# Patient Record
Sex: FEMALE | Race: WHITE | NOT HISPANIC OR LATINO | Employment: OTHER | URBAN - METROPOLITAN AREA
[De-identification: names, ages, dates, MRNs, and addresses within clinical notes are randomized per-mention and may not be internally consistent; named-entity substitution may affect disease eponyms.]

---

## 2017-02-10 ENCOUNTER — ALLSCRIPTS OFFICE VISIT (OUTPATIENT)
Dept: OTHER | Facility: OTHER | Age: 62
End: 2017-02-10

## 2017-02-10 DIAGNOSIS — G89.4 CHRONIC PAIN SYNDROME: ICD-10-CM

## 2017-02-10 DIAGNOSIS — R73.9 HYPERGLYCEMIA: ICD-10-CM

## 2017-02-10 DIAGNOSIS — J44.9 CHRONIC OBSTRUCTIVE PULMONARY DISEASE (HCC): ICD-10-CM

## 2017-02-10 DIAGNOSIS — E03.9 HYPOTHYROIDISM: ICD-10-CM

## 2017-02-10 DIAGNOSIS — J18.9 PNEUMONIA: ICD-10-CM

## 2017-04-13 ENCOUNTER — ALLSCRIPTS OFFICE VISIT (OUTPATIENT)
Dept: OTHER | Facility: OTHER | Age: 62
End: 2017-04-13

## 2018-01-09 NOTE — RESULT NOTES
Verified Results  (1) CBC/ PLT (NO DIFF) [id number] 08:44AM [email or web address] Order Number: [id number]     Order Number: [id number]     Test Name Result Flag Reference   HEMATOCRIT 50 9 % H 34 8-46 1   HEMOGLOBIN 16 3 g/dL H 11 5-15 4   MCHC 32 0 g/dL  31 4-37 4   MCH 29 0 pg  26 8-34 3   MCV 91 fL  82-98   PLATELET COUNT 152 Thousands/uL  149-390   RBC COUNT 5 62 Million/uL H 3 81-5 12   RDW 16 0 % H 11 6-15 1   WBC COUNT 8 10 Thousand/uL  4 31-10 16   MPV 13 1 fL H 8 9-12 7     (1) COMPREHENSIVE METABOLIC PANEL [id number] 87:32WU [email or web address] Order Number: [id number]      National Kidney Disease Education Program recommendations are as follows:  GFR calculation is accurate only with a steady state creatinine  Chronic Kidney disease less than 60 ml/min/1 73 sq  meters  Kidney failure less than 15 ml/min/1 73 sq  meters  Test Name Result Flag Reference   GLUCOSE,RANDM 110 mg/dL     If the patient is fasting, the ADA then defines impaired fasting glucose as > 100 mg/dL and diabetes as > or equal to 123 mg/dL     SODIUM 142 mmol/L  136-145   POTASSIUM 4 5 mmol/L  3 5-5 3   CHLORIDE 101 mmol/L  100-108   CARBON DIOXIDE 34 mmol/L H 21-32   ANION GAP (CALC) 7 mmol/L  4-13   BLOOD UREA NITROGEN 12 mg/dL  5-25   CREATININE 1 11 mg/dL  0 60-1 30   Standardized to IDMS reference method   CALCIUM 8 4 mg/dL  8 3-10 1   BILI, TOTAL 0 35 mg/dL  0 20-1 00   ALK PHOSPHATAS 107 U/L     ALT (SGPT) 37 U/L  12-78   AST(SGOT) 22 U/L  5-45   ALBUMIN 3 5 g/dL  3 5-5 0   TOTAL PROTEIN 7 7 g/dL  6 4-8 2   eGFR Non-African American 50 5 ml/min/1 73sq m       (1) LIPID PANEL, FASTING [id number] 08:44AM [email or web address] Order Number: [id number]      Triglyceride:         Normal              <150 mg/dl       Borderline High    150-199 mg/dl       High               200-499 mg/dl       Very High          >499 mg/dl  Cholesterol:         Desirable        <200 mg/dl      Borderline High  200-239 mg/dl High             >239 mg/dl  HDL Cholesterol:        High    >59 mg/dL      Low     <41 mg/dL  LDL CALCULATED:    This screening LDL is a calculated result  It does not have the accuracy of the Direct Measured LDL in the monitoring of patients with hyperlipidemia and/or statin therapy  Direct Measure LDL (YTA988) must be ordered separately in these patients  Test Name Result Flag Reference   CHOLESTEROL 166 mg/dL     HDL,DIRECT 38 mg/dL L 40-60   TRIGLYCERIDES 134 mg/dL  <=150     (1) TSH [id number] 08:44AM [facility] Order Number: [id number]    Patients undergoing fluorescein dye angiography may retain small amounts of fluorescein in the body for 48-72 hours post procedure  Samples containing fluorescein can produce falsely depressed TSH values  If the patient had this procedure,a specimen should be resubmitted post fluorescein clearance          The recommended reference ranges for TSH during pregnancy are as follows:  First trimester 0 1 to 2 5 uIU/mL  Second trimester  0 2 to 3 0 uIU/mL  Third trimester 0 3 to 3 0 uIU/m     Test Name Result Flag Reference   TSH 9 278 uIU/mL H 0 358-3 740     (1) T4, FREE [id number] 08:44AM [facility] Order Number: [id number]     Test Name Result Flag Reference   T4,FREE 1 02 ng/dL  0 76-1 46        Pt aware1      1 Amended By: Lalita Andersen; Mar 10 2016 11:22 AM EST    Plan  PMH: History of hyperthyroidism, PMH: Thyroid Surgery Total Thyroidectomy    · Changed: From  Levothyroxine Sodium 100 MCG Oral Tablet TAKE 1 TABLET DAILY AS      DIRECTED To Levothyroxine Sodium 112 MCG Oral Tablet (Synthroid) TAKE 1 TABLET  DAILY    Discussion/Summary   please notify pt that her BW showed her thyroid tests were very elevated indicating she is not on enough thyroid med - will need to increase dose to 112 mcg 1 tab PO q day and recheck BW in 6 wks - will give pt order and review rest of BW at next appt in 6 wks; rx sent to her pharmacy

## 2018-01-11 NOTE — RESULT NOTES
Verified Results  (1) T4, FREE [id number] 11:34AM Conrado Roach     Test Name Result Flag Reference   T4, FREE 1 0 ng/dL  0 8-1 8     (1) CBC/PLT/DIFF [id number] 11:34AM Conrado Roach     Test Name Result Flag Reference   WHITE BLOOD CELL COUNT 11 7 Thousand/uL H 3 8-10 8   RED BLOOD CELL COUNT 5 19 Million/uL H 3 80-5 10   HEMOGLOBIN 15 5 g/dL  11 7-15 5   HEMATOCRIT 48 8 % H 35 0-45 0   MCV 94 0 fL  80 0-100 0   MCH 29 8 pg  27 0-33 0   MCHC 31 7 g/dL L 32 0-36 0   RDW 15 3 % H 11 0-15 0   PLATELET COUNT 712 Thousand/uL  140-400   MPV 10 9 fL  7 5-11 5   ABSOLUTE NEUTROPHILS 8213 cells/uL H 5601-2535   ABSOLUTE LYMPHOCYTES 2539 cells/uL  850-3900   ABSOLUTE MONOCYTES 737 cells/uL  200-950   ABSOLUTE EOSINOPHILS 176 cells/uL     ABSOLUTE BASOPHILS 35 cells/uL  0-200   NEUTROPHILS 70 2 %     LYMPHOCYTES 21 7 %     MONOCYTES 6 3 %     EOSINOPHILS 1 5 %     BASOPHILS 0 3 %       (Q) TSH, 3RD GENERATION [id number] 11:34AM Conrado Roach   REPORT COMMENT:  FASTING:NO     Test Name Result Flag Reference   TSH 4 71 mIU/L H 0 40-4 50        Pt aware  1      1 Amended By: Remington Crane; May 24 2016 3:50 PM EST    Plan  PMH: History of hyperthyroidism, PMH: Thyroid Surgery Total Thyroidectomy    · Changed: From  Levothyroxine Sodium 112 MCG Oral Tablet TAKE 1 TABLET DAILY To  Levothyroxine Sodium 125 MCG Oral Tablet take 1 tablet every day    Discussion/Summary   please notify pt that her thyroid tests have improved but are better - need to increase levothyroxine to 125 mcg 1 tab PO q day and will need to recheck in 6 wks - will give pt order at her appt in July

## 2018-01-11 NOTE — MISCELLANEOUS
Message   Recorded as Task   Date: 05/27/2016 09:11 AM, Created By: Nichelle Cr   Task Name: Medical Complaint Callback   Assigned To: Nichelle Cr   Regarding Patient: Bailey Parekh, Status: Active   Comment:    Tomasa Box - 27 May 2016 9:11 AM     TASK CREATED  Caller: Self; Medical Complaint; [phone] (Home)  Pt woke up with pink eye  Red and crusty  She is unable to come in  Could you call something into [facility]? No need to callback if you are ordeing med  Thanks  Olena Marc - 27 May 2016 11:09 AM     TASK REPLIED TO: Previously Assigned To Olena Marc  rx for sulfacetamide eye gtt sent to [facility] - 27 May 2016 11:21 AM     TASK EDITED  Pt also needs a note to say she was prescirbed the med  (I guess she lives in a group home)  Thanks  Please fax to [phone]  Olena Marc - 27 May 2016 11:45 AM     TASK REPLIED TO: Previously Assigned To Olena Marc  rx written and given to Devonte Dewitt - 27 May 2016 11:54 AM     TASK EDITED  Note faxed and completed  Active Problems    1  Acquired hypothyroidism (244 9) (E03 9)   2  Acute conjunctivitis (372 00) (H10 30)   3  Chronic obstructive pulmonary disease (496) (J44 9)   4  Chronic pain disorder (338 4) (G89 4)   5  Chronic pain of right upper extremity (729 5,338 29) (G89 29,M79 601)   6  Colonoscopy (Fiberoptic) Screening   7  Depression (311) (F32 9)   8  Encounter for PPD test (V74 1) (Z11 1)   9  Encounter for screening mammogram for malignant neoplasm of breast (V76 12)   (Z12 31)   10  Erythrocytosis (289 0) (D75 1)   11  Fibromyalgia (729 1) (M79 7)   12  Fracture of nasal bone (802 0) (S02  2XXA)   13  Gastritis (535 50) (K29 70)   14  History of hyperthyroidism (V12 29) (Z86 39)   15  Hyperglycemia (790 29) (R73 9)   16  Insomnia (780 52) (G47 00)   17  Irritable bowel syndrome with diarrhea (564 1) (K58 0)   18  Osteoporosis screening (V82 81) (Z13 820)   19   Screening for colon cancer (V76 51) (Z12 11)    Current Meds   1  Advair Diskus 250-50 MCG/DOSE Inhalation Aerosol Powder Breath Activated; inhale 1   puff twice daily; Therapy: 65FMP6500 to (Evaluate:61Lul1805)  Requested for: 93UIG0872 Recorded   2  BusPIRone HCl - 10 MG Oral Tablet; 1 tab PO qid; Therapy: 16OIG4357 to Recorded   3  Carisoprodol 250 MG Oral Tablet; 1 tab PO q 12 hrs prn; Therapy: 70Thi1814 to (Evaluate:03Wxq1064); Last Rx:28Apr2016 Ordered   4  Effexor  MG Oral Capsule Extended Release 24 Hour; take 1 capsule by mouth   once daily; Therapy: 11Vsi2913 to Recorded   5  Levothyroxine Sodium 125 MCG Oral Tablet; take 1 tablet every day; Therapy: 62Hgf8011 to (Evaluate:26Tfb9397)  Requested for: 47JPY2562; Last   Rx:24May2016 Ordered   6  Lyrica 200 MG Oral Capsule; 1 tab po tid; Therapy: 28VAP2005 to (Evaluate:46Jci7193); Last Rx:28Apr2016 Ordered   7  Omeprazole 20 MG Oral Capsule Delayed Release; 1 tab po bid; Therapy: 71SAS1889 to Recorded   8  Prochlorperazine Maleate 10 MG Oral Tablet; take 1 tablet by mouth every 8 hours if   needed for nausea; Therapy: 47NGM3071 to (Evaluate:94Trw7353) Recorded   9  Sulfacetamide Sodium 10 % Ophthalmic Solution; INSTILL 2 DROP Every 4 hours As   Directed while awake x 7 days; Therapy: 56GKD2651 to (Last Rx:27May2016)  Requested for: 84GKY2496 Ordered   10  Ventolin  (90 Base) MCG/ACT Inhalation Aerosol Solution; INHALE 2 PUFFS    EVERY 4 HOURS AS NEEDED FOR COUGH AND WHEEZE;    Therapy: 13Exq1545 to (Evaluate:18Mar2016)  Requested for: 35WVA5888 Recorded   11  Voltaren 1 % Transdermal Gel; GEL Top X 30; Therapy: 33SUM7245 to (Evaluate:09Apr2016)  Requested for: 77ZRO9924; Last    Rx:10Mar2016 Ordered    Allergies    1  Levaquin TABS   2   Penicillins    Signatures   Electronically signed by : Antoine Bales DO; May 27 2016 12:02PM EST                       (Author)

## 2018-01-11 NOTE — MISCELLANEOUS
Message   Recorded as Task   Date: 03/28/2016 12:36 PM, Created By: Leatha Neely   Task Name: Medical Complaint Callback   Assigned To: [facility]   Regarding Patient: Alexander Stephens, Status: Active   Comment:    Bela Garcia - 28 Mar 2016 12:36 PM     TASK CREATED  Caller: Self; Medical Complaint; [phone] (Home)  in [facility] 3/26/2016 - records in chart    fell out of bed - stitches in head, broken nose    was given #16 vicodin    still in a lot of pain, asking for more vicodin    pcb   Olena Marc - 28 Mar 2016 1:05 PM     TASK REPLIED TO: Previously Assigned To Yari Anderson  rx for hydrocodone written for 7 days but no further refills will be given at all - rx given to Marivel Beauchamp - 28 Mar 2016 1:16 PM     TASK REPLIED TO: Previously Assigned To [address] pt rx for 7 days can be picked up and we will not fill for anymore once it is gone        Active Problems    1  Acquired hypothyroidism (244 9) (E03 9)   2  Chronic obstructive pulmonary disease (496) (J44 9)   3  Chronic pain disorder (338 4) (G89 4)   4  Depression (311) (F32 9)   5  Fibromyalgia (729 1) (M79 7)   6  Fracture of nasal bone (802 0) (S02  2XXA)   7  Gastritis (535 50) (K29 70)   8  History of hyperthyroidism (V12 29) (Z86 39)   9  Insomnia (780 52) (G47 00)   10  Irritable bowel syndrome with diarrhea (564 1) (K58 0)    Current Meds   1  Advair Diskus 250-50 MCG/DOSE Inhalation Aerosol Powder Breath Activated; inhale 1   puff twice daily; Therapy: 23ISF8450 to (Evaluate:70Lwj0132)  Requested for: 35IKV2798 Recorded   2  BusPIRone HCl - 10 MG Oral Tablet; 1 tab PO qid; Therapy: 02CUB0284 to Recorded   3  Hydrocodone-Acetaminophen 5-325 MG Oral Tablet; 1 tab PO q 6 hrs prn severe pain; Therapy: 15YGM8550 to (Evaluate:04Apr2016); Last Rx:28Mar2016 Ordered   4  Levothyroxine Sodium 112 MCG Oral Tablet; TAKE 1 TABLET DAILY;    Therapy: 33Ufx8782 to (Evaluate:30Bmd0298) Requested for: 31KGJ6130; Last   Rx:06Mar2016 Ordered   5  Lyrica 100 MG Oral Capsule; 1 tab PO qid; Therapy: 60CBV9634 to (Evaluate:27Apr2016); Last Rx:28Jan2016 Ordered   6  Omeprazole 20 MG Oral Capsule Delayed Release; 1 tab po bid; Therapy: 92HMW6251 to Recorded   7  PARoxetine HCl - 40 MG Oral Tablet; take 1 tab PO q day; Therapy: 78WFO2861 to (Evaluate:20Ccg7923)  Requested for: 08CKM1267 Recorded   8  Prochlorperazine Maleate 10 MG Oral Tablet; take 1 tablet by mouth every 8 hours if   needed for nausea; Therapy: 74SZJ7662 to (Evaluate:71Bjp3784) Recorded   9  Ventolin  (90 Base) MCG/ACT Inhalation Aerosol Solution; INHALE 2 PUFFS   EVERY 4 HOURS AS NEEDED FOR COUGH AND WHEEZE;   Therapy: 14Apr2011 to (Evaluate:18Mar2016)  Requested for: 75FNC3809 Recorded   10  Voltaren 1 % Transdermal Gel; GEL Top X 30; Therapy: 11WVV4810 to (Evaluate:09Apr2016)  Requested for: 12FBG9171; Last    Rx:10Mar2016 Ordered    Allergies    1  Levaquin TABS   2   Penicillins    Signatures   Electronically signed by : Sarah Manzo DO; Mar 28 2016  1:26PM EST                       (Author)

## 2018-01-12 NOTE — MISCELLANEOUS
Provider Comments  Provider Comments:   pt no showed for today's appt - task will be sent to staff to call and have pt reschedule      Signatures   Electronically signed by : Maricarmen Mar DO; Dec 15 2016 12:15PM EST                       (Author)

## 2018-01-12 NOTE — MISCELLANEOUS
Message   Recorded as Task   Date: 08/04/2016 02:14 PM, Created By: Cristobal Landers   Task Name: Medical Complaint Callback   Assigned To: Cristobal Landers   Regarding Patient: Merlin De La O, Status: Active   CommentRafael Yates - 04 Aug 2016 2:14 PM     TASK CREATED  Caller: Self; Medical Complaint; [phone] (Home)  Pt is on oxydocodone 5/325-one every 12 hours  She wants to know if she could take it every 8 hours  The ankle hurts so bad---she sees the surgeon on Tuesday  She just can't take the pain  If this is OK, please send new directions and script to ----[phone]  [phone]  Olena Marc - 04 Aug 2016 2:43 PM     TASK REPLIED TO: Previously Assigned To Olena Marc                      no con't current Oxy dose until she see's surgeon   Tomasa Box - 04 Aug 2016 3:13 PM     TASK EDITED  She wants to stay on two a day-but take it at [address]  She takes soma to sleep  If this is ok she needs new directions sent over  Please advise  Wisam West - 04 Aug 2016 3:34 PM     TASK REPLIED TO: Previously Assigned To Wisam West            note written and given to Ida Adhikari - 04 Aug 2016 3:37 PM     TASK EDITED  New note faxed and Pt aware        Active Problems    1  Acquired hypothyroidism (244 9) (E03 9)   2  Chronic obstructive pulmonary disease (496) (J44 9)   3  Chronic pain disorder (338 4) (G89 4)   4  Chronic pain of right upper extremity (729 5,338 29) (G89 29,M79 601)   5  Colonoscopy (Fiberoptic) Screening   6  Depression (311) (F32 9)   7  Encounter for PPD test (V74 1) (Z11 1)   8  Encounter for screening mammogram for malignant neoplasm of breast (V76 12)   (Z12 31)   9  Erythrocytosis (289 0) (D75 1)   10  Fibromyalgia (729 1) (M79 7)   11  Fracture of nasal bone (802 0) (S02  2XXA)   12  Gastritis (535 50) (K29 70)   13  History of hyperthyroidism (V12 29) (Z86 39)   14  Hyperglycemia (790 29) (R73 9)   15  Insomnia (191 52) (G47 00)   16   Irritable bowel syndrome with diarrhea (564 1) (K58 0)   17  Nicotine dependence (305 1) (F17 200)   18  Osteoporosis screening (V82 81) (Z13 820)   19  Primary localized osteoarthritis of ankle, right (715 17) (M19 071)   20  Right ankle pain (719 47) (M25 571)   21  Screening for colon cancer (V76 51) (Z12 11)    Current Meds   1  Acetaminophen 500 MG Oral Tablet; Therapy: (Recorded:01Jun2016) to Recorded   2  Adderall 20 MG Oral Tablet; TAKE 1 TABLET TWICE DAILY; Therapy: (Recorded:01Jun2016) to Recorded   3  Advair Diskus 250-50 MCG/DOSE Inhalation Aerosol Powder Breath Activated; inhale 1   puff twice daily; Therapy: 39BIM0599 to (Evaluate:27Axg8360)  Requested for: 29Jul2016; Last   Rx:01Egc6315 Ordered   4  Bentyl 10 MG Oral Capsule; take 1 capsule 4 times daily; Therapy: (Recorded:01Jun2016) to Recorded   5  BusPIRone HCl - 10 MG Oral Tablet; 1 tab PO qid; Therapy: 21DPJ7694 to Recorded   6  Carisoprodol 250 MG Oral Tablet; 1 tab PO q 12 hrs prn; Therapy: 28Apr2016 to (Evaluate:27Oct2016); Last Rx:23Cyp7480 Ordered   7  Effexor  MG Oral Capsule Extended Release 24 Hour; 150mg and 75 mg taken   by mouth once daily; Therapy: 28Apr2016 to Recorded   8  KlonoPIN 1 MG Oral Tablet; TAKE 1 TABLET 3 TIMES DAILY; Therapy: (Recorded:01Jun2016) to Recorded   9  Levothyroxine Sodium 125 MCG Oral Tablet; 125 mcg 1 tab PO Mon, Wed, Fri, Sun and   con't 112 mcg 1 tab on Tue, Thurs, Sat; Therapy: 27Lpi2893 to (Evaluate:76Bol4058)  Requested for: 29Jul2016 Recorded   10  Lyrica 225 MG Oral Capsule; 1 tab po tid; Therapy: 22FHO0872 to (Evaluate:27Oct2016); Last Rx:74Ppw8070 Ordered   11  Omeprazole 20 MG Oral Capsule Delayed Release; 1 tab po bid; Therapy: 68QSN6484 to Recorded   12  Oxycodone-Acetaminophen 5-325 MG Oral Tablet; 1 tab PO q 8-12 hrs prn severe pain; Therapy: 77PSY8775 to (Evaluate:10Aug2016); Last Rx:37Jmn8344 Ordered   13   Prochlorperazine Maleate 10 MG Oral Tablet; take 1 tablet by mouth every 8 hours if    needed for nausea; Therapy: 14CKE6328 to (Evaluate:36Dww1443) Recorded   14  Sulfacetamide Sodium 10 % Ophthalmic Solution; INSTILL 2 DROP Every 4 hours As    Directed while awake x 7 days; Therapy: 63RDN5322 to (Last Rx:27May2016)  Requested for: 51UCG7535 Ordered   15  Synthroid 112 MCG Oral Tablet; TAKE 1 TABLET DAILY AS DIRECTED; Therapy: (Recorded:01Jun2016) to Recorded   16  Ventolin  (90 Base) MCG/ACT Inhalation Aerosol Solution; INHALE 2 PUFFS    EVERY 4 HOURS AS NEEDED FOR COUGH AND WHEEZE;    Therapy: 50Tud3644 to (Evaluate:18Mar2016)  Requested for: 01CAZ9397 Recorded   17  Voltaren 1 % Transdermal Gel; GEL Top X 30; Therapy: 29BGX1093 to (Evaluate:04Sba3519)  Requested for: 15YBX1181; Last    Rx:35Apa7089 Ordered    Allergies    1  Levaquin TABS   2   Penicillins    Signatures   Electronically signed by : Jessica Alarcon DO; Aug  4 2016  3:42PM EST                       (Author)

## 2018-01-13 NOTE — PROGRESS NOTES
Assessment   1  Encounter for preventive health examination (V70 0) (Z00 00)1   2  Chronic pain disorder (338 4) (G89 4)  3  Chronic obstructive pulmonary disease (496) (J44 9)  4  Depression (311) (F32 9)  5  Acquired hypothyroidism (244 9) (E03 9)  6  Fibromyalgia (729 1) (M79 7)     1 Amended By: Dania Nair; Mar 04 2016 10:33 AM EST    Discussion/Summary    Completed forms to be faxed  add voltaren gel to see if this help for her arm pain  advised need for pain mgt  advised f/u with psychiatry as well  continue same medications for now      will need to again encourage preventative measures in future visits    Impression: Initial Annual Wellness Visit  Cardiovascular screening and counseling: screening is current  Diabetes screening and counseling: screening is current  Colorectal cancer screening and counseling: the patient declines screening  Cervical cancer screening and counseling: the patient declines screening  Osteoporosis screening and counseling: the patient declines screening  Abdominal aortic aneurysm screening and counseling: screening not indicated  Glaucoma screening and counseling: the risks and benefits of screening were discussed and screening is current  HIV screening and counseling: screening not indicated  Advance Directive Planning: not complete, she was encouraged to follow-up with me to discuss her questions and/or decisions  Patient Discussion: follow-up visit needed in 1 month as scheduled  Chief Complaint  AWV/physical      History of Present Illness  HPI: patient here needing a formal physical examination  with forms faxed to [employer]  Was recently seen for multiple chronic conditions  She is currently looking for pain mgt as well as psychiatry  She reports h/o severe right arm pain due to previous injury and surgeries  Already on lyrica 100 QID  Reports this is not helping  At some point she was on multiple narcotics for this  She has not been able to see pain mgt again/yet  reviewed AWV questions   Welcome to Medicare and Wellness Visits: The patient is being seen for the initial annual wellness visit  Medicare Screening and Risk Factors   Hospitalizations: no previous hospitalizations  Medicare Screening Tests Risk Questions   Abdominal aortic aneurysm risk assessment: none indicated  Osteoporosis risk assessment: , female gender, over 48years of age and tobacco use  HIV risk assessment: none indicated  Drug and Alcohol Use: The patient is a current cigarette smoker  She wants resources to help with quitting and wants treatment to quit tobacco use  The patient reports never drinking alcohol  She has never used illicit drugs  Diet and Physical Activity: Current diet includes low carbohydrate food choices, low salt food choices, 1 cups of coffee per day and 2-3 cups of tea per day  She exercises daily  Exercise: walking 20 minutes per day  Mood Disorder and Cognitive Impairment Screening: PHQ-9 Depression Scale   Over the past 2 weeks, how often have you been bothered by the following problems? 1 ) Little interest or pleasure in doing things? Several days  2 ) Feeling down, depressed or hopeless? Several days  3 ) Trouble falling asleep or sleeping too much? Nearly every day  4 ) Feeling tired or having little energy? Nearly every day  5 ) Poor appetite or overeating? Nearly every day  6 ) Feeling bad about yourself, or that you are a failure, or have let yourself or your family down? Not at all    7 ) Trouble concentrating on things, such as reading a newspaper or watching television? Half the days or more  8 ) Moving or speaking so slowly that other people could have noticed, or the opposite, moving or speaking faster than usual? Not at all    9 ) Thoughts that you would be off dead or of hurting yourself in some way? Not at all     Functional Ability/Level of Safety: Hearing is a hearing aid is not used  The patient is currently able to do activities of daily living without limitations and able to do instrumental activities of daily living without limitations  Activities of daily living details: transportation help needed  Fall risk factors:  previous fall and walking in snow, but The patient fell 1 times in the past 12 months  Advance Directives: Advance directives: no living will, no durable power of  for health care directives and no advance directives  Co-Managers and Medical Equipment/Suppliers: See Patient Care Team      Review of Systems    Constitutional: negative  Head and Face: negative  ENT: negative  Cardiovascular: negative  Respiratory: negative  Gastrointestinal: negative  Genitourinary: negative  Musculoskeletal: as noted in HPI  Active Problems   1  Acquired hypothyroidism (244 9) (E03 9)  2  Chronic obstructive pulmonary disease (496) (J44 9)  3  Chronic pain disorder (338 4) (G89 4)  4  Depression (311) (F32 9)  5  Fibromyalgia (729 1) (M79 7)  6  Gastritis (535 50) (K29 70)  7  History of hyperthyroidism (V12 29) (Z86 39)  8  Insomnia (780 52) (G47 00)  9  Irritable bowel syndrome with diarrhea (564 1) (K58 0)    Past Medical History    · History of hyperthyroidism (V12 29) (Z86 39)   · History of thyroid nodule (V12 29) (Z86 39)   · History of Non-traumatic compartment syndrome of right upper extremity (729 71)  (M79 A11)    The active problems and past medical history were reviewed and updated today        Surgical History    · History of Ankle Surgery   · History of Biopsy Thyroid Using Percutaneous Core Needle   · History of  Section   · History of Forearm Decompression Fasciotomy   · History of Hysterectomy (V88 01)   · History of Knee Surgery   · History of Thyroid Surgery Total Thyroidectomy    Family History    · Family history of Diabetes (250 00) (E11 9)   · Family history of lung cancer (V16 1) (Z80 1)    · Family history unknown (V49 89) (Z78 9)    · Family history of Diabetes (250 00) (E11 9)    · Family history of malignant neoplasm of testis (V16 43) (Z80 43)    The family history was reviewed and updated today  Social History    · Current every day smoker (305 1) (F17 200)   ·    · No drug use   · Non drinker / no alcohol use  The social history was reviewed and updated today  Current Meds  1  Advair Diskus 250-50 MCG/DOSE Inhalation Aerosol Powder Breath Activated; inhale 1   puff twice daily; Therapy: 67SWA4391 to (Evaluate:52Irb8893)  Requested for: 92IYY7616 Recorded  2  BusPIRone HCl - 10 MG Oral Tablet; 1 tab PO qid; Therapy: 37FCU1393 to Recorded  3  Levothyroxine Sodium 100 MCG Oral Tablet; TAKE 1 TABLET DAILY AS     DIRECTED; Therapy: 06Ulb4074 to ([phone])  Requested for: 45OCC5883; Last   Rx:28Jan2016 Ordered  4  Lyrica 100 MG Oral Capsule; 1 tab PO qid; Therapy: 21FTR7716 to (Evaluate:39Uuc7950); Last Rx:28Jan2016 Ordered  5  Omeprazole 20 MG Oral Capsule Delayed Release; 1 tab po bid; Therapy: 56DOU8435 to Recorded  6  PARoxetine HCl - 40 MG Oral Tablet; take 1 tab PO q day; Therapy: 01QEV9652 to (Evaluate:96Ojm1125)  Requested for: 85YBF7774 Recorded  7  Prochlorperazine Maleate 10 MG Oral Tablet; take 1 tablet by mouth every 8 hours if   needed for nausea; Therapy: 86IUG3474 to (Evaluate:57Qav2399) Recorded  8  Ventolin  (90 Base) MCG/ACT Inhalation Aerosol Solution; INHALE 2 PUFFS   EVERY 4 HOURS AS NEEDED FOR COUGH AND WHEEZE;   Therapy: 30Ioe4755 to (Evaluate:43Csu5270)  Requested for: 78RTE1017 Recorded    Allergies   1  Levaquin TABS  2   Penicillins    Vitals  Signs [Data Includes: Current Encounter]    Temperature: 98 9 F, Tympanic  Heart Rate: 80, L Radial  Pulse Quality: Norm  Respiration: 16  Respiration Quality: Norm  Systolic: 560, LUE, Sitting  Diastolic: 66, LUE, Sitting  Height: 5 ft 5 in  Weight: 207 lb 2 08 oz  BMI Calculated: 34 47  BSA Calculated: 2 01    Physical Exam    Constitutional   General appearance: No acute distress, well appearing and well nourished  Head and Face   Head and face: Normal     Palpation of the face and sinuses: No sinus tenderness  Eyes   Conjunctiva and lids: No swelling, erythema or discharge  Pupils and irises: Equal, round, reactive to light  Neck   Neck: Supple, symmetric, trachea midline, no masses  Thyroid: Normal, no thyromegaly  Pulmonary   Respiratory effort: No increased work of breathing or signs of respiratory distress  Palpation of chest: Normal     Auscultation of lungs: Clear to auscultation  Cardiovascular   Auscultation of heart: Normal rate and rhythm, normal S1 and S2, no murmurs  Peripheral vascular exam: Normal     Examination of extremities for edema and/or varicosities: Normal     Abdomen   Abdomen: Non-tender, no masses  Liver and spleen: No hepatomegaly or splenomegaly  Lymphatic   Palpation of lymph nodes in neck: No lymphadenopathy  Constitutional   General appearance: No acute distress, well appearing and well nourished  Eyes   Conjunctiva and lids: No swelling, erythema or discharge  Ears, Nose, Mouth, and Throat   External inspection of ears and nose: Normal     Otoscopic examination: Abnormal   R TM occluded by cerumen  Oropharynx: Normal with no erythema, edema, exudate or lesions  upper and lower dentures  Pulmonary   Respiratory effort: No increased work of breathing or signs of respiratory distress  Auscultation of lungs: Clear to auscultation  Cardiovascular   Auscultation of heart: Normal rate and rhythm, normal S1 and S2, without murmurs  Abdomen generalized tenderness; no rebound or guarding  Liver and spleen: No hepatomegaly or splenomegaly  Lymphatic   Palpation of lymph nodes in neck: No lymphadenopathy  Skin scar R forearm     Psychiatric   Orientation to person, place, and time: Normal     Mood and affect: Normal  Future Appointments    Date/Time Provider Specialty Site   04/28/2016 09:00 AM Katherine Briscoe DO Internal Medicine Alok Drew MD     Signatures   Electronically signed by : Louise Madsen MD; Mar  4 2016 10:33AM EST                       (Author)

## 2018-01-14 VITALS
WEIGHT: 212 LBS | HEIGHT: 65 IN | HEART RATE: 92 BPM | SYSTOLIC BLOOD PRESSURE: 120 MMHG | TEMPERATURE: 97.5 F | DIASTOLIC BLOOD PRESSURE: 70 MMHG | BODY MASS INDEX: 35.32 KG/M2

## 2018-01-15 NOTE — MISCELLANEOUS
Message   Recorded as Task   Date: 05/05/2016 12:42 PM, Created By: Gagan Murcia   Task Name: Medical Complaint Callback   Assigned To: Jordan Mcgarry   Regarding Patient: Kamar Conley, Status: Active   Comment:    Bela Garcia - 05 May 2016 12:42 PM     TASK CREATED  Caller: Self; Medical Complaint; [phone] (Home)  feeling of needing to urinate, but cannot go/or goes only a small amountonce getting into bathroom -  no burning or foul odor    still with pain in ankle and leg, radiating up the leg   John PaulmarcusTomasa - 05 May 2016 1:17 PM     TASK EDITED  Pt called  I called her back and she said she knows she doesn't have a UTI--shes had one before and its not like that  She went just a couple minutes before I called her back  She did have her pshych doctor up her anxiety pills but she is doing OK with that  She didn't want an appt today or even next week with you  She will call back if it gets worse or her pain gets worse  Just an FYI>        Active Problems    1  Acquired hypothyroidism (244 9) (E03 9)   2  Chronic obstructive pulmonary disease (496) (J44 9)   3  Chronic pain disorder (338 4) (G89 4)   4  Chronic pain of right upper extremity (729 5,338 29) (G89 29,M79 601)   5  Colonoscopy (Fiberoptic) Screening   6  Depression (311) (F32 9)   7  Encounter for PPD test (V74 1) (Z11 1)   8  Encounter for screening mammogram for malignant neoplasm of breast (V76 12)   (Z12 31)   9  Erythrocytosis (289 0) (D75 1)   10  Fibromyalgia (729 1) (M79 7)   11  Fracture of nasal bone (802 0) (S02  2XXA)   12  Gastritis (535 50) (K29 70)   13  History of hyperthyroidism (V12 29) (Z86 39)   14  Hyperglycemia (790 29) (R73 9)   15  Insomnia (780 52) (G47 00)   16  Irritable bowel syndrome with diarrhea (564 1) (K58 0)   17  Osteoporosis screening (V82 81) (Z13 820)   18  Screening for colon cancer (V76 51) (Z12 11)    Current Meds   1   Advair Diskus 250-50 MCG/DOSE Inhalation Aerosol Powder Breath Activated; inhale 1 puff twice daily; Therapy: 40KYL4216 to (Evaluate:12Dsz0618)  Requested for: 85TDS5022 Recorded   2  BusPIRone HCl - 10 MG Oral Tablet; 1 tab PO qid; Therapy: 29AYY5001 to Recorded   3  Carisoprodol 250 MG Oral Tablet (Soma); 1 tab PO q 12 hrs prn; Therapy: 49Ljp1181 to (Evaluate:38Fmv4611); Last Rx:28Apr2016 Ordered   4  Effexor  MG Oral Capsule Extended Release 24 Hour (Venlafaxine HCl ER); take   1 capsule by mouth once daily; Therapy: 45Tze4810 to Recorded   5  Levothyroxine Sodium 112 MCG Oral Tablet; TAKE 1 TABLET DAILY; Therapy: 61Njs1658 to (Evaluate:72Mlx6096)  Requested for: 73LVV5828; Last   Rx:06Mar2016 Ordered   6  Lyrica 200 MG Oral Capsule; 1 tab po tid; Therapy: 70TBU7329 to (Evaluate:40Opo2935); Last Rx:28Apr2016 Ordered   7  Omeprazole 20 MG Oral Capsule Delayed Release; 1 tab po bid; Therapy: 66HLM0633 to Recorded   8  Prochlorperazine Maleate 10 MG Oral Tablet; take 1 tablet by mouth every 8 hours if   needed for nausea; Therapy: 86RXS8720 to (Evaluate:49Qoa9162) Recorded   9  Ventolin  (90 Base) MCG/ACT Inhalation Aerosol Solution; INHALE 2 PUFFS   EVERY 4 HOURS AS NEEDED FOR COUGH AND WHEEZE;   Therapy: 13Vfv7814 to (Evaluate:18Mar2016)  Requested for: 67SWR6255 Recorded   10  Voltaren 1 % Transdermal Gel (Diclofenac Sodium); GEL Top X 30; Therapy: 79FAY6553 to (Evaluate:09Apr2016)  Requested for: 59AMO2113; Last    Rx:10Mar2016 Ordered    Allergies    1  Levaquin TABS   2   Penicillins    Signatures   Electronically signed by : Blessing Mota DO; May  8 2016 12:55PM EST                       (Author)

## 2018-01-15 NOTE — MISCELLANEOUS
Message   Recorded as Task   Date: 06/13/2016 11:07 AM, Created By: Cindy Jimenez   Task Name: Med Renewal   Assigned To: [address] [place] [address]   Regarding Patient: Bert Hansen, Status: Active   Comment:    Cindy Jimenez - 13 Jun 2016 11:07 AM     TASK CREATED  please renew to [facility], has apt with ortho 07/08/16, would like something stronger than tramadol if she can, please advise, pcb when ready   Arleen Morfin - 13 Jun 2016 3:20 PM     TASK REPLIED TO: Previously Assigned To Lalito Mckenna  Patient is aware you are not in 06/13/2016  Spoke with patient and states she would have to call her ortho specialist for a change of dose of tramadol  Patient states she called and was told she has an appointment 07/08/2016 with a surgeon and she should ask her PCP for a stronger dose  Told patient her ortho is the ordering physician of tramadol and that is unusual they would have her call her PCP and not change the dose  Please Advise  Olena Marc - 13 Jun 2016 8:43 PM     TASK REPLIED TO: Previously Assigned To Olena Marc  with all of her other meds I do not feel increasing the dose of the Tramadol is appropriate - will con't current meds and f/u wtith Jess Form - 14 Jun 2016 8:56 AM     TASK REPLIED TO: Previously Assigned To [address] [place] [address]  Pt aware        Active Problems    1  Acquired hypothyroidism (244 9) (E03 9)   2  Acute conjunctivitis (372 00) (H10 30)   3  Chronic obstructive pulmonary disease (496) (J44 9)   4  Chronic pain disorder (338 4) (G89 4)   5  Chronic pain of right upper extremity (729 5,338 29) (G89 29,M79 601)   6  Colonoscopy (Fiberoptic) Screening   7  Depression (311) (F32 9)   8  Encounter for PPD test (V74 1) (Z11 1)   9  Encounter for screening mammogram for malignant neoplasm of breast (V76 12)   (Z12 31)   10  Erythrocytosis (289 0) (D75 1)   11  Fibromyalgia (729 1) (M79 7)   12   Fracture of nasal bone (802 0) (S02 2XXA)   13  Gastritis (535 50) (K29 70)   14  History of hyperthyroidism (V12 29) (Z86 39)   15  Hyperglycemia (790 29) (R73 9)   16  Insomnia (780 52) (G47 00)   17  Irritable bowel syndrome with diarrhea (564 1) (K58 0)   18  Osteoporosis screening (V82 81) (Z13 820)   19  Primary localized osteoarthritis of ankle, right (715 17) (M19 071)   20  Right ankle pain (719 47) (M25 571)   21  Screening for colon cancer (V76 51) (Z12 11)    Current Meds   1  Acetaminophen 500 MG Oral Tablet; Therapy: (Recorded:01Jun2016) to Recorded   2  Adderall 20 MG Oral Tablet; TAKE 1 TABLET TWICE DAILY; Therapy: (Recorded:01Jun2016) to Recorded   3  Advair Diskus 250-50 MCG/DOSE Inhalation Aerosol Powder Breath Activated; inhale 1   puff twice daily; Therapy: 41XHG8238 to (Evaluate:17Mjh5572)  Requested for: 61SFO1617 Recorded   4  Bentyl 10 MG Oral Capsule; take 1 capsule 4 times daily; Therapy: (Recorded:01Jun2016) to Recorded   5  BusPIRone HCl - 10 MG Oral Tablet; 1 tab PO qid; Therapy: 22WZW2068 to Recorded   6  Carisoprodol 250 MG Oral Tablet; 1 tab PO q 12 hrs prn; Therapy: 28Apr2016 to (Evaluate:53Hxf3701); Last Rx:28Apr2016 Ordered   7  Effexor  MG Oral Capsule Extended Release 24 Hour; 150mg and 75 mg taken   by mouth once daily; Therapy: 28Apr2016 to Recorded   8  KlonoPIN 1 MG Oral Tablet; TAKE 1 TABLET 3 TIMES DAILY; Therapy: (Recorded:01Jun2016) to Recorded   9  Levothyroxine Sodium 125 MCG Oral Tablet; take 1 tablet every day; Therapy: 48Brg2220 to (Evaluate:89Ahm5774)  Requested for: 07DTI5511; Last   Rx:61Uor9352 Ordered   10  Lyrica 200 MG Oral Capsule; 1 tab po tid; Therapy: 73VBX3479 to (Evaluate:51Hen2866); Last Rx:28Apr2016 Ordered   11  Omeprazole 20 MG Oral Capsule Delayed Release; 1 tab po bid; Therapy: 48VIB3189 to Recorded   12  Prochlorperazine Maleate 10 MG Oral Tablet; take 1 tablet by mouth every 8 hours if    needed for nausea;     Therapy: 51DZL9531 to (Evaluate:27Feb2016) Recorded   13  Sulfacetamide Sodium 10 % Ophthalmic Solution; INSTILL 2 DROP Every 4 hours As    Directed while awake x 7 days; Therapy: 40JXV4397 to (Last Rx:27May2016)  Requested for: 97GXZ9843 Ordered   14  Synthroid 112 MCG Oral Tablet; TAKE 1 TABLET DAILY AS DIRECTED; Therapy: (Recorded:01Jun2016) to Recorded   15  TraMADol HCl - 50 MG Oral Tablet; TAKE 1 TABLET EVERY 8 HOURS AS NEEDED; Therapy: 67XII0311 to (Evaluate:11Jun2016); Last Rx:01Jun2016 Ordered   16  Ventolin  (90 Base) MCG/ACT Inhalation Aerosol Solution; INHALE 2 PUFFS    EVERY 4 HOURS AS NEEDED FOR COUGH AND WHEEZE;    Therapy: 14Apr2011 to (Evaluate:18Mar2016)  Requested for: 07WOV2992 Recorded   17  Voltaren 1 % Transdermal Gel; GEL Top X 30; Therapy: 61WCA2180 to (Evaluate:24Sep2016)  Requested for: 55AFP0670; Last    Rx:27May2016 Ordered    Allergies    1  Levaquin TABS   2   Penicillins    Signatures   Electronically signed by : Ihsan Diamond DO; Jun 14 2016 12:24PM EST                       (Author)

## 2018-01-16 NOTE — MISCELLANEOUS
Provider Comments  Provider Comments:   pt no showed for today's appt; task will be sent to staff to call pt and have her do BW and reschedule appt      Signatures   Electronically signed by : Rankin Angelucci, DO;  Apr 13 2017  1:44PM EST                       (Author)

## 2018-01-17 NOTE — MISCELLANEOUS
Message   Recorded as Task   Date: 09/27/2016 03:31 PM, Created By: Shamika Meadows   Task Name: Call Back   Assigned To: [address] [place] [address]   Regarding Patient: Bailey Parekh, Status: Active   Comment:    Shamika Meadows - 27 Sep 2016 3:31 PM     TASK CREATED  Patient calling because she is home bound right now from ankle surgery  She is in need of blood work to check her thyroid so she would like for the nurses to draw her blood when they come in for a visit  Jovanna Bennett can be reached at [phone]  Tomasa Box - 27 Sep 2016 3:36 PM     TASK REASSIGNED: Previously Assigned To [address] [place] [address]  Please advise  (she is suppose to come in on Thursday at 9:00---I wonder if she is going to cancel?)   Olena Marc - 27 Sep 2016 4:44 PM     TASK REPLIED TO: Previously Assigned To Marcin Lanier                      she was given order at last appt for FBS/A1C and TSH/FT4 - I reprinted these orders - will print on front printer as two separate orders - please make sure pt gets both orders to do before f/u appt - ensure she is keeping that appt on Thursday - if not able to make it for  1 mo out when she should be back on her feet (40 Min)   Tomasa Box - 27 Sep 2016 4:54 PM     TASK EDITED  Pt aware  Orders are mailed out to her  She rescheduled the appt for thursday to 10/28  Active Problems    1  Acquired hypothyroidism (244 9) (E03 9)   2  Chronic obstructive pulmonary disease (496) (J44 9)   3  Chronic pain disorder (338 4) (G89 4)   4  Chronic pain of right upper extremity (729 5,338 29) (G89 29,M79 601)   5  Colonoscopy (Fiberoptic) Screening   6  Depression (311) (F32 9)   7  Encounter for PPD test (V74 1) (Z11 1)   8  Encounter for screening mammogram for malignant neoplasm of breast (V76 12)   (Z12 31)   9  Erythrocytosis (289 0) (D75 1)   10  Fibromyalgia (729 1) (M79 7)   11  Fracture of nasal bone (802 0) (S02  2XXA)   12  Gastritis (535 50) (K29 70)   13  History of hyperthyroidism (V12 29) (Z86 39)   14  History of nicotine dependence (V15 82) (Z87 891)   15  Hyperglycemia (790 29) (R73 9)   16  Insomnia (780 52) (G47 00)   17  Irritable bowel syndrome with diarrhea (564 1) (K58 0)   18  Osteoporosis screening (V82 81) (Z13 820)   19  Preoperative clearance (V72 84) (Z01 818)   20  Primary localized osteoarthritis of ankle, right (715 17) (M19 071)   21  Rhinitis (472 0) (J31 0)   22  Right ankle pain (719 47) (M25 571)   23  Screening for colon cancer (V76 51) (Z12 11)   24  Vertigo (780 4) (R42)    Current Meds   1  Acetaminophen 500 MG Oral Tablet; Therapy: (Recorded:01Jun2016) to Recorded   2  Adderall 20 MG Oral Tablet; TAKE 1 TABLET TWICE DAILY; Therapy: (Recorded:01Jun2016) to Recorded   3  Advair Diskus 250-50 MCG/DOSE Inhalation Aerosol Powder Breath Activated; inhale 1   puff twice daily; Therapy: 54OZD5882 to (Evaluate:60Fck1436)  Requested for: 60Cfp9890; Last   Rx:05Uul7020 Ordered   4  Bentyl 10 MG Oral Capsule; take 1 capsule 4 times daily; Therapy: (Recorded:01Jun2016) to Recorded   5  BusPIRone HCl - 10 MG Oral Tablet; 1 tab PO qid; Therapy: 71IXZ1858 to Recorded   6  Carisoprodol 250 MG Oral Tablet; 1 tab PO q 12 hrs prn; Therapy: 28Apr2016 to (Evaluate:27Oct2016); Last Rx:71Kpg2368 Ordered   7  Effexor  MG Oral Capsule Extended Release 24 Hour; 150mg and 75 mg taken   by mouth once daily; Therapy: 28Apr2016 to Recorded   8  Fluticasone Propionate 50 MCG/ACT Nasal Suspension; USE 2 SPRAYS IN EACH   NOSTRIL ONCE DAILY; Therapy: 90Xlp9841 to (Evaluate:42Xdk5486)  Requested for: 32Xrf5702; Last   Rx:34Gld9991 Ordered   9  KlonoPIN 1 MG Oral Tablet; TAKE 1 TABLET 3 TIMES DAILY; Therapy: (Recorded:01Jun2016) to Recorded   10  Levothyroxine Sodium 125 MCG Oral Tablet; 125 mcg 1 tab PO Mon, Wed, Fri, Sun and    con't 112 mcg 1 tab on Tue, Thurs, Sat;     Therapy: 89Jes2518 to (Evaluate:25Shf0252)  Requested for: 61Gkx1146 Recorded 11  Lyrica 225 MG Oral Capsule; 1 tab po tid; Therapy: 68ASR6700 to (Evaluate:27Oct2016); Last Rx:16Qhh8502 Ordered   12  Meclizine HCl - 25 MG Oral Tablet; TAKE 1 TABLET BY MOUTH EVERY 8 HOURS as    needed for dizziness; Therapy: 02Klz6453 to ([facility])  Requested for: 39Jfy3576; Last    Rx:59Qiw7795 Ordered   13  Omeprazole 20 MG Oral Capsule Delayed Release; 1 tab po bid; Therapy: 26HDC0168 to Recorded   14  Oxycodone-Acetaminophen 5-325 MG Oral Tablet; 1 tab PO q 8-12 hrs prn severe pain; Therapy: 14GHR5131 to (Evaluate:32Dzo2108); Last Rx:89Kzy8846 Ordered   15  Prochlorperazine Maleate 10 MG Oral Tablet; take 1 tablet by mouth every 8 hours if    needed for nausea; Therapy: 70KUB9085 to (Evaluate:66Gpq4644) Recorded   16  Sulfacetamide Sodium 10 % Ophthalmic Solution; INSTILL 2 DROP Every 4 hours As    Directed while awake x 7 days; Therapy: 13PLB7841 to (Last Rx:27May2016)  Requested for: 69QQU7030 Ordered   17  Synthroid 112 MCG Oral Tablet; TAKE 1 TABLET DAILY AS DIRECTED; Therapy: (Recorded:01Jun2016) to Recorded   18  Ventolin  (90 Base) MCG/ACT Inhalation Aerosol Solution; INHALE 2 PUFFS    EVERY 4 HOURS AS NEEDED FOR COUGH AND WHEEZE;    Therapy: 05Qmq8776 to (Evaluate:18Mar2016)  Requested for: 15EAD0450 Recorded   19  Voltaren 1 % Transdermal Gel; GEL Top X 30; Therapy: 38KKA7784 to (Evaluate:94Wfy3886)  Requested for: 66JUQ9418; Last    Rx:65Jbd2765 Ordered    Allergies    1  Levaquin TABS   2   Penicillins    Signatures   Electronically signed by : Wendi Nicholson DO; Sep 27 2016  5:19PM EST                       (Author)

## 2018-01-18 NOTE — MISCELLANEOUS
Message   Recorded as Task   Date: 06/27/2016 09:07 AM, Created By: Zak Elliott   Task Name: Med Renewal Request   Assigned To: [address] [facility]   Regarding Patient: Mayelin Miles, Status: Active   Comment:    Zak Elliott - 27 Jun 2016 9:07 AM     TASK CREATED  Patient calling because she needs to speak to someone regarding her thyroid medication  She states that she "does not feel good" on the dose she is currently taking  [facility] is also requesting a new order for blood work for her thyroid  She can be reached at [phone]  Arleen Morfin - 27 Jun 2016 9:13 AM     TASK REASSIGNED: Previously Assigned To [address] [facility]  This is a Medical complaint call  Yari Anderson - 27 Jun 2016 9:33 AM     TASK EDITED  Pt sees Dr Heaton Cibecue but she is not in today  Patient is tired all the time, about 4pm becomes extremely tired  Stomach upset  Doesnt feel right  Started to get symptoms about 1 week ago  Started new dose of synthroid end of may  Feels this is from the med and would like to go back to dose she was on previously  Do you feel comfortable advising pt? Augusto Conroy - 27 Jun 2016 12:45 PM     TASK REPLIED TO: Previously Assigned To Augusto Conroy  She can go back to the previous dose and discuss the issue with Dr Heaton Cibecue when she comes back this week   Tomasa Box - 27 Jun 2016 1:14 PM     TASK EDITED  Pt aware  Active Problems    1  Acquired hypothyroidism (244 9) (E03 9)   2  Acute conjunctivitis (372 00) (H10 30)   3  Chronic obstructive pulmonary disease (496) (J44 9)   4  Chronic pain disorder (338 4) (G89 4)   5  Chronic pain of right upper extremity (729 5,338 29) (G89 29,M79 601)   6  Colonoscopy (Fiberoptic) Screening   7  Depression (311) (F32 9)   8  Encounter for PPD test (V74 1) (Z11 1)   9  Encounter for screening mammogram for malignant neoplasm of breast (V76 12)   (Z12 31)   10  Erythrocytosis (289 0) (D75 1)   11  Fibromyalgia (729 1) (M79 7)   12  Fracture of nasal bone (802 0) (S02  2XXA)   13  Gastritis (535 50) (K29 70)   14  History of hyperthyroidism (V12 29) (Z86 39)   15  Hyperglycemia (790 29) (R73 9)   16  Insomnia (780 52) (G47 00)   17  Irritable bowel syndrome with diarrhea (564 1) (K58 0)   18  Osteoporosis screening (V82 81) (Z13 820)   19  Primary localized osteoarthritis of ankle, right (715 17) (M19 071)   20  Right ankle pain (719 47) (M25 571)   21  Screening for colon cancer (V76 51) (Z12 11)    Current Meds   1  Acetaminophen 500 MG Oral Tablet; Therapy: (Recorded:01Jun2016) to Recorded   2  Adderall 20 MG Oral Tablet; TAKE 1 TABLET TWICE DAILY; Therapy: (Recorded:01Jun2016) to Recorded   3  Advair Diskus 250-50 MCG/DOSE Inhalation Aerosol Powder Breath Activated; inhale 1   puff twice daily; Therapy: 11WXR4163 to (Evaluate:64Dcm2855)  Requested for: 78ZLK5601 Recorded   4  Bentyl 10 MG Oral Capsule; take 1 capsule 4 times daily; Therapy: (Recorded:01Jun2016) to Recorded   5  BusPIRone HCl - 10 MG Oral Tablet; 1 tab PO qid; Therapy: 70BQX8820 to Recorded   6  Carisoprodol 250 MG Oral Tablet; 1 tab PO q 12 hrs prn; Therapy: 28Apr2016 to (Evaluate:30Fok5018); Last Rx:28Apr2016 Ordered   7  Effexor  MG Oral Capsule Extended Release 24 Hour; 150mg and 75 mg taken   by mouth once daily; Therapy: 28Apr2016 to Recorded   8  KlonoPIN 1 MG Oral Tablet; TAKE 1 TABLET 3 TIMES DAILY; Therapy: (Recorded:01Jun2016) to Recorded   9  Levothyroxine Sodium 125 MCG Oral Tablet; take 1 tablet every day; Therapy: 00Cgg8955 to (Evaluate:45Svo1273)  Requested for: 16KBV6971; Last   Rx:46Ecx0117 Ordered   10  Lyrica 200 MG Oral Capsule; 1 tab po tid; Therapy: 28YEP3546 to (Evaluate:25Uac8003); Last Rx:28Apr2016 Ordered   11  Omeprazole 20 MG Oral Capsule Delayed Release; 1 tab po bid; Therapy: 02UWZ7173 to Recorded   12   Prochlorperazine Maleate 10 MG Oral Tablet; take 1 tablet by mouth every 8 hours if    needed for nausea; Therapy: 59GPP3769 to (Evaluate:37Ybs9978) Recorded   13  Sulfacetamide Sodium 10 % Ophthalmic Solution; INSTILL 2 DROP Every 4 hours As    Directed while awake x 7 days; Therapy: 39TJG4346 to (Last Rx:27May2016)  Requested for: 45LNB1746 Ordered   14  Synthroid 112 MCG Oral Tablet; TAKE 1 TABLET DAILY AS DIRECTED; Therapy: (Recorded:01Jun2016) to Recorded   15  TraMADol HCl - 50 MG Oral Tablet; TAKE 1 TABLET EVERY 8 HOURS AS NEEDED; Therapy: 07IAP7694 to (Evaluate:66Uue0653); Last Rx:14Jun2016 Ordered   16  Ventolin  (90 Base) MCG/ACT Inhalation Aerosol Solution; INHALE 2 PUFFS    EVERY 4 HOURS AS NEEDED FOR COUGH AND WHEEZE;    Therapy: 14Apr2011 to (Evaluate:18Mar2016)  Requested for: 63BBL5211 Recorded   17  Voltaren 1 % Transdermal Gel; GEL Top X 30; Therapy: 36MML6468 to (Evaluate:19Uzf2938)  Requested for: 32RVO6082; Last    Rx:27May2016 Ordered    Allergies    1  Levaquin TABS   2   Penicillins    Signatures   Electronically signed by : Cheli Saxena MD; Jul 28 2016  8:13PM EST                       (Co-author)

## 2019-11-21 ENCOUNTER — CLINICAL SUPPORT (OUTPATIENT)
Dept: GASTROENTEROLOGY | Facility: CLINIC | Age: 64
End: 2019-11-21

## 2019-11-21 VITALS — HEIGHT: 66 IN | WEIGHT: 207 LBS | BODY MASS INDEX: 33.27 KG/M2

## 2019-11-21 DIAGNOSIS — Z86.010 HISTORY OF COLON POLYPS: Primary | ICD-10-CM

## 2019-11-21 RX ORDER — LORAZEPAM 1 MG/1
1 TABLET ORAL EVERY 6 HOURS PRN
COMMUNITY
End: 2022-02-03

## 2019-11-21 RX ORDER — CYCLOBENZAPRINE HCL 5 MG
5 TABLET ORAL AS NEEDED
COMMUNITY
Start: 2018-04-13 | End: 2021-04-08

## 2019-11-21 RX ORDER — VENLAFAXINE HYDROCHLORIDE 150 MG/1
150 CAPSULE, EXTENDED RELEASE ORAL DAILY
COMMUNITY
End: 2022-03-04

## 2019-11-21 RX ORDER — ONDANSETRON 4 MG/1
4 TABLET, FILM COATED ORAL EVERY 8 HOURS PRN
COMMUNITY
End: 2021-12-10 | Stop reason: SDUPTHER

## 2019-11-21 RX ORDER — IPRATROPIUM BROMIDE AND ALBUTEROL SULFATE 2.5; .5 MG/3ML; MG/3ML
3 SOLUTION RESPIRATORY (INHALATION) EVERY 6 HOURS PRN
COMMUNITY

## 2019-11-21 RX ORDER — SODIUM PHOSPHATE, DIBASIC AND SODIUM PHOSPHATE, MONOBASIC 7; 19 G/133ML; G/133ML
1 ENEMA RECTAL ONCE AS NEEDED
COMMUNITY
End: 2020-10-14

## 2019-11-21 RX ORDER — ROPINIROLE 0.25 MG/1
0.25 TABLET, FILM COATED ORAL
COMMUNITY
End: 2021-10-04 | Stop reason: ALTCHOICE

## 2019-11-21 RX ORDER — MULTIVITAMIN
1 TABLET ORAL DAILY
COMMUNITY
End: 2021-09-21 | Stop reason: ALTCHOICE

## 2019-11-21 RX ORDER — ALBUTEROL SULFATE 90 UG/1
2 AEROSOL, METERED RESPIRATORY (INHALATION) EVERY 4 HOURS PRN
COMMUNITY

## 2019-11-21 RX ORDER — LIDOCAINE 4 G/G
PATCH TOPICAL EVERY 8 HOURS PRN
COMMUNITY
End: 2021-01-07 | Stop reason: ALTCHOICE

## 2019-11-21 RX ORDER — TOPIRAMATE 25 MG/1
25 TABLET ORAL
COMMUNITY
End: 2022-02-03

## 2019-11-21 RX ORDER — TRIAMCINOLONE ACETONIDE 1 MG/G
CREAM TOPICAL DAILY
COMMUNITY
End: 2021-01-07 | Stop reason: ALTCHOICE

## 2019-11-21 RX ORDER — GABAPENTIN 600 MG/1
600 TABLET ORAL 2 TIMES DAILY
COMMUNITY
End: 2022-03-04 | Stop reason: SDUPTHER

## 2019-11-21 RX ORDER — SERTRALINE HYDROCHLORIDE 25 MG/1
50 TABLET, FILM COATED ORAL DAILY
COMMUNITY
End: 2021-04-08

## 2019-11-21 RX ORDER — SENNA PLUS 8.6 MG/1
1 TABLET ORAL 2 TIMES DAILY PRN
COMMUNITY
End: 2020-10-14

## 2019-11-21 RX ORDER — CYCLOBENZAPRINE HCL 10 MG
10 TABLET ORAL 2 TIMES DAILY
COMMUNITY
End: 2021-08-09

## 2019-11-21 RX ORDER — RANITIDINE 150 MG/1
150 TABLET ORAL DAILY
COMMUNITY
End: 2020-02-26

## 2019-11-21 RX ORDER — BISACODYL 10 MG
10 SUPPOSITORY, RECTAL RECTAL AS NEEDED
COMMUNITY
Start: 2018-06-04 | End: 2020-10-14

## 2019-11-21 RX ORDER — POLYETHYLENE GLYCOL 3350 17 G/17G
17 POWDER, FOR SOLUTION ORAL DAILY
COMMUNITY
End: 2020-10-14

## 2019-11-21 RX ORDER — PANTOPRAZOLE SODIUM 40 MG/1
40 TABLET, DELAYED RELEASE ORAL
COMMUNITY
End: 2020-05-12 | Stop reason: SDUPTHER

## 2019-11-21 RX ORDER — DEXTROAMPHETAMINE SACCHARATE, AMPHETAMINE ASPARTATE, DEXTROAMPHETAMINE SULFATE AND AMPHETAMINE SULFATE 5; 5; 5; 5 MG/1; MG/1; MG/1; MG/1
20 TABLET ORAL 2 TIMES DAILY
COMMUNITY
Start: 2018-04-14 | End: 2020-11-12

## 2019-11-21 RX ORDER — GUAIFENESIN 1200 MG/1
1 TABLET, EXTENDED RELEASE ORAL 2 TIMES DAILY
COMMUNITY
End: 2022-02-03

## 2019-11-21 RX ORDER — OXYCODONE HCL 10 MG/1
10 TABLET, FILM COATED, EXTENDED RELEASE ORAL 3 TIMES DAILY
COMMUNITY
End: 2021-10-04 | Stop reason: ALTCHOICE

## 2019-11-21 RX ORDER — ACETAMINOPHEN 325 MG/1
650 TABLET ORAL EVERY 6 HOURS PRN
COMMUNITY
End: 2021-08-09 | Stop reason: SDUPTHER

## 2019-11-21 NOTE — PROGRESS NOTES
Pt seen for colon prep dx history of polyps  Meds list from [facility]  Instructions given  Suprep rx sent to pharmacy   Colon bmec 12/5/19 KK

## 2019-12-05 ENCOUNTER — LAB REQUISITION (OUTPATIENT)
Dept: LAB | Facility: HOSPITAL | Age: 64
End: 2019-12-05
Payer: MEDICARE

## 2019-12-05 DIAGNOSIS — K64.2 THIRD DEGREE HEMORRHOIDS: ICD-10-CM

## 2019-12-05 DIAGNOSIS — K57.30 DIVERTICULOSIS OF LARGE INTESTINE WITHOUT PERFORATION OR ABSCESS WITHOUT BLEEDING: ICD-10-CM

## 2019-12-05 DIAGNOSIS — Z86.010 PERSONAL HISTORY OF COLONIC POLYPS: ICD-10-CM

## 2019-12-05 DIAGNOSIS — D12.5 BENIGN NEOPLASM OF SIGMOID COLON: ICD-10-CM

## 2019-12-05 DIAGNOSIS — K62.1 RECTAL POLYP: ICD-10-CM

## 2019-12-05 PROCEDURE — 88305 TISSUE EXAM BY PATHOLOGIST: CPT | Performed by: PATHOLOGY

## 2020-02-26 ENCOUNTER — OFFICE VISIT (OUTPATIENT)
Dept: GASTROENTEROLOGY | Facility: CLINIC | Age: 65
End: 2020-02-26
Payer: MEDICARE

## 2020-02-26 VITALS
HEART RATE: 106 BPM | WEIGHT: 205 LBS | DIASTOLIC BLOOD PRESSURE: 76 MMHG | BODY MASS INDEX: 32.95 KG/M2 | SYSTOLIC BLOOD PRESSURE: 128 MMHG | HEIGHT: 66 IN

## 2020-02-26 DIAGNOSIS — K58.2 IRRITABLE BOWEL SYNDROME WITH BOTH CONSTIPATION AND DIARRHEA: ICD-10-CM

## 2020-02-26 DIAGNOSIS — K22.0 ACHALASIA: Primary | ICD-10-CM

## 2020-02-26 DIAGNOSIS — Z86.010 PERSONAL HISTORY OF COLONIC POLYPS: ICD-10-CM

## 2020-02-26 DIAGNOSIS — R13.10 DYSPHAGIA, UNSPECIFIED TYPE: ICD-10-CM

## 2020-02-26 PROCEDURE — 99214 OFFICE O/P EST MOD 30 MIN: CPT | Performed by: NURSE PRACTITIONER

## 2020-02-26 RX ORDER — ALUMINUM ZIRCONIUM OCTACHLOROHYDREX GLY 16 G/100G
1 GEL TOPICAL DAILY
Qty: 30 CAPSULE | Refills: 2 | Status: SHIPPED | OUTPATIENT
Start: 2020-02-26 | End: 2020-10-14 | Stop reason: SDUPTHER

## 2020-02-26 NOTE — PROGRESS NOTES
[address] [facility] - Outpatient Consultation  Andie Shabazz 60 y o  female MRN: [id number]  Encounter: [id number]    ASSESSMENT AND PLAN:       1  Achalasia    2  Dysphagia, unspecified type   Patient with history of achalasia  She reports continued issues with solid and liquid dysphagia  Her last EGD was on 07/21/2015 and showed mild gastritis and small sliding hiatal hernia      -arrange for EGD with dilation at [facility]  -continue Protonix 40 mg daily  -swallowing safety precautions reviewed with patient    3  Irritable bowel syndrome with both constipation and diarrhea  Longstanding history of IBS  She is still bothered by generalized abdominal discomfort and cramping  She takes Bentyl 20 mg q i d  and states that this medicine used to work well for her but she feels that it is not working as well as it used to     -continue Bentyl 20 mg q i d   -will trial probiotic (Align) 1 capsule daily   -if symptoms do not improve, will consider breath test to rule out small intestinal bacterial overgrowth  We can discuss this at follow-up visit  4  Personal history of colonic polyps  Average risk  Last colonoscopy was on 12/05/2019  Showed diverticula, 2 hyperplastic polyps and grade 3 internal hemorrhoids  Five year recall, December 2024  Followup Appointment: 4-6 weeks   ______________________________________________________________________    Chief Complaint   Patient presents with   Luci Mondragon       HPI:   nAdie Shabazz is a 60y o  year old female who presents to the office today for dysphagia and lower abdominal pain  Patient has not been in our office since 2016  She has a history of achalasia and had been previously seen at [facility]  Patient reports ongoing solid and liquid dysphagia  She states that often food will get stuck in her throat and she will have to regurgitate and or vomit to relieve her symptoms    She states that symptoms occur a few times per week  Surgical interventions were discussed with patient previously and she has declined surgery  She denies any odynophagia but does complain of early satiety  She states that her appetite is fair and she has had about a 10-15 lb weight loss over the past 2 years  She denies any heartburn or reflux-type symptoms  She also has history of IBS and reports intermittent lower abdominal pain and cramping, this occurs a few times a week  She denies any fever, chills, nausea, vomiting, rectal bleeding, melena  She reports that she moves her bowels daily and stools are formed and soft  She has occasional episodes of diarrhea, about once or twice per month  Historical Information   Past Medical History:   Diagnosis Date    Anxiety and depression     Chronic pain     chronic pain    COPD (chronic obstructive pulmonary disease) (HCC)     Disease of thyroid gland     Esophageal spasm     Fibromyalgia     GERD (gastroesophageal reflux disease)     Hypertension     Iron deficiency anemia     Polysubstance abuse ([address] )     Psychiatric disorder     major depressive     Past Surgical History:   Procedure Laterality Date    ANKLE FUSION      APPENDECTOMY      BELOW KNEE LEG AMPUTATION Right      SECTION      COLONOSCOPY  2010    COLONOSCOPY  2019    diverticula, 3 mm polyp and 6 mm polyp in rectum, grade III internal hemorrhoids, hyperplastic polyp, 5 year recall 2024     EGD  2010    FOREARM FASCIOTOMY Right     KNEE ARTHROSCOPY Left      Social History     Substance and Sexual Activity   Alcohol Use No     Social History     Substance and Sexual Activity   Drug Use No     Social History     Tobacco Use   Smoking Status Current Every Day Smoker    Types: Cigarettes   Smokeless Tobacco Never Used   Tobacco Comment    plans to quit?      Family History   Problem Relation Age of Onset    Ulcerative colitis Mother     Lung cancer Mother     Diabetes Mother    [facility] Alcohol abuse Father     Diabetes Sister     Cancer Brother     Colon polyps Neg Hx     Colon cancer Neg Hx        Meds/Allergies     Current Outpatient Medications:     acetaminophen (TYLENOL) 325 mg tablet    albuterol (PROVENTIL HFA,VENTOLIN HFA) 90 mcg/act inhaler    amphetamine-dextroamphetamine (ADDERALL) 20 mg tablet    Benzocaine-Menthol (CEPACOL EXTRA STRENGTH) 15-2 6 MG LOZG    bisacodyl (DULCOLAX) 10 mg suppository    cyclobenzaprine (FLEXERIL) 10 mg tablet    cyclobenzaprine (FLEXERIL) 5 mg tablet    dicyclomine (BENTYL) 20 mg tablet    gabapentin (NEURONTIN) 600 MG tablet    Galcanezumab-gnlm (EMGALITY) 120 MG/ML SOSY    Guaifenesin 1200 MG TB12    HYDROcodone-acetaminophen (NORCO) 5-325 mg per tablet    ipratropium-albuterol (DUO-NEB) 0 5-2 5 mg/3 mL nebulizer solution    levothyroxine 112 mcg tablet    Lidocaine (ASPERCREME LIDOCAINE) 4 % PTCH    LORazepam (ATIVAN) 1 mg tablet    magnesium hydroxide (MILK OF MAGNESIA) 400 mg/5 mL oral suspension    Menthol, Topical Analgesic, (BIOFREEZE) 4 % GEL    Multiple Vitamin (MULTIVITAMIN) tablet    Na Sulfate-K Sulfate-Mg Sulf (SUPREP BOWEL PREP KIT) 17 5-3 13-1 6 GM/177ML SOLN    ondansetron (ZOFRAN) 4 mg tablet    oxyCODONE (OxyCONTIN) 10 mg 12 hr tablet    pantoprazole (PROTONIX) 40 mg tablet    polyethylene glycol (MIRALAX) 17 g packet    rOPINIRole (REQUIP) 0 25 mg tablet    senna (SENOKOT) 8 6 MG tablet    sertraline (ZOLOFT) 25 mg tablet    sodium phosphate-biphosphate (FLEET) 7-19 g 118 mL enema    topiramate (TOPAMAX) 25 mg tablet    triamcinolone (KENALOG) 0 1 % cream    venlafaxine (EFFEXOR-XR) 150 mg 24 hr capsule    Allergies   Allergen Reactions    Levaquin [Levofloxacin]     Penicillins        PHYSICAL EXAM:    Blood pressure 128/76, pulse (!) 106, height 5' 6" (1 676 m), weight 93 kg (205 lb)  Body mass index is 33 09 kg/m²    General Appearance: NAD, cooperative, alert  Eyes: Anicteric, PERRLA, EOMI  ENT: Normocephalic, atraumatic, normal mucosa  Neck:  Supple, symmetrical, trachea midline,   Resp:  Clear to auscultation bilaterally; no rales, rhonchi or wheezing; respirations unlabored   CV:  S1 S2, Regular rate and rhythm; no murmur, rub, or gallop  GI:  Soft, positive for diffuse abdominal tenderness, no guarding, no rebound, non-distended; normal bowel sounds; no masses, no organomegaly   Rectal: Deferred  Musculoskeletal: No cyanosis, clubbing or edema  Normal ROM  Skin:  No jaundice, rashes, or lesions   Heme/Lymph: No palpable cervical lymphadenopathy  Psych: Normal affect, good eye contact  Neuro: No gross deficits, AAOx3    Lab Results:   Lab Results   Component Value Date    WBC 11 7 (H) 05/23/2016    HGB 15 5 05/23/2016    HCT 48 8 (H) 05/23/2016    MCV 94 0 05/23/2016     05/23/2016     Lab Results   Component Value Date     04/01/2015    K 5 1 03/27/2016     03/27/2016    CO2 33 (H) 03/27/2016    ANIONGAP 8 04/01/2015    BUN 14 03/27/2016    CREATININE 1 08 03/27/2016    GLUCOSE 90 04/01/2015    CALCIUM 8 2 (L) 03/27/2016    AST 45 03/27/2016    ALT 30 03/27/2016    ALKPHOS 132 (H) 03/27/2016    PROT 5 8 (L) 04/01/2015    BILITOT 0 2 04/01/2015    EGFR 52 1 03/27/2016     No results found for: IRON, TIBC, FERRITIN  No results found for: LIPASE    Radiology Results:   No results found  REVIEW OF SYSTEMS:    CONSTITUTIONAL: Denies any fever, chills, rigors, and weight loss  Positive for fatigue  HEENT: No earache or tinnitus  Denies hearing loss or visual disturbances  CARDIOVASCULAR: No chest pain or palpitations  RESPIRATORY: Denies any cough, hemoptysis, shortness of breath or dyspnea on exertion  GASTROINTESTINAL: As noted in the History of Present Illness  GENITOURINARY: No problems with urination  Denies any hematuria or dysuria  NEUROLOGIC: No dizziness or vertigo, denies headaches  MUSCULOSKELETAL: Denies any muscle or joint pain     SKIN: Denies skin rashes or itching  ENDOCRINE: Denies excessive thirst  Denies intolerance to heat or cold  PSYCHOSOCIAL: Denies depression or anxiety  Denies any recent memory loss

## 2020-02-26 NOTE — PATIENT INSTRUCTIONS
Arrange for upper endoscopy at [facility]  Start taking probiotic daily (Culturelle or Align)      Follow up in 4-6 weeks

## 2020-02-27 ENCOUNTER — TELEPHONE (OUTPATIENT)
Dept: GASTROENTEROLOGY | Facility: CLINIC | Age: 65
End: 2020-02-27

## 2020-02-27 NOTE — TELEPHONE ENCOUNTER
I returned call to [facility], she was not available  I left message that Align OTC is 4 mg dose after researching  If she needs new Rx with that information she is to call back

## 2020-02-27 NOTE — TELEPHONE ENCOUNTER
Anamika from [address] [place] [address] states Pt was prescribed Align and she needed dosage  Advised OTC  She states her pharmacy needs mg info    CB# [phone]

## 2020-03-27 LAB — HBA1C MFR BLD HPLC: 8.4 %

## 2020-04-03 ENCOUNTER — TELEPHONE (OUTPATIENT)
Dept: GASTROENTEROLOGY | Facility: CLINIC | Age: 65
End: 2020-04-03

## 2020-04-08 ENCOUNTER — HOSPITAL ENCOUNTER (OUTPATIENT)
Dept: GASTROENTEROLOGY | Facility: HOSPITAL | Age: 65
Setting detail: OUTPATIENT SURGERY
Discharge: HOME | End: 2020-04-08
Attending: INTERNAL MEDICINE

## 2020-05-12 ENCOUNTER — TELEMEDICINE (OUTPATIENT)
Dept: GASTROENTEROLOGY | Facility: CLINIC | Age: 65
End: 2020-05-12
Payer: MEDICARE

## 2020-05-12 VITALS — WEIGHT: 180 LBS | BODY MASS INDEX: 28.93 KG/M2 | HEIGHT: 66 IN

## 2020-05-12 DIAGNOSIS — Z12.11 SCREENING FOR COLON CANCER: ICD-10-CM

## 2020-05-12 DIAGNOSIS — R13.10 DYSPHAGIA, UNSPECIFIED TYPE: ICD-10-CM

## 2020-05-12 DIAGNOSIS — K58.0 IRRITABLE BOWEL SYNDROME WITH DIARRHEA: Primary | ICD-10-CM

## 2020-05-12 DIAGNOSIS — R63.4 WEIGHT LOSS: ICD-10-CM

## 2020-05-12 PROCEDURE — 99214 OFFICE O/P EST MOD 30 MIN: CPT | Performed by: NURSE PRACTITIONER

## 2020-05-12 RX ORDER — PANTOPRAZOLE SODIUM 40 MG/1
40 TABLET, DELAYED RELEASE ORAL
Qty: 30 TABLET | Refills: 10 | Status: SHIPPED | OUTPATIENT
Start: 2020-05-12 | End: 2021-03-18

## 2020-05-12 RX ORDER — DICYCLOMINE HCL 20 MG
20 TABLET ORAL EVERY 6 HOURS
Qty: 120 TABLET | Refills: 10 | Status: SHIPPED | OUTPATIENT
Start: 2020-05-12 | End: 2020-10-14 | Stop reason: SDUPTHER

## 2020-05-27 ENCOUNTER — CLINICAL SUPPORT (OUTPATIENT)
Dept: GASTROENTEROLOGY | Facility: CLINIC | Age: 65
End: 2020-05-27

## 2020-05-27 ENCOUNTER — TELEPHONE (OUTPATIENT)
Dept: GASTROENTEROLOGY | Facility: CLINIC | Age: 65
End: 2020-05-27

## 2020-05-27 VITALS — BODY MASS INDEX: 32.95 KG/M2 | WEIGHT: 205 LBS | HEIGHT: 66 IN

## 2020-05-27 DIAGNOSIS — K22.0 ACHALASIA: Primary | ICD-10-CM

## 2020-05-29 DIAGNOSIS — Z11.59 SCREENING FOR VIRAL DISEASE: ICD-10-CM

## 2020-05-29 PROCEDURE — U0003 INFECTIOUS AGENT DETECTION BY NUCLEIC ACID (DNA OR RNA); SEVERE ACUTE RESPIRATORY SYNDROME CORONAVIRUS 2 (SARS-COV-2) (CORONAVIRUS DISEASE [COVID-19]), AMPLIFIED PROBE TECHNIQUE, MAKING USE OF HIGH THROUGHPUT TECHNOLOGIES AS DESCRIBED BY CMS-2020-01-R: HCPCS

## 2020-06-01 LAB — SARS-COV-2 RNA SPEC QL NAA+PROBE: NOT DETECTED

## 2020-06-02 RX ORDER — SODIUM CHLORIDE 9 MG/ML
50 INJECTION, SOLUTION INTRAVENOUS CONTINUOUS
Status: CANCELLED | OUTPATIENT
Start: 2020-06-03

## 2020-06-03 ENCOUNTER — HOSPITAL ENCOUNTER (OUTPATIENT)
Dept: GASTROENTEROLOGY | Facility: HOSPITAL | Age: 65
Setting detail: OUTPATIENT SURGERY
Discharge: HOME | End: 2020-06-03
Attending: INTERNAL MEDICINE

## 2020-06-11 ENCOUNTER — TELEPHONE (OUTPATIENT)
Dept: GASTROENTEROLOGY | Facility: CLINIC | Age: 65
End: 2020-06-11

## 2020-06-23 ENCOUNTER — ANESTHESIA EVENT (OUTPATIENT)
Dept: GASTROENTEROLOGY | Facility: HOSPITAL | Age: 65
End: 2020-06-23

## 2020-07-01 ENCOUNTER — CLINICAL SUPPORT (OUTPATIENT)
Dept: GASTROENTEROLOGY | Facility: CLINIC | Age: 65
End: 2020-07-01

## 2020-07-01 VITALS — BODY MASS INDEX: 28.93 KG/M2 | HEIGHT: 66 IN | WEIGHT: 180 LBS

## 2020-07-01 DIAGNOSIS — K22.0 ACHALASIA: Primary | ICD-10-CM

## 2020-07-01 DIAGNOSIS — R10.9 STOMACH PAIN: ICD-10-CM

## 2020-07-01 NOTE — PROGRESS NOTES
Virtual EGD prep  Dx: achalasia, stomach pain  Instructions given  Meds reviewed  Covid testing confirmed for 7/3

## 2020-07-02 ENCOUNTER — TELEPHONE (OUTPATIENT)
Dept: GASTROENTEROLOGY | Facility: CLINIC | Age: 65
End: 2020-07-02

## 2020-07-02 ENCOUNTER — TRANSCRIBE ORDERS (OUTPATIENT)
Dept: ADMINISTRATIVE | Facility: HOSPITAL | Age: 65
End: 2020-07-02

## 2020-07-02 DIAGNOSIS — R93.5 ABNORMAL ULTRASOUND OF ABDOMEN: Primary | ICD-10-CM

## 2020-07-02 NOTE — TELEPHONE ENCOUNTER
Regina BANDA called stating she follows patient at home and she was just made aware that patient was scheduled for EGD next week  She performed an ultrasound on patient and she is faxing results here for aneurysm  The radiologist recommended a CT scan and she is ordering to be completed at [address] [place] [address]  She wanted you to be aware  Her number is [phone] if you need to discuss anything

## 2020-07-03 DIAGNOSIS — K22.0 ACHALASIA: ICD-10-CM

## 2020-07-03 DIAGNOSIS — Z11.59 SCREENING FOR VIRAL DISEASE: ICD-10-CM

## 2020-07-03 DIAGNOSIS — R13.10 DYSPHAGIA, UNSPECIFIED TYPE: ICD-10-CM

## 2020-07-03 PROCEDURE — U0003 INFECTIOUS AGENT DETECTION BY NUCLEIC ACID (DNA OR RNA); SEVERE ACUTE RESPIRATORY SYNDROME CORONAVIRUS 2 (SARS-COV-2) (CORONAVIRUS DISEASE [COVID-19]), AMPLIFIED PROBE TECHNIQUE, MAKING USE OF HIGH THROUGHPUT TECHNOLOGIES AS DESCRIBED BY CMS-2020-01-R: HCPCS

## 2020-07-06 NOTE — TELEPHONE ENCOUNTER
Ultrasound report states aorta in not aneurysmal  IVC is normal  There is a tubular anechoic structure within the mid abdomen anterior to the aorta  Possible duplication cyst cannot be excluded  She has ordered CT scan of abdomen as recommended

## 2020-07-08 ENCOUNTER — ANESTHESIA (OUTPATIENT)
Dept: GASTROENTEROLOGY | Facility: HOSPITAL | Age: 65
End: 2020-07-08

## 2020-07-08 ENCOUNTER — HOSPITAL ENCOUNTER (OUTPATIENT)
Dept: GASTROENTEROLOGY | Facility: HOSPITAL | Age: 65
Setting detail: OUTPATIENT SURGERY
Discharge: HOME | End: 2020-07-08
Attending: INTERNAL MEDICINE | Admitting: INTERNAL MEDICINE
Payer: MEDICARE

## 2020-07-08 VITALS
OXYGEN SATURATION: 92 % | DIASTOLIC BLOOD PRESSURE: 50 MMHG | TEMPERATURE: 99 F | WEIGHT: 180 LBS | HEART RATE: 74 BPM | RESPIRATION RATE: 18 BRPM | SYSTOLIC BLOOD PRESSURE: 104 MMHG | HEIGHT: 66 IN | BODY MASS INDEX: 28.93 KG/M2

## 2020-07-08 DIAGNOSIS — R13.10 DYSPHAGIA, UNSPECIFIED TYPE: ICD-10-CM

## 2020-07-08 DIAGNOSIS — K22.0 ACHALASIA: ICD-10-CM

## 2020-07-08 LAB
SARS-COV-2 RNA RESP QL NAA+PROBE: NEGATIVE
SARS-COV-2 RNA SPEC QL NAA+PROBE: NOT DETECTED

## 2020-07-08 PROCEDURE — 88305 TISSUE EXAM BY PATHOLOGIST: CPT | Performed by: PATHOLOGY

## 2020-07-08 PROCEDURE — 87635 SARS-COV-2 COVID-19 AMP PRB: CPT | Performed by: INTERNAL MEDICINE

## 2020-07-08 RX ORDER — SODIUM CHLORIDE 9 MG/ML
50 INJECTION, SOLUTION INTRAVENOUS CONTINUOUS
Status: DISCONTINUED | OUTPATIENT
Start: 2020-07-08 | End: 2020-07-12 | Stop reason: HOSPADM

## 2020-07-08 RX ORDER — PROPOFOL 10 MG/ML
INJECTION, EMULSION INTRAVENOUS AS NEEDED
Status: DISCONTINUED | OUTPATIENT
Start: 2020-07-08 | End: 2020-07-08 | Stop reason: SURG

## 2020-07-08 RX ORDER — MIDAZOLAM HYDROCHLORIDE 2 MG/2ML
INJECTION, SOLUTION INTRAMUSCULAR; INTRAVENOUS AS NEEDED
Status: DISCONTINUED | OUTPATIENT
Start: 2020-07-08 | End: 2020-07-08 | Stop reason: SURG

## 2020-07-08 RX ADMIN — PROPOFOL 70 MG: 10 INJECTION, EMULSION INTRAVENOUS at 08:45

## 2020-07-08 RX ADMIN — MIDAZOLAM 2 MG: 1 INJECTION INTRAMUSCULAR; INTRAVENOUS at 08:25

## 2020-07-08 RX ADMIN — SODIUM CHLORIDE 50 ML/HR: 0.9 INJECTION, SOLUTION INTRAVENOUS at 08:20

## 2020-07-08 RX ADMIN — PROPOFOL 80 MG: 10 INJECTION, EMULSION INTRAVENOUS at 08:39

## 2020-07-08 NOTE — H&P
History and Physical - [facility]  Nedra Amin 60 y o  female MRN: [id number]                  HPI: Nedra Amin is a 60y o  year old female who presents for achalasia and dysphagia, history of unsuccessful Botox injection  Here today for dilation  REVIEW OF SYSTEMS: Per the HPI, and otherwise unremarkable  Historical Information   Past Medical History:   Diagnosis Date    Achalasia     Anxiety and depression     Chronic pain     chronic pain    COPD (chronic obstructive pulmonary disease) (HCC)     Disease of thyroid gland     Esophageal spasm     Fibromyalgia     GERD (gastroesophageal reflux disease)     Hypertension     Iron deficiency anemia     Polysubstance abuse ([address] )     Psychiatric disorder     major depressive     Past Surgical History:   Procedure Laterality Date    ANKLE FUSION      APPENDECTOMY      BELOW KNEE LEG AMPUTATION Right      SECTION      COLONOSCOPY  2010    COLONOSCOPY  2019    diverticula, 3 mm polyp and 6 mm polyp in rectum, grade III internal hemorrhoids, hyperplastic polyp, 5 year recall 2024     EGD  2010    FOREARM FASCIOTOMY Right     KNEE ARTHROSCOPY Left     UPPER GASTROINTESTINAL ENDOSCOPY       Social History   Social History     Substance and Sexual Activity   Alcohol Use No     Social History     Substance and Sexual Activity   Drug Use No     Social History     Tobacco Use   Smoking Status Current Every Day Smoker    Packs/day: 1 00    Types: Cigarettes    Start date: 1977   Smokeless Tobacco Never Used   Tobacco Comment    plans to quit?      Family History   Problem Relation Age of Onset    Ulcerative colitis Mother     Lung cancer Mother     Diabetes Mother     Alcohol abuse Father     Diabetes Sister     Cancer Brother     Colon polyps Neg Hx     Colon cancer Neg Hx        Meds/Allergies     Current Outpatient Medications:     albuterol Meg Quintero HFA) 90 mcg/act inhaler    amphetamine-dextroamphetamine (ADDERALL) 20 mg tablet    bifidobacterium infantis (ALIGN) capsule    bisacodyl (DULCOLAX) 10 mg suppository    cyclobenzaprine (FLEXERIL) 10 mg tablet    cyclobenzaprine (FLEXERIL) 5 mg tablet    dicyclomine (BENTYL) 20 mg tablet    gabapentin (NEURONTIN) 600 MG tablet    Galcanezumab-gnlm (EMGALITY) 120 MG/ML SOSY    Guaifenesin 1200 MG TB12    HYDROcodone-acetaminophen (NORCO) 5-325 mg per tablet    ipratropium-albuterol (DUO-NEB) 0 5-2 5 mg/3 mL nebulizer solution    levothyroxine 112 mcg tablet    Lidocaine (ASPERCREME LIDOCAINE) 4 % PTCH    LORazepam (ATIVAN) 1 mg tablet    Multiple Vitamin (MULTIVITAMIN) tablet    ondansetron (ZOFRAN) 4 mg tablet    oxyCODONE (OxyCONTIN) 10 mg 12 hr tablet    pantoprazole (PROTONIX) 40 mg tablet    polyethylene glycol (MIRALAX) 17 g packet    rOPINIRole (REQUIP) 0 25 mg tablet    sertraline (ZOLOFT) 25 mg tablet    triamcinolone (KENALOG) 0 1 % cream    venlafaxine (EFFEXOR-XR) 150 mg 24 hr capsule    acetaminophen (TYLENOL) 325 mg tablet    Benzocaine-Menthol (CEPACOL EXTRA STRENGTH) 15-2 6 MG LOZG    magnesium hydroxide (MILK OF MAGNESIA) 400 mg/5 mL oral suspension    Menthol, Topical Analgesic, (BIOFREEZE) 4 % GEL    senna (SENOKOT) 8 6 MG tablet    sodium phosphate-biphosphate (FLEET) 7-19 g 118 mL enema    topiramate (TOPAMAX) 25 mg tablet    Current Facility-Administered Medications:     sodium chloride 0 9 % infusion, 50 mL/hr, Intravenous, Continuous    Allergies   Allergen Reactions    Levaquin [Levofloxacin]     Penicillins        Objective     BP 99/57   Pulse 79   Temp 99 °F (37 2 °C) (Oral)   Resp 18   Ht 5' 6" (1 676 m)   Wt 81 6 kg (180 lb)   LMP  (LMP Unknown)   SpO2 92%   BMI 29 05 kg/m²       PHYSICAL EXAM    Gen: NAD AAOx3  CV: S1S2 RRR no m/r/g  CHEST: Clear b/l no c/r/w  ABD: +BS soft, NT/ND  EXT: no edema      ASSESSMENT/PLAN:  This is a 60y o  year old female here for EGD and dilation, and she is stable and optimized for her procedure

## 2020-07-08 NOTE — ANESTHESIA PREPROCEDURE EVALUATION
Review of Systems/Medical History    Chart reviewed  No history of anesthetic complications     Cardiovascular  Hypertension ,    Pulmonary  COPD ,        GI/Hepatic    GERD ,             Endo/Other  History of thyroid disease ,      GYN       Hematology   Musculoskeletal       Neurology   Psychology           Physical Exam    Airway    Mallampati score: II  TM Distance: >3 FB  Neck ROM: full     Dental   upper dentures,     Cardiovascular  Rhythm: regular, Rate: normal, Cardiovascular exam normal    Pulmonary  Pulmonary exam normal     Other Findings        Anesthesia Plan  ASA Score- 3     Anesthesia Type- IV sedation with anesthesia with ASA Monitors  Additional Monitors:   Airway Plan:         Plan Factors-    Induction- intravenous  Postoperative Plan-     Informed Consent- Anesthetic plan and risks discussed with patient

## 2020-07-08 NOTE — ANESTHESIA POSTPROCEDURE EVALUATION
Post-Op Assessment Note    CV Status:  Stable  Pain Score: 0    Pain management: adequate     Mental Status:  Sleepy   Hydration Status:  Euvolemic and stable   PONV Controlled:  None   Airway Patency:  Patent   Post Op Vitals Reviewed: Yes      Staff: Anesthesiologist           BP      Temp      Pulse     Resp      SpO2

## 2020-07-15 ENCOUNTER — TELEPHONE (OUTPATIENT)
Dept: PSYCHIATRY | Facility: CLINIC | Age: 65
End: 2020-07-15

## 2020-08-05 ENCOUNTER — TELEPHONE (OUTPATIENT)
Dept: RHEUMATOLOGY | Facility: CLINIC | Age: 65
End: 2020-08-05

## 2020-08-06 NOTE — TELEPHONE ENCOUNTER
This patient wants to do a virtual visit at 8:45am Thursday via [employer], which I don't have access to  Can you try calling and setting her up with a [employer] meeting?     Thanks,  HM

## 2020-08-18 ENCOUNTER — APPOINTMENT (OUTPATIENT)
Dept: LAB | Facility: MEDICAL CENTER | Age: 65
End: 2020-08-18
Payer: MEDICARE

## 2020-08-18 ENCOUNTER — OFFICE VISIT (OUTPATIENT)
Dept: RHEUMATOLOGY | Facility: CLINIC | Age: 65
End: 2020-08-18
Payer: MEDICARE

## 2020-08-18 ENCOUNTER — APPOINTMENT (OUTPATIENT)
Dept: RADIOLOGY | Facility: MEDICAL CENTER | Age: 65
End: 2020-08-18
Payer: MEDICARE

## 2020-08-18 VITALS
TEMPERATURE: 99.1 F | HEIGHT: 66 IN | SYSTOLIC BLOOD PRESSURE: 120 MMHG | BODY MASS INDEX: 29.05 KG/M2 | DIASTOLIC BLOOD PRESSURE: 70 MMHG

## 2020-08-18 DIAGNOSIS — M89.9 DISORDER OF BONE, UNSPECIFIED: ICD-10-CM

## 2020-08-18 DIAGNOSIS — R71.8 OTHER ABNORMALITY OF RED BLOOD CELLS: ICD-10-CM

## 2020-08-18 DIAGNOSIS — M19.90 ARTHRITIS: Primary | ICD-10-CM

## 2020-08-18 DIAGNOSIS — M19.90 ARTHRITIS: ICD-10-CM

## 2020-08-18 LAB
25(OH)D3 SERPL-MCNC: 29.9 NG/ML (ref 30–100)
ALBUMIN SERPL BCP-MCNC: 3.8 G/DL (ref 3.5–5)
ALP SERPL-CCNC: 123 U/L (ref 46–116)
ALT SERPL W P-5'-P-CCNC: 18 U/L (ref 12–78)
ANION GAP SERPL CALCULATED.3IONS-SCNC: 6 MMOL/L (ref 4–13)
AST SERPL W P-5'-P-CCNC: 14 U/L (ref 5–45)
BASOPHILS # BLD AUTO: 0.08 THOUSANDS/ΜL (ref 0–0.1)
BASOPHILS NFR BLD AUTO: 1 % (ref 0–1)
BILIRUB SERPL-MCNC: 0.32 MG/DL (ref 0.2–1)
BUN SERPL-MCNC: 13 MG/DL (ref 5–25)
CALCIUM SERPL-MCNC: 9.1 MG/DL (ref 8.3–10.1)
CHLORIDE SERPL-SCNC: 102 MMOL/L (ref 100–108)
CO2 SERPL-SCNC: 31 MMOL/L (ref 21–32)
CREAT SERPL-MCNC: 0.92 MG/DL (ref 0.6–1.3)
CRP SERPL QL: 12.2 MG/L
EOSINOPHIL # BLD AUTO: 0.29 THOUSAND/ΜL (ref 0–0.61)
EOSINOPHIL NFR BLD AUTO: 2 % (ref 0–6)
ERYTHROCYTE [DISTWIDTH] IN BLOOD BY AUTOMATED COUNT: 18.6 % (ref 11.6–15.1)
ERYTHROCYTE [SEDIMENTATION RATE] IN BLOOD: 77 MM/HOUR (ref 0–29)
GFR SERPL CREATININE-BSD FRML MDRD: 67 ML/MIN/1.73SQ M
GLUCOSE P FAST SERPL-MCNC: 107 MG/DL (ref 65–99)
HCT VFR BLD AUTO: 50 % (ref 34.8–46.1)
HGB BLD-MCNC: 14.5 G/DL (ref 11.5–15.4)
IMM GRANULOCYTES # BLD AUTO: 0.04 THOUSAND/UL (ref 0–0.2)
IMM GRANULOCYTES NFR BLD AUTO: 0 % (ref 0–2)
LYMPHOCYTES # BLD AUTO: 3.37 THOUSANDS/ΜL (ref 0.6–4.47)
LYMPHOCYTES NFR BLD AUTO: 27 % (ref 14–44)
MCH RBC QN AUTO: 24 PG (ref 26.8–34.3)
MCHC RBC AUTO-ENTMCNC: 29 G/DL (ref 31.4–37.4)
MCV RBC AUTO: 83 FL (ref 82–98)
MONOCYTES # BLD AUTO: 0.69 THOUSAND/ΜL (ref 0.17–1.22)
MONOCYTES NFR BLD AUTO: 6 % (ref 4–12)
NEUTROPHILS # BLD AUTO: 8.18 THOUSANDS/ΜL (ref 1.85–7.62)
NEUTS SEG NFR BLD AUTO: 64 % (ref 43–75)
NRBC BLD AUTO-RTO: 0 /100 WBCS
PLATELET # BLD AUTO: 357 THOUSANDS/UL (ref 149–390)
PMV BLD AUTO: 13.4 FL (ref 8.9–12.7)
POTASSIUM SERPL-SCNC: 4.1 MMOL/L (ref 3.5–5.3)
PROT SERPL-MCNC: 8.9 G/DL (ref 6.4–8.2)
RBC # BLD AUTO: 6.04 MILLION/UL (ref 3.81–5.12)
SODIUM SERPL-SCNC: 139 MMOL/L (ref 136–145)
WBC # BLD AUTO: 12.65 THOUSAND/UL (ref 4.31–10.16)

## 2020-08-18 PROCEDURE — 85652 RBC SED RATE AUTOMATED: CPT | Performed by: INTERNAL MEDICINE

## 2020-08-18 PROCEDURE — 96372 THER/PROPH/DIAG INJ SC/IM: CPT | Performed by: INTERNAL MEDICINE

## 2020-08-18 PROCEDURE — 36415 COLL VENOUS BLD VENIPUNCTURE: CPT | Performed by: INTERNAL MEDICINE

## 2020-08-18 PROCEDURE — 86038 ANTINUCLEAR ANTIBODIES: CPT | Performed by: INTERNAL MEDICINE

## 2020-08-18 PROCEDURE — 86140 C-REACTIVE PROTEIN: CPT | Performed by: INTERNAL MEDICINE

## 2020-08-18 PROCEDURE — 85025 COMPLETE CBC W/AUTO DIFF WBC: CPT | Performed by: INTERNAL MEDICINE

## 2020-08-18 PROCEDURE — 80053 COMPREHEN METABOLIC PANEL: CPT | Performed by: INTERNAL MEDICINE

## 2020-08-18 PROCEDURE — 86200 CCP ANTIBODY: CPT | Performed by: INTERNAL MEDICINE

## 2020-08-18 PROCEDURE — 73130 X-RAY EXAM OF HAND: CPT

## 2020-08-18 PROCEDURE — 99204 OFFICE O/P NEW MOD 45 MIN: CPT | Performed by: INTERNAL MEDICINE

## 2020-08-18 PROCEDURE — 82306 VITAMIN D 25 HYDROXY: CPT | Performed by: INTERNAL MEDICINE

## 2020-08-18 RX ORDER — TRIAMCINOLONE ACETONIDE 40 MG/ML
60 INJECTION, SUSPENSION INTRA-ARTICULAR; INTRAMUSCULAR ONCE
Status: COMPLETED | OUTPATIENT
Start: 2020-08-18 | End: 2020-08-18

## 2020-08-18 RX ADMIN — TRIAMCINOLONE ACETONIDE 60 MG: 40 INJECTION, SUSPENSION INTRA-ARTICULAR; INTRAMUSCULAR at 12:44
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
## 2020-08-18 NOTE — PATIENT INSTRUCTIONS
Steroid injection given in arm today  Apply diclofenac gel as needed for joint pain  Do labs  Do hand x-rays    Return to clinic in 3 months    Arthritis   AMBULATORY CARE:   Arthritis  is a disease that causes inflammation in one or more joints  There are many types of arthritis, such as osteoarthritis, rheumatoid arthritis, and septic arthritis  Some types cause inflammation in the joints  Other types wear away the cartilage between joints  This makes the bones of the joint rub together when you move the joint  Your symptoms may be constant, or symptoms may come and go  Arthritis often gets worse over time and can cause permanent joint damage  Common signs and symptoms of arthritis:   · Pain, swelling, or stiffness in the joint    · Limited range of motion in the joint    · Warmth or redness over the joint    · Tenderness when you touch the joint    · Stiff joints in the morning that loosen with movement    · A creaking or grinding sound when you move the joint    · Fever  Seek care immediately if:   · You have a fever and severe joint pain or swelling  · You cannot move the affected joint  · You have severe joint pain you cannot tolerate  Contact your healthcare provider if:   · Your pain or swelling does not get better with treatment  · You have questions or concerns about your condition or care  Treatment  will depend on the type of arthritis you have and if it is severe  You may need any of the following:  · Acetaminophen  decreases pain and fever  It is available without a doctor's order  Ask how much to take and how often to take it  Follow directions  Acetaminophen can cause liver damage if not taken correctly  · NSAIDs , such as ibuprofen, help decrease swelling, pain, and fever  This medicine is available with or without a doctor's order  NSAIDs can cause stomach bleeding or kidney problems in certain people   If you take blood thinner medicine, always ask your healthcare provider if NSAIDs are safe for you  Always read the medicine label and follow directions  · Steroid medicine  helps reduce swelling and pain  · Surgery  may be needed to repair or replace a damaged joint  Manage arthritis:   · Rest your painful joint so it can heal   Your healthcare provider may recommend crutches or a walker if the affected joint is in a leg  · Apply ice or heat to the joint  Both can help decrease swelling and pain  Ice may also help prevent tissue damage  Use an ice pack, or put crushed ice in a plastic bag  Cover it with a towel and place it on your joint for 15 to 20 minutes every hour or as directed  You can apply heat for 20 minutes every 2 hours  Heat treatment includes hot packs or heat lamps  · Elevate your joint  Elevation helps reduce swelling and pain  Raise your joint above the level of your heart as often as you can  Prop your painful joint on pillows to keep it above your heart comfortably  · Go to physical or occupational therapy as directed  A physical therapist can teach you exercises to improve flexibility and range of motion  You may also be shown non-weight-bearing exercises that are safe for your joints, such as swimming  Exercise can help keep your joints flexible and reduce pain  An occupational therapist can help you learn to do your daily activities when your joints are stiff or sore  · Maintain a healthy weight  Extra weight puts increased pressure on your joints  Ask your healthcare provider what you should weigh  If you need to lose weight, he can help you create a weight loss program  Weight loss can help reduce pain and increase your ability to do your activities  The amount of exercise you do may vary each day, depending on your symptoms  · Wear flat or low-heeled shoes  This will help decrease pain and reduce pressure on your ankle, knee, and hip joints  · Use support devices as directed    You may be given splints to wear on your hands to help your joints rest and to decrease inflammation  While you sleep, use a pillow that is firm enough to support your neck and head  Other equipment  that may help you move and prevent falls:  · Orthotic shoes or insoles  help support your feet when you walk  · Crutches, a cane, or a walker  may help decrease your risk for falling  They also decrease stress on affected joints  · Devices to prevent falls  include raised toilet seats and bathtub bars to help you get up from sitting  Handrails can be placed in areas where you need balance and support  Follow up with your healthcare provider or rheumatologist as directed:  Write down your questions so you remember to ask them during your visits  © 2017 [employer] Information is for End User's use only and may not be sold, redistributed or otherwise used for commercial purposes  All illustrations and images included in CareNotes® are the copyrighted property of [employer]  or Chucky Bae[employer]  The above information is an  only  It is not intended as medical advice for individual conditions or treatments  Talk to your doctor, nurse or pharmacist before following any medical regimen to see if it is safe and effective for you

## 2020-08-19 LAB — RYE IGE QN: NEGATIVE

## 2020-08-20 LAB — CCP IGA+IGG SERPL IA-ACNC: 8 UNITS (ref 0–19)

## 2020-09-22 ENCOUNTER — TELEPHONE (OUTPATIENT)
Dept: OBGYN CLINIC | Facility: HOSPITAL | Age: 65
End: 2020-09-22

## 2020-09-22 NOTE — TELEPHONE ENCOUNTER
Dr Fatmata Jefferson  Re: Office note  [facility]  CB# [phone]: Calvin Hackett  Fax# [phone] ATTN: [id number] [facility] called to obtain last office notes with DR Fatmata Jefferson on 08 18    Can Dr Kaelyn Murcia sign off on office notes from 08 18

## 2020-09-23 ENCOUNTER — OFFICE VISIT (OUTPATIENT)
Dept: OBGYN CLINIC | Facility: MEDICAL CENTER | Age: 65
End: 2020-09-23
Payer: MEDICARE

## 2020-09-23 ENCOUNTER — TELEPHONE (OUTPATIENT)
Dept: OBGYN CLINIC | Facility: CLINIC | Age: 65
End: 2020-09-23

## 2020-09-23 VITALS
DIASTOLIC BLOOD PRESSURE: 91 MMHG | WEIGHT: 175 LBS | HEART RATE: 81 BPM | HEIGHT: 66 IN | SYSTOLIC BLOOD PRESSURE: 148 MMHG | TEMPERATURE: 98.3 F | BODY MASS INDEX: 28.12 KG/M2

## 2020-09-23 DIAGNOSIS — M54.12 CERVICAL RADICULOPATHY: ICD-10-CM

## 2020-09-23 DIAGNOSIS — G56.22 CUBITAL TUNNEL SYNDROME ON LEFT: ICD-10-CM

## 2020-09-23 DIAGNOSIS — M19.039 WRIST ARTHRITIS: Primary | ICD-10-CM

## 2020-09-23 PROCEDURE — 99204 OFFICE O/P NEW MOD 45 MIN: CPT | Performed by: ORTHOPAEDIC SURGERY

## 2020-09-23 NOTE — PATIENT INSTRUCTIONS
Cubital Tunnel Syndrome    DESCRIPTION  Cubital Tunnel Syndrome is a condition that involves pressure or stretching of the ulnar nerve (also known as the funny bone nerve), which can cause numbness or tingling in the ring and small fingers, pain in the forearm, and/or weakness in the hand  The ulnar nerve (Figure 1) runs in a groove on the inner side of the elbow  CAUSES  There are a few causes of this ulnar nerve problem  These include:    Pressure: The nerve has little padding over it  Direct pressure (like leaning the arm on an arm rest) can press the nerve, causing the arm and hand -- especially the ring and small fingers -- to fall asleep      Stretching:  Keeping the elbow bent for a long time can stretch the nerve behind the elbow  This can happen during sleep  Anatomy:  Sometimes, the ulnar nerve does not stay in its place and snaps back and forth over a bony bump as the elbow is moved  Repeated snapping can irritate the nerve  Sometimes, the soft tissues over the nerve become thicker or there is an extra muscle over the nerve that can keep it from working correctly  SIGNS AND SYMPTOMS  Cubital tunnel syndrome can cause pain, loss of sensation, tingling and/or weakness  Pins and needles usually are felt in the ring and small fingers  These symptoms are often felt when the elbow is bent for a long period of time, such as while holding a phone or while sleeping  Some people feel weak or clumsy  DIAGNOSIS   Your doctor can learn much by asking you about your symptoms and examining you  S/he might test you for other medical problems like diabetes or thyroid disease  Sometimes, nerve testing (EMG/NCS) may be needed to see how much the nerve and muscle are being affected  This test also checks for other problems such as a pinched nerve in the neck, which can cause similar symptoms  TREATMENT  The first treatment is to avoid actions that cause symptoms    Wrapping a pillow or towel loosely around the elbow (see below) or wearing a splint at night to keep the elbow from bending can help  Avoiding leaning on the funny bone can also help  A hand therapist can help you find ways to avoid pressure on the nerve  (Cubital tunnel brace on [employer]  com also ok)    Sometimes, surgery may be needed to relieve the pressure on the nerve  This can involve releasing the nerve, moving the nerve to the front of the elbow, and/or removing a part of the bone  Your surgeon will talk to you about options  Therapy is sometimes needed after surgery, and the time it takes to recover can vary  Numbness and tingling may improve quickly or slowly  It may take many months for recovery after surgery  Cubital tunnel symptoms may not totally go away after surgery, especially if symptoms are severe

## 2020-09-23 NOTE — PROGRESS NOTES
Chief Complaint     Right wrist pain, Left upper extremity numbness and tingling      History of Present Illness     Nyasia Galloway is a 63 y o  RHD female who presents with right wrist pain and left upper extremity numbness and tingling  I am being asked to see her in consultation at the request of [facility]  Patient has had a history of compartment syndrome with fasciotomy and carpal tunnel syndrome many years ago in right side  She notes symptoms started 3-4 months ago without traumatic onset  Symptoms are worse with activity  They wake her up at night and she has problems with gripping objects  She has tried topical medications and bracing for her right hand which have not helped  She also notes swelling and problems moving her right fingers in the morning but no catching, locking, clicking, or popping            Past Medical History:   Diagnosis Date    Achalasia     Anxiety and depression     Chronic pain     chronic pain    [facility]     Disease of thyroid gland     Esophageal spasm     Fibromyalgia     GERD (gastroesophageal reflux disease)     Hypertension     Iron deficiency anemia     Polysubstance abuse ([address] )     Psychiatric disorder     major depressive       Past Surgical History:   Procedure Laterality Date    ANKLE FUSION      APPENDECTOMY      BELOW KNEE LEG AMPUTATION Right      SECTION      COLONOSCOPY  2010    COLONOSCOPY  2019    diverticula, 3 mm polyp and 6 mm polyp in rectum, grade III internal hemorrhoids, hyperplastic polyp, 5 year recall 2024     EGD  2010    FOREARM FASCIOTOMY Right     KNEE ARTHROSCOPY Left     UPPER GASTROINTESTINAL ENDOSCOPY         Allergies   Allergen Reactions    Levaquin [Levofloxacin]     Penicillins        Current Outpatient Medications on File Prior to Visit   Medication Sig Dispense Refill    acetaminophen (TYLENOL) 325 mg tablet Take 650 mg by mouth every 6 (six) hours as needed for mild pain      albuterol (PROVENTIL HFA,VENTOLIN HFA) 90 mcg/act inhaler Inhale 2 puffs every 4 (four) hours as needed for wheezing      amphetamine-dextroamphetamine (ADDERALL) 20 mg tablet Take 20 mg by mouth 2 (two) times a day      Benzocaine-Menthol (CEPACOL EXTRA STRENGTH) 15-2 6 MG LOZG Apply to the mouth or throat as needed      bifidobacterium infantis (ALIGN) capsule Take 1 capsule by mouth daily 30 capsule 2    bisacodyl (DULCOLAX) 10 mg suppository Insert 10 mg into the rectum as needed      cyclobenzaprine (FLEXERIL) 10 mg tablet Take 10 mg by mouth 2 (two) times a day      cyclobenzaprine (FLEXERIL) 5 mg tablet Take 5 mg by mouth as needed      diclofenac sodium (VOLTAREN) 1 % Apply 4 g topically 4 (four) times a day as needed (pain) 300 g 6    dicyclomine (BENTYL) 20 mg tablet Take 1 tablet (20 mg total) by mouth every 6 (six) hours 120 tablet 10    gabapentin (NEURONTIN) 600 MG tablet Take 600 mg by mouth daily Take 1 and 1/2 tablets daily (900mg total)      Galcanezumab-gnlm (EMGALITY) 120 MG/ML SOSY Inject under the skin Once a month      Guaifenesin 1200 MG TB12 Take 1 tablet by mouth 2 (two) times a day      HYDROcodone-acetaminophen (NORCO) 5-325 mg per tablet Take 1 tablet by mouth every 6 (six) hours as needed for moderate pain for up to 16 doses   Max Daily Amount: 4 tablets 16 tablet 0    ipratropium-albuterol (DUO-NEB) 0 5-2 5 mg/3 mL nebulizer solution Take 3 mL by nebulization 4 (four) times a day      levothyroxine 112 mcg tablet Take 125 mcg by mouth daily       Lidocaine (ASPERCREME LIDOCAINE) 4 % PTCH Apply topically every 8 (eight) hours as needed      LORazepam (ATIVAN) 1 mg tablet Take 1 mg by mouth daily      magnesium hydroxide (MILK OF MAGNESIA) 400 mg/5 mL oral suspension Take 30 mL by mouth daily as needed for constipation      Menthol, Topical Analgesic, (BIOFREEZE) 4 % GEL Apply topically 2 (two) times a day      Multiple Vitamin (MULTIVITAMIN) tablet Take 1 tablet by mouth daily      ondansetron (ZOFRAN) 4 mg tablet Take 4 mg by mouth every 8 (eight) hours as needed for nausea or vomiting      oxyCODONE (OxyCONTIN) 10 mg 12 hr tablet Take 10 mg by mouth every 8 (eight) hours      pantoprazole (PROTONIX) 40 mg tablet Take 1 tablet (40 mg total) by mouth daily at bedtime 30 tablet 10    polyethylene glycol (MIRALAX) 17 g packet Take 17 g by mouth daily      rOPINIRole (REQUIP) 0 25 mg tablet Take 0 25 mg by mouth daily at bedtime      senna (SENOKOT) 8 6 MG tablet Take 1 tablet by mouth 2 (two) times a day as needed for constipation      sertraline (ZOLOFT) 25 mg tablet Take 75 mg by mouth daily      sodium phosphate-biphosphate (FLEET) 7-19 g 118 mL enema Insert 1 enema into the rectum once as needed      topiramate (TOPAMAX) 25 mg tablet Take 25 mg by mouth daily at bedtime      triamcinolone (KENALOG) 0 1 % cream Apply topically daily      venlafaxine (EFFEXOR-XR) 150 mg 24 hr capsule Take 150 mg by mouth daily       No current facility-administered medications on file prior to visit  Social History     Tobacco Use    Smoking status: Current Every Day Smoker     Packs/day: 1 00     Types: Cigarettes     Start date: 1/1/1977    Smokeless tobacco: Never Used    Tobacco comment: plans to quit? Substance Use Topics    Alcohol use: No    Drug use: No       Family History   Problem Relation Age of Onset    Ulcerative colitis Mother     Lung cancer Mother     Diabetes Mother     Alcohol abuse Father     Diabetes Sister     Cancer Brother     Colon polyps Neg Hx     Colon cancer Neg Hx        Review of Systems     As stated in the HPI  All other systems were reviewed and are negative        Physical Exam     /91   Pulse 81   Temp 98 3 °F (36 8 °C)   Ht 5' 6" (1 676 m)   Wt 79 4 kg (175 lb)   LMP  (LMP Unknown)   BMI 28 25 kg/m²     GENERAL: This is a well-developed, well-nourished, age-appropriate patient in no acute distress  The patient is alert and oriented x3  Pleasant and cooperative  Eyes: Anicteric sclerae  Extraocular movements appear intact  HENT: Nares are patent with no drainage  Lungs: There is equal chest rise on inspection  Breathing is non-labored with no audible wheezing  Cardiovascular: No cyanosis  No upper extremity lymphadema  Skin: Skin is warm to touch  No obvious skin lesions or rashes other than described below  Neurologic: No ataxia  Psychiatric: Mood and affect are appropriate  Cervical Spine:  TTP Midline cervical spine  Positive Spurling's Negative Lhermite's    Left Upper Extremity:  Positive Tinels over Cubital Tunnel with positive flexion compression test  Negative Tinel's over Guyon's and Carpal Tunnel  Negative Durkan's over carpal tunnel  5/5 Motor to the APB, FDI, FDP2, FDP5, EDC  Sensation intact to light touch in the median, radial, and ulnar nerve distribution  Ulnar nerve dislocates on left  Fingers WWP    Right upper extremity:  TTP radiocarpal joint  Pain with PROM wrist flexion-extension  Negative Tinel's and cubital tunnel, carpal tunnel, and guyon's canal  Negative Durkan's at carpal tunnel  TTP over SL interval  5/5 Motor to the APB, FDI, FDP2, FDP5, EDC  Sensation intact to light touch in the median, radial, and ulnar nerve distribution  Fingers WWP    Data Review     Results Reviewed     None             Imaging:  X-rays of bilateral hands were personally reviewed  3 views show radiocarpal and midcarpal arthritis with ulnar negative variance  There is also SL widening on right wrist  No acute fractures or dislocations  Assessment and Plan      Diagnoses and all orders for this visit:    Bilateral wrist arthritis    Cervical radiculopathy  -     Ambulatory referral to Hand Surgery    Cubital tunnel syndrome on left         We discussed the etiology and natural course of cubital tunnel syndrome at the elbow    This is related to compression of the ulnar nerve at or around the medial epicondyle or FCU fascia  Compression typically results in numbness to the hand, pain, and occasionally weakness  We discussed that there are nonoperative and operative modalities for treatment and at the majority of patients benefit from non operative modalities  Typically, this involves the night splinting and ergonomic adjustments to prevent direct pressure or compression of the ulnar nerve at the elbow  Surgical treatment can involve in situ decompression or transposition if the nerve is unstable  Start bracing for left cubital tunnel  With regards to the right wrist arthritis that is symptomatic, I would offer corticosteroid injections and bracing, but she has no regimented care associated with her diabetes  She notes that she has not been tested for her A1c in quite a while and she is not even sure what medication she is supposed to be taking  She does not test her blood sugars on a daily basis because she does not have strips  I told her that I would be very concerned about administering a corticosteroid injection with transient raising of the blood sugars associated with the injection given the fact that I am sure unsure how brittle she is  Given that, I Will plan for injections for right radiocarpal arthritis when she gets diabetes and blood glucose under control    Follow Up:  6-8 weeks if diabetes is appropriately managed    PROCEDURES PERFORMED:  No new procedures done today

## 2020-09-23 NOTE — LETTER
2020     MD James Sim [address]    Patient: Rey Caldwell   YOB: 1955   Date of Visit: 2020       Dear Dr Redd Minaya: Thank you for referring Vernal Setting to me for evaluation  Below are my notes for this consultation  If you have questions, please do not hesitate to call me  I look forward to following your patient along with you  Sincerely,        Kendy Potter MD        CC: No Recipients  Kendy Potter MD  2020  7:28 PM  Sign when Signing Visit  Chief Complaint     Right wrist pain, Left upper extremity numbness and tingling      History of Present Illness     Rey Caldwell is a 63 y o  RHD female who presents with right wrist pain and left upper extremity numbness and tingling  I am being asked to see her in consultation at the request of [facility]  Patient has had a history of compartment syndrome with fasciotomy and carpal tunnel syndrome many years ago in right side  She notes symptoms started 3-4 months ago without traumatic onset  Symptoms are worse with activity  They wake her up at night and she has problems with gripping objects  She has tried topical medications and bracing for her right hand which have not helped  She also notes swelling and problems moving her right fingers in the morning but no catching, locking, clicking, or popping            Past Medical History:   Diagnosis Date    Achalasia     Anxiety and depression     Chronic pain     chronic pain    [facility]     Disease of thyroid gland     Esophageal spasm     Fibromyalgia     GERD (gastroesophageal reflux disease)     Hypertension     Iron deficiency anemia     Polysubstance abuse ([address] )     Psychiatric disorder     major depressive       Past Surgical History:   Procedure Laterality Date    ANKLE FUSION      APPENDECTOMY      BELOW KNEE LEG AMPUTATION Right      SECTION      COLONOSCOPY  03/08/2010    COLONOSCOPY  12/05/2019    diverticula, 3 mm polyp and 6 mm polyp in rectum, grade III internal hemorrhoids, hyperplastic polyp, 5 year recall December 2024     EGD  08/18/2010    FOREARM FASCIOTOMY Right     KNEE ARTHROSCOPY Left     UPPER GASTROINTESTINAL ENDOSCOPY         Allergies   Allergen Reactions    Levaquin [Levofloxacin]     Penicillins        Current Outpatient Medications on File Prior to Visit   Medication Sig Dispense Refill    acetaminophen (TYLENOL) 325 mg tablet Take 650 mg by mouth every 6 (six) hours as needed for mild pain      albuterol (PROVENTIL HFA,VENTOLIN HFA) 90 mcg/act inhaler Inhale 2 puffs every 4 (four) hours as needed for wheezing      amphetamine-dextroamphetamine (ADDERALL) 20 mg tablet Take 20 mg by mouth 2 (two) times a day      Benzocaine-Menthol (CEPACOL EXTRA STRENGTH) 15-2 6 MG LOZG Apply to the mouth or throat as needed      bifidobacterium infantis (ALIGN) capsule Take 1 capsule by mouth daily 30 capsule 2    bisacodyl (DULCOLAX) 10 mg suppository Insert 10 mg into the rectum as needed      cyclobenzaprine (FLEXERIL) 10 mg tablet Take 10 mg by mouth 2 (two) times a day      cyclobenzaprine (FLEXERIL) 5 mg tablet Take 5 mg by mouth as needed      diclofenac sodium (VOLTAREN) 1 % Apply 4 g topically 4 (four) times a day as needed (pain) 300 g 6    dicyclomine (BENTYL) 20 mg tablet Take 1 tablet (20 mg total) by mouth every 6 (six) hours 120 tablet 10    gabapentin (NEURONTIN) 600 MG tablet Take 600 mg by mouth daily Take 1 and 1/2 tablets daily (900mg total)      Galcanezumab-gnlm (EMGALITY) 120 MG/ML SOSY Inject under the skin Once a month      Guaifenesin 1200 MG TB12 Take 1 tablet by mouth 2 (two) times a day      HYDROcodone-acetaminophen (NORCO) 5-325 mg per tablet Take 1 tablet by mouth every 6 (six) hours as needed for moderate pain for up to 16 doses   Max Daily Amount: 4 tablets 16 tablet 0    ipratropium-albuterol (DUO-NEB) 0 5-2 5 mg/3 mL nebulizer solution Take 3 mL by nebulization 4 (four) times a day      levothyroxine 112 mcg tablet Take 125 mcg by mouth daily       Lidocaine (ASPERCREME LIDOCAINE) 4 % PTCH Apply topically every 8 (eight) hours as needed      LORazepam (ATIVAN) 1 mg tablet Take 1 mg by mouth daily      magnesium hydroxide (MILK OF MAGNESIA) 400 mg/5 mL oral suspension Take 30 mL by mouth daily as needed for constipation      Menthol, Topical Analgesic, (BIOFREEZE) 4 % GEL Apply topically 2 (two) times a day      Multiple Vitamin (MULTIVITAMIN) tablet Take 1 tablet by mouth daily      ondansetron (ZOFRAN) 4 mg tablet Take 4 mg by mouth every 8 (eight) hours as needed for nausea or vomiting      oxyCODONE (OxyCONTIN) 10 mg 12 hr tablet Take 10 mg by mouth every 8 (eight) hours      pantoprazole (PROTONIX) 40 mg tablet Take 1 tablet (40 mg total) by mouth daily at bedtime 30 tablet 10    polyethylene glycol (MIRALAX) 17 g packet Take 17 g by mouth daily      rOPINIRole (REQUIP) 0 25 mg tablet Take 0 25 mg by mouth daily at bedtime      senna (SENOKOT) 8 6 MG tablet Take 1 tablet by mouth 2 (two) times a day as needed for constipation      sertraline (ZOLOFT) 25 mg tablet Take 75 mg by mouth daily      sodium phosphate-biphosphate (FLEET) 7-19 g 118 mL enema Insert 1 enema into the rectum once as needed      topiramate (TOPAMAX) 25 mg tablet Take 25 mg by mouth daily at bedtime      triamcinolone (KENALOG) 0 1 % cream Apply topically daily      venlafaxine (EFFEXOR-XR) 150 mg 24 hr capsule Take 150 mg by mouth daily       No current facility-administered medications on file prior to visit  Social History     Tobacco Use    Smoking status: Current Every Day Smoker     Packs/day: 1 00     Types: Cigarettes     Start date: 1/1/1977    Smokeless tobacco: Never Used    Tobacco comment: plans to quit? Substance Use Topics    Alcohol use: No    Drug use:  No Family History   Problem Relation Age of Onset    Ulcerative colitis Mother     Lung cancer Mother     Diabetes Mother     Alcohol abuse Father     Diabetes Sister     Cancer Brother     Colon polyps Neg Hx     Colon cancer Neg Hx        Review of Systems     As stated in the HPI  All other systems were reviewed and are negative  Physical Exam     /91   Pulse 81   Temp 98 3 °F (36 8 °C)   Ht 5' 6" (1 676 m)   Wt 79 4 kg (175 lb)   LMP  (LMP Unknown)   BMI 28 25 kg/m²     GENERAL: This is a well-developed, well-nourished, age-appropriate patient in no acute distress  The patient is alert and oriented x3  Pleasant and cooperative  Eyes: Anicteric sclerae  Extraocular movements appear intact  HENT: Nares are patent with no drainage  Lungs: There is equal chest rise on inspection  Breathing is non-labored with no audible wheezing  Cardiovascular: No cyanosis  No upper extremity lymphadema  Skin: Skin is warm to touch  No obvious skin lesions or rashes other than described below  Neurologic: No ataxia  Psychiatric: Mood and affect are appropriate  Cervical Spine:  TTP Midline cervical spine  Positive Spurling's Negative Lhermite's    Left Upper Extremity:  Positive Tinels over Cubital Tunnel with positive flexion compression test  Negative Tinel's over Guyon's and Carpal Tunnel  Negative Durkan's over carpal tunnel  5/5 Motor to the APB, FDI, FDP2, FDP5, EDC  Sensation intact to light touch in the median, radial, and ulnar nerve distribution  Ulnar nerve dislocates on left  Fingers WWP    Right upper extremity:  TTP radiocarpal joint  Pain with PROM wrist flexion-extension  Negative Tinel's and cubital tunnel, carpal tunnel, and guyon's canal  Negative Durkan's at carpal tunnel  TTP over SL interval  5/5 Motor to the APB, FDI, FDP2, FDP5, EDC  Sensation intact to light touch in the median, radial, and ulnar nerve distribution    Fingers WWP    Data Review     Results Reviewed None             Imaging:  X-rays of bilateral hands were personally reviewed  3 views show radiocarpal and midcarpal arthritis with ulnar negative variance  There is also SL widening on right wrist  No acute fractures or dislocations  Assessment and Plan      Diagnoses and all orders for this visit:    Bilateral wrist arthritis    Cervical radiculopathy  -     Ambulatory referral to Hand Surgery    Cubital tunnel syndrome on left         We discussed the etiology and natural course of cubital tunnel syndrome at the elbow  This is related to compression of the ulnar nerve at or around the medial epicondyle or FCU fascia  Compression typically results in numbness to the hand, pain, and occasionally weakness  We discussed that there are nonoperative and operative modalities for treatment and at the majority of patients benefit from non operative modalities  Typically, this involves the night splinting and ergonomic adjustments to prevent direct pressure or compression of the ulnar nerve at the elbow  Surgical treatment can involve in situ decompression or transposition if the nerve is unstable  Start bracing for left cubital tunnel  With regards to the right wrist arthritis that is symptomatic, I would offer corticosteroid injections and bracing, but she has no regimented care associated with her diabetes  She notes that she has not been tested for her A1c in quite a while and she is not even sure what medication she is supposed to be taking  She does not test her blood sugars on a daily basis because she does not have strips  I told her that I would be very concerned about administering a corticosteroid injection with transient raising of the blood sugars associated with the injection given the fact that I am sure unsure how brittle she is    Given that, I Will plan for injections for right radiocarpal arthritis when she gets diabetes and blood glucose under control    Follow Up:  6-8 weeks if diabetes is appropriately managed    PROCEDURES PERFORMED:  No new procedures done today

## 2020-09-23 NOTE — TELEPHONE ENCOUNTER
Note is complete  Please print and fax to the number provided with attention to Viet Webster      Thanks,  HM

## 2020-09-29 ENCOUNTER — TELEPHONE (OUTPATIENT)
Dept: OBGYN CLINIC | Facility: HOSPITAL | Age: 65
End: 2020-09-29

## 2020-09-29 NOTE — TELEPHONE ENCOUNTER
Sent force on appt email to [employer] and Terrance Turk  Patient is asking to be seen by Dr Iggy Hartman sooner than 10/14 if possible  Her pain in her arm is at a level 8, can barely move it         #   [phone]

## 2020-10-14 ENCOUNTER — TELEMEDICINE (OUTPATIENT)
Dept: GASTROENTEROLOGY | Facility: CLINIC | Age: 65
End: 2020-10-14
Payer: MEDICARE

## 2020-10-14 ENCOUNTER — TELEPHONE (OUTPATIENT)
Dept: GASTROENTEROLOGY | Facility: CLINIC | Age: 65
End: 2020-10-14

## 2020-10-14 ENCOUNTER — OFFICE VISIT (OUTPATIENT)
Dept: OBGYN CLINIC | Facility: MEDICAL CENTER | Age: 65
End: 2020-10-14
Payer: MEDICARE

## 2020-10-14 VITALS — WEIGHT: 175 LBS | BODY MASS INDEX: 28.12 KG/M2 | HEIGHT: 66 IN

## 2020-10-14 VITALS
WEIGHT: 175 LBS | HEIGHT: 66 IN | TEMPERATURE: 98.7 F | DIASTOLIC BLOOD PRESSURE: 87 MMHG | BODY MASS INDEX: 28.12 KG/M2 | SYSTOLIC BLOOD PRESSURE: 146 MMHG | HEART RATE: 96 BPM

## 2020-10-14 DIAGNOSIS — M54.12 CERVICAL RADICULOPATHY: Primary | ICD-10-CM

## 2020-10-14 DIAGNOSIS — G56.22 CUBITAL TUNNEL SYNDROME ON LEFT: ICD-10-CM

## 2020-10-14 DIAGNOSIS — R13.10 DYSPHAGIA, UNSPECIFIED TYPE: ICD-10-CM

## 2020-10-14 DIAGNOSIS — K58.0 IRRITABLE BOWEL SYNDROME WITH DIARRHEA: Primary | ICD-10-CM

## 2020-10-14 DIAGNOSIS — Z86.010 PERSONAL HISTORY OF COLONIC POLYPS: ICD-10-CM

## 2020-10-14 DIAGNOSIS — M25.511 ACUTE PAIN OF RIGHT SHOULDER: ICD-10-CM

## 2020-10-14 DIAGNOSIS — G56.02 CARPAL TUNNEL SYNDROME OF LEFT WRIST: ICD-10-CM

## 2020-10-14 DIAGNOSIS — M19.039 WRIST ARTHRITIS: ICD-10-CM

## 2020-10-14 PROCEDURE — 99213 OFFICE O/P EST LOW 20 MIN: CPT | Performed by: ORTHOPAEDIC SURGERY

## 2020-10-14 PROCEDURE — 20550 NJX 1 TENDON SHEATH/LIGAMENT: CPT | Performed by: ORTHOPAEDIC SURGERY

## 2020-10-14 PROCEDURE — 99214 OFFICE O/P EST MOD 30 MIN: CPT | Performed by: INTERNAL MEDICINE

## 2020-10-14 RX ORDER — BETAMETHASONE SODIUM PHOSPHATE AND BETAMETHASONE ACETATE 3; 3 MG/ML; MG/ML
6 INJECTION, SUSPENSION INTRA-ARTICULAR; INTRALESIONAL; INTRAMUSCULAR; SOFT TISSUE
Status: COMPLETED | OUTPATIENT
Start: 2020-10-14 | End: 2020-10-14

## 2020-10-14 RX ORDER — ALUMINUM ZIRCONIUM OCTACHLOROHYDREX GLY 16 G/100G
1 GEL TOPICAL 2 TIMES DAILY
Qty: 120 CAPSULE | Refills: 2 | Status: SHIPPED | OUTPATIENT
Start: 2020-10-14 | End: 2020-12-13

## 2020-10-14 RX ORDER — DICYCLOMINE HCL 20 MG
20 TABLET ORAL EVERY 6 HOURS
Qty: 120 TABLET | Refills: 10 | Status: SHIPPED | OUTPATIENT
Start: 2020-10-14 | End: 2021-08-18

## 2020-10-14 RX ORDER — LIDOCAINE HYDROCHLORIDE 20 MG/ML
1 INJECTION, SOLUTION EPIDURAL; INFILTRATION; INTRACAUDAL; PERINEURAL
Status: COMPLETED | OUTPATIENT
Start: 2020-10-14 | End: 2020-10-14

## 2020-10-14 RX ADMIN — LIDOCAINE HYDROCHLORIDE 1 ML: 20 INJECTION, SOLUTION EPIDURAL; INFILTRATION; INTRACAUDAL; PERINEURAL at 15:17

## 2020-10-14 RX ADMIN — BETAMETHASONE SODIUM PHOSPHATE AND BETAMETHASONE ACETATE 6 MG: 3; 3 INJECTION, SUSPENSION INTRA-ARTICULAR; INTRALESIONAL; INTRAMUSCULAR; SOFT TISSUE at 15:17

## 2020-10-19 ENCOUNTER — APPOINTMENT (OUTPATIENT)
Dept: RADIOLOGY | Facility: MEDICAL CENTER | Age: 65
End: 2020-10-19
Payer: MEDICARE

## 2020-10-19 ENCOUNTER — OFFICE VISIT (OUTPATIENT)
Dept: OBGYN CLINIC | Facility: MEDICAL CENTER | Age: 65
End: 2020-10-19
Payer: MEDICARE

## 2020-10-19 VITALS
HEART RATE: 83 BPM | DIASTOLIC BLOOD PRESSURE: 82 MMHG | SYSTOLIC BLOOD PRESSURE: 122 MMHG | WEIGHT: 175 LBS | TEMPERATURE: 98.2 F | BODY MASS INDEX: 28.12 KG/M2 | HEIGHT: 66 IN

## 2020-10-19 DIAGNOSIS — M25.511 RIGHT SHOULDER PAIN, UNSPECIFIED CHRONICITY: ICD-10-CM

## 2020-10-19 DIAGNOSIS — M19.011 PRIMARY OSTEOARTHRITIS OF RIGHT SHOULDER: Primary | ICD-10-CM

## 2020-10-19 PROCEDURE — 73030 X-RAY EXAM OF SHOULDER: CPT

## 2020-10-19 PROCEDURE — 20610 DRAIN/INJ JOINT/BURSA W/O US: CPT | Performed by: ORTHOPAEDIC SURGERY

## 2020-10-19 PROCEDURE — 99214 OFFICE O/P EST MOD 30 MIN: CPT | Performed by: ORTHOPAEDIC SURGERY

## 2020-10-19 RX ORDER — LIDOCAINE HYDROCHLORIDE 10 MG/ML
3 INJECTION, SOLUTION EPIDURAL; INFILTRATION; INTRACAUDAL; PERINEURAL
Status: COMPLETED | OUTPATIENT
Start: 2020-10-19 | End: 2020-10-19

## 2020-10-19 RX ORDER — METHYLPREDNISOLONE ACETATE 40 MG/ML
1 INJECTION, SUSPENSION INTRA-ARTICULAR; INTRALESIONAL; INTRAMUSCULAR; SOFT TISSUE
Status: COMPLETED | OUTPATIENT
Start: 2020-10-19 | End: 2020-10-19

## 2020-10-19 RX ADMIN — LIDOCAINE HYDROCHLORIDE 3 ML: 10 INJECTION, SOLUTION EPIDURAL; INFILTRATION; INTRACAUDAL; PERINEURAL at 12:11

## 2020-10-19 RX ADMIN — METHYLPREDNISOLONE ACETATE 1 ML: 40 INJECTION, SUSPENSION INTRA-ARTICULAR; INTRALESIONAL; INTRAMUSCULAR; SOFT TISSUE at 12:11

## 2020-10-27 ENCOUNTER — TELEPHONE (OUTPATIENT)
Dept: GASTROENTEROLOGY | Facility: CLINIC | Age: 65
End: 2020-10-27

## 2020-11-12 ENCOUNTER — OFFICE VISIT (OUTPATIENT)
Dept: OBGYN CLINIC | Facility: MEDICAL CENTER | Age: 65
End: 2020-11-12
Payer: MEDICARE

## 2020-11-12 VITALS
BODY MASS INDEX: 28.12 KG/M2 | HEART RATE: 78 BPM | HEIGHT: 66 IN | SYSTOLIC BLOOD PRESSURE: 130 MMHG | DIASTOLIC BLOOD PRESSURE: 82 MMHG | WEIGHT: 175 LBS | TEMPERATURE: 96.7 F

## 2020-11-12 DIAGNOSIS — M25.511 RIGHT SHOULDER PAIN, UNSPECIFIED CHRONICITY: Primary | ICD-10-CM

## 2020-11-12 PROCEDURE — 20610 DRAIN/INJ JOINT/BURSA W/O US: CPT | Performed by: ORTHOPAEDIC SURGERY

## 2020-11-12 PROCEDURE — 99213 OFFICE O/P EST LOW 20 MIN: CPT | Performed by: ORTHOPAEDIC SURGERY

## 2020-11-12 RX ORDER — METHYLPREDNISOLONE ACETATE 40 MG/ML
1 INJECTION, SUSPENSION INTRA-ARTICULAR; INTRALESIONAL; INTRAMUSCULAR; SOFT TISSUE
Status: COMPLETED | OUTPATIENT
Start: 2020-11-12 | End: 2020-11-12

## 2020-11-12 RX ORDER — LIDOCAINE HYDROCHLORIDE 10 MG/ML
3 INJECTION, SOLUTION INFILTRATION; PERINEURAL
Status: COMPLETED | OUTPATIENT
Start: 2020-11-12 | End: 2020-11-12

## 2020-11-12 RX ORDER — ATOMOXETINE 40 MG/1
80 CAPSULE ORAL DAILY
COMMUNITY
Start: 2020-11-10 | End: 2022-02-03

## 2020-11-12 RX ADMIN — METHYLPREDNISOLONE ACETATE 1 ML: 40 INJECTION, SUSPENSION INTRA-ARTICULAR; INTRALESIONAL; INTRAMUSCULAR; SOFT TISSUE at 10:49

## 2020-11-12 RX ADMIN — LIDOCAINE HYDROCHLORIDE 3 ML: 10 INJECTION, SOLUTION INFILTRATION; PERINEURAL at 10:49

## 2020-12-01 ENCOUNTER — APPOINTMENT (OUTPATIENT)
Dept: RADIOLOGY | Facility: MEDICAL CENTER | Age: 65
End: 2020-12-01
Payer: MEDICARE

## 2020-12-01 ENCOUNTER — OFFICE VISIT (OUTPATIENT)
Dept: OBGYN CLINIC | Facility: MEDICAL CENTER | Age: 65
End: 2020-12-01
Payer: MEDICARE

## 2020-12-01 VITALS
HEIGHT: 66 IN | TEMPERATURE: 96.9 F | DIASTOLIC BLOOD PRESSURE: 80 MMHG | HEART RATE: 90 BPM | SYSTOLIC BLOOD PRESSURE: 118 MMHG | BODY MASS INDEX: 28.12 KG/M2 | WEIGHT: 175 LBS

## 2020-12-01 DIAGNOSIS — M54.12 CERVICAL RADICULOPATHY: ICD-10-CM

## 2020-12-01 DIAGNOSIS — G56.22 CUBITAL TUNNEL SYNDROME ON LEFT: ICD-10-CM

## 2020-12-01 DIAGNOSIS — G56.02 CARPAL TUNNEL SYNDROME OF LEFT WRIST: ICD-10-CM

## 2020-12-01 DIAGNOSIS — M54.12 CERVICAL RADICULOPATHY: Primary | ICD-10-CM

## 2020-12-01 PROCEDURE — 99213 OFFICE O/P EST LOW 20 MIN: CPT | Performed by: ORTHOPAEDIC SURGERY

## 2020-12-01 PROCEDURE — 72050 X-RAY EXAM NECK SPINE 4/5VWS: CPT

## 2020-12-07 ENCOUNTER — TELEPHONE (OUTPATIENT)
Dept: OBGYN CLINIC | Facility: HOSPITAL | Age: 65
End: 2020-12-07

## 2020-12-07 ENCOUNTER — OFFICE VISIT (OUTPATIENT)
Dept: RHEUMATOLOGY | Facility: CLINIC | Age: 65
End: 2020-12-07
Payer: MEDICARE

## 2020-12-07 VITALS
HEART RATE: 69 BPM | BODY MASS INDEX: 28.12 KG/M2 | WEIGHT: 175 LBS | DIASTOLIC BLOOD PRESSURE: 88 MMHG | SYSTOLIC BLOOD PRESSURE: 143 MMHG | HEIGHT: 66 IN

## 2020-12-07 DIAGNOSIS — R53.81 MALAISE AND FATIGUE: ICD-10-CM

## 2020-12-07 DIAGNOSIS — R53.83 MALAISE AND FATIGUE: ICD-10-CM

## 2020-12-07 DIAGNOSIS — M19.90 ARTHRITIS: ICD-10-CM

## 2020-12-07 DIAGNOSIS — R79.82 ELEVATED C-REACTIVE PROTEIN (CRP): ICD-10-CM

## 2020-12-07 DIAGNOSIS — M13.0 POLYARTHRITIS: Primary | ICD-10-CM

## 2020-12-07 PROCEDURE — 99213 OFFICE O/P EST LOW 20 MIN: CPT | Performed by: INTERNAL MEDICINE

## 2020-12-07 RX ORDER — METHYLPREDNISOLONE 8 MG/1
TABLET ORAL
Qty: 32 TABLET | Refills: 1 | Status: SHIPPED | OUTPATIENT
Start: 2020-12-07 | End: 2020-12-21

## 2020-12-07 RX ORDER — FOLIC ACID 1 MG/1
1 TABLET ORAL DAILY
Qty: 90 TABLET | Refills: 3 | Status: SHIPPED | OUTPATIENT
Start: 2020-12-07 | End: 2021-03-22

## 2020-12-10 ENCOUNTER — OFFICE VISIT (OUTPATIENT)
Dept: OBGYN CLINIC | Facility: MEDICAL CENTER | Age: 65
End: 2020-12-10
Payer: MEDICARE

## 2020-12-10 VITALS
HEART RATE: 85 BPM | HEIGHT: 66 IN | WEIGHT: 175 LBS | DIASTOLIC BLOOD PRESSURE: 89 MMHG | BODY MASS INDEX: 28.12 KG/M2 | TEMPERATURE: 97.8 F | SYSTOLIC BLOOD PRESSURE: 144 MMHG

## 2020-12-10 DIAGNOSIS — M19.011 PRIMARY OSTEOARTHRITIS OF RIGHT SHOULDER: Primary | ICD-10-CM

## 2020-12-10 PROCEDURE — 99213 OFFICE O/P EST LOW 20 MIN: CPT | Performed by: ORTHOPAEDIC SURGERY

## 2020-12-10 PROCEDURE — 20610 DRAIN/INJ JOINT/BURSA W/O US: CPT | Performed by: ORTHOPAEDIC SURGERY

## 2020-12-10 RX ORDER — METHYLPREDNISOLONE ACETATE 40 MG/ML
1 INJECTION, SUSPENSION INTRA-ARTICULAR; INTRALESIONAL; INTRAMUSCULAR; SOFT TISSUE
Status: COMPLETED | OUTPATIENT
Start: 2020-12-10 | End: 2020-12-10

## 2020-12-10 RX ORDER — LIDOCAINE HYDROCHLORIDE 10 MG/ML
3 INJECTION, SOLUTION INFILTRATION; PERINEURAL
Status: COMPLETED | OUTPATIENT
Start: 2020-12-10 | End: 2020-12-10

## 2020-12-10 RX ADMIN — LIDOCAINE HYDROCHLORIDE 3 ML: 10 INJECTION, SOLUTION INFILTRATION; PERINEURAL at 11:14

## 2020-12-10 RX ADMIN — METHYLPREDNISOLONE ACETATE 1 ML: 40 INJECTION, SUSPENSION INTRA-ARTICULAR; INTRALESIONAL; INTRAMUSCULAR; SOFT TISSUE at 11:14

## 2020-12-16 DIAGNOSIS — M19.90 ARTHRITIS: ICD-10-CM

## 2020-12-21 ENCOUNTER — TELEPHONE (OUTPATIENT)
Dept: OBGYN CLINIC | Facility: HOSPITAL | Age: 65
End: 2020-12-21

## 2020-12-21 RX ORDER — METHYLPREDNISOLONE 4 MG/1
TABLET ORAL
Qty: 64 TABLET | Refills: 1 | Status: SHIPPED | OUTPATIENT
Start: 2020-12-21 | End: 2021-01-07 | Stop reason: ALTCHOICE

## 2020-12-22 ENCOUNTER — HOSPITAL ENCOUNTER (OUTPATIENT)
Dept: RADIOLOGY | Facility: HOSPITAL | Age: 65
Discharge: HOME | End: 2020-12-22
Attending: ORTHOPAEDIC SURGERY
Payer: MEDICARE

## 2020-12-22 ENCOUNTER — TRANSCRIBE ORDERS (OUTPATIENT)
Dept: RADIOLOGY | Facility: HOSPITAL | Age: 65
End: 2020-12-22

## 2020-12-22 ENCOUNTER — HOSPITAL ENCOUNTER (OUTPATIENT)
Dept: RADIOLOGY | Facility: HOSPITAL | Age: 65
Discharge: HOME | End: 2020-12-22
Payer: MEDICARE

## 2020-12-22 DIAGNOSIS — G56.22 CUBITAL TUNNEL SYNDROME ON LEFT: ICD-10-CM

## 2020-12-22 DIAGNOSIS — G56.02 CARPAL TUNNEL SYNDROME OF LEFT WRIST: ICD-10-CM

## 2020-12-22 PROCEDURE — 76882 US LMTD JT/FCL EVL NVASC XTR: CPT

## 2020-12-29 ENCOUNTER — OFFICE VISIT (OUTPATIENT)
Dept: OBGYN CLINIC | Facility: MEDICAL CENTER | Age: 65
End: 2020-12-29
Payer: MEDICARE

## 2020-12-29 VITALS
HEART RATE: 79 BPM | HEIGHT: 66 IN | BODY MASS INDEX: 28.12 KG/M2 | WEIGHT: 175 LBS | DIASTOLIC BLOOD PRESSURE: 75 MMHG | SYSTOLIC BLOOD PRESSURE: 135 MMHG

## 2020-12-29 DIAGNOSIS — G56.22 CUBITAL TUNNEL SYNDROME ON LEFT: Primary | ICD-10-CM

## 2020-12-29 DIAGNOSIS — G56.02 CARPAL TUNNEL SYNDROME OF LEFT WRIST: ICD-10-CM

## 2020-12-29 PROCEDURE — 99214 OFFICE O/P EST MOD 30 MIN: CPT | Performed by: ORTHOPAEDIC SURGERY

## 2020-12-29 RX ORDER — CLINDAMYCIN PHOSPHATE 900 MG/50ML
900 INJECTION INTRAVENOUS ONCE
Status: CANCELLED | OUTPATIENT
Start: 2020-12-29 | End: 2020-12-29

## 2020-12-29 NOTE — H&P (VIEW-ONLY)
Chief Complaint     Left hand numbness and tingling      History of Present Illness     Aj Bernard is a 63 y o  female who presents with continued left hand numbness and tingling  She has tried bracing on the elbow and the hand with continued numbness and tingling  Wakes her up from sleep at night  No catching clicking popping locking  Was able to obtain her ultrasound  Is very concerned about continuing with nonoperative care  Has been maintaining her diabetes quite well  Past Medical History:   Diagnosis Date    Achalasia     Anxiety and depression     Chronic pain     chronic pain    Colon polyp     COPD (chronic obstructive pulmonary disease) ([facility])     Disease of thyroid gland     Esophageal spasm     Fibromyalgia     GERD (gastroesophageal reflux disease)     Hypertension     Iron deficiency anemia     Polysubstance abuse ([address] )     Psychiatric disorder     major depressive       Past Surgical History:   Procedure Laterality Date    ANKLE FUSION      APPENDECTOMY      BELOW KNEE LEG AMPUTATION Right      SECTION      COLONOSCOPY  2010    COLONOSCOPY  2019    diverticula, 3 mm polyp and 6 mm polyp in rectum, grade III internal hemorrhoids, hyperplastic polyp, 5 year recall 2024     EGD  2010    FOREARM FASCIOTOMY Right     KNEE ARTHROSCOPY Left     UPPER GASTROINTESTINAL ENDOSCOPY         Allergies   Allergen Reactions    Morphine Itching and Anaphylaxis     u  Other reaction(s):  Other (See Comments)  u  Other reaction(s): Feeling agitated (finding)      Cephalexin      Other reaction(s): Unknown    Cephalosporins Hives     Other reaction(s): Unknown Reaction      Codeine Hives     Other reaction(s): Unknown  Other reaction(s): Unknown Reaction  Other reaction(s): Unknown      Levaquin [Levofloxacin]     Nsaids      Other reaction(s): Unknown Reaction    Penicillins     Aspirin Hives, Other (See Comments) and Rash     Other reaction(s): Unknown Reaction      Chlorhexidine Rash    Medical Tape Rash    Vancomycin Rash       Current Outpatient Medications on File Prior to Visit   Medication Sig Dispense Refill    acetaminophen (TYLENOL) 325 mg tablet Take 650 mg by mouth every 6 (six) hours as needed for mild pain      albuterol (PROVENTIL HFA,VENTOLIN HFA) 90 mcg/act inhaler Inhale 2 puffs every 4 (four) hours as needed for wheezing      atoMOXetine (STRATTERA) 40 mg capsule Take 80 mg by mouth daily      Benzocaine-Menthol (CEPACOL EXTRA STRENGTH) 15-2 6 MG LOZG Apply to the mouth or throat as needed      cyclobenzaprine (FLEXERIL) 10 mg tablet Take 10 mg by mouth 2 (two) times a day      cyclobenzaprine (FLEXERIL) 5 mg tablet Take 5 mg by mouth as needed      diclofenac sodium (VOLTAREN) 1 % Apply 4 g topically 4 (four) times a day as needed (pain) 381 g 6    folic acid (FOLVITE) 1 mg tablet Take 1 tablet (1 mg total) by mouth daily 90 tablet 3    gabapentin (NEURONTIN) 600 MG tablet Take 600 mg by mouth daily Take 1 and 1/2 tablets daily (900mg total)      Galcanezumab-gnlm (EMGALITY) 120 MG/ML SOSY Inject under the skin Once a month      Guaifenesin 1200 MG TB12 Take 1 tablet by mouth 2 (two) times a day      HYDROcodone-acetaminophen (NORCO) 5-325 mg per tablet Take 1 tablet by mouth every 6 (six) hours as needed for moderate pain for up to 16 doses   Max Daily Amount: 4 tablets 16 tablet 0    ipratropium-albuterol (DUO-NEB) 0 5-2 5 mg/3 mL nebulizer solution Take 3 mL by nebulization 4 (four) times a day      levothyroxine 112 mcg tablet Take 125 mcg by mouth daily       Lidocaine (ASPERCREME LIDOCAINE) 4 % PTCH Apply topically every 8 (eight) hours as needed      LORazepam (ATIVAN) 1 mg tablet Take 1 mg by mouth daily      Menthol, Topical Analgesic, (BIOFREEZE) 4 % GEL Apply topically 2 (two) times a day      methotrexate 10 MG tablet Take 1 tablet (10 mg total) by mouth once a week 4 tablet 3    methylprednisolone (MEDROL) 4 mg tablet Take 8 tablets (32 mg total) by mouth daily for 3 days, THEN 6 tablets (24 mg total) daily for 3 days, THEN 4 tablets (16 mg total) daily for 3 days, THEN 2 tablet (8 mg total) daily for 3 days, THEN 1 tablets (4 mg total) daily for 3 days  64 tablet 1    Multiple Vitamin (MULTIVITAMIN) tablet Take 1 tablet by mouth daily      ondansetron (ZOFRAN) 4 mg tablet Take 4 mg by mouth every 8 (eight) hours as needed for nausea or vomiting      oxyCODONE (OxyCONTIN) 10 mg 12 hr tablet Take 10 mg by mouth every 8 (eight) hours      pantoprazole (PROTONIX) 40 mg tablet Take 1 tablet (40 mg total) by mouth daily at bedtime 30 tablet 10    rOPINIRole (REQUIP) 0 25 mg tablet Take 0 25 mg by mouth daily at bedtime      sertraline (ZOLOFT) 25 mg tablet Take 50 mg by mouth daily       topiramate (TOPAMAX) 25 mg tablet Take 25 mg by mouth daily at bedtime      triamcinolone (KENALOG) 0 1 % cream Apply topically daily      venlafaxine (EFFEXOR-XR) 150 mg 24 hr capsule Take 150 mg by mouth daily      dicyclomine (BENTYL) 20 mg tablet Take 1 tablet (20 mg total) by mouth every 6 (six) hours 120 tablet 10     No current facility-administered medications on file prior to visit  Social History     Tobacco Use    Smoking status: Current Every Day Smoker     Packs/day: 1 00     Types: Cigarettes     Start date: 1/1/1977    Smokeless tobacco: Never Used    Tobacco comment: plans to quit? Substance Use Topics    Alcohol use: No    Drug use: No       Family History   Problem Relation Age of Onset    Ulcerative colitis Mother     Lung cancer Mother     Diabetes Mother     Alcohol abuse Father     Diabetes Sister     Cancer Brother     Colon polyps Neg Hx     Colon cancer Neg Hx        Review of Systems     As stated in the HPI  All other systems were reviewed and are negative        Physical Exam     /75   Pulse 79   Ht 5' 6" (1 676 m)   Wt 79 4 kg (175 lb)   LMP (LMP Unknown)   BMI 28 25 kg/m²     GENERAL: This is a well-developed, well-nourished, age-appropriate patient in no acute distress  The patient is alert and oriented x3  Pleasant and cooperative  Eyes: Anicteric sclerae  Extraocular movements appear intact  HENT: Nares are patent with no drainage  Lungs: There is equal chest rise on inspection  Breathing is non-labored with no audible wheezing  Cardiovascular: No cyanosis  No upper extremity lymphadema  Skin: Skin is warm to touch  No obvious skin lesions or rashes other than described below  Neurologic: No ataxia  Psychiatric: Mood and affect are appropriate  Left Upper Extremity:  Positive Tinels over Cubital Tunnel with positive flexion compression test  Positive Tinel's  Carpal Tunnel  Postive Durkan's over carpal tunnel  5/5 Motor to the APB, FDI, FDP2, FDP5, EDC  Full range of motion with DPC 0 to the fingers  Decreased sensation to light touch in the median, radial, and ulnar nerve distribution  Ulnar nerve subluxation   2 point: thumb 8, index greater than 15, long 10, ring 10, small 8       Data Review     Results Reviewed     None             Imaging:  Ultrasound of the cubital and carpal tunnel reveal increased size of both nerves suggestive of their respective neuropathies  No dynamic subluxation of the ulnar nerve    Assessment and Plan      Diagnoses and all orders for this visit:    Cubital tunnel syndrome on left    Carpal tunnel syndrome of left wrist         29-year-old female with carpal and cubital tunnel refractory to nonsurgical modalities  I had a long discussion with her about the risks and benefits of the surgery and the patient would like to proceed given that she has significant limitations related to the numbness and tingling  I discussed with her that transposition as possible with this surgery and that we should expect 6-12 months of recovery of the nerve given the chronicity of the compression    She may never have full recovery in terms of sensation  She is in good understanding wished to proceed  We will schedule this accordingly  Patient notes a history of COPD which she says is well controlled  I discussed the increased risk associated with this as well as diabetes and her other comorbidities and she still wishes to proceed      Follow Up:  10 14 days postop    To Do Next Visit:     PROCEDURES PERFORMED:  Procedures  No Procedures performed today

## 2020-12-31 ENCOUNTER — TELEPHONE (OUTPATIENT)
Dept: OBGYN CLINIC | Facility: HOSPITAL | Age: 65
End: 2020-12-31

## 2021-01-04 ENCOUNTER — TELEPHONE (OUTPATIENT)
Dept: OBGYN CLINIC | Facility: HOSPITAL | Age: 66
End: 2021-01-04

## 2021-01-04 NOTE — TELEPHONE ENCOUNTER
Patient sees Dr Yuniel Ramirez from [address] [place] [address] called requesting OVN from 12/7/2020, faxed at [phone]

## 2021-01-06 ENCOUNTER — OFFICE VISIT (OUTPATIENT)
Dept: RHEUMATOLOGY | Facility: CLINIC | Age: 66
End: 2021-01-06
Payer: MEDICARE

## 2021-01-06 VITALS
DIASTOLIC BLOOD PRESSURE: 78 MMHG | WEIGHT: 175 LBS | BODY MASS INDEX: 28.12 KG/M2 | HEIGHT: 66 IN | SYSTOLIC BLOOD PRESSURE: 115 MMHG | HEART RATE: 90 BPM

## 2021-01-06 DIAGNOSIS — M14.832: ICD-10-CM

## 2021-01-06 DIAGNOSIS — M19.90 INFLAMMATORY ARTHRITIS: Primary | ICD-10-CM

## 2021-01-06 DIAGNOSIS — R53.81 MALAISE AND FATIGUE: ICD-10-CM

## 2021-01-06 DIAGNOSIS — R53.83 MALAISE AND FATIGUE: ICD-10-CM

## 2021-01-06 DIAGNOSIS — M19.90 ARTHRITIS: ICD-10-CM

## 2021-01-06 DIAGNOSIS — M13.0 POLYARTHRITIS: ICD-10-CM

## 2021-01-06 PROCEDURE — 99213 OFFICE O/P EST LOW 20 MIN: CPT | Performed by: INTERNAL MEDICINE

## 2021-01-06 RX ORDER — LEVOTHYROXINE SODIUM 0.15 MG/1
150 TABLET ORAL DAILY
COMMUNITY
End: 2022-03-04

## 2021-01-06 NOTE — PROGRESS NOTES
Assessment and Plan:   Inflammatory arthrits/carpal tunnel syndrome--Continue MTX and titrate upwards to achieve symptom relief  Increase methotrexate to 12 5 mg weekly  Continue folic acid daily  Anti-inflammatory diet  Continue to f/u with orthopedics for left CTS release and left cubital tunnel syndrome transposition sx  Avoid steroid use pre/postoperatively to prevent increased risk of infection and optimize wound-healing  Continue topical NSAIDs/topical analgesics  Repeal labs, CBC, Hepatic function, basic metabolic panel, ESR/CRP in 3 months and follow up with Dr Anel Medrano in 3 months  Rheumatic Disease Summary  Inflammatory arthritis    HPI       The following portions of the patient's history were reviewed and updated as appropriate: allergies, current medications, past family history, past medical history, past social history, past surgical history and problem list     Review of Systems:   Review of Systems   Constitutional: Negative for chills, fatigue, fever and unexpected weight change  HENT: Negative for mouth sores and trouble swallowing  Eyes: Negative for pain and visual disturbance  Respiratory: Negative for cough and shortness of breath  Cardiovascular: Negative for chest pain and leg swelling  Gastrointestinal: Negative for constipation and diarrhea  Musculoskeletal: Negative for arthralgias, back pain, joint swelling and myalgias  Skin: Negative for color change and rash  Neurological: Positive for numbness  Negative for weakness  Hematological: Negative for adenopathy  Psychiatric/Behavioral: Negative for sleep disturbance         Home Medications:    Current Outpatient Medications:     acetaminophen (TYLENOL) 325 mg tablet, Take 650 mg by mouth every 6 (six) hours as needed for mild pain, Disp: , Rfl:     albuterol (PROVENTIL HFA,VENTOLIN HFA) 90 mcg/act inhaler, Inhale 2 puffs every 4 (four) hours as needed for wheezing, Disp: , Rfl:     atoMOXetine (STRATTERA) 40 mg capsule, Take 80 mg by mouth daily, Disp: , Rfl:     Benzocaine-Menthol (CEPACOL EXTRA STRENGTH) 15-2 6 MG LOZG, Apply to the mouth or throat as needed, Disp: , Rfl:     cyclobenzaprine (FLEXERIL) 10 mg tablet, Take 10 mg by mouth 2 (two) times a day, Disp: , Rfl:     cyclobenzaprine (FLEXERIL) 5 mg tablet, Take 5 mg by mouth as needed, Disp: , Rfl:     diclofenac sodium (VOLTAREN) 1 %, Apply 4 g topically 4 (four) times a day as needed (pain), Disp: 300 g, Rfl: 6    dicyclomine (BENTYL) 20 mg tablet, Take 1 tablet (20 mg total) by mouth every 6 (six) hours, Disp: 120 tablet, Rfl: 10    folic acid (FOLVITE) 1 mg tablet, Take 1 tablet (1 mg total) by mouth daily, Disp: 90 tablet, Rfl: 3    gabapentin (NEURONTIN) 600 MG tablet, Take 600 mg by mouth daily Take 1 and 1/2 tablets daily (900mg total), Disp: , Rfl:     Galcanezumab-gnlm (EMGALITY) 120 MG/ML SOSY, Inject under the skin Once a month, Disp: , Rfl:     Guaifenesin 1200 MG TB12, Take 1 tablet by mouth 2 (two) times a day, Disp: , Rfl:     HYDROcodone-acetaminophen (NORCO) 5-325 mg per tablet, Take 1 tablet by mouth every 6 (six) hours as needed for moderate pain for up to 16 doses   Max Daily Amount: 4 tablets, Disp: 16 tablet, Rfl: 0    ipratropium-albuterol (DUO-NEB) 0 5-2 5 mg/3 mL nebulizer solution, Take 3 mL by nebulization 4 (four) times a day, Disp: , Rfl:     levothyroxine 112 mcg tablet, Take 125 mcg by mouth daily , Disp: , Rfl:     Lidocaine (ASPERCREME LIDOCAINE) 4 % PTCH, Apply topically every 8 (eight) hours as needed, Disp: , Rfl:     LORazepam (ATIVAN) 1 mg tablet, Take 1 mg by mouth daily, Disp: , Rfl:     Menthol, Topical Analgesic, (BIOFREEZE) 4 % GEL, Apply topically 2 (two) times a day, Disp: , Rfl:     methotrexate 10 MG tablet, Take 1 tablet (10 mg total) by mouth once a week, Disp: 4 tablet, Rfl: 3    methylprednisolone (MEDROL) 4 mg tablet, Take 8 tablets (32 mg total) by mouth daily for 3 days, THEN 6 tablets (24 mg total) daily for 3 days, THEN 4 tablets (16 mg total) daily for 3 days, THEN 2 tablet (8 mg total) daily for 3 days, THEN 1 tablets (4 mg total) daily for 3 days  , Disp: 64 tablet, Rfl: 1    Multiple Vitamin (MULTIVITAMIN) tablet, Take 1 tablet by mouth daily, Disp: , Rfl:     ondansetron (ZOFRAN) 4 mg tablet, Take 4 mg by mouth every 8 (eight) hours as needed for nausea or vomiting, Disp: , Rfl:     oxyCODONE (OxyCONTIN) 10 mg 12 hr tablet, Take 10 mg by mouth every 8 (eight) hours, Disp: , Rfl:     pantoprazole (PROTONIX) 40 mg tablet, Take 1 tablet (40 mg total) by mouth daily at bedtime, Disp: 30 tablet, Rfl: 10    rOPINIRole (REQUIP) 0 25 mg tablet, Take 0 25 mg by mouth daily at bedtime, Disp: , Rfl:     sertraline (ZOLOFT) 25 mg tablet, Take 50 mg by mouth daily , Disp: , Rfl:     topiramate (TOPAMAX) 25 mg tablet, Take 25 mg by mouth daily at bedtime, Disp: , Rfl:     triamcinolone (KENALOG) 0 1 % cream, Apply topically daily, Disp: , Rfl:     venlafaxine (EFFEXOR-XR) 150 mg 24 hr capsule, Take 150 mg by mouth daily, Disp: , Rfl:     Objective: There were no vitals filed for this visit  Physical Exam  Constitutional:       General: She is not in acute distress  Appearance: She is well-developed  HENT:      Head: Normocephalic and atraumatic  Eyes:      General: Lids are normal  No scleral icterus  Conjunctiva/sclera: Conjunctivae normal    Neck:      Musculoskeletal: Neck supple  Pulmonary:      Effort: Pulmonary effort is normal  No tachypnea, accessory muscle usage or respiratory distress  Musculoskeletal: Normal range of motion  Skin:     General: Skin is dry  Findings: No rash  Neurological:      Mental Status: She is alert  Psychiatric:         Behavior: Behavior normal  Behavior is cooperative  Reviewed labs and imaging      Imaging:   [employer]    Result Date: 12/22/2020  Narrative: ULTRASOUND LEFT ELBOW (CUBITAL TUNNEL SYNDROME PROTOCOL) INDICATION:   G56 22: Lesion of ulnar nerve, left upper limb G56 02: Carpal tunnel syndrome, left upper limb  TECHNIQUE:   Using a high frequency transducer, the LEFT ulnar nerves were  scanned to evaluate for cubital tunnel syndrome and presence of ulnar nerve dislocation  Gray scale and color doppler ultrasound was used  COMPARISON:  None FINDINGS: Ulnar nerve cross sectional area distal arm: 12 3 sq mm  Maximum ulnar nerve cross sectional area within the cubital tunnel: 16 1 sq mm  Maximum ulnar nerve cross sectional area in the proximal forearm: 11 0 sq mm  Maximal ulnar nerve/proximal cross sectional ratio: 1 3 No evidence of ulnar nerve dislocation of the ulnar nerve from the cubital tunnel with dynamic flexion-extension evaluation  No accessory anconeus epitrochlearis muscle  Impression: Findings suspicious for cubital tunnel syndrome, evidenced by increased cross sectional area of the ulnar nerve  Note: Ultrasound diagnosis of cubital tunnel syndrome is suggested with visualization of hypoechoic enlargement of the ulnar nerve just proximal to the cubital tunnel, usually with a transition to normal size within the cubital tunnel  A cross sectional  area of ulnar nerve greater than 9 sq mm at the site of maximal enlargement or increase in size with a ratio of greater than 2 8 compared to the proximal is considered abnormal  Workstation performed: [id number]     [employer]    Result Date: 12/22/2020  Narrative: ULTRASOUND LEFT WRIST (CARPAL TUNNEL SYNDROME PROTOCOL) TECHNIQUE:   Using a high frequency transducer, the LEFT WRIST median nerve was scanned to evaluate for carpal tunnel syndrome  Gray scale and color doppler ultrasound was used  INDICATION:   G56 22: Lesion of ulnar nerve, left upper limb G56 02: Carpal tunnel syndrome, left upper limb  COMPARISON:  None FINDINGS: Median nerve cross sectional area distal forearm (CSAp): 12 8 sq mm   Maximum median nerve cross sectional area within carpal tunnel (CSAc): 18 7 sq mm  Delta CSA (CSAc-CSAp): 5 9 sq mm  Wrist to Forearm ratio (WFR ratio) (CSAc/CSAp): 1 5 Vascularity: Abnormal Hypervascularity detected  Impression: Carpal tunnel syndrome ruled in based on marked abnormal CSA > 14 sq mm   Workstation performed: [id number]       Labs:   Office Visit on 08/18/2020   Component Date Value Ref Range Status    WBC 08/18/2020 12 65* 4 31 - 10 16 Thousand/uL Final    RBC 08/18/2020 6 04* 3 81 - 5 12 Million/uL Final    Hemoglobin 08/18/2020 14 5  11 5 - 15 4 g/dL Final    Hematocrit 08/18/2020 50 0* 34 8 - 46 1 % Final    MCV 08/18/2020 83  82 - 98 fL Final    MCH 08/18/2020 24 0* 26 8 - 34 3 pg Final    MCHC 08/18/2020 29 0* 31 4 - 37 4 g/dL Final    RDW 08/18/2020 18 6* 11 6 - 15 1 % Final    MPV 08/18/2020 13 4* 8 9 - 12 7 fL Final    Platelets 65/68/1916 357  149 - 390 Thousands/uL Final    nRBC 08/18/2020 0  /100 WBCs Final    Neutrophils Relative 08/18/2020 64  43 - 75 % Final    Immat GRANS % 08/18/2020 0  0 - 2 % Final    Lymphocytes Relative 08/18/2020 27  14 - 44 % Final    Monocytes Relative 08/18/2020 6  4 - 12 % Final    Eosinophils Relative 08/18/2020 2  0 - 6 % Final    Basophils Relative 08/18/2020 1  0 - 1 % Final    Neutrophils Absolute 08/18/2020 8 18* 1 85 - 7 62 Thousands/µL Final    Immature Grans Absolute 08/18/2020 0 04  0 00 - 0 20 Thousand/uL Final    Lymphocytes Absolute 08/18/2020 3 37  0 60 - 4 47 Thousands/µL Final    Monocytes Absolute 08/18/2020 0 69  0 17 - 1 22 Thousand/µL Final    Eosinophils Absolute 08/18/2020 0 29  0 00 - 0 61 Thousand/µL Final    Basophils Absolute 08/18/2020 0 08  0 00 - 0 10 Thousands/µL Final    Sodium 08/18/2020 139  136 - 145 mmol/L Final    Potassium 08/18/2020 4 1  3 5 - 5 3 mmol/L Final    Chloride 08/18/2020 102  100 - 108 mmol/L Final    CO2 08/18/2020 31  21 - 32 mmol/L Final    ANION GAP 08/18/2020 6  4 - 13 mmol/L Final    BUN 08/18/2020 13  5 - 25 mg/dL Final    Creatinine 08/18/2020 0 92  0 60 - 1 30 mg/dL Final    Standardized to IDMS reference method    Glucose, Fasting 08/18/2020 107* 65 - 99 mg/dL Final    Specimen collection should occur prior to Sulfasalazine administration due to the potential for falsely depressed results  Specimen collection should occur prior to Sulfapyridine administration due to the potential for falsely elevated results   Calcium 08/18/2020 9 1  8 3 - 10 1 mg/dL Final    AST 08/18/2020 14  5 - 45 U/L Final    Specimen collection should occur prior to Sulfasalazine administration due to the potential for falsely depressed results   ALT 08/18/2020 18  12 - 78 U/L Final    Specimen collection should occur prior to Sulfasalazine and/or Sulfapyridine administration due to the potential for falsely depressed results   Alkaline Phosphatase 08/18/2020 123* 46 - 116 U/L Final    Total Protein 08/18/2020 8 9* 6 4 - 8 2 g/dL Final    Albumin 08/18/2020 3 8  3 5 - 5 0 g/dL Final    Total Bilirubin 08/18/2020 0 32  0 20 - 1 00 mg/dL Final    Use of this assay is not recommended for patients undergoing treatment with eltrombopag due to the potential for falsely elevated results      eGFR 08/18/2020 67  ml/min/1 73sq m Final    Sed Rate 08/18/2020 77* 0 - 29 mm/hour Final    CRP 08/18/2020 12 2* <3 0 mg/L Final    Vit D, 25-Hydroxy 08/18/2020 29 9* 30 0 - 100 0 ng/mL Final    Cyclic Citrullin Peptide Ab 08/18/2020 8  0 - 19 units Final                              Negative               <20                            Weak positive      20 - 39                            Moderate positive  40 - 59                            Strong positive        >59    LUANA 08/18/2020 Negative  Negative Final   Hospital Outpatient Visit on 07/08/2020   Component Date Value Ref Range Status    SARS-CoV-2 07/08/2020 Negative  Negative Final    Case Report 07/08/2020    Final                    Value:Surgical Pathology Report Case: [id number]                                   Authorizing Provider:  Chelsie Dawkins MD           Collected:           07/08/2020 0844              Ordering Location:     [facility]     Received:            07/08/2020 [place] [id number] Endoscopy                                                             Pathologist:           Fuentes Wagoner MD                                                              Specimens:   A) - Stomach, antral bx r/o HPylori, mild gastritis                                                 B) - Esophagus, mid esophagus r/o EOE                                                      Final Diagnosis 07/08/2020    Final                    Value: This result contains rich text formatting which cannot be displayed here   Note 07/08/2020    Final                    Value: This result contains rich text formatting which cannot be displayed here   Additional Information 07/08/2020    Final                    Value: This result contains rich text formatting which cannot be displayed here  [facility] Description 07/08/2020    Final                    Value: This result contains rich text formatting which cannot be displayed here  Orders Only on 07/03/2020   Component Date Value Ref Range Status    SARS-CoV-2  07/03/2020 Not Detected  Not Detected Final    This test was developed and its performance characteristics determined  by [employer]  This test has not been FDA cleared or  approved  This test has been authorized by FDA under an Emergency Use  Authorization (EUA)  This test is only authorized for the duration of  time the declaration that circumstances exist justifying the  authorization of the emergency use of in vitro diagnostic tests for  detection of SARS-CoV-2 virus and/or diagnosis of COVID-19 infection  under section 564(b)(1) of the Act, 21 U  S C  238KSM-9(I)(8), unless  the authorization is terminated or revoked sooner    When diagnostic testing is negative, the possibility of a false  negative result should be considered in the context of a patient's  recent exposures and the presence of clinical signs and symptoms  consistent with COVID-19  An individual without symptoms of COVID-19  and who is not shedding SARS-CoV-2 virus would expect to have a  negative (not detected) result in this assay  Orders Only on 05/29/2020   Component Date Value Ref Range Status    SARS-CoV-2  05/29/2020 Not Detected  Not Detected Final    Comment: Testing was performed using the amelia(R) SARS-CoV-2 test   This test was developed and its performance characteristics determined  by [employer]  This test has not been FDA cleared or  approved  This test has been authorized by FDA under an Emergency Use  Authorization (EUA)  This test is only authorized for the duration of  time the declaration that circumstances exist justifying the  authorization of the emergency use of in vitro diagnostic tests for  detection of SARS-CoV-2 virus and/or diagnosis of COVID-19 infection  under section 564(b)(1) of the Act, 21 U  S C  170GLZ-5(I)(7), unless  the authorization is terminated or revoked sooner  When diagnostic testing is negative, the possibility of a false  negative result should be considered in the context of a patient's  recent exposures and the presence of clinical signs and symptoms  consistent with COVID-19  An individual without symptoms of COVID-19  and who is not shedding SARS-CoV-2 virus would expect to have                            a  negative (not detected) result in this assay     Orders Only on 03/27/2020   Component Date Value Ref Range Status    Hemoglobin A1C 03/27/2020 8 4   Final

## 2021-01-06 NOTE — PATIENT INSTRUCTIONS
Continue to use the Voltaren gel on your painful joints up to 4x/day  You can also use over the counter Aspercreme with lidocaine on your painful joints  We will check blood work in 3 months and you will follow up with your rheumatologist, Dr Kenny Kim in 3-4 months

## 2021-01-07 ENCOUNTER — ANESTHESIA EVENT (OUTPATIENT)
Dept: PERIOP | Facility: HOSPITAL | Age: 66
End: 2021-01-07
Payer: MEDICARE

## 2021-01-07 ENCOUNTER — CONSULT (OUTPATIENT)
Dept: PAIN MEDICINE | Facility: MEDICAL CENTER | Age: 66
End: 2021-01-07
Payer: MEDICARE

## 2021-01-07 VITALS
BODY MASS INDEX: 28.25 KG/M2 | HEIGHT: 66 IN | DIASTOLIC BLOOD PRESSURE: 84 MMHG | RESPIRATION RATE: 20 BRPM | HEART RATE: 93 BPM | SYSTOLIC BLOOD PRESSURE: 127 MMHG | TEMPERATURE: 98.1 F

## 2021-01-07 DIAGNOSIS — M54.2 NECK PAIN: ICD-10-CM

## 2021-01-07 DIAGNOSIS — M47.812 CERVICAL SPONDYLOSIS: Primary | ICD-10-CM

## 2021-01-07 PROCEDURE — 99204 OFFICE O/P NEW MOD 45 MIN: CPT | Performed by: PHYSICAL MEDICINE & REHABILITATION

## 2021-01-07 RX ORDER — LORATADINE 10 MG/1
10 TABLET ORAL DAILY
COMMUNITY
Start: 2020-12-24 | End: 2022-02-03 | Stop reason: SDUPTHER

## 2021-01-07 RX ORDER — MULTIVITAMIN WITH FOLIC ACID 400 MCG
1 TABLET ORAL DAILY
COMMUNITY
Start: 2020-12-24 | End: 2021-04-08

## 2021-01-07 RX ORDER — ATORVASTATIN CALCIUM 20 MG/1
TABLET, FILM COATED ORAL
COMMUNITY
End: 2022-03-23

## 2021-01-07 RX ORDER — L. ACIDOPHILUS/PECTIN, CITRUS 25MM-100MG
1 TABLET ORAL DAILY
COMMUNITY
Start: 2020-12-24
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
## 2021-01-07 NOTE — PATIENT INSTRUCTIONS
Peripheral Nerve Block   AMBULATORY CARE:   What you need to know about a peripheral nerve block:  A peripheral nerve block is a type of regional anesthesia  Medicine is given as an injection to numb part of your body  The arm and leg are the most common areas for a peripheral nerve block  Other areas include the head, neck, back, abdomen, and hip  You may need a peripheral nerve block during surgery or a procedure  You may have less pain after surgery, and be able to go home sooner  Peripheral nerve blocks can also be used to treat severe pain  The pain relief usually lasts 1 to 2 weeks  How to prepare for a peripheral nerve block:  Tell your healthcare provider if you or anyone in your family has ever had problems with anesthesia  You may need to arrange to have someone drive you home after your nerve block  Your healthcare provider will talk to you about how to prepare for your nerve block  He may tell you not to eat or drink anything after midnight on the day of your nerve block  Tell him about all the medicines you take, including vitamins and herbs  He will tell you which medicines to take or not take on the day of your nerve block  What will happen during a peripheral nerve block:   · You may receive medicine in your IV to make you sleepy and more relaxed  Your healthcare provider may use an ultrasound or nerve stimulator to find the nerves to numb  An ultrasound uses sound waves to show pictures of the body area on a monitor  A nerve stimulator uses a small electrical current that causes your muscle to twitch when the nerve is found  · Once the nerves are found, a needle is put through your skin into the tissue near the nerve  Anesthesia medicine is given through the needle into tissues around the nerve to be numbed  Medicine to reduce bleeding may also be given  There may be some discomfort when the numbing medicine is given  Your body part may feel tingly before it becomes numb      What will happen after a peripheral nerve block: You will be monitored until the numbness goes away  It may take a while before you can move the area that was numbed  If you are staying in the hospital, you may be taken to your room  If you will go home, you may be allowed to leave once you have feeling in the numbed area  You will be monitored until the numbness goes away  You may not be able to feel pain in the peripheral nerve block area for about 4 to 18 hours  Until you have full feeling back, you are at risk for falls and injury  Be careful not to bump the numbed body part  Risks of a peripheral nerve block: You may have bruising, bleeding, pain, or get an infection in the numbed area  You may have a hoarse voice, or blurry vision and a droopy eye  This is usually temporary  You may have an allergic reaction to the anesthesia  If the medicine enters a vein or you get too much, you may get headaches and have muscle twitching  You could also have a seizure or a heart attack  The peripheral nerve block may cause nerve damage, chronic pain, or loss of function of the body part  A peripheral nerve block in your upper body may damage your lungs  Call 911 if:   · You have any of the following signs of a heart attack:      ? Squeezing, pressure, or pain in your chest    ? You may  also have any of the following:     § Discomfort or pain in your back, neck, jaw, stomach, or arm    § Shortness of breath    § Nausea or vomiting    § Lightheadedness or a sudden cold sweat    · You have trouble breathing  · You have a seizure  Seek care immediately if:   · You develop swelling or hives  · You have a severe headache or muscle twitching that does not go away  · You have nausea or are vomiting  Contact your healthcare provider if:   · You have a fever  · You have increased numbness or pain in the numbed area  · You feel faint  · You have questions or concerns about peripheral nerve blocks      Self-care: · Change positions often  This will keep you from putting pressure on the area  · Prop the area or surround it with pillows while you sleep  This will keep you from rolling over onto the area while you sleep  It will also keep you from putting pressure on the area  · Protect the area from injury  You may not be able to feel pain in the peripheral nerve block area for about 4 to 18 hours  Until you have full feeling back, you are at risk for falls and injury  Be careful not to bump the numbed body part  Move slowly and carefully  Do not touch anything that might be hot  You might not feel the skin burn until it is severe  Protect the area in hot and cold weather  Follow up with your healthcare provider as directed:  Write down your questions so you remember to ask them during your visits  © Copyright [address] Information is for End User's use only and may not be sold, redistributed or otherwise used for commercial purposes  All illustrations and images included in CareNotes® are the copyrighted property of A D [employer]  or [facility]   The above information is an  only  It is not intended as medical advice for individual conditions or treatments  Talk to your doctor, nurse or pharmacist before following any medical regimen to see if it is safe and effective for you

## 2021-01-07 NOTE — PRE-PROCEDURE INSTRUCTIONS
Pre-Surgery Instructions:   Medication Instructions    acetaminophen (TYLENOL) 325 mg tablet Instructed patient per Anesthesia Guidelines   albuterol (PROVENTIL HFA,VENTOLIN HFA) 90 mcg/act inhaler Instructed patient per Anesthesia Guidelines   atoMOXetine (STRATTERA) 40 mg capsule Instructed patient per Anesthesia Guidelines   cyclobenzaprine (FLEXERIL) 10 mg tablet Instructed patient per Anesthesia Guidelines   cyclobenzaprine (FLEXERIL) 5 mg tablet Instructed patient per Anesthesia Guidelines   diclofenac sodium (VOLTAREN) 1 % Instructed patient per Anesthesia Guidelines   dicyclomine (BENTYL) 20 mg tablet Instructed patient per Anesthesia Guidelines   folic acid (FOLVITE) 1 mg tablet Instructed patient per Anesthesia Guidelines   gabapentin (NEURONTIN) 600 MG tablet Instructed patient per Anesthesia Guidelines   Galcanezumab-gnlm (EMGALITY) 120 MG/ML SOSY Instructed patient per Anesthesia Guidelines   Guaifenesin 1200 MG TB12 Instructed patient per Anesthesia Guidelines   ipratropium-albuterol (DUO-NEB) 0 5-2 5 mg/3 mL nebulizer solution Instructed patient per Anesthesia Guidelines   levothyroxine 150 mcg tablet Instructed patient per Anesthesia Guidelines   LORazepam (ATIVAN) 1 mg tablet Instructed patient per Anesthesia Guidelines   metFORMIN (GLUCOPHAGE) 500 mg tablet Instructed patient per Anesthesia Guidelines   methotrexate 2 5 mg tablet Instructed patient per Anesthesia Guidelines   Multiple Vitamin (MULTIVITAMIN) tablet Instructed patient per Anesthesia Guidelines   ondansetron (ZOFRAN) 4 mg tablet Instructed patient per Anesthesia Guidelines   oxyCODONE (OxyCONTIN) 10 mg 12 hr tablet Instructed patient per Anesthesia Guidelines   pantoprazole (PROTONIX) 40 mg tablet Instructed patient per Anesthesia Guidelines   rOPINIRole (REQUIP) 0 25 mg tablet Instructed patient per Anesthesia Guidelines      sertraline (ZOLOFT) 25 mg tablet Instructed patient per Anesthesia Guidelines   venlafaxine (EFFEXOR-XR) 150 mg 24 hr capsule Instructed patient per Anesthesia Guidelines  5HT3 Antagonists Med Class  Continue to take this medication on your normal schedule  If this is an oral medication and you take it in the morning, then you may take this medicine with a sip of water  Acetaminophen Med Class  Continue to take this medication on your normal schedule  If this is an oral medication and you take it in the morning, then you may take this medicine with a sip of water  Antidepressant Med Class  Continue to take this medication on your normal schedule  If this is an oral medication and you take it in the morning, then you may take this medicine with a sip of water  Antiepileptic Med Class  Continue to take this medication on your normal schedule  If this is an oral medication and you take it in the morning, then you may take this medicine with a sip of water  Antiparkinsons Med Class  Continue to take this medication on your normal schedule  If this is an oral medication and you take it in the morning, then you may take this medicine with a sip of water  Benzodiazepine antagonist Med Class  If this medication is needed please continue to take on your normal schedule  If you take it in the morning, then you may take this medicine with a sip of water  Inhalational Med Class  Continue to take these inhaler medications on your normal schedule up to and including the day of surgery  Insulin Med Class  Pre-Surgery/Procedure Instructions for Adult Patients who Take Medicine for Diabetes or to Control their Blood Sugar   Day Before Surgery/Procedure  Use the directions based on the type of medicine you take for your diabetes  1  If you are having a procedure that does not require a bowel prep:  ? Pre-Mixed Insulin (Intermediate Acting: Humalog 75/25, Humulin 70/30   Novolog 70/30, Regular Insulin)  § Take ½ your regular dose the evening before your procedure  ? Rapid/Fast Acting Insulin/Long Acting Insulin (Humalog U200, NovoLog, Apidra, Lantus, Levemir, Yessica Cull, [place])  § Take your FULL regular dose the day before procedure  ? Oral Diabetic Medicines including Glipizide/Glimepiride/Glucotrol (sulfonylurea)  § Take your regular dose with dinner the evening before your procedure  2  If you are having a procedure (e g  Colonoscopy) that requires a bowel prep and you are allowed to have at least a clear liquid diet:  ? Pre-Mixed Insulin (Intermediate Acting: Humalog 75/25, Humulin 70/30, Novolog 70/30, Regular Insulin)  § Take ½ your regular dose the evening before your procedure  ? Rapid/Fast Acting Insulin (Humalog U200, NovoLog, Apidra, Fiasp)  § Take ½ your regular dose the evening before your procedure  ? Long Acting Insulin (Lantus, Levemir, Yessica Cull)  § Take your FULL regular dose the day before procedure  ? Oral Glipizide/Glimepiride/Glucotrol (sulfonylurea)  § Take ½ your regular dose the evening before your procedure  ? Oral Diabetic Medicines that are NOT Glipizide/Glimepiride/Glucotrol  § Take your regular dose with dinner in the evening before your procedure    Day of Surgery/Procedure  · Long Acting Insulin (Lantus, Edwardoemir, Yessica Lees)  ? If you usually take your Long-Acting Insulin in the morning, take the full dose as scheduled  · With the exception of the morning Long-Acting Insulin noted above, DO NOT take ANY diabetic medicine on the day of your procedure unless you were instructed by the doctor who manages your diabetic medicines  · Continue to check your blood sugars  · If you have an insulin pump then consult with your Endocrinologist for instructions  · If you cannot see your Endocrinologist, on the day of the procedure set your insulin pump to your basal rate only   Please bring your insulin pump supplies to the hospital    This Educational material has been approved by the Patient Education Advisory Committee  Date prepared: 1/17/2018          Expiration date: 1/17/2019        Approval Number:         Methotrexate Med Class  Continue to take this medication on your normal schedule  If this is an oral medication and you take it in the morning, then you may take this medicine with a sip of water  Opioid Med Class  Continue to take this medication on your normal schedule  If this is an oral medication and you take it in the morning, then you may take this medicine with a sip of water  Statin Med Class  Continue to take this medication on your normal schedule  If this is an oral medication and you take it in the morning, then you may take this medicine with a sip of water  Thyroxine Med Class  Continue to take this medication on your normal schedule  If this is an oral medication and you take it in the morning, then you may take this medicine with a sip of water  Vitamin Med Class  You may continue to take any vitamin that your surgeon has prescribed to you up to the day before surgery  If your surgeon has not specifically prescribed this vitamin or instructed you to continue then stop taking 7 days prior to surgery  Phone assessment with verbal understanding   NPO MN including gum and candy, am meds , neb RX , inhalers and will check am blood sugar  Showers with CHG X2  Insurance card and photo ID , no makeup,jewelry or valuables and  for disch home   Preop call from [address] on 1/8/2021 after 1500
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
## 2021-01-08 PROBLEM — J43.9 PULMONARY EMPHYSEMA (HCC): Status: ACTIVE | Noted: 2021-01-08

## 2021-01-08 PROBLEM — E78.2 MODERATE MIXED HYPERLIPIDEMIA NOT REQUIRING STATIN THERAPY: Status: ACTIVE | Noted: 2021-01-08

## 2021-01-08 PROBLEM — F32.A DEPRESSION: Status: ACTIVE | Noted: 2021-01-08

## 2021-01-08 PROBLEM — F17.200 SMOKING: Status: ACTIVE | Noted: 2021-01-08

## 2021-01-08 PROBLEM — M79.7 FIBROMYALGIA: Status: ACTIVE | Noted: 2021-01-08

## 2021-01-08 PROBLEM — M19.90 ARTHRITIS: Status: ACTIVE | Noted: 2021-01-08

## 2021-01-08 NOTE — ANESTHESIA PREPROCEDURE EVALUATION
Procedure:  Left carpal and cubital tunnel release, possible transposition (Left Elbow)    Relevant Problems   CARDIO  BKA   (+) Moderate mixed hyperlipidemia not requiring statin therapy      ENDO (within normal limits)      GI/HEPATIC  IBS hx   (+) Dysphagia      /RENAL (within normal limits)      GYN  PM      HEMATOLOGY (within normal limits)  no longer anemic      MUSCULOSKELETAL   (+) Arthritis   (+) Fibromyalgia      NEURO/PSYCH  ADHD    hx of polysubstance abuse   (+) Depression   (+) Fibromyalgia   (+) Personal history of colonic polyps      PULMONARY  smoking cessation stressed   (+) Smoking   (-) Sleep apnea   (-) URI (upper respiratory infection)        Physical Exam    Airway    Mallampati score: II  TM Distance: >3 FB  Neck ROM: full     Dental   upper dentures and lower dentures,     Cardiovascular  Cardiovascular exam normal    Pulmonary  Pulmonary exam normal     Other Findings  Fixed upper and lower teeth and in good repair      Anesthesia Plan  ASA Score- 3     Anesthesia Type- general with ASA Monitors  Additional Monitors:   Airway Plan: LMA  Comment: Morphine only pruritis  NSAiDS GI only      Urticaria to cephalsporins  Plan Factors-Exercise tolerance (METS): >4 METS  Chart reviewed  EKG reviewed  Existing labs reviewed  Patient summary reviewed  Patient is a current smoker  Patient instructed to abstain from smoking on day of procedure  Patient did not smoke on day of surgery  Induction- intravenous  Postoperative Plan-     Informed Consent- Anesthetic plan and risks discussed with patient  I personally reviewed this patient with the CRNA  Discussed and agreed on the Anesthesia Plan with the CRNA  Akash Cheney F03.91

## 2021-01-11 ENCOUNTER — HOSPITAL ENCOUNTER (OUTPATIENT)
Facility: HOSPITAL | Age: 66
Setting detail: OUTPATIENT SURGERY
Discharge: HOME | End: 2021-01-11
Attending: ORTHOPAEDIC SURGERY | Admitting: ORTHOPAEDIC SURGERY
Payer: MEDICARE

## 2021-01-11 ENCOUNTER — ANESTHESIA (OUTPATIENT)
Dept: PERIOP | Facility: HOSPITAL | Age: 66
End: 2021-01-11
Payer: MEDICARE

## 2021-01-11 ENCOUNTER — TELEPHONE (OUTPATIENT)
Dept: OBGYN CLINIC | Facility: HOSPITAL | Age: 66
End: 2021-01-11

## 2021-01-11 VITALS
RESPIRATION RATE: 18 BRPM | WEIGHT: 175 LBS | HEART RATE: 92 BPM | OXYGEN SATURATION: 92 % | TEMPERATURE: 98 F | DIASTOLIC BLOOD PRESSURE: 91 MMHG | SYSTOLIC BLOOD PRESSURE: 130 MMHG | HEIGHT: 66 IN | BODY MASS INDEX: 28.12 KG/M2

## 2021-01-11 VITALS — HEART RATE: 89 BPM

## 2021-01-11 DIAGNOSIS — Z98.890 S/P CUBITAL TUNNEL RELEASE: ICD-10-CM

## 2021-01-11 DIAGNOSIS — T65.291A TOXIC EFFECT OF TOBACCO AND NICOTINE, ACCIDENTAL OR UNINTENTIONAL, INITIAL ENCOUNTER: ICD-10-CM

## 2021-01-11 DIAGNOSIS — Z98.890 S/P CARPAL TUNNEL RELEASE: Primary | ICD-10-CM

## 2021-01-11 LAB
ATRIAL RATE: 88 BPM
GLUCOSE SERPL-MCNC: 159 MG/DL (ref 65–140)
P AXIS: 53 DEGREES
PR INTERVAL: 164 MS
QRS AXIS: 20 DEGREES
QRSD INTERVAL: 82 MS
QT INTERVAL: 398 MS
QTC INTERVAL: 481 MS
T WAVE AXIS: 50 DEGREES
VENTRICULAR RATE: 88 BPM

## 2021-01-11 PROCEDURE — 64718 REVISE ULNAR NERVE AT ELBOW: CPT | Performed by: PHYSICIAN ASSISTANT

## 2021-01-11 PROCEDURE — 93005 ELECTROCARDIOGRAM TRACING: CPT

## 2021-01-11 PROCEDURE — 82948 REAGENT STRIP/BLOOD GLUCOSE: CPT

## 2021-01-11 PROCEDURE — 64718 REVISE ULNAR NERVE AT ELBOW: CPT | Performed by: ORTHOPAEDIC SURGERY

## 2021-01-11 PROCEDURE — 64721 CARPAL TUNNEL SURGERY: CPT | Performed by: PHYSICIAN ASSISTANT

## 2021-01-11 PROCEDURE — 64721 CARPAL TUNNEL SURGERY: CPT | Performed by: ORTHOPAEDIC SURGERY

## 2021-01-11 PROCEDURE — 93010 ELECTROCARDIOGRAM REPORT: CPT | Performed by: INTERNAL MEDICINE

## 2021-01-11 RX ORDER — SODIUM CHLORIDE, SODIUM LACTATE, POTASSIUM CHLORIDE, CALCIUM CHLORIDE 600; 310; 30; 20 MG/100ML; MG/100ML; MG/100ML; MG/100ML
INJECTION, SOLUTION INTRAVENOUS CONTINUOUS PRN
Status: DISCONTINUED | OUTPATIENT
Start: 2021-01-11 | End: 2021-01-11

## 2021-01-11 RX ORDER — DIPHENHYDRAMINE HYDROCHLORIDE 50 MG/ML
12.5 INJECTION INTRAMUSCULAR; INTRAVENOUS ONCE AS NEEDED
Status: DISCONTINUED | OUTPATIENT
Start: 2021-01-11 | End: 2021-01-11 | Stop reason: HOSPADM

## 2021-01-11 RX ORDER — SODIUM CHLORIDE, SODIUM LACTATE, POTASSIUM CHLORIDE, CALCIUM CHLORIDE 600; 310; 30; 20 MG/100ML; MG/100ML; MG/100ML; MG/100ML
100 INJECTION, SOLUTION INTRAVENOUS CONTINUOUS
Status: DISCONTINUED | OUTPATIENT
Start: 2021-01-11 | End: 2021-01-11 | Stop reason: HOSPADM

## 2021-01-11 RX ORDER — FENTANYL CITRATE/PF 50 MCG/ML
25 SYRINGE (ML) INJECTION
Status: DISCONTINUED | OUTPATIENT
Start: 2021-01-11 | End: 2021-01-11 | Stop reason: HOSPADM

## 2021-01-11 RX ORDER — LIDOCAINE HYDROCHLORIDE 10 MG/ML
INJECTION, SOLUTION EPIDURAL; INFILTRATION; INTRACAUDAL; PERINEURAL AS NEEDED
Status: DISCONTINUED | OUTPATIENT
Start: 2021-01-11 | End: 2021-01-11

## 2021-01-11 RX ORDER — DEXAMETHASONE SODIUM PHOSPHATE 4 MG/ML
4 INJECTION, SOLUTION INTRA-ARTICULAR; INTRALESIONAL; INTRAMUSCULAR; INTRAVENOUS; SOFT TISSUE ONCE AS NEEDED
Status: DISCONTINUED | OUTPATIENT
Start: 2021-01-11 | End: 2021-01-11 | Stop reason: HOSPADM

## 2021-01-11 RX ORDER — MEPERIDINE HYDROCHLORIDE 25 MG/ML
12.5 INJECTION INTRAMUSCULAR; INTRAVENOUS; SUBCUTANEOUS
Status: DISCONTINUED | OUTPATIENT
Start: 2021-01-11 | End: 2021-01-11 | Stop reason: HOSPADM

## 2021-01-11 RX ORDER — OXYCODONE HYDROCHLORIDE 5 MG/1
5 TABLET ORAL EVERY 6 HOURS PRN
Qty: 10 TABLET | Refills: 0 | Status: SHIPPED | OUTPATIENT
Start: 2021-01-11 | End: 2021-10-04 | Stop reason: ALTCHOICE

## 2021-01-11 RX ORDER — MIDAZOLAM HYDROCHLORIDE 2 MG/2ML
INJECTION, SOLUTION INTRAMUSCULAR; INTRAVENOUS AS NEEDED
Status: DISCONTINUED | OUTPATIENT
Start: 2021-01-11 | End: 2021-01-11

## 2021-01-11 RX ORDER — CLINDAMYCIN PHOSPHATE 900 MG/50ML
900 INJECTION INTRAVENOUS ONCE
Status: COMPLETED | OUTPATIENT
Start: 2021-01-11 | End: 2021-01-11

## 2021-01-11 RX ORDER — PROPOFOL 10 MG/ML
INJECTION, EMULSION INTRAVENOUS AS NEEDED
Status: DISCONTINUED | OUTPATIENT
Start: 2021-01-11 | End: 2021-01-11

## 2021-01-11 RX ORDER — FENTANYL CITRATE/PF 50 MCG/ML
12.5 SYRINGE (ML) INJECTION
Status: DISCONTINUED | OUTPATIENT
Start: 2021-01-11 | End: 2021-01-11 | Stop reason: HOSPADM

## 2021-01-11 RX ORDER — ALBUTEROL SULFATE 2.5 MG/3ML
2.5 SOLUTION RESPIRATORY (INHALATION) ONCE
Status: COMPLETED | OUTPATIENT
Start: 2021-01-11 | End: 2021-01-11

## 2021-01-11 RX ORDER — PROMETHAZINE HYDROCHLORIDE 25 MG/ML
12.5 INJECTION, SOLUTION INTRAMUSCULAR; INTRAVENOUS ONCE AS NEEDED
Status: DISCONTINUED | OUTPATIENT
Start: 2021-01-11 | End: 2021-01-11 | Stop reason: HOSPADM

## 2021-01-11 RX ORDER — ONDANSETRON 2 MG/ML
INJECTION INTRAMUSCULAR; INTRAVENOUS AS NEEDED
Status: DISCONTINUED | OUTPATIENT
Start: 2021-01-11 | End: 2021-01-11

## 2021-01-11 RX ORDER — FENTANYL CITRATE 50 UG/ML
INJECTION, SOLUTION INTRAMUSCULAR; INTRAVENOUS AS NEEDED
Status: DISCONTINUED | OUTPATIENT
Start: 2021-01-11 | End: 2021-01-11

## 2021-01-11 RX ORDER — DIPHENHYDRAMINE HYDROCHLORIDE 50 MG/ML
INJECTION INTRAMUSCULAR; INTRAVENOUS AS NEEDED
Status: DISCONTINUED | OUTPATIENT
Start: 2021-01-11 | End: 2021-01-11

## 2021-01-11 RX ORDER — DEXAMETHASONE SODIUM PHOSPHATE 10 MG/ML
INJECTION, SOLUTION INTRAMUSCULAR; INTRAVENOUS AS NEEDED
Status: DISCONTINUED | OUTPATIENT
Start: 2021-01-11 | End: 2021-01-11

## 2021-01-11 RX ORDER — BUPIVACAINE HYDROCHLORIDE 2.5 MG/ML
INJECTION, SOLUTION EPIDURAL; INFILTRATION; INTRACAUDAL AS NEEDED
Status: DISCONTINUED | OUTPATIENT
Start: 2021-01-11 | End: 2021-01-11 | Stop reason: HOSPADM

## 2021-01-11 RX ADMIN — FENTANYL CITRATE 25 MCG: 50 INJECTION INTRAMUSCULAR; INTRAVENOUS at 10:13

## 2021-01-11 RX ADMIN — DEXAMETHASONE SODIUM PHOSPHATE 4 MG: 10 INJECTION, SOLUTION INTRAMUSCULAR; INTRAVENOUS at 08:40

## 2021-01-11 RX ADMIN — DIPHENHYDRAMINE HYDROCHLORIDE 50 MG: 50 INJECTION INTRAMUSCULAR; INTRAVENOUS at 08:45

## 2021-01-11 RX ADMIN — SODIUM CHLORIDE, SODIUM LACTATE, POTASSIUM CHLORIDE, AND CALCIUM CHLORIDE: .6; .31; .03; .02 INJECTION, SOLUTION INTRAVENOUS at 08:00

## 2021-01-11 RX ADMIN — FENTANYL CITRATE 100 MCG: 50 INJECTION INTRAMUSCULAR; INTRAVENOUS at 08:28

## 2021-01-11 RX ADMIN — PHENYLEPHRINE HYDROCHLORIDE 100 MCG: 10 INJECTION INTRAVENOUS at 08:37

## 2021-01-11 RX ADMIN — PROPOFOL 200 MG: 10 INJECTION, EMULSION INTRAVENOUS at 08:31

## 2021-01-11 RX ADMIN — ONDANSETRON HYDROCHLORIDE 4 MG: 2 INJECTION, SOLUTION INTRAMUSCULAR; INTRAVENOUS at 08:40

## 2021-01-11 RX ADMIN — MIDAZOLAM HYDROCHLORIDE 2 MG: 1 INJECTION, SOLUTION INTRAMUSCULAR; INTRAVENOUS at 08:25

## 2021-01-11 RX ADMIN — FENTANYL CITRATE 25 MCG: 50 INJECTION INTRAMUSCULAR; INTRAVENOUS at 10:03

## 2021-01-11 RX ADMIN — ALBUTEROL SULFATE 2.5 MG: 2.5 SOLUTION RESPIRATORY (INHALATION) at 09:51

## 2021-01-11 RX ADMIN — CLINDAMYCIN IN 5 PERCENT DEXTROSE 900 MG: 18 INJECTION, SOLUTION INTRAVENOUS at 08:29

## 2021-01-11 RX ADMIN — LIDOCAINE HYDROCHLORIDE 50 MG: 10 INJECTION, SOLUTION EPIDURAL; INFILTRATION; INTRACAUDAL; PERINEURAL at 08:31

## 2021-01-11 NOTE — OP NOTE
DATE OF SURGERY: 1/11/2021    SURGEON: Jacque Johnson MD    PREOPERATIVE DIAGNOSIS:   1  Left cubital tunnel syndrome  2  Left carpal tunnel syndrome    POSTOPERATIVE DIAGNOSIS:  Same    PROCEDURE:   1  Left in situ cubital tunnel release  2  Left mini open carpal tunnel release    IMPLANTS: * No implants in log *     ASSISTANTS:      * Divina Jimenez PA-C - Assisting     ANESTHESIA: General    ESTIMATED BLOOD LOSS: Minimal     INTRAVENOUS FLUIDS: Per anesthesia    URINE OUTPUT:  No Morgan    TOURNIQUET TIME:  About 30 minutes    COMPLICATIONS:  None    ANTIBIOTICS:  900 mg clindamycin    SPECIMENS: * No specimens in log *     INDICATIONS: Renata Taylor is a 63y o  year old female who sustained the above conditions  The indications for operative intervention were carpal and cubital tunnel refractory to nonoperative modalities  The alternatives to operative intervention included same  The risks of the operative procedure were discussed in detail with the patient including but not limited to the risks of anesthesia, infection, injury to blood vessel/nerve, pain, stiffness, changes in sensation, and the need for repeat surgery  The patient understood these risks and alternatives and elected to proceed with surgery  DESCRIPTION OF PROCEDURE: The patient was seen in the preoperative holding area and identification was performed as per the institution's protocol  The patient was then brought to the operating room, a briefing was held and prophylactic antibiotics were given  Anesthesia was induced  A tourniquet cuff was applied to the operative extremity, set at 250 mmHg The site was prepped with ChloraPrep given the low risk allergy she had to chlorhexidine and draped in the usual sterile fashion  Following a timeout procedure,  Following a timeout procedure, a 4 cm incision was made just posterior to the medial epicondyle    Dissection was carried down through skin and subcutaneous tissue taking care to preserve any identified branches of the medial antebrachial cutaneous nerve  Barlow ligament and the transverse cubital retinaculum was identified and released on the posterior margin to allow for an anterior flap tissue to help prevent subluxation anteriorly  Dissection was carried out distally between the 2 heads of the FCU dissecting through the superficial fascia as well as the deep fascia overlying the nerve  All nerve branches to the FCU were protected  Dissection was then carried proximally and the nerve was released along its course proximally as far as the incision would allow using blunt retractors subcutaneously  After release, the elbow was taken through terminal flexion and terminal extension and there was no dislocation of the ulnar nerve noted  an incision starting distally at the intersection of Motta's cardinal line at the intersection of the longitudinal line drawn on the radial border of the 4th ray was made on the left hand  This was carried about 1 in proximally  Dissection was carried down through skin and subcutaneous tissue sweeping the fat away from the palmar fascia  The palmar fascia was incised longitudinally and retractors were placed to identify the transverse carpal ligament  Gentle sweeping of any muscle present revealed the transverse carpal ligament  This was incised using a knife from the proximal portion of the incision distally until 2 mm past the distal palmar fat  Distal release was confirmed and then retractors were placed proximally  A small subcutaneous pocket was then made superficial to the remaining transverse carpal ligament  a guiding slide was then placed beneath the transverse carpal ligament and run proximal and ulnarly  A blade was then used for the sled capturing the transverse carpal ligament  Care was taken to preserve the contents of the carpal tunnel which were deep  The blade was slid proximal on the sled     Full release was confirmed both visually and by feel  The wounds were copiously irrigated and closed in layers including 3-0 Monocryl and 3-0 nylon in the skin on the elbow, 4-0 nylon on the skin at the wrist   Sterile dressing was applied    All sharps and sponge counts were correct and there were no complications  I attest that I, Morgan Kenney, was present and scrubbed for the entirety of the procedure  No qualified resident was available to assist with this case  and A physician assistant was required during the procedure for retraction, tissue handling, dissection and suturing  The physician assistant was particularly and specifically imperative to this case in retracting branches of the medial antebrachial cutaneous nerve, motor branches from the ulnar nerve as well as the ulnar nerve proper to prevent injury during dissection during the cubital tunnel release  The patient was awoken from anesthesia in good condition and taken to the PACU         PLAN: The patient will follow up in 10-14 days

## 2021-01-11 NOTE — TELEPHONE ENCOUNTER
Hetal Cuello, patient's care taker is calling because the patient is in horrible pain in her rt shoulder  Patient was told at her last appt that if the injection did not work she could possibly get another type of injection  Katerina Dickinson is stating that the other injection worked for a short time & now the pain is back & worse  Patient can hardly lift her arm at this time     [phone] Sonya Vallejo

## 2021-01-11 NOTE — DISCHARGE INSTRUCTIONS
Katie Holman - Dr Fransisca Verdugo (Orthopedic Surgery)    Follow-up Appointments   Please call to set up/confirm your first postoperative visit with Dr Eagle Perez in 10-14 days    Dressing and [address] A dressing has been placed on your hand/arm to keep the incisions clean  Keep your dressing clean and dry      o You may remove the dressing 5 days after surgery  Once the dressing is off, your incision can get wet while showering/bathing only  Do not soak the wound (in bath water, pool, lake, etc )  Otherwise, keep your incision covered with Band-Aids or light dressing  Keep it dry and do not apply any ointments   Wear a plastic bag over your dressing/splint whenever you take a shower or bath until you are allowed to remove it   Swelling is normal after surgery  Elevate your hand/arm so the surgical site is above your heart to decrease the swelling  Swelling is like water, it runs downhill  This is especially important for the first 72 hours after surgery  o The best way to elevate your hand/arm is with your fingers pointing towards the ceiling and your hand/arm above the level of the heart   o You can use pillows to help prop your hand/arm up when sitting or lying down   If you are experiencing pain, be sure you are elevating your hand/arm as often as possible   Apply an ice pack over your dressing/splint for 20 minutes of every hour for the first 3 days when you are awake  This can help to reduce swelling and inflammation  Be sure the ice pack is waterproof so it does not leak on the dressing/splint  A simple ice pack can be made by adding ten cubes and a small amount of water in a small zip-lock bag  Seal this small bag tightly  Place this small bag in a larger zip lock bag  Apply to the area in pain   If the dressing feels too tight in spite of elevation, loosen the outer wrap but do not remove the entire dressing     If you have exposed pins/wires, take clean gauze or a cotton tip applicator (like a Q-tip), get it wet in clean warm water mixed with non-scented hand soap (like Chaparro, [place], Dial, etc ), and gently wipe around the base of the pin where it comes through the skin once a day  Do not use water from a well to clean as this has bacteria in it and can contribute to infections  ACTIVITIES:  [facility] and straighten the parts of your hand, wrist, elbow, and shoulder that are not included in your surgical dressing or splint  Do this at least 6 times a day, as this will help decrease swelling and speed up your recovery  This includes your fingers  See the instructions listed below for finger motion  THIS IS VERY IMPORTANT FOR YOUR RECOVERY! POSTOPERATIVE CARE/CONCERNS:  Nadia Bond You may experience some temporary numbness in your fingers   You should have very little to no bleeding on your dressing   Notify the office (see contact info at bottom of page) for any of the following:  o Excessive pain not relieved by rest, elevation, and pain medications  o Feeling that the dressing is too tight in spite of adequately elevating hand/arm  o Active bleeding through the dressing  o Drainage from the wound site or pin sites  o Foul odor from the dressing/wound  o Temperature greater that 101? F or chills  o Blue or excessively cold fingertips  o Numbness of the fingertips that does not improve in spite of adequately elevating hand/arm    PAIN MEDICATION:   Pain is a normal part of the recovery after surgery  The pain medication provided to you will help to decrease the discomfort but will not completely eliminate the pain   A prescription for a narcotic pain medicine (oxycodone or hydrocodone) and anti-inflammatory (naproxen) were called in to your pharmacy  Please take the anti-inflammatory medication AND over-the-counter acetaminophen (Tylenol) regularly  The instructions will be listed on the bottles   The narcotic pain medicine should be used for pain that is not controlled by these other medications and only for the first few days after surgery  The narcotic is HIGHLY ADDICTIVE and has many side effects such as causing constipation, dizziness, confusion, decreased breathing and more  It is safe to take for a short period of time after surgery  It is almost never prescribed for longer than a few weeks and never after one month for elective surgeries   Do not take narcotic or anti-inflammatories on an empty stomach   It is illegal to drive while taking narcotic pain medication   Your pain should decrease over the first few days after surgery which will allow you to take less pain medicine, increase the time between doses of medication, or stop taking all pain medicine        OFFICE CONTACT NUMBERS   Please call [phone] with any questions about appointments or any medical concerns

## 2021-01-11 NOTE — INTERVAL H&P NOTE
H&P reviewed  After examining the patient I find no changes in the patients condition since the H&P had been written      Vitals:    01/11/21 0649   BP: 126/83   Pulse: 88   Resp: 20   Temp: (!) 96 7 °F (35 9 °C)   SpO2: 96%

## 2021-01-11 NOTE — ANESTHESIA POSTPROCEDURE EVALUATION
Post-Op Assessment Note    CV Status:  Stable    Pain management: adequate  Multimodal analgesia used between 6 hours prior to anesthesia start to PACU discharge    Mental Status:  Sleepy   Hydration Status:  Euvolemic   PONV Controlled:  Controlled   Airway Patency:  Patent      Post Op Vitals Reviewed: Yes      Staff: CRNA         No complications documented      BP   161/108   Temp  98 1   Pulse 92   Resp 18   SpO2 96

## 2021-01-12 ENCOUNTER — TELEPHONE (OUTPATIENT)
Dept: OBGYN CLINIC | Facility: HOSPITAL | Age: 66
End: 2021-01-12

## 2021-01-12 NOTE — TELEPHONE ENCOUNTER
Patients aid Kristy Lizama is calling stating that the patients bandages are coming off and she is wondering if she is able to redress the wound for her  She says that the patient is a restless sleeper and this probably caused it       - [phone]

## 2021-01-12 NOTE — TELEPHONE ENCOUNTER
Spoke to patient  Advised she try to keep the under layers of the dressing in place but can rewrap or add another ace to the area to provide more support and try to prevent it from falling down  She states her aid already adjusted it earlier but will follow the instructions if it falls down again  Verified dressing may be removed in 5 days

## 2021-01-12 NOTE — TELEPHONE ENCOUNTER
Patient's aideHetal is calling to request that Dr Pawel Feldman surgical report be faxed to the patient's PCP at [address] [facility] [address])     [employer] can be reached at [phone]

## 2021-01-15 ENCOUNTER — OFFICE VISIT (OUTPATIENT)
Dept: OBGYN CLINIC | Facility: CLINIC | Age: 66
End: 2021-01-15
Payer: MEDICARE

## 2021-01-15 VITALS
SYSTOLIC BLOOD PRESSURE: 125 MMHG | DIASTOLIC BLOOD PRESSURE: 80 MMHG | BODY MASS INDEX: 28.12 KG/M2 | HEIGHT: 66 IN | WEIGHT: 175 LBS

## 2021-01-15 DIAGNOSIS — M19.011 PRIMARY OSTEOARTHRITIS OF RIGHT SHOULDER: Primary | ICD-10-CM

## 2021-01-15 PROCEDURE — 20610 DRAIN/INJ JOINT/BURSA W/O US: CPT | Performed by: ORTHOPAEDIC SURGERY

## 2021-01-15 PROCEDURE — 99213 OFFICE O/P EST LOW 20 MIN: CPT | Performed by: ORTHOPAEDIC SURGERY

## 2021-01-15 RX ORDER — LIDOCAINE HYDROCHLORIDE 10 MG/ML
3 INJECTION, SOLUTION INFILTRATION; PERINEURAL
Status: COMPLETED | OUTPATIENT
Start: 2021-01-15 | End: 2021-01-15

## 2021-01-15 RX ORDER — METHYLPREDNISOLONE ACETATE 40 MG/ML
1 INJECTION, SUSPENSION INTRA-ARTICULAR; INTRALESIONAL; INTRAMUSCULAR; SOFT TISSUE
Status: COMPLETED | OUTPATIENT
Start: 2021-01-15 | End: 2021-01-15

## 2021-01-15 RX ADMIN — LIDOCAINE HYDROCHLORIDE 3 ML: 10 INJECTION, SOLUTION INFILTRATION; PERINEURAL at 11:15

## 2021-01-15 RX ADMIN — METHYLPREDNISOLONE ACETATE 1 ML: 40 INJECTION, SUSPENSION INTRA-ARTICULAR; INTRALESIONAL; INTRAMUSCULAR; SOFT TISSUE at 11:15

## 2021-01-15 NOTE — PROGRESS NOTES
Ortho Sports Medicine Shoulder Follow Up Visit     Assesment:   63 y o  female right shoulder moderate glenohumeral and AC joint OA, with increased pain     Plan:    Conservative treatment:    Ice to shoulder 1-2 times daily, for 20 minutes at a time  Imaging: All imaging from today was reviewed by myself and explained to the patient  Injection:    Was advised to the patient that she should wait 3 months prior to having repeat injections however she would like to have this today to get some pain relief  The risks and benefits of the injection (which include but are not limited to: infection, bleeding,damage to nerve/artery, need for further intervention), as well as the risks and benefits of all alternative treatments were explained and understood  The patient elected to proceed with injection  The procedure was done with aseptic technique, and the patient tolerated the procedure well with no complications  A corticosteroid injection of the glenohumeral joint was performed  The patient should take 1-2 days off of activity, with gradual return to activity as tolerated  Ice to the shoulder 1-2 times daily for 20 minutes, for next 24-48 hrs  Surgery:     No surgery is recommended at this point, continue with conservative treatment plan as noted  Can see Dr Moisés Morel or Dr Mushtaq Genao for possible shoulder replacement discussion     Follow up:    No follow-ups on file  No chief complaint on file  History of Present Illness: The patient is returns for follow up of right shoulder moderate glenohumeral and AC joint OA  Patient had a cortisone injection last visit which she states gave her t, She reports minimal improvement  Pain is improved by rest   Pain is aggravated by overhead activity, reaching back, rotation and lifting   Symptoms include clicking, catching, popping and cracking  The patient denies weakness  The patient has tried rest, ice, NSAIDS and injection  Shoulder Surgical History:  None    Past Medical, Social and Family History:  Past Medical History:   Diagnosis Date    Achalasia     ADHD     Anemia     Anxiety     Anxiety and depression     Arthritis     Chronic narcotic dependence (HCC)     3 yrs    Chronic pain     chronic pain    Chronic pain disorder     Colon polyp     COPD (chronic obstructive pulmonary disease) (HCC)     Depression     Diabetes mellitus (HCC)     Disease of thyroid gland     hypo    Esophageal spasm     Fibromyalgia     Fibromyalgia, primary     GERD (gastroesophageal reflux disease)     Hiatal hernia     Hyperlipidemia     Hypertension     Iron deficiency anemia     Irritable bowel syndrome     Migraine     Polysubstance abuse ([facility] [address]     Psychiatric disorder     major depressive    RA (rheumatoid arthritis) ([facility] [address] )     Seasonal allergies      Past Surgical History:   Procedure Laterality Date    AMPUTATION      RBKA    ANKLE FUSION      APPENDECTOMY      BELOW KNEE LEG AMPUTATION Right     CARPAL TUNNEL RELEASE  2021    left arm    CATARACT EXTRACTION       SECTION      COLONOSCOPY  2010    COLONOSCOPY  2019    diverticula, 3 mm polyp and 6 mm polyp in rectum, grade III internal hemorrhoids, hyperplastic polyp, 5 year recall 2024     EGD  2010    FOREARM FASCIOTOMY Right     FRACTURE SURGERY      HYSTERECTOMY      KNEE ARTHROSCOPY Left     ORTHOPEDIC SURGERY      IN REVISE ULNAR NERVE AT ELBOW Left 2021    Procedure: Left carpal and cubital tunnel release;  Surgeon: Mandy Gastelum MD;  Location:  MAIN OR;  Service: Orthopedics    UPPER GASTROINTESTINAL ENDOSCOPY       Allergies   Allergen Reactions    Morphine Itching and Anaphylaxis     u  Other reaction(s):  Other (See Comments)  u  Other reaction(s): Feeling agitated (finding)      Cephalexin      Other reaction(s): Unknown    Cephalosporins Hives     Other reaction(s): Unknown Reaction  Codeine Hives     Other reaction(s): Unknown  Other reaction(s): Unknown Reaction  Other reaction(s): Unknown      Levaquin [Levofloxacin]     Nsaids      Other reaction(s): Unknown Reaction    Penicillins     Aspirin Hives, Other (See Comments) and Rash     Other reaction(s): Unknown Reaction      Chlorhexidine Rash    Medical Tape Rash    Vancomycin Rash     Current Outpatient Medications on File Prior to Visit   Medication Sig Dispense Refill    acetaminophen (TYLENOL) 325 mg tablet Take 650 mg by mouth every 6 (six) hours as needed for mild pain      albuterol (PROVENTIL HFA,VENTOLIN HFA) 90 mcg/act inhaler Inhale 2 puffs every 4 (four) hours as needed for wheezing      Allergy Relief 10 MG tablet Take 10 mg by mouth daily      atoMOXetine (STRATTERA) 40 mg capsule Take 80 mg by mouth daily      atorvastatin (LIPITOR) 20 mg tablet atorvastatin 20 mg tablet   TAKE ONE TABLET BY MOUTH EVERY EVENING      cyclobenzaprine (FLEXERIL) 10 mg tablet Take 10 mg by mouth 2 (two) times a day      cyclobenzaprine (FLEXERIL) 5 mg tablet Take 5 mg by mouth as needed      diclofenac sodium (VOLTAREN) 1 % Apply 4 g topically 4 (four) times a day as needed (pain) 323 g 6    folic acid (FOLVITE) 1 mg tablet Take 1 tablet (1 mg total) by mouth daily 90 tablet 3    gabapentin (NEURONTIN) 600 MG tablet Take 900 mg by mouth 3 (three) times a day Take 1 and 1/2 tablets daily (900mg total)      Galcanezumab-gnlm (EMGALITY) 120 MG/ML SOSY Inject under the skin Once a month      Guaifenesin 1200 MG TB12 Take 1 tablet by mouth 2 (two) times a day      ipratropium-albuterol (DUO-NEB) 0 5-2 5 mg/3 mL nebulizer solution Take 3 mL by nebulization every 6 (six) hours as needed       Lactobacillus Acid-Pectin (Acidophilus/Citrus Pectin) TABS Take 1 tablet by mouth daily      levothyroxine 150 mcg tablet Take 150 mcg by mouth daily      LORazepam (ATIVAN) 1 mg tablet Take 1 mg by mouth daily      metFORMIN (GLUCOPHAGE) 500 mg tablet Take 500 mg by mouth daily with dinner      methotrexate 2 5 mg tablet Take 5 tablets by mouth once a week with food or after you eat 20 tablet 4    Multiple Vitamin (MULTIVITAMIN) tablet Take 1 tablet by mouth daily      Multiple Vitamins-Minerals (Tab-A-Felicia) TABS Take 1 tablet by mouth daily      ondansetron (ZOFRAN) 4 mg tablet Take 4 mg by mouth every 8 (eight) hours as needed for nausea or vomiting      oxyCODONE (OxyCONTIN) 10 mg 12 hr tablet Take 10 mg by mouth every 8 (eight) hours      oxyCODONE (ROXICODONE) 5 mg immediate release tablet Take 1 tablet (5 mg total) by mouth every 6 (six) hours as needed for moderate pain for up to 10 dosesMax Daily Amount: 20 mg 10 tablet 0    pantoprazole (PROTONIX) 40 mg tablet Take 1 tablet (40 mg total) by mouth daily at bedtime 30 tablet 10    rOPINIRole (REQUIP) 0 25 mg tablet Take 0 25 mg by mouth daily at bedtime      sertraline (ZOLOFT) 25 mg tablet Take 50 mg by mouth daily       topiramate (TOPAMAX) 25 mg tablet Take 25 mg by mouth daily at bedtime      venlafaxine (EFFEXOR-XR) 150 mg 24 hr capsule Take 150 mg by mouth daily      dicyclomine (BENTYL) 20 mg tablet Take 1 tablet (20 mg total) by mouth every 6 (six) hours 120 tablet 10    sertraline (ZOLOFT) 50 mg tablet Take 50 mg by mouth 2 (two) times a day       No current facility-administered medications on file prior to visit        Social History     Socioeconomic History    Marital status:      Spouse name: Not on file    Number of children: Not on file    Years of education: Not on file    Highest education level: Not on file   Occupational History    Not on file   Social Needs    Financial resource strain: Not on file    Food insecurity     Worry: Not on file     Inability: Not on file    Transportation needs     Medical: Not on file     Non-medical: Not on file   Tobacco Use    Smoking status: Current Every Day Smoker     Packs/day: 0 50     Types: Cigarettes Start date: 1/1/1977    Smokeless tobacco: Never Used   Substance and Sexual Activity    Alcohol use: No    Drug use: Never    Sexual activity: Not Currently   Lifestyle    Physical activity     Days per week: Not on file     Minutes per session: Not on file    Stress: Not on file   Relationships    Social connections     Talks on phone: Not on file     Gets together: Not on file     Attends Orthodoxy service: Not on file     Active member of club or organization: Not on file     Attends meetings of clubs or organizations: Not on file     Relationship status: Not on file    Intimate partner violence     Fear of current or ex partner: Not on file     Emotionally abused: Not on file     Physically abused: Not on file     Forced sexual activity: Not on file   Other Topics Concern    Not on file   Social History Narrative    Not on file       I have reviewed the past medical, surgical, social and family history, medications and allergies as documented in the EMR  Review of systems: ROS is negative other than that noted in the HPI  Constitutional: Negative for fatigue and fever  Physical Exam:    Blood pressure 125/80, height 5' 6" (1 676 m), weight 79 4 kg (175 lb), not currently breastfeeding      General/Constitutional: NAD, well developed, well nourished  HENT: Normocephalic, atraumatic  CV: Intact distal pulses, regular rate  Resp: No respiratory distress or labored breathing  Lymphatic: No lymphadenopathy palpated  Neuro: Alert and Oriented x 3, no focal deficits  Psych: Normal mood, normal affect, normal judgement, normal behavior  Skin: Warm, dry, no rashes, no erythema      Shoulder focused exam:       RIGHT LEFT    Scapula Atrophy Negative Negative     Winging Negative Negative     Protraction Negative Negative    Rotator cuff SS 5/5 5/5     IS 5/5 5/5     SubS 5/5 5/5    ROM     170     ER0 60 60     ER90 90    90     IR90 T6    T6     IRb T6    T6    TTP: AC Negative Negative Biceps Negative Negative     Coracoid Negative Negative    Special Tests: O'Briens Negative Negative     Lowry-shear Negative Negative     Cross body Adduction Negative Negative     Speeds  Negative Negative     Crys's Negative Negative     Whipple Negative Negative       Neer Negative Negative     Motta Negative Negative    Instability: Apprehension & relocation not tested not tested     Load & shift not tested not tested    Other: Crank Negative Negative           Large joint arthrocentesis: R glenohumeral  [place] Protocol:  Consent: Verbal consent not obtained  Risks and benefits: risks, benefits and alternatives were discussed  Consent given by: patient and parent  Time out: Immediately prior to procedure a "time out" was called to verify the correct patient, procedure, equipment, support staff and site/side marked as required  Patient understanding: patient states understanding of the procedure being performed  Patient consent: the patient's understanding of the procedure matches consent given  Procedure consent: procedure consent matches procedure scheduled  Relevant documents: relevant documents present and verified  Test results: test results available and properly labeled  Site marked: the operative site was marked  Radiology Images displayed and confirmed   If images not available, report reviewed: imaging studies available  Patient identity confirmed: verbally with patient    Supporting Documentation  Indications: pain and joint swelling   Procedure Details  Location: shoulder - R glenohumeral  Needle size: 22 G  Ultrasound guidance: no  Approach: posterolateral  Medications administered: 3 mL lidocaine 1 %; 1 mL methylPREDNISolone acetate 40 mg/mL    Patient tolerance: patient tolerated the procedure well with no immediate complications  Dressing:  Sterile dressing applied          UE NV Exam: +2 Radial pulses bilaterally  Sensation intact to light touch C5-T1 bilaterally, Radial/median/ulnar nerve motor intact    Cervical ROM is full without pain, numbness or tingling      Shoulder Imaging    No imaging was performed today      Scribe Attestation    I,:  John Berry am acting as a scribe while in the presence of the attending physician :       I,:  Savilla Rubinstein, DO personally performed the services described in this documentation    as scribed in my presence :

## 2021-01-27 ENCOUNTER — OFFICE VISIT (OUTPATIENT)
Dept: OBGYN CLINIC | Facility: MEDICAL CENTER | Age: 66
End: 2021-01-27

## 2021-01-27 VITALS
HEIGHT: 66 IN | HEART RATE: 80 BPM | BODY MASS INDEX: 28.25 KG/M2 | RESPIRATION RATE: 18 BRPM | SYSTOLIC BLOOD PRESSURE: 127 MMHG | DIASTOLIC BLOOD PRESSURE: 82 MMHG

## 2021-01-27 DIAGNOSIS — G56.02 CARPAL TUNNEL SYNDROME OF LEFT WRIST: ICD-10-CM

## 2021-01-27 DIAGNOSIS — G56.22 CUBITAL TUNNEL SYNDROME ON LEFT: Primary | ICD-10-CM

## 2021-01-27 PROCEDURE — 99024 POSTOP FOLLOW-UP VISIT: CPT | Performed by: ORTHOPAEDIC SURGERY

## 2021-01-27 NOTE — PROGRESS NOTES
History of the Present Illness     Alana Quinonez is a 63 y o  female status post left in situ cubital tunnel decompression as well as carpal tunnel release  Patient has been picking at her wounds and her incisions  Denies any drainage or fevers  She notes that her numbness and tingling has improved significantly  Feels much better than prior to surgery          Physical Exam     /82   Pulse 80   Resp 18   Ht 5' 6" (1 676 m)   LMP  (LMP Unknown)   BMI 28 25 kg/m²     Examination left upper extremity reveals incisions on the palmar aspect of the wrist and medial elbow that are healing with a bit of superficial eschar secondary to gapping  Sutures are loose and some a been previously removed  Flexion-extension of the elbow is full as well as wrist 5/5 Motor to the APB, FDI, FDP2, FDP5, EDC  Sensation intact but slightly decreased to light touch in the median, radial, and ulnar nerve distribution  Palmar cutaneous nerve intact      Data Review       Imaging:  None today    Assessment and Plan       30-year-old female healing well status post above surgery  I counseled her that she should not pick her wounds  I wrapped her with 3 layers of different wrapping including a Band-Aid, Kerlix, and Ace wrap on both incisions to help prevent her from getting at her incisions  I discussed that she should really wrap every day after a shower to help prevent her from picking  She said she will not be picking moving forward so I will take her at her work  She still mentions that she has significant pain and limitation on the right upper extremity  We will investigate that next time after full recovery with no evidence of infection or problems on the left side        Follow Up:  1 month    To Do Next Visit:     PROCEDURES PERFORMED:  Procedures  No Procedures performed today

## 2021-02-09 ENCOUNTER — OFFICE VISIT (OUTPATIENT)
Dept: OBGYN CLINIC | Facility: HOSPITAL | Age: 66
End: 2021-02-09
Payer: MEDICARE

## 2021-02-09 VITALS
BODY MASS INDEX: 28.25 KG/M2 | HEART RATE: 91 BPM | HEIGHT: 66 IN | DIASTOLIC BLOOD PRESSURE: 72 MMHG | SYSTOLIC BLOOD PRESSURE: 120 MMHG

## 2021-02-09 DIAGNOSIS — M25.511 CHRONIC RIGHT SHOULDER PAIN: ICD-10-CM

## 2021-02-09 DIAGNOSIS — M19.011 PRIMARY OSTEOARTHRITIS OF RIGHT SHOULDER: Primary | ICD-10-CM

## 2021-02-09 DIAGNOSIS — G89.29 CHRONIC RIGHT SHOULDER PAIN: ICD-10-CM

## 2021-02-09 PROCEDURE — 99214 OFFICE O/P EST MOD 30 MIN: CPT | Performed by: ORTHOPAEDIC SURGERY

## 2021-02-09 NOTE — PROGRESS NOTES
Assessment  Diagnoses and all orders for this visit:    Primary osteoarthritis of right shoulder    Chronic right shoulder pain          Discussion and Plan:     the patient has right shoulder glenohumeral osteoarthritis which has failed to improve with multiple injections and prolonged nonoperative care  She does have a complicated medical history including a right below-the-knee amputation for which she is pending a prosthetic fitting and I do feel that given her persistent symptoms she is a candidate for total shoulder arthroplasty of the right shoulder, an MRI will be obtained preoperatively to better assess the integrity of the rotator cuff and determine if an anatomic or reverse prosthesis is appropriate for her  I do think delaying this procedure until she is fitted for her prosthetic leg will be helpful for her in her postoperative recovery and she understands the complexity of recovering from a shoulder replacement while also dealing with a prosthesis of her right lower extremity  A thorough discussion was performed with the patient reviewing all operative and nonoperative options as well as the risks of the procedure  Risks discussed include but not limited to persistent pain, dislocation, loosening of the prosthesis, infection, need for further surgery including revision to a reverse total shoulder, rotator cuff rupture , neurovascular injury, as well as the risk of anesthesia  After this discussion all questions were answered and informed consent was obtained for Total Shoulder Arthroplasty of the right shoulder     Patient was instructed to start a course of PT in the mean time as we wait the results of the MRI scan and we will review the results with her and confirm the need for arthroplasty at that time based on the outcome of any therapy she is able to obtain      *Patient is on chronic pain medications and understands that her postoperative pain regimen will have to be in accordance with her pain management physician as we would not be able to take on that complex postoperative pain controlled for and will offer a referral to see her pain management physician preoperatively to develop a good plan for postoperative care    Subjective:   Patient ID: Renata Taylor is a 63 y o  female      HPI  The patient presents with a chief complaint of right shoulder pain  The pain began several year(s) ago and is not associated with an acute injury  Patient was previously treating with Dr Solis Eastman with CS injections  Her last injection was 1/15/21  The patient describes the pain as sharp in intensity,  intermittent in timing, and localizes the pain to the  right globally  The pain is worse with movement and relieved by rest   The pain is not associated with numbness and tingling  The pain is not associated with constitutional symptoms  The patient is awoken at night by the pain  The following portions of the patient's history were reviewed and updated as appropriate: allergies, current medications, past family history, past medical history, past social history, past surgical history and problem list     Review of Systems   Constitutional: Negative for chills and fever  HENT: Negative for drooling and sneezing  Eyes: Negative for redness  Respiratory: Negative for cough and wheezing  Gastrointestinal: Negative for nausea and vomiting  Musculoskeletal:        Please see ortho exam   Psychiatric/Behavioral: Negative for behavioral problems  The patient is not nervous/anxious  Objective:  /72   Pulse 91   Ht 5' 6" (1 676 m)   LMP  (LMP Unknown)   BMI 28 25 kg/m²       Right Shoulder Exam     Range of Motion   External rotation: 30   Forward flexion: 80     Muscle Strength   Abduction: 3/5   External rotation: 3/5     Other   Erythema: absent  Sensation: normal  Pulse: present            Physical Exam  Vitals signs reviewed     Constitutional:       Appearance: She is well-developed  Eyes:      Pupils: Pupils are equal, round, and reactive to light  Pulmonary:      Effort: Pulmonary effort is normal       Breath sounds: Normal breath sounds  Skin:     General: Skin is warm and dry  Neurological:      Mental Status: She is alert and oriented to person, place, and time  Psychiatric:         Behavior: Behavior normal          Thought Content: Thought content normal          Judgment: Judgment normal            I have personally reviewed pertinent films in PACS and my interpretation is as follows  Right shoulder x-rays demonstrates no fracture or dislocation, with moderate GH degenerative changes        Scribe Attestation    I,:  Jessica Foley am acting as a scribe while in the presence of the attending physician :       I,:  Mandy Newman MD personally performed the services described in this documentation    as scribed in my presence :

## 2021-02-10 RX ORDER — CHLORHEXIDINE GLUCONATE 0.12 MG/ML
15 RINSE ORAL ONCE
Status: CANCELLED | OUTPATIENT
Start: 2021-02-10 | End: 2021-02-10

## 2021-02-10 RX ORDER — CEFAZOLIN SODIUM 2 G/50ML
2000 SOLUTION INTRAVENOUS ONCE
Status: CANCELLED | OUTPATIENT
Start: 2021-02-10 | End: 2021-02-10

## 2021-02-15 ENCOUNTER — PREP FOR PROCEDURE (OUTPATIENT)
Dept: OBGYN CLINIC | Facility: HOSPITAL | Age: 66
End: 2021-02-15

## 2021-02-15 DIAGNOSIS — Z01.812 PRE-OPERATIVE LABORATORY EXAMINATION: Primary | ICD-10-CM

## 2021-02-23 ENCOUNTER — HOSPITAL ENCOUNTER (OUTPATIENT)
Dept: RADIOLOGY | Facility: HOSPITAL | Age: 66
Discharge: HOME | End: 2021-02-23
Attending: ORTHOPAEDIC SURGERY

## 2021-02-23 DIAGNOSIS — M25.511 CHRONIC RIGHT SHOULDER PAIN: ICD-10-CM

## 2021-02-23 DIAGNOSIS — G89.29 CHRONIC RIGHT SHOULDER PAIN: ICD-10-CM

## 2021-02-23 DIAGNOSIS — M19.011 PRIMARY OSTEOARTHRITIS OF RIGHT SHOULDER: ICD-10-CM

## 2021-02-25 ENCOUNTER — OFFICE VISIT (OUTPATIENT)
Dept: OBGYN CLINIC | Facility: MEDICAL CENTER | Age: 66
End: 2021-02-25

## 2021-02-25 VITALS
HEIGHT: 66 IN | HEART RATE: 93 BPM | DIASTOLIC BLOOD PRESSURE: 77 MMHG | WEIGHT: 175 LBS | BODY MASS INDEX: 28.12 KG/M2 | SYSTOLIC BLOOD PRESSURE: 133 MMHG

## 2021-02-25 DIAGNOSIS — G56.02 CARPAL TUNNEL SYNDROME OF LEFT WRIST: ICD-10-CM

## 2021-02-25 DIAGNOSIS — G56.22 CUBITAL TUNNEL SYNDROME ON LEFT: Primary | ICD-10-CM

## 2021-02-25 PROCEDURE — 99024 POSTOP FOLLOW-UP VISIT: CPT | Performed by: ORTHOPAEDIC SURGERY

## 2021-02-25 NOTE — PROGRESS NOTES
History of the Present Illness     Pamela Shrestha is a 63 y o  female who is status post left carpal and cubital tunnel release on the left upper extremity 01/11/2021  Patient states this side overall is doing well and she has noticed improvement since surgery  She states she feels like her incisions are healing well but has some irritation in the palm secondary to having to use her hands to assist her when she performs transfers  She also presents today with right wrist pain and hand numbness/tingling  She states it is primarily the pain that bothers her today  She does have history of previous compartment syndrome with fasciotomies of this arm many years ago  She had previous steroid injection back in October for wrist arthritis and states this helped a little, but did not provide much relief  She is now checking her blood glucose 3x/week and is on Metformin for her diabetes  Patient currently smokes <1 pack per day  Physical Exam     /77   Pulse 93   Ht 5' 6" (1 676 m)   Wt 79 4 kg (175 lb)   LMP  (LMP Unknown)   BMI 28 25 kg/m²     Left Upper Extremity:  Cubital tunnel incision is well-healed  Carpal tunnel incision is healed as well, but it does appear that at one point in time this may have split slightly and is healing by secondary intention  No erythema, drainage or concerns for infection here  5/5 Motor to the APB, FDI, FDP2, FDP5, EDC  Sensation intact to light touch in the median, radial, and ulnar nerve distribution  Radial pulse 2+    Right Upper Extremity:  Patient with scars from previous fasciotomy surgeries that are well healed  This appears to go down through the carpal tunnel  Patient has positive Tinel's at cubital tunnel radiating to middle and index fingers  Negative Tinel's proximal median nerve  Positive Tinel's carpal tunnel  Patient has pain in the wrist with motion and motion is significantly limited here  PIP extension lag of the ring finger    [facility] <0 index finger and 0 for middle through small fingers  5/5 Motor to the APB, FDP2, FDP5, EDC  FDI 3/5  Sensation intact to light touch in the median, radial, and ulnar nerve distribution  Radial pulse 2+    Data Review       Imaging:  No new images to review - previous xrays show radiocarpal and midcarpal arthritis of the right wrist with scapholunate widening  Assessment and Plan     63year old female status post left carpal and cubital tunnel release on the left upper extremity 01/11/2021  Patient doing well overall and incisions have finally healed  As for the right side, patient states the wrist pain is worse than the numbness and tingling  She does have significant degenerative changes on previous xrays  I did explain we could try injecting a different steroid into her wrist joint to see if this provides more relief than the betamethasone provided previously  I also discussed possible surgery down the road with wrist but explained that she does have to quit smoking for this to be a possibility and it is a much more in depth surgery with a longer recovery that what she had with the carpal and cubital tunnel releases  We do not have a recent A1C in patient's file  She states she got this performed at [facility] in [place]  We did call to get these results and waited to receive these, but we still have not received these  I do not feel comfortable injecting the patient until we can get this result  I did discuss this with her and she states understanding  We will continue to monitor for the results and if her A1C is at a safe level, than we can have her come in as a procedure only visit for an injection  Patient agrees with the treatment plan        Follow Up: Pending A1c results    PROCEDURES PERFORMED:  Procedures  No Procedures performed today

## 2021-03-15 ENCOUNTER — HOSPITAL ENCOUNTER (OUTPATIENT)
Dept: RADIOLOGY | Facility: HOSPITAL | Age: 66
Discharge: HOME | End: 2021-03-15
Attending: ORTHOPAEDIC SURGERY
Payer: MEDICARE

## 2021-03-15 PROCEDURE — 73221 MRI JOINT UPR EXTREM W/O DYE: CPT

## 2021-03-15 PROCEDURE — G1004 CDSM NDSC: HCPCS

## 2021-03-16 ENCOUNTER — OFFICE VISIT (OUTPATIENT)
Dept: OBGYN CLINIC | Facility: MEDICAL CENTER | Age: 66
End: 2021-03-16
Payer: MEDICARE

## 2021-03-16 ENCOUNTER — TELEPHONE (OUTPATIENT)
Dept: OBGYN CLINIC | Facility: HOSPITAL | Age: 66
End: 2021-03-16

## 2021-03-16 VITALS
DIASTOLIC BLOOD PRESSURE: 85 MMHG | BODY MASS INDEX: 28.25 KG/M2 | TEMPERATURE: 98.8 F | HEART RATE: 93 BPM | HEIGHT: 66 IN | SYSTOLIC BLOOD PRESSURE: 127 MMHG

## 2021-03-16 DIAGNOSIS — M19.039 WRIST ARTHRITIS: Primary | ICD-10-CM

## 2021-03-16 PROCEDURE — 20605 DRAIN/INJ JOINT/BURSA W/O US: CPT | Performed by: ORTHOPAEDIC SURGERY

## 2021-03-16 RX ORDER — LIDOCAINE HYDROCHLORIDE 10 MG/ML
1 INJECTION, SOLUTION INFILTRATION; PERINEURAL
Status: COMPLETED | OUTPATIENT
Start: 2021-03-16 | End: 2021-03-16

## 2021-03-16 RX ORDER — TRIAMCINOLONE ACETONIDE 40 MG/ML
40 INJECTION, SUSPENSION INTRA-ARTICULAR; INTRAMUSCULAR
Status: COMPLETED | OUTPATIENT
Start: 2021-03-16 | End: 2021-03-16

## 2021-03-16 RX ADMIN — TRIAMCINOLONE ACETONIDE 40 MG: 40 INJECTION, SUSPENSION INTRA-ARTICULAR; INTRAMUSCULAR at 11:04

## 2021-03-16 RX ADMIN — LIDOCAINE HYDROCHLORIDE 1 ML: 10 INJECTION, SOLUTION INFILTRATION; PERINEURAL at 11:04

## 2021-03-16 NOTE — TELEPHONE ENCOUNTER
PT in hospital will determine what needs patient requires on d/c   99 9% of patients do not qualify for home PT as this is a shoulder  Patient has 1 leg and other medical conditions so she will likely qualify  Anabel's aide was the one who called because patient was scheduled for pre-surgery therapy measurements and outpatient PT said insurance won't pay for outpatient PT if she has an aide coming to her house  There is no reason to have patient to pay out of pocket for this measurement or to send home PT once to attempt an accurate ROM assessment    We will deal with all of this while she is admitted and forgo the pre-op assessment    Questions answered

## 2021-03-16 NOTE — TELEPHONE ENCOUNTER
Dr Leveda Seip from [facility] is calling regarding trying to get the patient Home Health care      # [phone]

## 2021-03-16 NOTE — PROGRESS NOTES
Chief Complaint     Right wrist pain       History of Present Illness     Wood Ramos is a 63 y o  right hand dominant female who presents to the office today for a right wrist radiocarpal CSI  Melissa Valdes notes continued pain to her right wrist, which is more bothersome then the right hand numbness/tingling  Melissa Valdes underwent a right wrist radiocarpal joint CSI on 10/14/2020, which provided her with some relief of pain  She is here today for a second right wrist radiocarpal joint CSI  Her last Hemoglobin A1C was 7 8  She checks her glucose 3x a week  Melissa Valdes states that numbness and tingling to her left hand is starting to return  This starts at the neck and radiated down to her hand  She was scheduled for cervical injections but did cancel as she is not having any neck pain  Melissa Valdes is aprox  2 months s/p left carpal tunnel and cubital tunnel release, DOS 2021                Past Medical History:   Diagnosis Date    Achalasia     ADHD     Anemia     Anxiety     Anxiety and depression     Arthritis     Chronic narcotic dependence (HCC)     3 yrs    Chronic pain     chronic pain    Chronic pain disorder     Colon polyp     COPD (chronic obstructive pulmonary disease) (HCC)     Depression     Diabetes mellitus (HCC)     Disease of thyroid gland     hypo    Esophageal spasm     Fibromyalgia     Fibromyalgia, primary     GERD (gastroesophageal reflux disease)     Hiatal hernia     Hyperlipidemia     Hypertension     Iron deficiency anemia     Irritable bowel syndrome     Migraine     Polysubstance abuse ([facility])     Psychiatric disorder     major depressive    RA (rheumatoid arthritis) ([address] )     Seasonal allergies        Past Surgical History:   Procedure Laterality Date    AMPUTATION      RBKA    ANKLE FUSION      APPENDECTOMY      BELOW KNEE LEG AMPUTATION Right     CARPAL TUNNEL RELEASE  2021    left arm    CATARACT EXTRACTION       SECTION      COLONOSCOPY 03/08/2010    COLONOSCOPY  12/05/2019    diverticula, 3 mm polyp and 6 mm polyp in rectum, grade III internal hemorrhoids, hyperplastic polyp, 5 year recall December 2024     EGD  08/18/2010    FOREARM FASCIOTOMY Right     FRACTURE SURGERY      HYSTERECTOMY      KNEE ARTHROSCOPY Left     ORTHOPEDIC SURGERY      DC REVISE ULNAR NERVE AT ELBOW Left 1/11/2021    Procedure: Left carpal and cubital tunnel release;  Surgeon: Amita Dumont MD;  Location:  MAIN OR;  Service: Orthopedics    UPPER GASTROINTESTINAL ENDOSCOPY         Allergies   Allergen Reactions    Morphine Itching and Anaphylaxis     u  Other reaction(s):  Other (See Comments)  u  Other reaction(s): Feeling agitated (finding)      Cephalexin      Other reaction(s): Unknown    Cephalosporins Hives     Other reaction(s): Unknown Reaction      Codeine Hives     Other reaction(s): Unknown  Other reaction(s): Unknown Reaction  Other reaction(s): Unknown      Levaquin [Levofloxacin]     Nsaids      Other reaction(s): Unknown Reaction    Penicillins     Aspirin Hives, Other (See Comments) and Rash     Other reaction(s): Unknown Reaction      Chlorhexidine Rash    Medical Tape Rash    Vancomycin Rash       Current Outpatient Medications on File Prior to Visit   Medication Sig Dispense Refill    acetaminophen (TYLENOL) 325 mg tablet Take 650 mg by mouth every 6 (six) hours as needed for mild pain      albuterol (PROVENTIL HFA,VENTOLIN HFA) 90 mcg/act inhaler Inhale 2 puffs every 4 (four) hours as needed for wheezing      Allergy Relief 10 MG tablet Take 10 mg by mouth daily      atoMOXetine (STRATTERA) 40 mg capsule Take 80 mg by mouth daily      atorvastatin (LIPITOR) 20 mg tablet atorvastatin 20 mg tablet   TAKE ONE TABLET BY MOUTH EVERY EVENING      cyclobenzaprine (FLEXERIL) 10 mg tablet Take 10 mg by mouth 2 (two) times a day      cyclobenzaprine (FLEXERIL) 5 mg tablet Take 5 mg by mouth as needed      diclofenac sodium (VOLTAREN) 1 % Apply 4 g topically 4 (four) times a day as needed (pain) 300 g 6    dicyclomine (BENTYL) 20 mg tablet Take 1 tablet (20 mg total) by mouth every 6 (six) hours 120 tablet 10    folic acid (FOLVITE) 1 mg tablet Take 1 tablet (1 mg total) by mouth daily 90 tablet 3    gabapentin (NEURONTIN) 600 MG tablet Take 900 mg by mouth 3 (three) times a day Take 1 and 1/2 tablets daily (900mg total)      Galcanezumab-gnlm (EMGALITY) 120 MG/ML SOSY Inject under the skin Once a month      Guaifenesin 1200 MG TB12 Take 1 tablet by mouth 2 (two) times a day      ipratropium-albuterol (DUO-NEB) 0 5-2 5 mg/3 mL nebulizer solution Take 3 mL by nebulization every 6 (six) hours as needed       Lactobacillus Acid-Pectin (Acidophilus/Citrus Pectin) TABS Take 1 tablet by mouth daily      levothyroxine 150 mcg tablet Take 150 mcg by mouth daily      LORazepam (ATIVAN) 1 mg tablet Take 1 mg by mouth daily      metFORMIN (GLUCOPHAGE) 500 mg tablet Take 500 mg by mouth daily with dinner      methotrexate 2 5 mg tablet Take 5 tablets by mouth once a week with food or after you eat 20 tablet 4    Multiple Vitamin (MULTIVITAMIN) tablet Take 1 tablet by mouth daily      Multiple Vitamins-Minerals (Tab-A-Felicia) TABS Take 1 tablet by mouth daily      ondansetron (ZOFRAN) 4 mg tablet Take 4 mg by mouth every 8 (eight) hours as needed for nausea or vomiting      oxyCODONE (OxyCONTIN) 10 mg 12 hr tablet Take 10 mg by mouth every 8 (eight) hours      oxyCODONE (ROXICODONE) 5 mg immediate release tablet Take 1 tablet (5 mg total) by mouth every 6 (six) hours as needed for moderate pain for up to 10 dosesMax Daily Amount: 20 mg 10 tablet 0    pantoprazole (PROTONIX) 40 mg tablet Take 1 tablet (40 mg total) by mouth daily at bedtime 30 tablet 10    rOPINIRole (REQUIP) 0 25 mg tablet Take 0 25 mg by mouth daily at bedtime      sertraline (ZOLOFT) 25 mg tablet Take 50 mg by mouth daily       sertraline (ZOLOFT) 50 mg tablet Take 50 mg by mouth 2 (two) times a day      topiramate (TOPAMAX) 25 mg tablet Take 25 mg by mouth daily at bedtime      venlafaxine (EFFEXOR-XR) 150 mg 24 hr capsule Take 150 mg by mouth daily       No current facility-administered medications on file prior to visit  Social History     Tobacco Use    Smoking status: Current Every Day Smoker     Packs/day: 0 50     Types: Cigarettes     Start date: 1/1/1977    Smokeless tobacco: Never Used   Substance Use Topics    Alcohol use: No    Drug use: Never       Family History   Problem Relation Age of Onset    Ulcerative colitis Mother     Lung cancer Mother     Diabetes Mother     Alcohol abuse Father     Diabetes Sister     Cancer Brother     Colon polyps Neg Hx     Colon cancer Neg Hx        Review of Systems     As stated in the HPI  All other systems were reviewed and are negative  Physical Exam     /85   Pulse 93   Temp 98 8 °F (37 1 °C)   Ht 5' 6" (1 676 m)   LMP  (LMP Unknown)   BMI 28 25 kg/m²     GENERAL: This is a well-developed, well-nourished, age-appropriate patient in no acute distress  The patient is alert and oriented x3  Pleasant and cooperative  Eyes: Anicteric sclerae  Extraocular movements appear intact  HENT: Nares are patent with no drainage  Lungs: There is equal chest rise on inspection  Breathing is non-labored with no audible wheezing  Cardiovascular: No cyanosis  No upper extremity lymphadema  Skin: Skin is warm to touch  No obvious skin lesions or rashes other than described below  Neurologic: No ataxia  Psychiatric: Mood and affect are appropriate  Right upper extremity:   No erythema, ecchymosis or edema  Healed scars from previous fasciotomy  Limited wrist ROM secondary to pain   Pain with wrist ROM   2 + radial pulse   5/5 Motor to the APB, FDI, FDP2, FDP5, EDC  Sensation intact to light touch in the median, radial, and ulnar nerve distribution        Data Review     Results Reviewed     None Imaging:  No new imaging to review     Assessment and Plan      Diagnoses and all orders for this visit:    Bilateral wrist arthritis  -     Medium joint arthrocentesis: R radiocarpal           Right wrist arthritis  Right wrist radiocarpal joint CSI using Kenalog was performed in the office today without complication  Post injection protocol was discussed  If the CSI is beneficial it can be repeated every 3 months as needed  She was advised to bring her updated Hemoglobin A1C with her if she wishes to proceed with another CSI as we do not have access to [employer] diagnostics  Was advised to follow up with her doctor regarding her neck       Follow Up: PRN    To Do Next Visit:     PROCEDURES PERFORMED:  Medium joint arthrocentesis: R radiocarpal  Universal Protocol:  Consent: Verbal consent obtained  Written consent not obtained  Risks and benefits: risks, benefits and alternatives were discussed  Consent given by: patient  Time out: Immediately prior to procedure a "time out" was called to verify the correct patient, procedure, equipment, support staff and site/side marked as required    Site marked: the operative site was marked  Patient identity confirmed: verbally with patient    Supporting Documentation  Indications: pain   Procedure Details  Location: wrist - R radiocarpal  Preparation: Patient was prepped and draped in the usual sterile fashion  Needle size: 25 G  Ultrasound guidance: no  Medications administered: 40 mg triamcinolone acetonide 40 mg/mL; 1 mL lidocaine 1 %    Patient tolerance: patient tolerated the procedure well with no immediate complications  Dressing:  Sterile dressing applied             Scribe Attestation    I,:  Joey Nicole am acting as a scribe while in the presence of the attending physician :       I,:  Jorge Zheng MD personally performed the services described in this documentation    as scribed in my presence :

## 2021-03-16 NOTE — TELEPHONE ENCOUNTER
Patient sees Dr Evan Almazan      Patient called because she is trying to get assistance to have Home Care PT

## 2021-03-18 DIAGNOSIS — R13.10 DYSPHAGIA, UNSPECIFIED TYPE: ICD-10-CM

## 2021-03-18 DIAGNOSIS — M19.90 ARTHRITIS: ICD-10-CM

## 2021-03-19 ENCOUNTER — TELEPHONE (OUTPATIENT)
Dept: OBGYN CLINIC | Facility: HOSPITAL | Age: 66
End: 2021-03-19

## 2021-03-19 RX ORDER — PANTOPRAZOLE SODIUM 40 MG/1
TABLET, DELAYED RELEASE ORAL
Qty: 30 TABLET | Refills: 5 | Status: SHIPPED | OUTPATIENT
Start: 2021-03-19 | End: 2021-09-24

## 2021-03-22 ENCOUNTER — TRANSCRIBE ORDERS (OUTPATIENT)
Dept: RADIOLOGY | Facility: HOSPITAL | Age: 66
End: 2021-03-22

## 2021-03-22 ENCOUNTER — HOSPITAL ENCOUNTER (OUTPATIENT)
Dept: RADIOLOGY | Facility: HOSPITAL | Age: 66
Discharge: HOME | End: 2021-03-22
Attending: ORTHOPAEDIC SURGERY
Payer: MEDICARE

## 2021-03-22 DIAGNOSIS — G89.29 CHRONIC RIGHT SHOULDER PAIN: ICD-10-CM

## 2021-03-22 DIAGNOSIS — M19.011 PRIMARY OSTEOARTHRITIS OF RIGHT SHOULDER: ICD-10-CM

## 2021-03-22 DIAGNOSIS — M25.511 CHRONIC RIGHT SHOULDER PAIN: ICD-10-CM

## 2021-03-22 PROCEDURE — G1004 CDSM NDSC: HCPCS

## 2021-03-22 PROCEDURE — 73200 CT UPPER EXTREMITY W/O DYE: CPT

## 2021-03-22 RX ORDER — FOLIC ACID 1 MG/1
1 TABLET ORAL DAILY
Qty: 30 TABLET | Refills: 3 | Status: SHIPPED | OUTPATIENT
Start: 2021-03-22 | End: 2021-06-02

## 2021-04-05 ENCOUNTER — OFFICE VISIT (OUTPATIENT)
Dept: OBGYN CLINIC | Facility: OTHER | Age: 66
End: 2021-04-05
Payer: MEDICARE

## 2021-04-05 VITALS
BODY MASS INDEX: 28.12 KG/M2 | HEIGHT: 66 IN | HEART RATE: 101 BPM | WEIGHT: 175 LBS | DIASTOLIC BLOOD PRESSURE: 86 MMHG | SYSTOLIC BLOOD PRESSURE: 121 MMHG

## 2021-04-05 DIAGNOSIS — M75.101 ROTATOR CUFF TEAR ARTHROPATHY OF RIGHT SHOULDER: Primary | ICD-10-CM

## 2021-04-05 DIAGNOSIS — M12.811 ROTATOR CUFF TEAR ARTHROPATHY OF RIGHT SHOULDER: Primary | ICD-10-CM

## 2021-04-05 PROCEDURE — 99214 OFFICE O/P EST MOD 30 MIN: CPT | Performed by: ORTHOPAEDIC SURGERY

## 2021-04-05 NOTE — PROGRESS NOTES
Assessment  Diagnoses and all orders for this visit:    Rotator cuff tear arthropathy of right shoulder        Discussion and Plan:    · Reviewed patient's CT scan results which reveal a chronic rotator cuff tear with severe glenohumeral joint osteoarthritis  A reverse total shoulder arthroplasty will be performed on her already scheduled surgical date of 4/13/2021  Informed consent was obtained on 2/9/2021 and the risks and benefits of the specific procedure were again explained to the patient in detail today in the office  She understood and all questions were answered  · Again, explained to the patient that as she is on chronic pain medication her pain regimen will not be taken over by me  She will be given the standard amount of OxyCodone given to all patients post operatively but after this initial prescription, she will have to be in accordance with her pain management/PCP for a proper post operative pain regimen  ·   We also reviewed that it is our expectation should be discharged to home following this procedure and her and her family are preparing the cells for this including with scheduling home visiting nurses to be present for her to assist her in any way  I do understand the given her lower extremity amputation it is go to be more challenging for her to recover from this procedure and I am happy that the family is preparing  themselves for this discharge  · Follow up post operatively with Julian Douglas PA-C    Subjective:   Patient ID: Lillian Ortiz is a 63 y o  female      HPI  64 y/o female who presents today for a follow up visit for her right shoulder as well as to discuss CT scan results  Patient continues to note constant pain about her right shoulder, worse with any motion  She has tried and failed multiple cortisone injections into her right shoulder, last being on 1/15/2021 with Dr Araujo Sic  She states that her pain does continue to wake her up at night     Denies numbness and tingling, fevers or chills  The following portions of the patient's history were reviewed and updated as appropriate: allergies, current medications, past family history, past medical history, past social history, past surgical history and problem list     Review of Systems   Constitutional: Negative for chills, fever and unexpected weight change  HENT: Negative for hearing loss, nosebleeds and sore throat  Eyes: Negative for pain, redness and visual disturbance  Respiratory: Negative for cough, shortness of breath and wheezing  Cardiovascular: Negative for chest pain, palpitations and leg swelling  Gastrointestinal: Negative for abdominal distention, nausea and vomiting  Endocrine: Negative for polydipsia and polyuria  Genitourinary: Negative for dysuria and hematuria  Skin: Negative for rash and wound  Neurological: Negative for dizziness, numbness and headaches  Psychiatric/Behavioral: Negative for decreased concentration and suicidal ideas  Objective:  /86   Pulse 101   Ht 5' 6" (1 676 m)   Wt 79 4 kg (175 lb)   LMP  (LMP Unknown)   BMI 28 25 kg/m²       Right Shoulder Exam     Range of Motion   External rotation: 30   Forward flexion: 80     Muscle Strength   Abduction: 3/5   External rotation: 3/5     Other   Erythema: absent  Sensation: normal  Pulse: present            Physical Exam  Constitutional:       Appearance: She is well-developed  Eyes:      Pupils: Pupils are equal, round, and reactive to light  Pulmonary:      Effort: Pulmonary effort is normal       Breath sounds: Normal breath sounds  Skin:     General: Skin is warm and dry  Neurological:      Mental Status: She is alert and oriented to person, place, and time  Psychiatric:         Behavior: Behavior normal          Thought Content: Thought content normal          Judgment: Judgment normal            I have personally reviewed pertinent films in PACS and my interpretation is as follows      CT Right Shoulder Blue Print 3/22/2021: Advanced glenohumeral degenerative osteoarthritis without significant glenoid retroversion  Good glenoid bone stock noted  Full thickness supraspinatus tendon tear with superior humeral head migration       Scribe Attestation    I,:  Anjelica Escobedo am acting as a scribe while in the presence of the attending physician :       I,:  Margaret Artis MD personally performed the services described in this documentation    as scribed in my presence :

## 2021-04-12 ENCOUNTER — TELEPHONE (OUTPATIENT)
Dept: OBGYN CLINIC | Facility: HOSPITAL | Age: 66
End: 2021-04-12

## 2021-04-12 ENCOUNTER — PREP FOR PROCEDURE (OUTPATIENT)
Dept: OBGYN CLINIC | Facility: HOSPITAL | Age: 66
End: 2021-04-12

## 2021-04-12 ENCOUNTER — TELEPHONE (OUTPATIENT)
Dept: OBGYN CLINIC | Facility: CLINIC | Age: 66
End: 2021-04-12

## 2021-04-12 DIAGNOSIS — Z01.812 PRE-OPERATIVE LABORATORY EXAMINATION: Primary | ICD-10-CM

## 2021-04-12 NOTE — TELEPHONE ENCOUNTER
Patient sees Myke Hidalgo    Patient needs a call back to re-schedule her surgery, she was told her surgery was cancelled      Laurie Cox - [phone]

## 2021-04-12 NOTE — TELEPHONE ENCOUNTER
Patient's surgery was cancelled - she was informed & phone was disconnected   I will be working on the clearance again with her nursing home for final clearance since they didn't clear her due to her commodities    Thanks

## 2021-04-12 NOTE — TELEPHONE ENCOUNTER
I believe she has a live-in home health aid  She probably requires the letter so the aid service doesn't take the aid away if her son is there to help  I will put a note in the computer  It can be printed before she leaves the hospital or she can pick it up

## 2021-04-12 NOTE — TELEPHONE ENCOUNTER
Dr Duane Ser is scheduled for shoulder surgery 4/13/21 and needs a written note giving permission for her son Everton Chiu to stay with her after the surgery to help with recovery  Please put the note in her p/o packet so she can get it to her residential staff  She is expecting him to stay about 3 weeks    Please call her if you have any questions [phone]  Thank you

## 2021-04-12 NOTE — TELEPHONE ENCOUNTER
Patient was just speaking to someone regarding her surgery being canceled for tomorrow and was disconnected      Please call back [phone]

## 2021-04-13 ENCOUNTER — TELEPHONE (OUTPATIENT)
Dept: OBGYN CLINIC | Facility: HOSPITAL | Age: 66
End: 2021-04-13

## 2021-04-13 NOTE — TELEPHONE ENCOUNTER
Patient is calling back in regards to her surgery being canceled  She states that her PCP will clear her on Monday  Please advise      CB# [phone]

## 2021-04-14 NOTE — TELEPHONE ENCOUNTER
Spoke to patient  Per patient, letter placed in out going mail  Mailed to patient's home address on file

## 2021-04-15 NOTE — TELEPHONE ENCOUNTER
Dr Bower Comfort Patient was to get cleared Monday by PCP the soonest we would be able to get her on your schedule is 5/25/21  You have 3 already on for 4/20/21  L/M advising pt of getting labs done. Advised pt to call office for any further questions or concerns.    NANCY Paige

## 2021-04-15 NOTE — TELEPHONE ENCOUNTER
Patient is aware of her 5/25 date  She is going for her clearance Monday 4/19  PORSHA/TABBY- If you have ANY cancellations please let me know so I can get this patient is sooner     Thank you

## 2021-04-15 NOTE — TELEPHONE ENCOUNTER
I didn't schedule the original surgery - this will be routed to her   She needs multiple clearances so most likely will have at the date in the system       thanks

## 2021-04-20 ENCOUNTER — TELEPHONE (OUTPATIENT)
Dept: OBGYN CLINIC | Facility: MEDICAL CENTER | Age: 66
End: 2021-04-20

## 2021-04-20 ENCOUNTER — TELEPHONE (OUTPATIENT)
Dept: OBGYN CLINIC | Facility: CLINIC | Age: 66
End: 2021-04-20

## 2021-04-20 NOTE — TELEPHONE ENCOUNTER
[facility] office given above information from Dr Aminta Driver and they verbalized understanding

## 2021-04-20 NOTE — TELEPHONE ENCOUNTER
Patient sees Dr Isidoro Nissen Silverio Pears at [facility], PCP Joesph Andrade is requesting a order for Thyroid be added to blood work for clearance for surgery  Please fax to [phone]

## 2021-04-20 NOTE — TELEPHONE ENCOUNTER
Dr Sanjay Larry called because she has not yet received the letter for her caretaker to be able to stay with her  It was mailed out on 4/14/21 and will call back if not received within the next few days    Thank you

## 2021-04-20 NOTE — TELEPHONE ENCOUNTER
Patient wants to know if she can move her surgery up  She wants surgery sooner than may 25th      C/b # [phone]

## 2021-04-22 NOTE — TELEPHONE ENCOUNTER
Patient's daytime care taker called in with regard to the letter for the caretaker  Patient needs the letter to specify that the caretake must stay with the patient from now up until 2 weeks after her scheduled sx and that this caretaker is to stay through the night  She isn't sure if the letter is missing this information as she hasn't received it yet  She will be calling back with the patient's fax # to have the letter faxed over

## 2021-04-23 NOTE — TELEPHONE ENCOUNTER
There has only been 1 request for a letter  That letter was for SON to stay with her  Is this a separate letter?   If its not, then send the letter she asked for 2 weeks ago

## 2021-04-23 NOTE — TELEPHONE ENCOUNTER
Henry Head from [facility] is calling because the letter that she received states that patient had sx on 4/13/21  Keri Guevara is asking that we fax her a note with the correct information  In the request from our office it states 3 weeks but the max through [employer] is 2 weeks  Keri Guevara said to call her with any questions at [phone]  Please fax updated note to [phone]
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
## 2021-05-05 ENCOUNTER — TELEPHONE (OUTPATIENT)
Dept: OBGYN CLINIC | Facility: HOSPITAL | Age: 66
End: 2021-05-05

## 2021-05-05 NOTE — TELEPHONE ENCOUNTER
Spoke with patient and she decided to cancel surgery due to her son not being able to take care of her and the process taking so long and not wanting to go thru this right now  She will decide at a later date when she is ready to have surgery on her shoulder

## 2021-05-05 NOTE — TELEPHONE ENCOUNTER
LVM for patient stating that I spoke with Pascual Smart at [facility] to make sure we are ok with clearances and she stated that patient informed her that she wanted to cx surgery on 4/26  So I called patient to confirm and waiting on a call back from patient

## 2021-05-14 ENCOUNTER — TRANSCRIBE ORDERS (OUTPATIENT)
Dept: ADMINISTRATIVE | Facility: HOSPITAL | Age: 66
End: 2021-05-14

## 2021-05-14 ENCOUNTER — HOSPITAL ENCOUNTER (OUTPATIENT)
Dept: RADIOLOGY | Facility: HOSPITAL | Age: 66
Discharge: HOME | End: 2021-05-14
Payer: MEDICARE

## 2021-05-14 DIAGNOSIS — M79.605 LEFT LEG PAIN: Primary | ICD-10-CM

## 2021-05-14 DIAGNOSIS — M79.605 LEFT LEG PAIN: ICD-10-CM

## 2021-05-14 PROCEDURE — 73564 X-RAY EXAM KNEE 4 OR MORE: CPT

## 2021-05-20 ENCOUNTER — OFFICE VISIT (OUTPATIENT)
Dept: OBGYN CLINIC | Facility: CLINIC | Age: 66
End: 2021-05-20
Payer: MEDICARE

## 2021-05-20 VITALS
SYSTOLIC BLOOD PRESSURE: 116 MMHG | DIASTOLIC BLOOD PRESSURE: 70 MMHG | HEIGHT: 66 IN | BODY MASS INDEX: 28.12 KG/M2 | WEIGHT: 175 LBS

## 2021-05-20 DIAGNOSIS — E66.01 MORBID OBESITY (HCC): ICD-10-CM

## 2021-05-20 DIAGNOSIS — M86.9 OSTEOMYELITIS, UNSPECIFIED SITE, UNSPECIFIED TYPE (HCC): ICD-10-CM

## 2021-05-20 DIAGNOSIS — Z89.511 S/P BKA (BELOW KNEE AMPUTATION) UNILATERAL, RIGHT (HCC): ICD-10-CM

## 2021-05-20 DIAGNOSIS — M17.12 ARTHRITIS OF LEFT KNEE: Primary | ICD-10-CM

## 2021-05-20 DIAGNOSIS — M25.562 LEFT KNEE PAIN, UNSPECIFIED CHRONICITY: ICD-10-CM

## 2021-05-20 PROCEDURE — 99214 OFFICE O/P EST MOD 30 MIN: CPT | Performed by: ORTHOPAEDIC SURGERY

## 2021-05-20 PROCEDURE — 20610 DRAIN/INJ JOINT/BURSA W/O US: CPT | Performed by: ORTHOPAEDIC SURGERY

## 2021-05-20 RX ORDER — BUPIVACAINE HYDROCHLORIDE 2.5 MG/ML
4 INJECTION, SOLUTION INFILTRATION; PERINEURAL
Status: COMPLETED | OUTPATIENT
Start: 2021-05-20 | End: 2021-05-20

## 2021-05-20 RX ORDER — METHYLPREDNISOLONE ACETATE 40 MG/ML
1 INJECTION, SUSPENSION INTRA-ARTICULAR; INTRALESIONAL; INTRAMUSCULAR; SOFT TISSUE
Status: COMPLETED | OUTPATIENT
Start: 2021-05-20 | End: 2021-05-20

## 2021-05-20 RX ADMIN — METHYLPREDNISOLONE ACETATE 1 ML: 40 INJECTION, SUSPENSION INTRA-ARTICULAR; INTRALESIONAL; INTRAMUSCULAR; SOFT TISSUE at 11:04

## 2021-05-20 RX ADMIN — BUPIVACAINE HYDROCHLORIDE 4 ML: 2.5 INJECTION, SOLUTION INFILTRATION; PERINEURAL at 11:04

## 2021-05-20 NOTE — PROGRESS NOTES
Orthopaedic Surgery Note    CC: Left Knee Pain      HPI:  Ms Anabel Douglas is a 63 y  o female with a history of left knee pain  Reports that this has been present for about 1 month  Describes the pain as dull, sore, swelling, instability, popping  Rates the pain as severe and numerically the pain is graded as 8 of 10  The pain is worse with walking and is better with nothing  Worsening over time per her report  Interfering with her ADLs  It is waking her at night  She is not supposed to take NSAIDs due to GI ulcers  Tried tylenol with only minimal relief  No prior PT, CSI or VS injections  No prior surgery L knee  Of note, she does have DM and reports taht last A1c was in the 8's she thinks, cannot recall exactly  Also of note, she has R BKA, reports this was done a few years ago in [facility] for infection  ALLERGIES:  Allergies   Allergen Reactions    Morphine Itching and Anaphylaxis     u  Other reaction(s):  Other (See Comments)  u  Other reaction(s): Feeling agitated (finding)      Penicillins Anaphylaxis    Cephalexin      Other reaction(s): Unknown    Cephalosporins Hives     Other reaction(s): Unknown Reaction      Codeine Hives     Other reaction(s): Unknown  Other reaction(s): Unknown Reaction  Other reaction(s): Unknown      Levaquin [Levofloxacin] Hives    Nsaids GI Bleeding     Other reaction(s): Unknown Reaction    Aspirin Hives, Other (See Comments) and Rash     Other reaction(s): Unknown Reaction      Chlorhexidine Rash    Medical Tape Rash    Vancomycin Rash       CURRENT MEDICATIONS:  Current Outpatient Medications   Medication Sig Dispense Refill    acetaminophen (TYLENOL) 325 mg tablet Take 650 mg by mouth every 6 (six) hours as needed for mild pain      albuterol (PROVENTIL HFA,VENTOLIN HFA) 90 mcg/act inhaler Inhale 2 puffs every 4 (four) hours as needed for wheezing      Allergy Relief 10 MG tablet Take 10 mg by mouth daily      atoMOXetine (STRATTERA) 40 mg capsule Take 80 mg by mouth daily      atorvastatin (LIPITOR) 20 mg tablet atorvastatin 20 mg tablet   TAKE ONE TABLET BY MOUTH EVERY EVENING      cyclobenzaprine (FLEXERIL) 10 mg tablet Take 10 mg by mouth 2 (two) times a day      diclofenac sodium (VOLTAREN) 1 % Apply 4 g topically 4 (four) times a day as needed (pain) 300 g 6    dicyclomine (BENTYL) 20 mg tablet Take 1 tablet (20 mg total) by mouth every 6 (six) hours (Patient taking differently: Take 20 mg by mouth 4 (four) times a day ) 120 tablet 10    folic acid (FOLVITE) 1 mg tablet Take 1 tablet (1 mg total) by mouth daily 30 tablet 3    gabapentin (NEURONTIN) 600 MG tablet Take 600 mg by mouth 2 (two) times a day       Galcanezumab-gnlm (EMGALITY) 120 MG/ML SOSY Inject under the skin Once a month      glipiZIDE (GLUCOTROL) 5 mg tablet Take 5 mg by mouth 2 (two) times a day before meals      Guaifenesin 1200 MG TB12 Take 1 tablet by mouth 2 (two) times a day      ipratropium-albuterol (DUO-NEB) 0 5-2 5 mg/3 mL nebulizer solution Take 3 mL by nebulization every 6 (six) hours as needed       Lactobacillus Acid-Pectin (Acidophilus/Citrus Pectin) TABS Take 1 tablet by mouth daily      levothyroxine 150 mcg tablet Take 150 mcg by mouth daily      linaGLIPtin (Tradjenta) 5 MG TABS Take 5 mg by mouth daily      LORazepam (ATIVAN) 1 mg tablet Take 1 mg by mouth every 6 (six) hours as needed       methotrexate 2 5 mg tablet Take 5 tablets by mouth once a week with food or after you eat 20 tablet 4    Multiple Vitamin (MULTIVITAMIN) tablet Take 1 tablet by mouth daily      ondansetron (ZOFRAN) 4 mg tablet Take 4 mg by mouth every 8 (eight) hours as needed for nausea or vomiting      oxyCODONE (OxyCONTIN) 10 mg 12 hr tablet Take 10 mg by mouth 3 (three) times a day       oxyCODONE (ROXICODONE) 5 mg immediate release tablet Take 1 tablet (5 mg total) by mouth every 6 (six) hours as needed for moderate pain for up to 10 dosesMax Daily Amount: 20 mg 10 tablet 0    pantoprazole (PROTONIX) 40 mg tablet TAKE ONE TABLET BY MOUTH AT BEDTIME 30 tablet 5    rOPINIRole (REQUIP) 0 25 mg tablet Take 0 25 mg by mouth daily at bedtime      sertraline (ZOLOFT) 50 mg tablet Take 50 mg by mouth 2 (two) times a day      topiramate (TOPAMAX) 25 mg tablet Take 25 mg by mouth daily at bedtime      venlafaxine (EFFEXOR-XR) 150 mg 24 hr capsule Take 150 mg by mouth daily       No current facility-administered medications for this visit          PAST MEDICAL HISTORY  Past Medical History:   Diagnosis Date    Achalasia     ADHD     Anemia     Anxiety     Anxiety and depression     Arthritis     Chronic narcotic dependence (HCC)     3 yrs    Chronic pain     chronic pain    Chronic pain disorder     Colon polyp     [facility]     Depression     Diabetes mellitus ([facility])     Disease of thyroid gland     hypo    Esophageal spasm     Fibromyalgia     Fibromyalgia, primary     GERD (gastroesophageal reflux disease)     Hiatal hernia     Hyperlipidemia     Hypertension     Iron deficiency anemia     Irritable bowel syndrome     Migraine     Polysubstance abuse ([facility])     Psychiatric disorder     major depressive    RA [facility]umatoid arthritis) [facility] [address] [facility]     Seasonal allergies        SURGICAL HISTORY  Past Surgical History:   Procedure Laterality Date    AMPUTATION      RBKA    ANKLE FUSION      APPENDECTOMY      BELOW KNEE LEG AMPUTATION Right     CARPAL TUNNEL RELEASE  2021    left arm    CATARACT EXTRACTION       SECTION      COLONOSCOPY  2010    COLONOSCOPY  2019    diverticula, 3 mm polyp and 6 mm polyp in rectum, grade III internal hemorrhoids, hyperplastic polyp, 5 year recall 2024     EGD  2010    FOREARM FASCIOTOMY Right     FRACTURE SURGERY      HYSTERECTOMY      KNEE ARTHROSCOPY Left     ORTHOPEDIC SURGERY      AL REVISE ULNAR NERVE AT ELBOW Left 2021 Procedure: Left carpal and cubital tunnel release;  Surgeon:  Josué Frank MD;  Location: [address] [place] [address] OR;  Service: Orthopedics    UPPER GASTROINTESTINAL ENDOSCOPY         FAMILY HISTORY  Family History   Problem Relation Age of Onset    Ulcerative colitis Mother     Lung cancer Mother     Diabetes Mother     Alcohol abuse Father     Diabetes Sister     Cancer Brother     Colon polyps Neg Hx     Colon cancer Neg Hx        SOCIAL HISTORY  Social History     Socioeconomic History    Marital status:      Spouse name: Not on file    Number of children: Not on file    Years of education: Not on file    Highest education level: Not on file   Occupational History    Not on file   Social Needs    Financial resource strain: Not on file    Food insecurity     Worry: Not on file     Inability: Not on file    Transportation needs     Medical: Not on file     Non-medical: Not on file   Tobacco Use    Smoking status: Current Every Day Smoker     Packs/day: 0 50     Types: Cigarettes     Start date: 1/1/1977    Smokeless tobacco: Never Used   Substance and Sexual Activity    Alcohol use: No    Drug use: Never    Sexual activity: Not Currently   Lifestyle    Physical activity     Days per week: Not on file     Minutes per session: Not on file    Stress: Not on file   Relationships    Social connections     Talks on phone: Not on file     Gets together: Not on file     Attends [employer] service: Not on file     Active member of club or organization: Not on file     Attends meetings of clubs or organizations: Not on file     Relationship status: Not on file    Intimate partner violence     Fear of current or ex partner: Not on file     Emotionally abused: Not on file     Physically abused: Not on file     Forced sexual activity: Not on file   Other Topics Concern    Not on file   Social History Narrative    Not on file         Review of Systems     Patient indicated positive for fatigue, vision problems, ringing in ears, racing heart, porr circulation, wheezing, nausea, joint back and neck pain, headaches, dizziness, memory loss, diabetes, thyroid problkems  Otherwise negative except per above and HPI  Physical Exam    Vitals  Vitals:    05/20/21 1032   BP: 116/70       BMI  Body mass index is 28 25 kg/m²  GENERAL: No acute distress  Alert and oriented  Well nourished and well hydrated  Appears stated age  Sitting in wheelchair  HEENT : Normocephalic, atraumatic  Extraocular movements intact  Mask in place  NECK: Supple, trachea midline  LUNGS: Adequate and symmetric respiratory effort  No intercostal retractions or accessory muscle use  HEART: Extremities warm and perfused  ABDOMEN: Nondistended  SKIN: Warm and dry, no rash  Left  Knee   Inspection/Appearance:       Swelling: No      Patella is midline  Alignment:  Knee is in mild varus  Palpation - Soft Tissue: normal without effusion  ROM:       Extension - 0          Flexion - 120      extensor lag: no    Stability:  demonstrates no varus, valgus, anterior drawer or posterior drawer  Patella: stable, tracks normally  Sensation Intact to Light Touch in Sural, Saphenous, Tibial, Superficial Peroneal, and Deep Peroneal Nerve Distribution  Reports paresthesias and decreased sensation through foot and ankle  Imaging  A) Imaging modality available  Radiographs: yes  MRI scan: no  CT scan: no    B) Imaging findings  Subchondral cysts: no  Subchondral sclerosis: yes  Periarticular osteophytes: yes  Joint subluxation: no  Joint space narrowing: yes  Bone-on-bone articulations: yes  Avascular necrosis: no      Assessment and Plan  Left  Knee Arthritis    - reviewed nonop mgmt of knee OA as below  Encourage tylenol, PT ordered, CSI in office today      Knee Pain / Arthritis Treatment Recommendations    Strengthening Exercises  10 repetitions each leg Monday, Wednesday, Friday OR Tuesday, Thursday, Saturday  Increase 10 repetitions per week  1  Straight leg raises (SLR)  2  VMO Modification SLR - (Rotate hip out externally) Do if SLR becomes easy    Exercise - 5 minutes per day Monday, Wednesday, Friday OR Tuesday, Thursday, Saturday  Increase 5 minutes per day per week  May use bike (non-impact) or walk (impact) or swim or alternate  Goal is to get up to at least 30 minutes per day  1  Bicycle  2  Walking    Weight loss   Goal to lose 1 pound per week  Portion control, limit sweets, limit carbs    Medications  Anti-inflammatories (NSAIDs)  1  Ibuprofen (Motrin or Advil) 600 mg (3 pills) with food 3 times a day for 3 - 7 days  OR  2  Naprosyn (Aleve) 1 - 2 pills twice a day with food for 3 - 7 days  Stop if you develop upset stomach or bleeding occurs  We discussed that scheduled NSAIDs for greater than 1 week should be discussed with PCP to monitor for potential side effects  Pain reliever  1  Acetaminophen (Tylenol) 2 pills (regular or extra-strength) 3 times a day for 3 - 7 days    Taken together (Acetaminophen with one of the NSAIDs (ibuprofen OR naprosyn)), the combination may work better than either one alone for more acute pain    Other  Braces, wraps, ice, heat, topical ointments may all be used/tried if they help pain/symptoms      Large joint arthrocentesis: L knee  Universal Protocol:  Consent given by: patient  Time out: Immediately prior to procedure a "time out" was called to verify the correct patient, procedure, equipment, support staff and site/side marked as required    Site marked: the operative site was marked  Supporting Documentation  Indications: pain   Procedure Details  Location: knee - L knee  Needle size: 20 G  Approach: anterior  Medications administered: 1 mL methylPREDNISolone acetate 40 mg/mL; 4 mL bupivacaine 0 25 %    Patient tolerance: patient tolerated the procedure well with no immediate complications  Dressing:  Sterile dressing applied    We did discuss the risk of infection and elevated blood glucose from infection  Especially in context of her diabetes and history of infection  She reported understanding and wished to proceed  Saraihugo Rajput MD  Adult Reconstruction Surgery  Department of [address] [place] [address]  10:58 AM

## 2021-05-30 DIAGNOSIS — M13.0 POLYARTHRITIS: ICD-10-CM

## 2021-05-30 DIAGNOSIS — R53.81 MALAISE AND FATIGUE: ICD-10-CM

## 2021-05-30 DIAGNOSIS — R53.83 MALAISE AND FATIGUE: ICD-10-CM

## 2021-05-30 DIAGNOSIS — M19.90 ARTHRITIS: ICD-10-CM

## 2021-06-02 ENCOUNTER — OFFICE VISIT (OUTPATIENT)
Dept: RHEUMATOLOGY | Facility: CLINIC | Age: 66
End: 2021-06-02
Payer: MEDICARE

## 2021-06-02 VITALS
BODY MASS INDEX: 28.25 KG/M2 | HEART RATE: 90 BPM | DIASTOLIC BLOOD PRESSURE: 69 MMHG | SYSTOLIC BLOOD PRESSURE: 106 MMHG | HEIGHT: 66 IN

## 2021-06-02 DIAGNOSIS — R53.83 MALAISE AND FATIGUE: ICD-10-CM

## 2021-06-02 DIAGNOSIS — M13.0 POLYARTHRITIS: ICD-10-CM

## 2021-06-02 DIAGNOSIS — M17.12 PRIMARY OSTEOARTHRITIS OF LEFT KNEE: ICD-10-CM

## 2021-06-02 DIAGNOSIS — R53.81 MALAISE AND FATIGUE: ICD-10-CM

## 2021-06-02 DIAGNOSIS — M19.90 ARTHRITIS: Primary | ICD-10-CM

## 2021-06-02 DIAGNOSIS — M05.79 RHEUMATOID ARTHRITIS INVOLVING MULTIPLE SITES WITH POSITIVE RHEUMATOID FACTOR (HCC): ICD-10-CM

## 2021-06-02 PROCEDURE — 99213 OFFICE O/P EST LOW 20 MIN: CPT | Performed by: INTERNAL MEDICINE

## 2021-06-02 RX ORDER — DICLOFENAC SODIUM 20 MG/G
40 SOLUTION TOPICAL 2 TIMES DAILY
Qty: 112 G | Refills: 5 | Status: SHIPPED | OUTPATIENT
Start: 2021-06-02 | End: 2021-08-02 | Stop reason: SDUPTHER

## 2021-06-02 RX ORDER — FOLIC ACID 1 MG/1
2 TABLET ORAL
Qty: 180 TABLET | Refills: 3 | Status: SHIPPED | OUTPATIENT
Start: 2021-06-02 | End: 2021-08-02 | Stop reason: SDUPTHER

## 2021-06-02 NOTE — PROGRESS NOTES
Assessment and Plan:   Inflammatory arthritis and left knee meniscal injury  Rheumatic Disease Summary  As above    Inflammatory arthritis doing better on methotrexate and folic acid  She also has improvement on Pennsaid  She is scheduled for left knee PT prior to future MRI to r/o mechanical injury  +malaise and some GI discomfort  Will increase folic acid to 2 mg daily  The following portions of the patient's history were reviewed and updated as appropriate: allergies, current medications, past family history, past medical history, past social history, past surgical history and problem list     Review of Systems:   Review of Systems   Constitutional: Positive for fatigue  Negative for chills, fever and unexpected weight change  HENT: Negative for mouth sores and trouble swallowing  Eyes: Negative for pain and visual disturbance  Respiratory: Negative for cough and shortness of breath  Cardiovascular: Negative for chest pain and leg swelling  Gastrointestinal: Negative for constipation and diarrhea  Musculoskeletal: Positive for arthralgias  Negative for back pain, joint swelling and myalgias  Skin: Negative for color change and rash  Neurological: Negative for weakness  Hematological: Negative for adenopathy  Psychiatric/Behavioral: Negative for sleep disturbance         Home Medications:    Current Outpatient Medications:     acetaminophen (TYLENOL) 325 mg tablet, Take 650 mg by mouth every 6 (six) hours as needed for mild pain, Disp: , Rfl:     albuterol (PROVENTIL HFA,VENTOLIN HFA) 90 mcg/act inhaler, Inhale 2 puffs every 4 (four) hours as needed for wheezing, Disp: , Rfl:     Allergy Relief 10 MG tablet, Take 10 mg by mouth daily, Disp: , Rfl:     atoMOXetine (STRATTERA) 40 mg capsule, Take 80 mg by mouth daily, Disp: , Rfl:     atorvastatin (LIPITOR) 20 mg tablet, atorvastatin 20 mg tablet  TAKE ONE TABLET BY MOUTH EVERY EVENING, Disp: , Rfl:     cyclobenzaprine (FLEXERIL) 10 mg tablet, Take 10 mg by mouth 2 (two) times a day, Disp: , Rfl:     diclofenac sodium (VOLTAREN) 1 %, Apply 4 g topically 4 (four) times a day as needed (pain), Disp: 300 g, Rfl: 6    dicyclomine (BENTYL) 20 mg tablet, Take 1 tablet (20 mg total) by mouth every 6 (six) hours (Patient taking differently: Take 20 mg by mouth 4 (four) times a day ), Disp: 120 tablet, Rfl: 10    folic acid (FOLVITE) 1 mg tablet, Take 1 tablet (1 mg total) by mouth daily, Disp: 30 tablet, Rfl: 3    gabapentin (NEURONTIN) 600 MG tablet, Take 600 mg by mouth 2 (two) times a day , Disp: , Rfl:     Galcanezumab-gnlm (EMGALITY) 120 MG/ML SOSY, Inject under the skin Once a month, Disp: , Rfl:     glipiZIDE (GLUCOTROL) 5 mg tablet, Take 5 mg by mouth 2 (two) times a day before meals, Disp: , Rfl:     Guaifenesin 1200 MG TB12, Take 1 tablet by mouth 2 (two) times a day, Disp: , Rfl:     ipratropium-albuterol (DUO-NEB) 0 5-2 5 mg/3 mL nebulizer solution, Take 3 mL by nebulization every 6 (six) hours as needed , Disp: , Rfl:     Lactobacillus Acid-Pectin (Acidophilus/Citrus Pectin) TABS, Take 1 tablet by mouth daily, Disp: , Rfl:     levothyroxine 150 mcg tablet, Take 150 mcg by mouth daily, Disp: , Rfl:     linaGLIPtin (Tradjenta) 5 MG TABS, Take 5 mg by mouth daily, Disp: , Rfl:     LORazepam (ATIVAN) 1 mg tablet, Take 1 mg by mouth every 6 (six) hours as needed , Disp: , Rfl:     methotrexate 2 5 mg tablet, TAKE FIVE TABLETS BY MOUTH ONCE A WEEK WITH FOOD OR AFTER EATING, Disp: 22 tablet, Rfl: 5    Multiple Vitamin (MULTIVITAMIN) tablet, Take 1 tablet by mouth daily, Disp: , Rfl:     ondansetron (ZOFRAN) 4 mg tablet, Take 4 mg by mouth every 8 (eight) hours as needed for nausea or vomiting, Disp: , Rfl:     oxyCODONE (OxyCONTIN) 10 mg 12 hr tablet, Take 10 mg by mouth 3 (three) times a day , Disp: , Rfl:     oxyCODONE (ROXICODONE) 5 mg immediate release tablet, Take 1 tablet (5 mg total) by mouth every 6 (six) hours as needed for moderate pain for up to 10 dosesMax Daily Amount: 20 mg, Disp: 10 tablet, Rfl: 0    pantoprazole (PROTONIX) 40 mg tablet, TAKE ONE TABLET BY MOUTH AT BEDTIME, Disp: 30 tablet, Rfl: 5    rOPINIRole (REQUIP) 0 25 mg tablet, Take 0 25 mg by mouth daily at bedtime, Disp: , Rfl:     sertraline (ZOLOFT) 50 mg tablet, Take 50 mg by mouth 2 (two) times a day, Disp: , Rfl:     topiramate (TOPAMAX) 25 mg tablet, Take 25 mg by mouth daily at bedtime, Disp: , Rfl:     venlafaxine (EFFEXOR-XR) 150 mg 24 hr capsule, Take 150 mg by mouth daily, Disp: , Rfl:     Objective:    Vitals:    06/02/21 1039   BP: 106/69   Pulse: 90   Height: 5' 6" (1 676 m)       Physical Exam  Constitutional:       General: She is not in acute distress  Appearance: She is well-developed  HENT:      Head: Normocephalic and atraumatic  Eyes:      General: Lids are normal  No scleral icterus  Conjunctiva/sclera: Conjunctivae normal    Neck:      Musculoskeletal: Neck supple  No muscular tenderness  Cardiovascular:      Rate and Rhythm: Normal rate and regular rhythm  Heart sounds: S1 normal and S2 normal  No murmur  No friction rub  Pulmonary:      Effort: Pulmonary effort is normal  No tachypnea or respiratory distress  Breath sounds: Normal breath sounds  No wheezing, rhonchi or rales  Musculoskeletal:      Right hand: She exhibits swelling  Left hand: She exhibits swelling  Skin:     General: Skin is warm and dry  Findings: No rash  Nails: There is no clubbing  Neurological:      Mental Status: She is alert  Sensory: No sensory deficit  Psychiatric:         Behavior: Behavior normal  Behavior is cooperative  Reviewed labs and imaging  Imaging:   Xr Knee 4+ Vw Left Injury    Result Date: 5/18/2021  Narrative: LEFT KNEE INDICATION:   M79 [id number]: Pain in left leg   COMPARISON:  August 6, 2011 VIEWS:  XR KNEE 4+ VW LEFT INJURY Images: 4 FINDINGS: Bone mineralization within normal limits for patient's age  Medial joint space narrowing with marginal osteophytes and sharpening of the medial tibial spine  Small suprapatellar effusion  No acute fracture or dislocation  No lytic or blastic osseous lesion  Soft tissues are unremarkable  Impression: Osteoarthritis mostly affecting the medial compartment, minimal elsewhere  This has progressed since the prior x-ray  No fracture or dislocation   Workstation performed: [id number]       Labs:   Admission on 01/11/2021, Discharged on 01/11/2021   Component Date Value Ref Range Status    POC Glucose 01/11/2021 159* 65 - 140 mg/dl Final    Ventricular Rate 01/11/2021 88  BPM Final    Atrial Rate 01/11/2021 88  BPM Final    NM Interval 01/11/2021 164  ms Final    QRSD Interval 01/11/2021 82  ms Final    QT Interval 01/11/2021 398  ms Final    QTC Interval 01/11/2021 481  ms Final    P Axis 01/11/2021 53  degrees Final    QRS Axis 01/11/2021 20  degrees Final    T Wave Axis 01/11/2021 50  degrees Final   Office Visit on 08/18/2020   Component Date Value Ref Range Status    WBC 08/18/2020 12 65* 4 31 - 10 16 Thousand/uL Final    RBC 08/18/2020 6 04* 3 81 - 5 12 Million/uL Final    Hemoglobin 08/18/2020 14 5  11 5 - 15 4 g/dL Final    Hematocrit 08/18/2020 50 0* 34 8 - 46 1 % Final    MCV 08/18/2020 83  82 - 98 fL Final    MCH 08/18/2020 24 0* 26 8 - 34 3 pg Final    MCHC 08/18/2020 29 0* 31 4 - 37 4 g/dL Final    RDW 08/18/2020 18 6* 11 6 - 15 1 % Final    MPV 08/18/2020 13 4* 8 9 - 12 7 fL Final    Platelets 59/90/9070 357  149 - 390 Thousands/uL Final    nRBC 08/18/2020 0  /100 WBCs Final    Neutrophils Relative 08/18/2020 64  43 - 75 % Final    Immat GRANS % 08/18/2020 0  0 - 2 % Final    Lymphocytes Relative 08/18/2020 27  14 - 44 % Final    Monocytes Relative 08/18/2020 6  4 - 12 % Final    Eosinophils Relative 08/18/2020 2  0 - 6 % Final    Basophils Relative 08/18/2020 1  0 - 1 % Final    Neutrophils Absolute 08/18/2020 8 18* 1 85 - 7 62 Thousands/µL Final    Immature Grans Absolute 08/18/2020 0 04  0 00 - 0 20 Thousand/uL Final    Lymphocytes Absolute 08/18/2020 3 37  0 60 - 4 47 Thousands/µL Final    Monocytes Absolute 08/18/2020 0 69  0 17 - 1 22 Thousand/µL Final    Eosinophils Absolute 08/18/2020 0 29  0 00 - 0 61 Thousand/µL Final    Basophils Absolute 08/18/2020 0 08  0 00 - 0 10 Thousands/µL Final    Sodium 08/18/2020 139  136 - 145 mmol/L Final    Potassium 08/18/2020 4 1  3 5 - 5 3 mmol/L Final    Chloride 08/18/2020 102  100 - 108 mmol/L Final    CO2 08/18/2020 31  21 - 32 mmol/L Final    ANION GAP 08/18/2020 6  4 - 13 mmol/L Final    BUN 08/18/2020 13  5 - 25 mg/dL Final    Creatinine 08/18/2020 0 92  0 60 - 1 30 mg/dL Final    Standardized to IDMS reference method    Glucose, Fasting 08/18/2020 107* 65 - 99 mg/dL Final    Specimen collection should occur prior to Sulfasalazine administration due to the potential for falsely depressed results  Specimen collection should occur prior to Sulfapyridine administration due to the potential for falsely elevated results   Calcium 08/18/2020 9 1  8 3 - 10 1 mg/dL Final    AST 08/18/2020 14  5 - 45 U/L Final    Specimen collection should occur prior to Sulfasalazine administration due to the potential for falsely depressed results   ALT 08/18/2020 18  12 - 78 U/L Final    Specimen collection should occur prior to Sulfasalazine and/or Sulfapyridine administration due to the potential for falsely depressed results   Alkaline Phosphatase 08/18/2020 123* 46 - 116 U/L Final    Total Protein 08/18/2020 8 9* 6 4 - 8 2 g/dL Final    Albumin 08/18/2020 3 8  3 5 - 5 0 g/dL Final    Total Bilirubin 08/18/2020 0 32  0 20 - 1 00 mg/dL Final    Use of this assay is not recommended for patients undergoing treatment with eltrombopag due to the potential for falsely elevated results      eGFR 08/18/2020 67  ml/min/1 73sq m Final    Sed Rate 08/18/2020 77* 0 - 29 mm/hour Final    CRP 08/18/2020 12 2* <3 0 mg/L Final    Vit D, 25-Hydroxy 08/18/2020 29 9* 30 0 - 100 0 ng/mL Final    Cyclic Citrullin Peptide Ab 08/18/2020 8  0 - 19 units Final                              Negative               <20                            Weak positive      20 - 39                            Moderate positive  40 - 59                            Strong positive        >59    LUANA 08/18/2020 Negative  Negative Final   Hospital Outpatient Visit on 07/08/2020   Component Date Value Ref Range Status    SARS-CoV-2 07/08/2020 Negative  Negative Final    Case Report 07/08/2020    Final                    Value:Surgical Pathology Report                         Case: [id number]                                   Authorizing Provider:  Marco A Gordon MD           Collected:           07/08/2020 0844              Ordering Location:     [facility]     Received:            07/08/2020 [place] [id number] Endoscopy                                                             Pathologist:           Margie Feliciano MD                                                              Specimens:   A) - Stomach, antral bx r/o HPylori, mild gastritis                                                 B) - Esophagus, mid esophagus r/o EOE                                                      Final Diagnosis 07/08/2020    Final                    Value: This result contains rich text formatting which cannot be displayed here   Note 07/08/2020    Final                    Value: This result contains rich text formatting which cannot be displayed here   Additional Information 07/08/2020    Final                    Value: This result contains rich text formatting which cannot be displayed here  Carolin Robertson Description 07/08/2020    Final                    Value: This result contains rich text formatting which cannot be displayed here     Orders Only on 07/03/2020 Component Date Value Ref Range Status    SARS-CoV-2  07/03/2020 Not Detected  Not Detected Final    This test was developed and its performance characteristics determined  by [facility]  This test has not been FDA cleared or  approved  This test has been authorized by FDA under an Emergency Use  Authorization (EUA)  This test is only authorized for the duration of  time the declaration that circumstances exist justifying the  authorization of the emergency use of in vitro diagnostic tests for  detection of SARS-CoV-2 virus and/or diagnosis of COVID-19 infection  under section 564(b)(1) of the Act, 21 U  S C  438NZD-4(A)(1), unless  the authorization is terminated or revoked sooner  When diagnostic testing is negative, the possibility of a false  negative result should be considered in the context of a patient's  recent exposures and the presence of clinical signs and symptoms  consistent with COVID-19  An individual without symptoms of COVID-19  and who is not shedding SARS-CoV-2 virus would expect to have a  negative (not detected) result in this assay

## 2021-06-02 NOTE — PATIENT INSTRUCTIONS
Please have your blood work done  I also placed a standing order for your blood work to be performed every 3 months because you are taking methotrexate  Take 2 folic acid every morning which will help with your fatigue  Keep using the Pennsaid on your painful joints  I'll see you back in the clinic in 3 months

## 2021-06-03 ENCOUNTER — TELEPHONE (OUTPATIENT)
Dept: OBGYN CLINIC | Facility: HOSPITAL | Age: 66
End: 2021-06-03

## 2021-06-07 ENCOUNTER — TELEPHONE (OUTPATIENT)
Dept: OBGYN CLINIC | Facility: HOSPITAL | Age: 66
End: 2021-06-07

## 2021-06-07 NOTE — TELEPHONE ENCOUNTER
Dr Didier Evans    [phone]    Patient states that Voltaren needs to be pre authorized  She uses [facility]

## 2021-06-08 LAB
ALBUMIN SERPL-MCNC: 4 G/DL (ref 3.6–5.1)
ALBUMIN/GLOB SERPL: 1.3 (CALC) (ref 1–2.5)
ALP SERPL-CCNC: 104 U/L (ref 37–153)
ALT SERPL-CCNC: 13 U/L (ref 6–29)
AST SERPL-CCNC: 19 U/L (ref 10–35)
BASOPHILS # BLD AUTO: 87 CELLS/UL (ref 0–200)
BASOPHILS NFR BLD AUTO: 0.7 %
BILIRUB SERPL-MCNC: 0.2 MG/DL (ref 0.2–1.2)
BUN SERPL-MCNC: 16 MG/DL (ref 7–25)
BUN/CREAT SERPL: ABNORMAL (CALC) (ref 6–22)
CALCIUM SERPL-MCNC: 9 MG/DL (ref 8.6–10.4)
CHLORIDE SERPL-SCNC: 100 MMOL/L (ref 98–110)
CO2 SERPL-SCNC: 30 MMOL/L (ref 20–32)
CREAT SERPL-MCNC: 0.76 MG/DL (ref 0.5–0.99)
CRP SERPL-MCNC: 62.4 MG/L
EOSINOPHIL # BLD AUTO: 583 CELLS/UL (ref 15–500)
EOSINOPHIL NFR BLD AUTO: 4.7 %
ERYTHROCYTE [DISTWIDTH] IN BLOOD BY AUTOMATED COUNT: 17.7 % (ref 11–15)
GLOBULIN SER CALC-MCNC: 3.2 G/DL (CALC) (ref 1.9–3.7)
GLUCOSE SERPL-MCNC: 151 MG/DL (ref 65–99)
HCT VFR BLD AUTO: 37.1 % (ref 35–45)
HGB BLD-MCNC: 11.2 G/DL (ref 11.7–15.5)
LYMPHOCYTES # BLD AUTO: 2654 CELLS/UL (ref 850–3900)
LYMPHOCYTES NFR BLD AUTO: 21.4 %
MCH RBC QN AUTO: 23.7 PG (ref 27–33)
MCHC RBC AUTO-ENTMCNC: 30.2 G/DL (ref 32–36)
MCV RBC AUTO: 78.6 FL (ref 80–100)
MONOCYTES # BLD AUTO: 918 CELLS/UL (ref 200–950)
MONOCYTES NFR BLD AUTO: 7.4 %
NEUTROPHILS # BLD AUTO: 8159 CELLS/UL (ref 1500–7800)
NEUTROPHILS NFR BLD AUTO: 65.8 %
PLATELET # BLD AUTO: 318 THOUSAND/UL (ref 140–400)
PMV BLD REES-ECKER: 12 FL (ref 7.5–12.5)
POTASSIUM SERPL-SCNC: 4.4 MMOL/L (ref 3.5–5.3)
PROT SERPL-MCNC: 7.2 G/DL (ref 6.1–8.1)
RBC # BLD AUTO: 4.72 MILLION/UL (ref 3.8–5.1)
SERVICE CMNT-IMP: ABNORMAL
SL AMB EGFR AFRICAN AMERICAN: 97 ML/MIN/1.73M2
SL AMB EGFR NON AFRICAN AMERICAN: 83 ML/MIN/1.73M2
SODIUM SERPL-SCNC: 138 MMOL/L (ref 135–146)
WBC # BLD AUTO: 12.4 THOUSAND/UL (ref 3.8–10.8)

## 2021-06-09 NOTE — TELEPHONE ENCOUNTER
[facility] pharmacy works with a drug program to get medication covered in full for patient

## 2021-06-10 ENCOUNTER — APPOINTMENT (OUTPATIENT)
Dept: RADIOLOGY | Facility: CLINIC | Age: 66
End: 2021-06-10
Payer: MEDICARE

## 2021-06-10 ENCOUNTER — OFFICE VISIT (OUTPATIENT)
Dept: OBGYN CLINIC | Facility: CLINIC | Age: 66
End: 2021-06-10
Payer: MEDICARE

## 2021-06-10 VITALS
BODY MASS INDEX: 31.28 KG/M2 | HEIGHT: 62 IN | DIASTOLIC BLOOD PRESSURE: 80 MMHG | SYSTOLIC BLOOD PRESSURE: 138 MMHG | WEIGHT: 170 LBS

## 2021-06-10 DIAGNOSIS — M17.12 PRIMARY OSTEOARTHRITIS OF LEFT KNEE: Primary | ICD-10-CM

## 2021-06-10 DIAGNOSIS — M17.12 ARTHRITIS OF LEFT KNEE: ICD-10-CM

## 2021-06-10 PROCEDURE — 73560 X-RAY EXAM OF KNEE 1 OR 2: CPT

## 2021-06-10 PROCEDURE — 99213 OFFICE O/P EST LOW 20 MIN: CPT | Performed by: ORTHOPAEDIC SURGERY

## 2021-06-10 NOTE — ASSESSMENT & PLAN NOTE
Findings consistent with left knee osteoarthritis severe medial  Imaging and prognosis reviewed with patient  Reviewed treatment options moving forward with patient  Since she has already had cortisone, next option gel injections, script placed  Continue with physical therapy  If gel injections, therapy fail and pain uncontrollable then the only option left is total knee replacement  She can continue with oral medications, pennsaid  Also give hinged knee brace for support  All patient's questions were answered to her satisfaction  This note is created using dictation transcription  It may contain typographical errors, grammatical errors, improperly dictated words, background noise and other errors

## 2021-06-10 NOTE — PROGRESS NOTES
Assessment:     1  Primary osteoarthritis of left knee        Plan:     Problem List Items Addressed This Visit        Musculoskeletal and Integument    Primary osteoarthritis of left knee - Primary     Findings consistent with left knee osteoarthritis severe medial  Imaging and prognosis reviewed with patient  Reviewed treatment options moving forward with patient  Since she has already had cortisone, next option gel injections, script placed  Continue with physical therapy  If gel injections, therapy fail and pain uncontrollable then the only option left is total knee replacement  She can continue with oral medications, pennsaid  Also give hinged knee brace for support  All patient's questions were answered to her satisfaction  This note is created using dictation transcription  It may contain typographical errors, grammatical errors, improperly dictated words, background noise and other errors  Relevant Orders    XR knee 1 or 2 vw left    Injection procedure prior authorization    Brace          Subjective:     Patient ID: Goran Nava is a 63 y o  female  Chief Complaint:  63 yr old female in for evaluation of her left knee  She saw Dr Nani Dent 5/20/21 and he gave her CSI and script for therapy  She states she had twisting injury around a month ago which was onset of medial based pain  She told physical therapist Dr Nani Dent was dismissive and she had advanced arthritis in her knee, which is why he gave CSI  She reports CSI gave no relief and continues with sharp stabbing medial compartment pain  She only did one session of physical therapy  Has history right knee arthroscopy and states left knee feels the same as meniscal injury on right  She is using oxycodone and tylenol for pain  Also using pennsaid topical  Presents in wheelchair  She can't weight bear on left leg due to pain even with prosthetic on right  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   She does see rheumatologist and is on methotrexate for inflammatory arthritis  History left ankle fusion  Allergy:  Allergies   Allergen Reactions    Morphine Itching and Anaphylaxis     u  Other reaction(s):  Other (See Comments)  u  Other reaction(s): Feeling agitated (finding)      Penicillins Anaphylaxis    Cephalexin      Other reaction(s): Unknown    Cephalosporins Hives     Other reaction(s): Unknown Reaction      Codeine Hives     Other reaction(s): Unknown  Other reaction(s): Unknown Reaction  Other reaction(s): Unknown      Levaquin [Levofloxacin] Hives    Nsaids GI Bleeding     Other reaction(s): Unknown Reaction    Aspirin Hives, Other (See Comments) and Rash     Other reaction(s): Unknown Reaction      Chlorhexidine Rash    Medical Tape Rash    Vancomycin Rash     Medications:  all current active meds have been reviewed  Past Medical History:  Past Medical History:   Diagnosis Date    Achalasia     ADHD     Anemia     Anxiety     Anxiety and depression     Arthritis     Chronic narcotic dependence (HCC)     3 yrs    Chronic pain     chronic pain    Chronic pain disorder     Colon polyp     [facility]     Depression     Diabetes mellitus ([facility])     Disease of thyroid gland     hypo    Esophageal spasm     Fibromyalgia     Fibromyalgia, primary     GERD (gastroesophageal reflux disease)     Hiatal hernia     Hyperlipidemia     Hypertension     Iron deficiency anemia     Irritable bowel syndrome     Migraine     Polysubstance abuse ([facility])     Psychiatric disorder     major depressive    RA [facility]umatoid arthritis) ([facility] [address] [facility]     Seasonal allergies      Past Surgical History:  Past Surgical History:   Procedure Laterality Date    AMPUTATION      RBKA    ANKLE FUSION      APPENDECTOMY      BELOW KNEE LEG AMPUTATION Right     CARPAL TUNNEL RELEASE  2021    left arm    CATARACT EXTRACTION       SECTION      COLONOSCOPY  2010    COLONOSCOPY 12/05/2019    diverticula, 3 mm polyp and 6 mm polyp in rectum, grade III internal hemorrhoids, hyperplastic polyp, 5 year recall December 2024     EGD  08/18/2010    FOREARM FASCIOTOMY Right     FRACTURE SURGERY      HYSTERECTOMY      KNEE ARTHROSCOPY Left     ORTHOPEDIC SURGERY      UT REVISE ULNAR NERVE AT ELBOW Left 1/11/2021    Procedure: Left carpal and cubital tunnel release;  Surgeon: Zoltan Jacobson MD;  Location: [address] [place] [address];  Service: Orthopedics    UPPER GASTROINTESTINAL ENDOSCOPY       Family History:  Family History   Problem Relation Age of Onset    Ulcerative colitis Mother     Lung cancer Mother     Diabetes Mother     Alcohol abuse Father     Diabetes Sister     Cancer Brother     Colon polyps Neg Hx     Colon cancer Neg Hx      Social History:  Social History     Substance and Sexual Activity   Alcohol Use No     Social History     Substance and Sexual Activity   Drug Use Never     Social History     Tobacco Use   Smoking Status Current Every Day Smoker    Packs/day: 0 50    Types: Cigarettes    Start date: 1/1/1977   Smokeless Tobacco Never Used     Review of Systems   Constitutional: Negative for chills and fever  HENT: Negative for ear pain and sore throat  Eyes: Negative for pain and visual disturbance  Respiratory: Negative for cough and shortness of breath  Cardiovascular: Negative for chest pain and palpitations  Gastrointestinal: Negative for abdominal pain and vomiting  Genitourinary: Negative for dysuria and hematuria  Musculoskeletal: Positive for arthralgias (Left knee) and gait problem (Arriving in a wheelchair)  Negative for back pain  Skin: Negative for color change and rash  Neurological: Negative for seizures and syncope  All other systems reviewed and are negative          Objective:  BP Readings from Last 1 Encounters:   06/10/21 138/80      Wt Readings from Last 1 Encounters:   06/10/21 77 1 kg (170 lb)      BMI:   Estimated body mass index is 31 09 kg/m² as calculated from the following:    Height as of this encounter: 5' 2" (1 575 m)  Weight as of this encounter: 77 1 kg (170 lb)  BSA:   Estimated body surface area is 1 78 meters squared as calculated from the following:    Height as of this encounter: 5' 2" (1 575 m)  Weight as of this encounter: 77 1 kg (170 lb)  Physical Exam  Vitals signs and nursing note reviewed  Constitutional:       Appearance: Normal appearance  She is well-developed  HENT:      Head: Normocephalic and atraumatic  Right Ear: External ear normal       Left Ear: External ear normal    Eyes:      Extraocular Movements: Extraocular movements intact  Conjunctiva/sclera: Conjunctivae normal    Neck:      Musculoskeletal: Neck supple  Pulmonary:      Effort: Pulmonary effort is normal    Musculoskeletal:      Left knee: She exhibits no effusion  Skin:     General: Skin is warm and dry  Neurological:      Mental Status: She is alert and oriented to person, place, and time  Deep Tendon Reflexes: Reflexes are normal and symmetric  Psychiatric:         Mood and Affect: Mood normal          Behavior: Behavior normal        Left Knee Exam     Muscle Strength   The patient has normal left knee strength  Tenderness   The patient is experiencing tenderness in the medial joint line  Range of Motion   Extension: 0 (pain)   Flexion: 130 (pain)     Tests   Jamal:  Medial - negative Lateral - negative  Varus: negative Valgus: negative  Patellar apprehension: negative    Other   Erythema: absent  Scars: absent  Sensation: normal  Pulse: present  Swelling: mild  Effusion: no effusion present    Comments: Well tracking patella with crepitation             I have personally reviewed pertinent films in PACS and my interpretation is xr left knee demonstrates moderate to severe medial compartment osteoarthritis, subchondral sclerosis, osteophytes        Scribe Attestation    I,:  Margaree Galeazzi am acting as a scribe while in the presence of the attending physician :       I,:  Saturnino Gomez MD personally performed the services described in this documentation    as scribed in my presence :

## 2021-06-28 ENCOUNTER — OFFICE VISIT (OUTPATIENT)
Dept: PAIN MEDICINE | Facility: MEDICAL CENTER | Age: 66
End: 2021-06-28
Payer: MEDICARE

## 2021-06-28 VITALS
TEMPERATURE: 97.6 F | SYSTOLIC BLOOD PRESSURE: 130 MMHG | BODY MASS INDEX: 31.09 KG/M2 | HEIGHT: 62 IN | HEART RATE: 92 BPM | DIASTOLIC BLOOD PRESSURE: 86 MMHG

## 2021-06-28 DIAGNOSIS — M54.2 NECK PAIN: Primary | ICD-10-CM

## 2021-06-28 DIAGNOSIS — M47.812 CERVICAL SPONDYLOSIS: ICD-10-CM

## 2021-06-28 PROCEDURE — 99213 OFFICE O/P EST LOW 20 MIN: CPT | Performed by: NURSE PRACTITIONER
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
## 2021-07-09 ENCOUNTER — PROCEDURE VISIT (OUTPATIENT)
Dept: OBGYN CLINIC | Facility: CLINIC | Age: 66
End: 2021-07-09
Payer: MEDICARE

## 2021-07-09 ENCOUNTER — TELEPHONE (OUTPATIENT)
Dept: OBGYN CLINIC | Facility: CLINIC | Age: 66
End: 2021-07-09

## 2021-07-09 VITALS
WEIGHT: 170 LBS | SYSTOLIC BLOOD PRESSURE: 130 MMHG | HEIGHT: 62 IN | BODY MASS INDEX: 31.28 KG/M2 | DIASTOLIC BLOOD PRESSURE: 70 MMHG

## 2021-07-09 DIAGNOSIS — M17.12 PRIMARY OSTEOARTHRITIS OF LEFT KNEE: Primary | ICD-10-CM

## 2021-07-09 PROCEDURE — 20610 DRAIN/INJ JOINT/BURSA W/O US: CPT | Performed by: ORTHOPAEDIC SURGERY

## 2021-07-09 RX ORDER — HYALURONATE SODIUM 10 MG/ML
20 SYRINGE (ML) INTRAARTICULAR
Status: COMPLETED | OUTPATIENT
Start: 2021-07-09 | End: 2021-07-09

## 2021-07-09 RX ADMIN — Medication 20 MG: at 14:07

## 2021-07-09 NOTE — ASSESSMENT & PLAN NOTE
Findings consistent with left knee osteoarthritis  Findings and treatment options were discussed with the patient  The 1st of 3 left knee Euflexxa injections was given today  She tolerated the procedure well  Advised to apply cold compress today  Follow-up in 1 week for the 2nd injection  All questions were answered to patient's satisfaction

## 2021-07-09 NOTE — TELEPHONE ENCOUNTER
[facility] requested PT's last 2 office notes and most recent radiology report   Faxed to [phone]
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
## 2021-07-12 ENCOUNTER — TELEPHONE (OUTPATIENT)
Dept: OBGYN CLINIC | Facility: HOSPITAL | Age: 66
End: 2021-07-12

## 2021-07-12 NOTE — TELEPHONE ENCOUNTER
Patient and aid calling in to let Dr Carmen Washington know that patient is having pain in her knee following her euflexxa injection  I advised this is normal and can last for a week post visco injection  Should not get worse and should start to improve using the Pennsaid gel as directed, ice 20 min on2 0 min off, tylenol 1000mg TID  Call office with worsening of symptoms  Aid and patient verbalized understanding

## 2021-07-15 ENCOUNTER — TELEPHONE (OUTPATIENT)
Dept: OBGYN CLINIC | Facility: HOSPITAL | Age: 66
End: 2021-07-15

## 2021-07-15 NOTE — TELEPHONE ENCOUNTER
Kiera Roth, patient's aide is calling stating that they called previously & someone was going to call to find out if Dr Eric Simpson would see her  I let her know that due to patient wanting sx she would not be able to have that with Dr Eric Simpson  She did not want to take this for an answer & I reached out to the practice admin who stated that if she was looking to have sx she would need to see another [facility] sx  Patient took an appt with Dr Miriam Jenkins on 9/10/21 but they would like to know if Dr Eric Simpson would call in anything for pain until she gets in to see Dr Miriam Jenkins  Patient uses [facility]      cb [phone]

## 2021-07-15 NOTE — TELEPHONE ENCOUNTER
Called patient to find out what type of pain she was having (wrist or carpal tunnel)  She states she has pain in the wrists but has it all over  I informed her we could offer her a steroid injection for the wrist but if she gets this, she would have to wait to get any surgery if this is something she's truly interested in pursuing  Patient states she doesn't want to do this  She already is taking Tylenol  She can't take NSAIDs secondary to her stomach  I talked to her about Voltaren Gel  She had mentioned that the Percocet she had previously didn't bother her stomach  I explained that we do not prescribe opioid medications unless someone has had surgery with us  She then discussed how the doctor who was prescribing her percocet moved to [facility] and she can't see her anymore  I asked her if she has made an appointment with a pain management physician  She states she's seen two of them and they won't give her medication  I explained that we cannot give narcotics if she is seeing pain management as this could void her pain management agreement  I advised that if she is having trouble, it may not be  bad idea to reach out to her previous physician and see if she has any suggestions of where to go for her to continue her care  I also explained that her previous practice should have somebody covering her patients as she transitions out but she states that she had to "sign off" this doctor so that wouldn't work  When I tried to go over what I could offer the patient, the call was disconnected  I tried to call patient back but no answer  She did have her full name on her voicemail so I left a message stating that I wanted to make sure that she was OK and that we could see her for any localized treatment for the pain and again to reach out to her previous physician for advice on who to go  I also advised her that if her pain control is an emergency or if she has any emergent symptoms, to go to the nearest emergency room  Otherwise, we would be happy to see her back for her wrist pain

## 2021-07-16 ENCOUNTER — PROCEDURE VISIT (OUTPATIENT)
Dept: OBGYN CLINIC | Facility: CLINIC | Age: 66
End: 2021-07-16
Payer: MEDICARE

## 2021-07-16 VITALS — SYSTOLIC BLOOD PRESSURE: 118 MMHG | DIASTOLIC BLOOD PRESSURE: 80 MMHG

## 2021-07-16 DIAGNOSIS — M17.12 PRIMARY OSTEOARTHRITIS OF LEFT KNEE: Primary | ICD-10-CM

## 2021-07-16 PROCEDURE — 20610 DRAIN/INJ JOINT/BURSA W/O US: CPT | Performed by: PHYSICIAN ASSISTANT

## 2021-07-16 RX ORDER — HYALURONATE SODIUM 10 MG/ML
20 SYRINGE (ML) INTRAARTICULAR
Status: COMPLETED | OUTPATIENT
Start: 2021-07-16 | End: 2021-07-16

## 2021-07-16 RX ADMIN — Medication 20 MG: at 13:45

## 2021-07-16 NOTE — PROGRESS NOTES
Assessment:     1  Primary osteoarthritis of left knee        Plan:     Problem List Items Addressed This Visit        Musculoskeletal and Integument    Primary osteoarthritis of left knee - Primary       Findings consistent with left knee osteoarthritis  Findings and treatment options were discussed with the patient  The 2nd of 3 left knee Euflexxa injections was given today  She tolerated the procedure well  Advised to apply cold compress today  Follow-up in 1 week for the 3rd injection  All questions were answered to patient's satisfaction  Relevant Medications    Sodium Hyaluronate 20 mg (Completed)    Other Relevant Orders    Large joint arthrocentesis: L knee (Completed)          Subjective:     Patient ID: Anayeli Medel is a 63 y o  female  Chief Complaint:  63 yr old female following up for left knee osteoarthritis  She is here for the 2nd of 3 left knee Euflexxa injections  She arrives in a wheelchair  She continues to have aching pain in the left knee  No improvement yet  Allergy:  Allergies   Allergen Reactions    Morphine Itching and Anaphylaxis     u  Other reaction(s):  Other (See Comments)  u  Other reaction(s): Feeling agitated (finding)      Penicillins Anaphylaxis    Cephalexin      Other reaction(s): Unknown    Cephalosporins Hives     Other reaction(s): Unknown Reaction      Codeine Hives     Other reaction(s): Unknown  Other reaction(s): Unknown Reaction  Other reaction(s): Unknown      Levaquin [Levofloxacin] Hives    Nsaids GI Bleeding     Other reaction(s): Unknown Reaction    Aspirin Hives, Other (See Comments) and Rash     Other reaction(s): Unknown Reaction      Chlorhexidine Rash    Medical Tape Rash    Vancomycin Rash     Medications:  all current active meds have been reviewed  Past Medical History:  Past Medical History:   Diagnosis Date    Achalasia     ADHD     Anemia     Anxiety     Anxiety and depression     Arthritis     Chronic narcotic dependence ([facility] )     3 yrs    Chronic pain     chronic pain    Chronic pain disorder     Colon polyp     COPD (chronic obstructive pulmonary disease) (HCC)     Depression     Diabetes mellitus (HCC)     Disease of thyroid gland     hypo    Esophageal spasm     Fibromyalgia     Fibromyalgia, primary     GERD (gastroesophageal reflux disease)     Hiatal hernia     Hyperlipidemia     Hypertension     Iron deficiency anemia     Irritable bowel syndrome     Migraine     [facility]     Psychiatric disorder     major depressive    RA (rheumatoid arthritis) ([place] [address] )     Seasonal allergies      Past Surgical History:  Past Surgical History:   Procedure Laterality Date    AMPUTATION      RBKA    ANKLE FUSION      APPENDECTOMY      BELOW KNEE LEG AMPUTATION Right     CARPAL TUNNEL RELEASE  2021    left arm    CATARACT EXTRACTION       SECTION      COLONOSCOPY  2010    COLONOSCOPY  2019    diverticula, 3 mm polyp and 6 mm polyp in rectum, grade III internal hemorrhoids, hyperplastic polyp, 5 year recall 2024     EGD  2010    FOREARM FASCIOTOMY Right     FRACTURE SURGERY      HYSTERECTOMY      KNEE ARTHROSCOPY Left     ORTHOPEDIC SURGERY      [place] REVISE ULNAR NERVE AT ELBOW Left 2021    Procedure: Left carpal and cubital tunnel release;  Surgeon:  Philippe Fink MD;  Location: [address] [place] [address];  Service: Orthopedics    UPPER GASTROINTESTINAL ENDOSCOPY       Family History:  Family History   Problem Relation Age of Onset    Ulcerative colitis Mother     Lung cancer Mother     Diabetes Mother     Alcohol abuse Father     Diabetes Sister     Cancer Brother     Colon polyps Neg Hx     Colon cancer Neg Hx      Social History:  Social History     Substance and Sexual Activity   Alcohol Use No     Social History     Substance and Sexual Activity   Drug Use Never     Social History     Tobacco Use   Smoking Status Current Every Day Smoker  Packs/day: 0 50    Types: Cigarettes    Start date: 1/1/1977   Smokeless Tobacco Never Used     Review of Systems   Constitutional: Negative for chills and fever  HENT: Negative for ear pain and sore throat  Eyes: Negative for pain and visual disturbance  Respiratory: Negative for cough and shortness of breath  Cardiovascular: Negative for chest pain and palpitations  Gastrointestinal: Negative for abdominal pain and vomiting  Genitourinary: Negative for dysuria and hematuria  Musculoskeletal: Positive for arthralgias (Left knee) and gait problem (Arriving in a wheelchair)  Negative for back pain  Skin: Negative for color change and rash  Neurological: Negative for seizures and syncope  All other systems reviewed and are negative  Objective:  BP Readings from Last 1 Encounters:   07/16/21 118/80      Wt Readings from Last 1 Encounters:   07/09/21 77 1 kg (170 lb)      BMI:   Estimated body mass index is 31 09 kg/m² as calculated from the following:    Height as of 7/9/21: 5' 2" (1 575 m)  Weight as of 7/9/21: 77 1 kg (170 lb)  BSA:   Estimated body surface area is 1 78 meters squared as calculated from the following:    Height as of 7/9/21: 5' 2" (1 575 m)  Weight as of 7/9/21: 77 1 kg (170 lb)  Physical Exam  Vitals and nursing note reviewed  Constitutional:       Appearance: Normal appearance  She is well-developed  HENT:      Head: Normocephalic and atraumatic  Right Ear: External ear normal       Left Ear: External ear normal    Eyes:      Extraocular Movements: Extraocular movements intact  Conjunctiva/sclera: Conjunctivae normal    Pulmonary:      Effort: Pulmonary effort is normal    Musculoskeletal:      Cervical back: Neck supple  Left knee: No effusion  Instability Tests: Medial Jamal test negative and lateral Jamal test negative  Skin:     General: Skin is warm and dry     Neurological:      Mental Status: She is alert and oriented to person, place, and time  Deep Tendon Reflexes: Reflexes are normal and symmetric  Psychiatric:         Mood and Affect: Mood normal          Behavior: Behavior normal        Right Knee Exam     Comments:  BKA      Left Knee Exam     Muscle Strength   The patient has normal left knee strength  Tenderness   The patient is experiencing tenderness in the medial joint line  Range of Motion   Extension: 0 (pain)   Flexion: 130 (pain)     Tests   Jamal:  Medial - negative Lateral - negative  Varus: negative Valgus: negative  Patellar apprehension: negative    Other   Erythema: absent  Scars: absent  Sensation: normal  Pulse: present  Swelling: mild  Effusion: no effusion present    Comments: Well tracking patella with crepitation             No new imaging  Large joint arthrocentesis: L knee  Universal Protocol:  Consent: Verbal consent obtained    Risks and benefits: risks, benefits and alternatives were discussed  Consent given by: patient  Patient understanding: patient states understanding of the procedure being performed    Supporting Documentation  Indications: pain   Procedure Details  Location: knee - L knee  Preparation: Patient was prepped and draped in the usual sterile fashion  Needle size: 22 G  Approach: anterolateral  Medications administered: 20 mg Sodium Hyaluronate 20 MG/2ML    Patient tolerance: patient tolerated the procedure well with no immediate complications  Dressing:  Sterile dressing applied

## 2021-07-16 NOTE — ASSESSMENT & PLAN NOTE
Findings consistent with left knee osteoarthritis  Findings and treatment options were discussed with the patient  The 2nd of 3 left knee Euflexxa injections was given today  She tolerated the procedure well  Advised to apply cold compress today  Follow-up in 1 week for the 3rd injection  All questions were answered to patient's satisfaction

## 2021-07-19 ENCOUNTER — TELEPHONE (OUTPATIENT)
Dept: PAIN MEDICINE | Facility: MEDICAL CENTER | Age: 66
End: 2021-07-19

## 2021-07-19 NOTE — TELEPHONE ENCOUNTER
FYI:    Patient was schedule to see Dr Millie Arredondo for new issue on 7/19/2 and no showed for appt  Due to patient chart looks like patient was seen at [facility] for pain management  Per Dr Millie Arredondo, DO NOT SCHEDULE patient for office visit until doctor has reviewed notes first      Thank you

## 2021-07-23 ENCOUNTER — PROCEDURE VISIT (OUTPATIENT)
Dept: OBGYN CLINIC | Facility: CLINIC | Age: 66
End: 2021-07-23
Payer: MEDICARE

## 2021-07-23 VITALS — SYSTOLIC BLOOD PRESSURE: 120 MMHG | DIASTOLIC BLOOD PRESSURE: 72 MMHG

## 2021-07-23 DIAGNOSIS — M17.12 PRIMARY OSTEOARTHRITIS OF LEFT KNEE: Primary | ICD-10-CM

## 2021-07-23 PROCEDURE — 20610 DRAIN/INJ JOINT/BURSA W/O US: CPT | Performed by: PHYSICIAN ASSISTANT

## 2021-07-23 PROCEDURE — 99213 OFFICE O/P EST LOW 20 MIN: CPT | Performed by: PHYSICIAN ASSISTANT

## 2021-07-23 RX ORDER — HYALURONATE SODIUM 10 MG/ML
20 SYRINGE (ML) INTRAARTICULAR
Status: COMPLETED | OUTPATIENT
Start: 2021-07-23 | End: 2021-07-23

## 2021-07-23 RX ADMIN — Medication 20 MG: at 13:41

## 2021-07-23 NOTE — PROGRESS NOTES
Assessment:     1  Primary osteoarthritis of left knee        Plan:     Problem List Items Addressed This Visit        Musculoskeletal and Integument    Primary osteoarthritis of left knee - Primary       Findings consistent with left knee osteoarthritis  Findings and treatment options were discussed with the patient  The 3rd of 3 left knee Euflexxa injections was given today  She tolerated the procedure well  Advised to apply cold compress today  Patient requested pain medicine once again  Advised patient that we do not prescribe narcotic pain medication for osteoarthritis  She is to speak to Pain Management regarding that type of medication  Recommend continuing icing and over-the-counter oral NSAIDs or topical NSAIDs at this time  Follow-up in 3 months for re-evaluation  All questions were answered to patient's satisfaction  Relevant Medications    Sodium Hyaluronate 20 mg (Completed)    Other Relevant Orders    Large joint arthrocentesis: L knee (Completed)          Subjective:     Patient ID: Stephan Mesa is a 63 y o  female  Chief Complaint:  63 yr old female following up for left knee osteoarthritis  She is here for the 3rd of 3 left knee Euflexxa injections  She arrives in a wheelchair  She continues to have aching pain in the left knee  No improvement yet  She states that pain management will not give her any pain medication at this point  She is going to her PCP next week to see if they will give her pain medicine  Allergy:  Allergies   Allergen Reactions    Morphine Itching and Anaphylaxis     u  Other reaction(s):  Other (See Comments)  u  Other reaction(s): Feeling agitated (finding)      Penicillins Anaphylaxis    Cephalexin      Other reaction(s): Unknown    Cephalosporins Hives     Other reaction(s): Unknown Reaction      Codeine Hives     Other reaction(s): Unknown  Other reaction(s): Unknown Reaction  Other reaction(s): Unknown      Levaquin [Levofloxacin] Hives  Nsaids GI Bleeding     Other reaction(s): Unknown Reaction    Aspirin Hives, Other (See Comments) and Rash     Other reaction(s): Unknown Reaction      Chlorhexidine Rash    Medical Tape Rash    Vancomycin Rash     Medications:  all current active meds have been reviewed  Past Medical History:  Past Medical History:   Diagnosis Date    Achalasia     ADHD     Anemia     Anxiety     Anxiety and depression     Arthritis     Chronic narcotic dependence (HCC)     3 yrs    Chronic pain     chronic pain    Chronic pain disorder     Colon polyp     [facility]     Depression     Diabetes mellitus (HCC)     Disease of thyroid gland     hypo    Esophageal spasm     Fibromyalgia     Fibromyalgia, primary     GERD (gastroesophageal reflux disease)     Hiatal hernia     Hyperlipidemia     Hypertension     Iron deficiency anemia     Irritable bowel syndrome     Migraine     [facility]     Psychiatric disorder     major depressive    RA [facility]rheumatoid arthritis) [facility]     Seasonal allergies      Past Surgical History:  Past Surgical History:   Procedure Laterality Date    AMPUTATION      RBKA    ANKLE FUSION      APPENDECTOMY      BELOW KNEE LEG AMPUTATION Right     CARPAL TUNNEL RELEASE  2021    left arm    CATARACT EXTRACTION       SECTION      COLONOSCOPY  2010    COLONOSCOPY  2019    diverticula, 3 mm polyp and 6 mm polyp in rectum, grade III internal hemorrhoids, hyperplastic polyp, 5 year recall 2024     EGD  2010    FOREARM FASCIOTOMY Right     FRACTURE SURGERY      HYSTERECTOMY      KNEE ARTHROSCOPY Left     ORTHOPEDIC SURGERY      [place] REVISE ULNAR NERVE AT ELBOW Left 2021    Procedure: Left carpal and cubital tunnel release;  Surgeon:  Feliz Turk MD;  Location:  MAIN OR;  Service: Orthopedics    UPPER GASTROINTESTINAL ENDOSCOPY       Family History:  Family History Problem Relation Age of Onset    Ulcerative colitis Mother     Lung cancer Mother     Diabetes Mother     Alcohol abuse Father     Diabetes Sister     Cancer Brother     Colon polyps Neg Hx     Colon cancer Neg Hx      Social History:  Social History     Substance and Sexual Activity   Alcohol Use No     Social History     Substance and Sexual Activity   Drug Use Never     Social History     Tobacco Use   Smoking Status Current Every Day Smoker    Packs/day: 0 50    Types: Cigarettes    Start date: 1/1/1977   Smokeless Tobacco Never Used     Review of Systems   Constitutional: Negative for chills and fever  HENT: Negative for ear pain and sore throat  Eyes: Negative for pain and visual disturbance  Respiratory: Negative for cough and shortness of breath  Cardiovascular: Negative for chest pain and palpitations  Gastrointestinal: Negative for abdominal pain and vomiting  Genitourinary: Negative for dysuria and hematuria  Musculoskeletal: Positive for arthralgias (Left knee) and gait problem (Arriving in a wheelchair)  Negative for back pain  Skin: Negative for color change and rash  Neurological: Negative for seizures and syncope  All other systems reviewed and are negative  Objective:  BP Readings from Last 1 Encounters:   07/23/21 120/72      Wt Readings from Last 1 Encounters:   07/09/21 77 1 kg (170 lb)      BMI:   Estimated body mass index is 31 09 kg/m² as calculated from the following:    Height as of 7/9/21: 5' 2" (1 575 m)  Weight as of 7/9/21: 77 1 kg (170 lb)  BSA:   Estimated body surface area is 1 78 meters squared as calculated from the following:    Height as of 7/9/21: 5' 2" (1 575 m)  Weight as of 7/9/21: 77 1 kg (170 lb)  Physical Exam  Vitals and nursing note reviewed  Constitutional:       Appearance: Normal appearance  She is well-developed  HENT:      Head: Normocephalic and atraumatic        Right Ear: External ear normal       Left Ear: External ear normal    Eyes:      Extraocular Movements: Extraocular movements intact  Conjunctiva/sclera: Conjunctivae normal    Pulmonary:      Effort: Pulmonary effort is normal    Musculoskeletal:      Cervical back: Neck supple  Left knee: No effusion  Instability Tests: Medial Jamal test negative and lateral Jamal test negative  Skin:     General: Skin is warm and dry  Neurological:      Mental Status: She is alert and oriented to person, place, and time  Deep Tendon Reflexes: Reflexes are normal and symmetric  Psychiatric:         Mood and Affect: Mood normal          Behavior: Behavior normal        Right Knee Exam     Comments:  BKA      Left Knee Exam     Muscle Strength   The patient has normal left knee strength  Tenderness   The patient is experiencing tenderness in the medial joint line  Range of Motion   Extension: 0 (pain)   Flexion: 130 (pain)     Tests   Jamal:  Medial - negative Lateral - negative  Varus: negative Valgus: negative  Patellar apprehension: negative    Other   Erythema: absent  Scars: absent  Sensation: normal  Pulse: present  Swelling: mild  Effusion: no effusion present    Comments: Well tracking patella with crepitation             No new imaging  Large joint arthrocentesis: L knee  Universal Protocol:  Consent: Verbal consent obtained    Risks and benefits: risks, benefits and alternatives were discussed  Consent given by: patient  Patient understanding: patient states understanding of the procedure being performed    Supporting Documentation  Indications: pain   Procedure Details  Location: knee - L knee  Preparation: Patient was prepped and draped in the usual sterile fashion  Needle size: 22 G  Approach: anterolateral  Medications administered: 20 mg Sodium Hyaluronate 20 MG/2ML    Patient tolerance: patient tolerated the procedure well with no immediate complications  Dressing:  Sterile dressing applied

## 2021-07-23 NOTE — ASSESSMENT & PLAN NOTE
Findings consistent with left knee osteoarthritis  Findings and treatment options were discussed with the patient  The 3rd of 3 left knee Euflexxa injections was given today  She tolerated the procedure well  Advised to apply cold compress today  Patient requested pain medicine once again  Advised patient that we do not prescribe narcotic pain medication for osteoarthritis  She is to speak to Pain Management regarding that type of medication  Recommend continuing icing and over-the-counter oral NSAIDs or topical NSAIDs at this time  Follow-up in 3 months for re-evaluation  All questions were answered to patient's satisfaction

## 2021-07-27 ENCOUNTER — TELEPHONE (OUTPATIENT)
Dept: PAIN MEDICINE | Facility: CLINIC | Age: 66
End: 2021-07-27

## 2021-07-27 NOTE — TELEPHONE ENCOUNTER
SPOKE WITH PATIENT ADVISED OF DARYLS RECOMMENDATIONS AND PATIENT DID NOT WANT TO SCHEDULE AT THIS TIME     SEEKING MEDICATION MANAGEMENT

## 2021-07-27 NOTE — TELEPHONE ENCOUNTER
PATIENT IS REQUESTING A DEA TO SEE LAURA WANG IN [place]    PATIENT RESIDES IN [place] SO A MUCH CLOSER LOCATION  LAST SEEN BY DR Delia Mcguire 06/28/21    KNEE , SHOULDER, NECK PAIN      PLEASE ADVISE

## 2021-07-27 NOTE — TELEPHONE ENCOUNTER
Advise her that we would be willing to see her to discuss NON-opioid medication therapies ONLY and we are not willing to take over or prescribe opioids as it is not recommended with all of there medications she is currently on and her history  So if she wants to be evaluated for procedure or non-opioid tx options, she can come and see myself or Dr Lopez Postal, but we agree with Dr Rodrick Alicea and Brisa Gomez, that we do not feel opioids are in her best long term interest  If her PCP is no longer comfortable prescribing, then they either have to help her find someone who is OR they are obligated to titrate her down and off of the medication appropriately  Thank you

## 2021-08-02 ENCOUNTER — OFFICE VISIT (OUTPATIENT)
Dept: RHEUMATOLOGY | Facility: CLINIC | Age: 66
End: 2021-08-02
Payer: MEDICARE

## 2021-08-02 VITALS
DIASTOLIC BLOOD PRESSURE: 70 MMHG | BODY MASS INDEX: 31.28 KG/M2 | SYSTOLIC BLOOD PRESSURE: 120 MMHG | HEART RATE: 85 BPM | WEIGHT: 170 LBS | HEIGHT: 62 IN

## 2021-08-02 DIAGNOSIS — D64.9 ANEMIA, UNSPECIFIED TYPE: ICD-10-CM

## 2021-08-02 DIAGNOSIS — M25.50 ARTHRALGIA, UNSPECIFIED JOINT: Primary | ICD-10-CM

## 2021-08-02 DIAGNOSIS — M19.90 ARTHRITIS: ICD-10-CM

## 2021-08-02 DIAGNOSIS — M17.12 PRIMARY OSTEOARTHRITIS OF LEFT KNEE: ICD-10-CM

## 2021-08-02 DIAGNOSIS — M19.90 ARTHRITIS: Primary | ICD-10-CM

## 2021-08-02 PROCEDURE — 99214 OFFICE O/P EST MOD 30 MIN: CPT | Performed by: INTERNAL MEDICINE

## 2021-08-02 RX ORDER — DICLOFENAC SODIUM 20 MG/G
40 SOLUTION TOPICAL 2 TIMES DAILY
Qty: 112 G | Refills: 5 | Status: SHIPPED | OUTPATIENT
Start: 2021-08-02 | End: 2021-08-09 | Stop reason: ALTCHOICE

## 2021-08-02 RX ORDER — LEFLUNOMIDE 10 MG/1
10 TABLET ORAL DAILY
Qty: 90 TABLET | Refills: 1 | Status: SHIPPED | OUTPATIENT
Start: 2021-08-02 | End: 2021-12-15

## 2021-08-02 RX ORDER — FOLIC ACID 1 MG/1
2 TABLET ORAL
Qty: 180 TABLET | Refills: 3 | Status: SHIPPED | OUTPATIENT
Start: 2021-08-02 | End: 2021-10-04 | Stop reason: SDUPTHER

## 2021-08-02 RX ORDER — METHYLPREDNISOLONE 8 MG/1
TABLET ORAL
Qty: 33 TABLET | Refills: 3 | Status: SHIPPED | OUTPATIENT
Start: 2021-08-02 | End: 2021-08-22

## 2021-08-02 NOTE — PROGRESS NOTES
Assessment and Plan:   Inflammatory arthritis--continue MTX 71UW weekly and folic acid 2 mg daily  Add Arava 10mg daily  Medrol taper for arthritis flare  Pennsaid for joint pain PRN  Check labs and f/u in 2 mos  Rheumatic Disease Summary  As above    With worsening joint pain and stiffness  No f/c/ns  Still with malaise  Not much improvement with CS injections by pain mgmt  The following portions of the patient's history were reviewed and updated as appropriate: allergies, current medications, past family history, past medical history, past social history, past surgical history and problem list     Review of Systems:   Review of Systems   Constitutional: Positive for fatigue  Musculoskeletal: Positive for arthralgias and myalgias         Home Medications:    Current Outpatient Medications:     acetaminophen (TYLENOL) 325 mg tablet, Take 650 mg by mouth every 6 (six) hours as needed for mild pain, Disp: , Rfl:     albuterol (PROVENTIL HFA,VENTOLIN HFA) 90 mcg/act inhaler, Inhale 2 puffs every 4 (four) hours as needed for wheezing, Disp: , Rfl:     Allergy Relief 10 MG tablet, Take 10 mg by mouth daily, Disp: , Rfl:     atoMOXetine (STRATTERA) 40 mg capsule, Take 80 mg by mouth daily, Disp: , Rfl:     atorvastatin (LIPITOR) 20 mg tablet, atorvastatin 20 mg tablet  TAKE ONE TABLET BY MOUTH EVERY EVENING, Disp: , Rfl:     cyclobenzaprine (FLEXERIL) 10 mg tablet, Take 10 mg by mouth 2 (two) times a day, Disp: , Rfl:     Diclofenac Sodium (Pennsaid) 2 % SOLN, Apply 40 mg topically 2 (two) times a day, Disp: 112 g, Rfl: 5    dicyclomine (BENTYL) 20 mg tablet, Take 1 tablet (20 mg total) by mouth every 6 (six) hours (Patient taking differently: Take 20 mg by mouth 4 (four) times a day ), Disp: 120 tablet, Rfl: 10    folic acid (FOLVITE) 1 mg tablet, Take 2 tablets (2 mg total) by mouth daily at bedtime, Disp: 180 tablet, Rfl: 3    gabapentin (NEURONTIN) 600 MG tablet, Take 600 mg by mouth 2 (two) times a day , Disp: , Rfl:     Galcanezumab-gnlm (EMGALITY) 120 MG/ML SOSY, Inject under the skin Once a month, Disp: , Rfl:     glipiZIDE (GLUCOTROL) 5 mg tablet, Take 5 mg by mouth 2 (two) times a day before meals, Disp: , Rfl:     Guaifenesin 1200 MG TB12, Take 1 tablet by mouth 2 (two) times a day, Disp: , Rfl:     ipratropium-albuterol (DUO-NEB) 0 5-2 5 mg/3 mL nebulizer solution, Take 3 mL by nebulization every 6 (six) hours as needed , Disp: , Rfl:     Lactobacillus Acid-Pectin (Acidophilus/Citrus Pectin) TABS, Take 1 tablet by mouth daily, Disp: , Rfl:     levothyroxine 150 mcg tablet, Take 150 mcg by mouth daily, Disp: , Rfl:     linaGLIPtin (Tradjenta) 5 MG TABS, Take 5 mg by mouth daily, Disp: , Rfl:     LORazepam (ATIVAN) 1 mg tablet, Take 1 mg by mouth every 6 (six) hours as needed , Disp: , Rfl:     methotrexate 2 5 mg tablet, TAKE six TABLETS BY MOUTH ONCE A WEEK WITH FOOD OR AFTER EATING, Disp: 28 tablet, Rfl: 5    Multiple Vitamin (MULTIVITAMIN) tablet, Take 1 tablet by mouth daily, Disp: , Rfl:     ondansetron (ZOFRAN) 4 mg tablet, Take 4 mg by mouth every 8 (eight) hours as needed for nausea or vomiting, Disp: , Rfl:     oxyCODONE (OxyCONTIN) 10 mg 12 hr tablet, Take 10 mg by mouth 3 (three) times a day , Disp: , Rfl:     oxyCODONE (ROXICODONE) 5 mg immediate release tablet, Take 1 tablet (5 mg total) by mouth every 6 (six) hours as needed for moderate pain for up to 10 dosesMax Daily Amount: 20 mg, Disp: 10 tablet, Rfl: 0    pantoprazole (PROTONIX) 40 mg tablet, TAKE ONE TABLET BY MOUTH AT BEDTIME, Disp: 30 tablet, Rfl: 5    rOPINIRole (REQUIP) 0 25 mg tablet, Take 0 25 mg by mouth daily at bedtime, Disp: , Rfl:     sertraline (ZOLOFT) 50 mg tablet, Take 50 mg by mouth 2 (two) times a day, Disp: , Rfl:     topiramate (TOPAMAX) 25 mg tablet, Take 25 mg by mouth daily at bedtime, Disp: , Rfl:     venlafaxine (EFFEXOR-XR) 150 mg 24 hr capsule, Take 150 mg by mouth daily, Disp: , Rfl:     Objective:    Vitals:    08/02/21 1139   BP: 120/70   Pulse: 85   Weight: 77 1 kg (170 lb)   Height: 5' 2" (1 575 m)       Physical Exam  Constitutional:       General: She is not in acute distress  Appearance: She is well-developed  HENT:      Head: Normocephalic and atraumatic  Eyes:      General: Lids are normal  No scleral icterus  Conjunctiva/sclera: Conjunctivae normal    Cardiovascular:      Rate and Rhythm: Normal rate and regular rhythm  Heart sounds: S1 normal and S2 normal  No murmur heard  No friction rub  Pulmonary:      Effort: Pulmonary effort is normal  No tachypnea or respiratory distress  Breath sounds: Normal breath sounds  No wheezing, rhonchi or rales  Musculoskeletal:         General: Tenderness present  Cervical back: Neck supple  No muscular tenderness  Skin:     General: Skin is warm and dry  Findings: No rash  Nails: There is no clubbing  Neurological:      Mental Status: She is alert  Sensory: No sensory deficit  Psychiatric:         Behavior: Behavior normal  Behavior is cooperative  Reviewed labs and imaging  Imaging:   No results found  Labs:   Orders Only on 06/07/2021   Component Date Value Ref Range Status    Glucose, Random 06/07/2021 151* 65 - 99 mg/dL Final    Comment:               Fasting reference interval     For someone without known diabetes, a glucose  value >125 mg/dL indicates that they may have  diabetes and this should be confirmed with a  follow-up test          BUN 06/07/2021 16  7 - 25 mg/dL Final    Creatinine 06/07/2021 0 76  0 50 - 0 99 mg/dL Final    Comment: For patients >52years of age, the reference limit  for Creatinine is approximately 13% higher for people  identified as -American           eGFR Non  06/07/2021 83  > OR = 60 mL/min/1 73m2 Final    eGFR  06/07/2021 97  > OR = 60 mL/min/1 73m2 Final    SL AMB BUN/CREATININE RATIO 86/63/5451 NOT APPLICABLE  6 - 22 (calc) Final    Sodium 06/07/2021 138  135 - 146 mmol/L Final    Potassium 06/07/2021 4 4  3 5 - 5 3 mmol/L Final    Chloride 06/07/2021 100  98 - 110 mmol/L Final    CO2 06/07/2021 30  20 - 32 mmol/L Final    Calcium 06/07/2021 9 0  8 6 - 10 4 mg/dL Final    Protein, Total 06/07/2021 7 2  6 1 - 8 1 g/dL Final    Albumin 06/07/2021 4 0  3 6 - 5 1 g/dL Final    Globulin 06/07/2021 3 2  1 9 - 3 7 g/dL (calc) Final    Albumin/Globulin Ratio 06/07/2021 1 3  1 0 - 2 5 (calc) Final    TOTAL BILIRUBIN 06/07/2021 0 2  0 2 - 1 2 mg/dL Final    Alkaline Phosphatase 06/07/2021 104  37 - 153 U/L Final    AST 06/07/2021 19  10 - 35 U/L Final    ALT 06/07/2021 13  6 - 29 U/L Final    White Blood Cell Count 06/07/2021 12 4* 3 8 - 10 8 Thousand/uL Final    Red Blood Cell Count 06/07/2021 4 72  3 80 - 5 10 Million/uL Final    Hemoglobin 06/07/2021 11 2* 11 7 - 15 5 g/dL Final    HCT 06/07/2021 37 1  35 0 - 45 0 % Final    MCV 06/07/2021 78 6* 80 0 - 100 0 fL Final    MCH 06/07/2021 23 7* 27 0 - 33 0 pg Final    MCHC 06/07/2021 30 2* 32 0 - 36 0 g/dL Final    RDW 06/07/2021 17 7* 11 0 - 15 0 % Final    Platelet Count 68/10/2266 318  140 - 400 Thousand/uL Final    SL AMB MPV 06/07/2021 12 0  7 5 - 12 5 fL Final    Neutrophils (Absolute) 06/07/2021 8,159* 1,500 - 7,800 cells/uL Final    Lymphocytes (Absolute) 06/07/2021 2,654  850 - 3,900 cells/uL Final    Monocytes (Absolute) 06/07/2021 918  200 - 950 cells/uL Final    Eosinophils (Absolute) 06/07/2021 583* 15 - 500 cells/uL Final    Basophils ABS 06/07/2021 87  0 - 200 cells/uL Final    Neutrophils 06/07/2021 65 8  % Final    Lymphocytes 06/07/2021 21 4  % Final    Monocytes 06/07/2021 7 4  % Final    Eosinophils 06/07/2021 4 7  % Final    Basophils PCT 06/07/2021 0 7  % Final    Comment(s) 06/07/2021 Few atypical lymphocytes noted   Final    C-Reactive Protein, Quant 06/07/2021 62 4* <8 0 mg/L Final   Admission on 01/11/2021, Discharged on 01/11/2021   Component Date Value Ref Range Status    POC Glucose 01/11/2021 159* 65 - 140 mg/dl Final    Ventricular Rate 01/11/2021 88  BPM Final    Atrial Rate 01/11/2021 88  BPM Final    IN Interval 01/11/2021 164  ms Final    QRSD Interval 01/11/2021 82  ms Final    QT Interval 01/11/2021 398  ms Final    QTC Interval 01/11/2021 481  ms Final    P Axis 01/11/2021 53  degrees Final    QRS Axis 01/11/2021 20  degrees Final    T Wave Axis 01/11/2021 50  degrees Final   Office Visit on 08/18/2020   Component Date Value Ref Range Status    WBC 08/18/2020 12 65* 4 31 - 10 16 Thousand/uL Final    RBC 08/18/2020 6 04* 3 81 - 5 12 Million/uL Final    Hemoglobin 08/18/2020 14 5  11 5 - 15 4 g/dL Final    Hematocrit 08/18/2020 50 0* 34 8 - 46 1 % Final    MCV 08/18/2020 83  82 - 98 fL Final    MCH 08/18/2020 24 0* 26 8 - 34 3 pg Final    MCHC 08/18/2020 29 0* 31 4 - 37 4 g/dL Final    RDW 08/18/2020 18 6* 11 6 - 15 1 % Final    MPV 08/18/2020 13 4* 8 9 - 12 7 fL Final    Platelets 25/76/9205 357  149 - 390 Thousands/uL Final    nRBC 08/18/2020 0  /100 WBCs Final    Neutrophils Relative 08/18/2020 64  43 - 75 % Final    Immat GRANS % 08/18/2020 0  0 - 2 % Final    Lymphocytes Relative 08/18/2020 27  14 - 44 % Final    Monocytes Relative 08/18/2020 6  4 - 12 % Final    Eosinophils Relative 08/18/2020 2  0 - 6 % Final    Basophils Relative 08/18/2020 1  0 - 1 % Final    Neutrophils Absolute 08/18/2020 8 18* 1 85 - 7 62 Thousands/µL Final    Immature Grans Absolute 08/18/2020 0 04  0 00 - 0 20 Thousand/uL Final    Lymphocytes Absolute 08/18/2020 3 37  0 60 - 4 47 Thousands/µL Final    Monocytes Absolute 08/18/2020 0 69  0 17 - 1 22 Thousand/µL Final    Eosinophils Absolute 08/18/2020 0 29  0 00 - 0 61 Thousand/µL Final    Basophils Absolute 08/18/2020 0 08  0 00 - 0 10 Thousands/µL Final    Sodium 08/18/2020 139  136 - 145 mmol/L Final    Potassium 08/18/2020 4 1 3 5 - 5 3 mmol/L Final    Chloride 08/18/2020 102  100 - 108 mmol/L Final    CO2 08/18/2020 31  21 - 32 mmol/L Final    ANION GAP 08/18/2020 6  4 - 13 mmol/L Final    BUN 08/18/2020 13  5 - 25 mg/dL Final    Creatinine 08/18/2020 0 92  0 60 - 1 30 mg/dL Final    Standardized to IDMS reference method    Glucose, Fasting 08/18/2020 107* 65 - 99 mg/dL Final    Specimen collection should occur prior to Sulfasalazine administration due to the potential for falsely depressed results  Specimen collection should occur prior to Sulfapyridine administration due to the potential for falsely elevated results   Calcium 08/18/2020 9 1  8 3 - 10 1 mg/dL Final    AST 08/18/2020 14  5 - 45 U/L Final    Specimen collection should occur prior to Sulfasalazine administration due to the potential for falsely depressed results   ALT 08/18/2020 18  12 - 78 U/L Final    Specimen collection should occur prior to Sulfasalazine and/or Sulfapyridine administration due to the potential for falsely depressed results   Alkaline Phosphatase 08/18/2020 123* 46 - 116 U/L Final    Total Protein 08/18/2020 8 9* 6 4 - 8 2 g/dL Final    Albumin 08/18/2020 3 8  3 5 - 5 0 g/dL Final    Total Bilirubin 08/18/2020 0 32  0 20 - 1 00 mg/dL Final    Use of this assay is not recommended for patients undergoing treatment with eltrombopag due to the potential for falsely elevated results      eGFR 08/18/2020 67  ml/min/1 73sq m Final    Sed Rate 08/18/2020 77* 0 - 29 mm/hour Final    CRP 08/18/2020 12 2* <3 0 mg/L Final    Vit D, 25-Hydroxy 08/18/2020 29 9* 30 0 - 100 0 ng/mL Final    Cyclic Citrullin Peptide Ab 08/18/2020 8  0 - 19 units Final                              Negative               <20                            Weak positive      20 - 39                            Moderate positive  40 - 59                            Strong positive        >59    LUANA 08/18/2020 Negative  Negative Final

## 2021-08-02 NOTE — PATIENT INSTRUCTIONS
Keep taking the methotrexate 6 tabs once a week and 2 folic acid every night  Take the leflunomide one tablet every day  This is for the inflammation  Use the Pennsaid as directed  I will check your blood work about a week before you come back to the office in 2 months  Get over the counter Aspercreme with lidocaine and apply it to your painful joints 2-3 times per day as needed and use the diclofenac cream with it

## 2021-08-09 ENCOUNTER — TELEPHONE (OUTPATIENT)
Dept: OBGYN CLINIC | Facility: HOSPITAL | Age: 66
End: 2021-08-09

## 2021-08-09 DIAGNOSIS — M19.90 ARTHRITIS: ICD-10-CM

## 2021-08-09 DIAGNOSIS — M79.10 MYALGIA: Primary | ICD-10-CM

## 2021-08-09 RX ORDER — CYCLOBENZAPRINE HCL 10 MG
TABLET ORAL
Qty: 60 TABLET | Refills: 0 | Status: SHIPPED | OUTPATIENT
Start: 2021-08-09 | End: 2021-08-30

## 2021-08-09 RX ORDER — ACETAMINOPHEN 325 MG/1
650 TABLET ORAL EVERY 6 HOURS PRN
Qty: 120 TABLET | Refills: 5 | Status: SHIPPED | OUTPATIENT
Start: 2021-08-09 | End: 2022-01-20 | Stop reason: HOSPADM

## 2021-08-09 NOTE — TELEPHONE ENCOUNTER
Called spoke with the aid  They do not want any medication sent to [facility] pharmacy because it takes to long to get medication  The aid requested the Voltaren 1% to be sent to [facility] as well as a script for tylenol 325mg sent to [facility]  [address]  Aid states patient is in a lot of pain and the only thing that helps is the 1% gel but it has to go through the pharmacy they cannot buy it OTC

## 2021-08-09 NOTE — TELEPHONE ENCOUNTER
Patient sees Malgorzata Trujillo    Patients aid Fanta Frank) needs someone to call her back, the medications are all going to the wrong pharmacy's for the patient  Please call to go over medications    cb - [phone]

## 2021-08-16 ENCOUNTER — TELEPHONE (OUTPATIENT)
Dept: GASTROENTEROLOGY | Facility: CLINIC | Age: 66
End: 2021-08-16

## 2021-08-16 NOTE — TELEPHONE ENCOUNTER
Pt left VM mssg stating she had CT scan done; wall in stomach is thickening-can't eat much/feels full and has pain; needs appt before Oct; Bentyl doesn't work like before; can't take Tylenol  Also, has nodules on kiendy  CB# [phone]

## 2021-08-18 DIAGNOSIS — K58.0 IRRITABLE BOWEL SYNDROME WITH DIARRHEA: ICD-10-CM

## 2021-08-18 RX ORDER — DICYCLOMINE HCL 20 MG
TABLET ORAL
Qty: 120 TABLET | Refills: 1 | Status: SHIPPED | OUTPATIENT
Start: 2021-08-18 | End: 2021-09-29

## 2021-08-20 ENCOUNTER — OFFICE VISIT (OUTPATIENT)
Dept: GASTROENTEROLOGY | Facility: CLINIC | Age: 66
End: 2021-08-20
Payer: MEDICARE

## 2021-08-20 VITALS
SYSTOLIC BLOOD PRESSURE: 138 MMHG | DIASTOLIC BLOOD PRESSURE: 90 MMHG | HEIGHT: 62 IN | BODY MASS INDEX: 31.28 KG/M2 | HEART RATE: 110 BPM | WEIGHT: 170 LBS

## 2021-08-20 DIAGNOSIS — R10.84 GENERALIZED ABDOMINAL PAIN: ICD-10-CM

## 2021-08-20 DIAGNOSIS — K59.04 CHRONIC IDIOPATHIC CONSTIPATION: Primary | ICD-10-CM

## 2021-08-20 DIAGNOSIS — R68.81 EARLY SATIETY: ICD-10-CM

## 2021-08-20 PROCEDURE — 99214 OFFICE O/P EST MOD 30 MIN: CPT | Performed by: NURSE PRACTITIONER

## 2021-08-20 NOTE — PATIENT INSTRUCTIONS
Schedule gastric emptying study  Take dulcolax tablet daily until formed BM   Stop if watery diarrhea starts

## 2021-08-20 NOTE — PROGRESS NOTES
[id number] - Outpatient Follow-up Note  Marcelino Montoya 63 y o  female MRN: [id number]  Encounter: [id number]    ASSESSMENT AND PLAN:      1  Early satiety   1-2 month history early satiety with associated nausea and intermittent vomiting  7 lb weight loss in 1-2 months due to decreased oral intake  Recent CT scan showed mild thickening proximal to mid stomach  Findings may represent mild gastritis  She is diabetic and symptoms may be due to diabetic gastroparesis    - recommend small frequent meals  - will obtain gastric emptying study  If shows gastroparesis, would begin Reglan q i d  with meals  - continue  Pantoprazole 40 mg daily  - NM gastric emptying; Future    2  Chronic idiopathic constipation  3  Irritable bowel syndrome  4  Generalized abdominal pain  History of IBS -D   She is experiencing diffuse abdominal pain, not relieved with dicyclomine  No acute findings on recent CT scan  She does report having frequent small liquid stools however CT findings consistent with constipation  which may be  etiology of abdominal pain  - Recommend patient take MiraLax daily however patient reports this does not work for her and is requesting Dulcolax  Recommend 1 tablet daily  to promote formed bowel movement  - encourage high-fiber diet, increase fluid intake  - continue dicyclomine for now  Would be hesitant to add amitriptyline for IBS as patient takes multiple psychiatric meds and pain medication  - bisacodyl (DULCOLAX) 5 mg EC tablet; Take 1 tablet (5 mg total) by mouth daily as needed for constipation  Dispense: 30 tablet;  Refill: 0            Followup Appointment: 2  months  ______________________________________________________________________    Chief Complaint   Patient presents with    Abnormal CT     referred by Tika Zepeda    Early satiety    Abd pain     constant    Diarrhea     intermittent    Fatigue    Nausea     HPI:   53-year-old female with history of achalasia and dysphagia,  and IBS presents with 1-2 month history of early satiety with associated nausea and vomiting  The patient reports significant decrease in oral intake due to feeling full after small amounts of food  She frequently experiences nausea after eating and has had occasional emesis off partially digested food  She reports 7 lb weight loss over the past 1-2 months    She is also experiencing vague, diffuse abdominal pain which is different than her IBS symptoms  She continues to take dicyclomine with no relief  She reports pain is constant and denies increase with food ingestion or need for bowel movement  CT scan of the abdomen was done by her PCP due to abdominal pain which showed rugal thickening of the proximal to mid stomach, remainder of the bowel is nondilated, formed stool in the colon and rectum, no acute finding  She was noted to have an enlarged fatty liver  Review of recent labs showed normal LFTs    She underwent EGD 05/2020 which showed mild bile gastritis, small sliding hiatal hernia , normal esophagus which was empirically dilated   Biopsies were negative for H pylori,  eosinophilic esophagitis  And Mena's   Colonoscopy in 2019 -serrated  Sigmoid polyp,  5 year recall in hospital setting recommended    Historical Information   Past Medical History:   Diagnosis Date    Achalasia     ADHD     Anemia     Anxiety     Anxiety and depression     Arthritis     Chronic narcotic dependence ([address] )     3 yrs    Chronic pain     chronic pain    Chronic pain disorder     Colon polyp     COPD (chronic obstructive pulmonary disease) ([address] )     Depression     Diabetes mellitus ([address] )     Disease of thyroid gland     hypo    Esophageal spasm     Fibromyalgia     Fibromyalgia, primary     GERD (gastroesophageal reflux disease)     Hiatal hernia     Hyperlipidemia     Hypertension     Iron deficiency anemia     Irritable bowel syndrome     Migraine     Polysubstance abuse ([address]     Psychiatric disorder     major depressive    RA (rheumatoid arthritis) ([address] )     Seasonal allergies      Past Surgical History:   Procedure Laterality Date    AMPUTATION      RBKA    ANKLE FUSION      APPENDECTOMY      BELOW KNEE LEG AMPUTATION Right     CARPAL TUNNEL RELEASE  2021    left arm    CATARACT EXTRACTION       SECTION      COLONOSCOPY  2010    COLONOSCOPY  2019    diverticula, 3 mm polyp and 6 mm polyp in rectum, grade III internal hemorrhoids, hyperplastic polyp, 5 year recall 2024     EGD  2010    FOREARM FASCIOTOMY Right     FRACTURE SURGERY      HYSTERECTOMY      KNEE ARTHROSCOPY Left     ORTHOPEDIC SURGERY      HI REVISE ULNAR NERVE AT ELBOW Left 2021    Procedure: Left carpal and cubital tunnel release;  Surgeon:  Anel Ríos MD;  Location: [address] [place] [address] OR;  Service: Orthopedics    UPPER GASTROINTESTINAL ENDOSCOPY       Social History     Substance and Sexual Activity   Alcohol Use No     Social History     Substance and Sexual Activity   Drug Use Never     Social History     Tobacco Use   Smoking Status Current Every Day Smoker    Packs/day: 0 50    Types: Cigarettes    Start date: 1977   Smokeless Tobacco Never Used     Family History   Problem Relation Age of Onset    Ulcerative colitis Mother     Lung cancer Mother     Diabetes Mother     Alcohol abuse Father     Diabetes Sister     Cancer Brother     Colon polyps Neg Hx     Colon cancer Neg Hx          Current Outpatient Medications:     acetaminophen (TYLENOL) 325 mg tablet    albuterol (PROVENTIL HFA,VENTOLIN HFA) 90 mcg/act inhaler    Allergy Relief 10 MG tablet    atoMOXetine (STRATTERA) 40 mg capsule    atorvastatin (LIPITOR) 20 mg tablet    Diclofenac Sodium (VOLTAREN) 1 %    dicyclomine (BENTYL) 20 mg tablet    folic acid (FOLVITE) 1 mg tablet    gabapentin (NEURONTIN) 600 MG tablet    Galcanezumab-gnlm (EMGALITY) 120 MG/ML SOSY    glipiZIDE (GLUCOTROL) 5 mg tablet    Guaifenesin 1200 MG TB12    ipratropium-albuterol (DUO-NEB) 0 5-2 5 mg/3 mL nebulizer solution    Lactobacillus Acid-Pectin (Acidophilus/Citrus Pectin) TABS    leflunomide (ARAVA) 10 MG tablet    levothyroxine 150 mcg tablet    linaGLIPtin (Tradjenta) 5 MG TABS    methotrexate 2 5 mg tablet    methylPREDNISolone (MEDROL) 8 MG tablet    Multiple Vitamin (MULTIVITAMIN) tablet    ondansetron (ZOFRAN) 4 mg tablet    pantoprazole (PROTONIX) 40 mg tablet    rOPINIRole (REQUIP) 0 25 mg tablet    sertraline (ZOLOFT) 50 mg tablet    topiramate (TOPAMAX) 25 mg tablet    venlafaxine (EFFEXOR-XR) 150 mg 24 hr capsule    bisacodyl (DULCOLAX) 5 mg EC tablet    cyclobenzaprine (FLEXERIL) 10 mg tablet    LORazepam (ATIVAN) 1 mg tablet    oxyCODONE (OxyCONTIN) 10 mg 12 hr tablet    oxyCODONE (ROXICODONE) 5 mg immediate release tablet  Allergies   Allergen Reactions    Morphine Itching and Anaphylaxis     u  Other reaction(s): Other (See Comments)  u  Other reaction(s): Feeling agitated (finding)      Penicillins Anaphylaxis    Cephalexin      Other reaction(s): Unknown    Cephalosporins Hives     Other reaction(s): Unknown Reaction      Codeine Hives     Other reaction(s): Unknown  Other reaction(s): Unknown Reaction  Other reaction(s): Unknown      Levaquin [Levofloxacin] Hives    Nsaids GI Bleeding     Other reaction(s): Unknown Reaction    Aspirin Hives, Other (See Comments) and Rash     Other reaction(s): Unknown Reaction      Chlorhexidine Rash    Medical Tape Rash    Vancomycin Rash     Reviewed medications and allergies and updated as indicated    PHYSICAL EXAM:    Blood pressure 138/90, pulse (!) 110, height 5' 2" (1 575 m), weight 77 1 kg (170 lb), not currently breastfeeding  Body mass index is 31 09 kg/m²  General Appearance: NAD, cooperative, alert  Eyes: Anicteric  ENT:  Normocephalic, atraumatic, normal mucosa      Neck:  Supple, symmetrical, trachea midline  Resp:  Clear to auscultation bilaterally; no rales, rhonchi or wheezing; respirations unlabored   CV:  S1 S2, Regular rate and rhythm; no murmur, rub, or gallop  GI:  Soft,  non-distended,  Mild tenderness with palpation all 4 quadrants; normal bowel sounds; no masses, no organomegaly   Rectal: Deferred  Musculoskeletal: No cyanosis, clubbing or edema  Normal ROM  Skin:  No jaundice, rashes, or lesions   Psych: flat affect, poor eye contact, patient seemed angry and abrupt during visit  Neuro: No gross deficits, AAOx3    Lab Results:   Lab Results   Component Value Date    WBC 12 4 (H) 06/07/2021    HGB 11 2 (L) 06/07/2021    HCT 37 1 06/07/2021    MCV 78 6 (L) 06/07/2021     06/07/2021     Lab Results   Component Value Date     04/01/2015    K 4 4 06/07/2021     06/07/2021    CO2 30 06/07/2021    ANIONGAP 8 04/01/2015    BUN 16 06/07/2021    CREATININE 0 76 06/07/2021    GLUCOSE 90 04/01/2015    GLUF 107 (H) 08/18/2020    CALCIUM 9 0 06/07/2021    AST 19 06/07/2021    ALT 13 06/07/2021    ALKPHOS 104 06/07/2021    PROT 5 8 (L) 04/01/2015    BILITOT 0 2 04/01/2015    EGFR 67 08/18/2020

## 2021-08-20 NOTE — LETTER
August 20, 2021     Juanita Arechiga, [address] [address]    Patient: Tanya Dinh   YOB: 1955   Date of Visit: 8/20/2021       Dear Dr Sanju Dobson: Thank you for referring Haritha Huynh to me for evaluation  Below are my notes for this consultation  If you have questions, please do not hesitate to call me  I look forward to following your patient along with you  Sincerely,        VERONIKA Montiel        CC: No Recipients  VERONIKA Montiel  8/20/2021  5:08 PM  Sign when Signing Visit  [facility] - Outpatient Follow-up Note  Tanya Dinh 63 y o  female MRN: [id number]  Encounter: [id number]    ASSESSMENT AND PLAN:      1  Early satiety   1-2 month history early satiety with associated nausea and intermittent vomiting  7 lb weight loss in 1-2 months due to decreased oral intake  Recent CT scan showed mild thickening proximal to mid stomach  Findings may represent mild gastritis  She is diabetic and symptoms may be due to diabetic gastroparesis    - recommend small frequent meals  - will obtain gastric emptying study  If shows gastroparesis, would begin Reglan q i d  with meals  - continue  Pantoprazole 40 mg daily  - NM gastric emptying; Future    2  Chronic idiopathic constipation  3  Irritable bowel syndrome  4  Generalized abdominal pain  History of IBS -D   She is experiencing diffuse abdominal pain, not relieved with dicyclomine  She does report having frequent small liquid stools however CT findings consistent with constipation     - Recommend patient take MiraLax daily however patient reports this does not work for her and is requesting Dulcolax  Recommend 1 tablet daily  to promote formed bowel movement  - encourage high-fiber diet, increase fluid intake  - continue dicyclomine for now    Would be hesitant to add amitriptyline as patient takes multiple psychiatric meds and pain medication  - bisacodyl (DULCOLAX) 5 mg EC tablet; Take 1 tablet (5 mg total) by mouth daily as needed for constipation  Dispense: 30 tablet; Refill: 0            Followup Appointment: 2  months  ______________________________________________________________________    Chief Complaint   Patient presents with    Abnormal CT     referred by Kiran Blum    Early satiety    Abd pain     constant    Diarrhea     intermittent    Fatigue    Nausea     HPI:   53-year-old female with history of achalasia and dysphagia,  and IBS presents with 1-2 month history of early satiety with associated nausea and vomiting  The patient reports significant decrease in oral intake due to feeling full after small amounts of food  She frequently experiences nausea after eating and has had occasional emesis off partially digested food  She reports 7 lb weight loss over the past 1-2 months    She is also experiencing vague, diffuse abdominal pain which is different than her IBS symptoms  She continues to take dicyclomine with no relief  She reports pain is constant and denies increase with food ingestion or need for bowel movement  CT scan of the abdomen was done by her PCP due to abdominal pain which showed rugal thickening of the proximal to mid stomach, remainder of the bowel is nondilated, formed stool in the colon and rectum, no acute finding  She was noted to have an enlarged fatty liver  Review of recent labs showed normal LFTs    She underwent EGD 05/2020 which showed mild bile gastritis, small sliding hiatal hernia , normal esophagus which was empirically dilated   Biopsies were negative for H pylori,  eosinophilic esophagitis  And Mena's   Colonoscopy in 2019 -serrated  Sigmoid polyp,  5 year recall in hospital setting recommended    Historical Information   Past Medical History:   Diagnosis Date    Achalasia     ADHD     Anemia     Anxiety     Anxiety and depression     Arthritis     Chronic narcotic dependence ([facility] [address] )     3 yrs  Chronic pain     chronic pain    Chronic pain disorder     Colon polyp     COPD (chronic obstructive pulmonary disease) (HCC)     Depression     Diabetes mellitus (HCC)     Disease of thyroid gland     hypo    Esophageal spasm     Fibromyalgia     Fibromyalgia, primary     GERD (gastroesophageal reflux disease)     Hiatal hernia     Hyperlipidemia     Hypertension     Iron deficiency anemia     Irritable bowel syndrome     Migraine     [facility]ysub[facility] abuse [facility] [address] [facility]     Psychiatric disorder     major depressive    RA (rheumatoid arthritis) ([facility] [address] [facility]     Seasonal allergies      Past Surgical History:   Procedure Laterality Date    AMPUTATION      RBKA    ANKLE FUSION      APPENDECTOMY      BELOW KNEE LEG AMPUTATION Right     CARPAL TUNNEL RELEASE  2021    left arm    CATARACT EXTRACTION       SECTION      COLONOSCOPY  2010    COLONOSCOPY  2019    diverticula, 3 mm polyp and 6 mm polyp in rectum, grade III internal hemorrhoids, hyperplastic polyp, 5 year recall 2024     EGD  2010    FOREARM FASCIOTOMY Right     FRACTURE SURGERY      HYSTERECTOMY      KNEE ARTHROSCOPY Left     ORTHOPEDIC SURGERY      WI REVISE ULNAR NERVE AT ELBOW Left 2021    Procedure: Left carpal and cubital tunnel release;  Surgeon:  Josette Cabot, MD;  Location: [address] [place] [address] OR;  Service: Orthopedics    UPPER GASTROINTESTINAL ENDOSCOPY       Social History     Substance and Sexual Activity   Alcohol Use No     Social History     Substance and Sexual Activity   Drug Use Never     Social History     Tobacco Use   Smoking Status Current Every Day Smoker    Packs/day: 0 50    Types: Cigarettes    Start date: 1977   Smokeless Tobacco Never Used     Family History   Problem Relation Age of Onset    Ulcerative colitis Mother     Lung cancer Mother     Diabetes Mother     Alcohol abuse Father     Diabetes Sister     Cancer Brother     Colon polyps Neg Hx     Colon cancer Neg Hx          Current Outpatient Medications:     acetaminophen (TYLENOL) 325 mg tablet    albuterol (PROVENTIL HFA,VENTOLIN HFA) 90 mcg/act inhaler    Allergy Relief 10 MG tablet    atoMOXetine (STRATTERA) 40 mg capsule    atorvastatin (LIPITOR) 20 mg tablet    Diclofenac Sodium (VOLTAREN) 1 %    dicyclomine (BENTYL) 20 mg tablet    folic acid (FOLVITE) 1 mg tablet    gabapentin (NEURONTIN) 600 MG tablet    Galcanezumab-gnlm (EMGALITY) 120 MG/ML SOSY    glipiZIDE (GLUCOTROL) 5 mg tablet    Guaifenesin 1200 MG TB12    ipratropium-albuterol (DUO-NEB) 0 5-2 5 mg/3 mL nebulizer solution    Lactobacillus Acid-Pectin (Acidophilus/Citrus Pectin) TABS    leflunomide (ARAVA) 10 MG tablet    levothyroxine 150 mcg tablet    linaGLIPtin (Tradjenta) 5 MG TABS    methotrexate 2 5 mg tablet    methylPREDNISolone (MEDROL) 8 MG tablet    Multiple Vitamin (MULTIVITAMIN) tablet    ondansetron (ZOFRAN) 4 mg tablet    pantoprazole (PROTONIX) 40 mg tablet    rOPINIRole (REQUIP) 0 25 mg tablet    sertraline (ZOLOFT) 50 mg tablet    topiramate (TOPAMAX) 25 mg tablet    venlafaxine (EFFEXOR-XR) 150 mg 24 hr capsule    bisacodyl (DULCOLAX) 5 mg EC tablet    cyclobenzaprine (FLEXERIL) 10 mg tablet    LORazepam (ATIVAN) 1 mg tablet    oxyCODONE (OxyCONTIN) 10 mg 12 hr tablet    oxyCODONE (ROXICODONE) 5 mg immediate release tablet  Allergies   Allergen Reactions    Morphine Itching and Anaphylaxis     u  Other reaction(s):  Other (See Comments)  u  Other reaction(s): Feeling agitated (finding)      Penicillins Anaphylaxis    Cephalexin      Other reaction(s): Unknown    Cephalosporins Hives     Other reaction(s): Unknown Reaction      Codeine Hives     Other reaction(s): Unknown  Other reaction(s): Unknown Reaction  Other reaction(s): Unknown      Levaquin [Levofloxacin] Hives    Nsaids GI Bleeding     Other reaction(s): Unknown Reaction    Aspirin Hives, Other (See Comments) and Rash     Other reaction(s): Unknown Reaction      Chlorhexidine Rash    Medical Tape Rash    Vancomycin Rash     Reviewed medications and allergies and updated as indicated    PHYSICAL EXAM:    Blood pressure 138/90, pulse (!) 110, height 5' 2" (1 575 m), weight 77 1 kg (170 lb), not currently breastfeeding  Body mass index is 31 09 kg/m²  General Appearance: NAD, cooperative, alert  Eyes: Anicteric  ENT:  Normocephalic, atraumatic, normal mucosa  Neck:  Supple, symmetrical, trachea midline  Resp:  Clear to auscultation bilaterally; no rales, rhonchi or wheezing; respirations unlabored   CV:  S1 S2, Regular rate and rhythm; no murmur, rub, or gallop  GI:  Soft,  non-distended,  Mild tenderness with palpation all 4 quadrants; normal bowel sounds; no masses, no organomegaly   Rectal: Deferred  Musculoskeletal: No cyanosis, clubbing or edema  Normal ROM  Skin:  No jaundice, rashes, or lesions   Psych: flat affect, poor eye contact, patient seemed angry and abrupt during visit  Neuro: No gross deficits, AAOx3    Lab Results:   Lab Results   Component Value Date    WBC 12 4 (H) 06/07/2021    HGB 11 2 (L) 06/07/2021    HCT 37 1 06/07/2021    MCV 78 6 (L) 06/07/2021     06/07/2021     Lab Results   Component Value Date     04/01/2015    K 4 4 06/07/2021     06/07/2021    CO2 30 06/07/2021    ANIONGAP 8 04/01/2015    BUN 16 06/07/2021    CREATININE 0 76 06/07/2021    GLUCOSE 90 04/01/2015    GLUF 107 (H) 08/18/2020    CALCIUM 9 0 06/07/2021    AST 19 06/07/2021    ALT 13 06/07/2021    ALKPHOS 104 06/07/2021    PROT 5 8 (L) 04/01/2015    BILITOT 0 2 04/01/2015    EGFR 67 08/18/2020

## 2021-08-26 DIAGNOSIS — M79.10 MYALGIA: ICD-10-CM

## 2021-08-30 RX ORDER — CYCLOBENZAPRINE HCL 10 MG
TABLET ORAL
Qty: 60 TABLET | Refills: 0 | Status: SHIPPED | OUTPATIENT
Start: 2021-08-30 | End: 2021-09-27

## 2021-09-16 ENCOUNTER — TELEPHONE (OUTPATIENT)
Dept: OBGYN CLINIC | Facility: OTHER | Age: 66
End: 2021-09-16

## 2021-09-16 NOTE — TELEPHONE ENCOUNTER
Patients care giver called in asking us to fax blood work to [facility] in [facility]      She will call back with fax number

## 2021-09-21 ENCOUNTER — OFFICE VISIT (OUTPATIENT)
Dept: OBGYN CLINIC | Facility: CLINIC | Age: 66
End: 2021-09-21
Payer: MEDICARE

## 2021-09-21 VITALS — SYSTOLIC BLOOD PRESSURE: 130 MMHG | DIASTOLIC BLOOD PRESSURE: 82 MMHG

## 2021-09-21 DIAGNOSIS — M17.12 PRIMARY OSTEOARTHRITIS OF LEFT KNEE: Primary | ICD-10-CM

## 2021-09-21 DIAGNOSIS — Z89.511 S/P BKA (BELOW KNEE AMPUTATION) UNILATERAL, RIGHT (HCC): ICD-10-CM

## 2021-09-21 DIAGNOSIS — Z01.818 PREOP TESTING: ICD-10-CM

## 2021-09-21 PROCEDURE — 99214 OFFICE O/P EST MOD 30 MIN: CPT | Performed by: ORTHOPAEDIC SURGERY

## 2021-09-21 RX ORDER — SODIUM CHLORIDE, SODIUM LACTATE, POTASSIUM CHLORIDE, CALCIUM CHLORIDE 600; 310; 30; 20 MG/100ML; MG/100ML; MG/100ML; MG/100ML
75 INJECTION, SOLUTION INTRAVENOUS CONTINUOUS
Status: CANCELLED | OUTPATIENT
Start: 2021-09-21

## 2021-09-21 RX ORDER — FERROUS SULFATE TAB EC 324 MG (65 MG FE EQUIVALENT) 324 (65 FE) MG
324 TABLET DELAYED RESPONSE ORAL
Qty: 60 TABLET | Refills: 1 | Status: SHIPPED | OUTPATIENT
Start: 2021-09-21 | End: 2021-10-22 | Stop reason: SDUPTHER

## 2021-09-21 RX ORDER — MULTIVIT-MIN/IRON FUM/FOLIC AC 7.5 MG-4
1 TABLET ORAL DAILY
Qty: 30 TABLET | Refills: 1 | Status: SHIPPED | OUTPATIENT
Start: 2021-09-21

## 2021-09-21 RX ORDER — ASCORBIC ACID 500 MG
500 TABLET ORAL 2 TIMES DAILY
Qty: 60 TABLET | Refills: 1 | Status: SHIPPED | OUTPATIENT
Start: 2021-09-21 | End: 2021-10-22 | Stop reason: SDUPTHER

## 2021-09-21 NOTE — PROGRESS NOTES
Assessment:     1  Primary osteoarthritis of left knee    2  S/P BKA (below knee amputation) unilateral, right ([facility] [address] )    3  Preop testing        Plan:     Problem List Items Addressed This Visit        Musculoskeletal and Integument    Primary osteoarthritis of left knee - Primary    Relevant Medications    ascorbic acid (VITAMIN C) 500 MG tablet    ferrous sulfate 324 (65 Fe) mg    Multiple Vitamins-Minerals (multivitamin with minerals) tablet    Other Relevant Orders    Comprehensive metabolic panel    Hemoglobin A1C W/EAG Estimation    CBC and differential    Anemia Panel w/Reflex    Type and screen    Ambulatory referral to Family Practice    Ambulatory referral to Physical Therapy    Case request operating room: ARTHROPLASTY KNEE TOTAL, LEFT  CEMENTED (Completed)    EKG 12 lead       Other    S/P BKA (below knee amputation) unilateral, right (HCC)      Other Visit Diagnoses     Preop testing        Relevant Medications    ascorbic acid (VITAMIN C) 500 MG tablet    ferrous sulfate 324 (65 Fe) mg    Multiple Vitamins-Minerals (multivitamin with minerals) tablet    Other Relevant Orders    Comprehensive metabolic panel    Hemoglobin A1C W/EAG Estimation    CBC and differential    Anemia Panel w/Reflex    Type and screen    Ambulatory referral to Family Practice    Ambulatory referral to Physical Therapy    Case request operating room: ARTHROPLASTY KNEE TOTAL, LEFT  CEMENTED (Completed)    EKG 12 lead        Findings consistent with primary osteoarthritis of left knee severe medial  Patient continues with severe daily knee pain despite conservative measures such as cortisone injections, euflexxa series, bracing, physical therapy, activity modification  Due to poor quality of life patient wishes to proceed with total knee replacement on left   She does have other health factors that will impact her recovery, she has BKA on right, ankle fusion left, rheumatoid arthritis and on methotrexate which she will need to speak with rheumatologist about prior to surgery stopping,, most likely patient will need to stop methotrexate a week before her surgery and start the treatment a week after surgery  She smokes which impact healing  Her postop pain control will be complicated by fibromyalgia, COPD, diabetes  She will need extensive therapy after surgery, may need in home therapy or rehab facility  All patient's questions were answered to her satisfaction  This note is created using dictation transcription  It may contain typographical errors, grammatical errors, improperly dictated words, background noise and other errors  Discussed with patient surgical risks and complications including but not limited to infection, persistent pain, nerve and vessel injury, complications associated with anesthesia, DVT, exposure to COVID virus, problem with prosthesis, etc   Patient understands the risks and complication and consented to the surgery  Subjective:     Patient ID: Alicia Walsh is a 63 y o  female  Chief Complaint:  63 yr old female in for follow up of her right knee  She is treating for severe medial compartment osteoarthritis  She had CSI in May with no significant relief, she just finished euflexxa series 7/23/21 with minimal short term benefit  She presents in motorized wheelchair as she has limited walking tolerance  She continues with diffuse medial compartment pain on daily basis, certain motions recreate sharp acute severe pain which cause her knee to give out, buckle on daily basis  She has tried bracing and completed physical therapy as well with no improvement in symptoms  She did have pain management doctor who supplies oral and topical medications  She states stopped oxycodone 3 months ago  Patient just wants to be able to walk w/o pain and perform daily activities  She wishes to discuss total knee replacement  History prosthetic right leg due to BKA, ankle fusion left , rheumatoid arthritis on methotrexate  Allergy:  Allergies   Allergen Reactions    Morphine Itching and Anaphylaxis     u  Other reaction(s):  Other (See Comments)  u  Other reaction(s): Feeling agitated (finding)      Penicillins Anaphylaxis    Cephalexin      Other reaction(s): Unknown    Cephalosporins Hives     Other reaction(s): Unknown Reaction      Codeine Hives     Other reaction(s): Unknown  Other reaction(s): Unknown Reaction  Other reaction(s): Unknown      Levaquin [Levofloxacin] Hives    Nsaids GI Bleeding     Other reaction(s): Unknown Reaction    Aspirin Hives, Other (See Comments) and Rash     Other reaction(s): Unknown Reaction      Chlorhexidine Rash    Medical Tape Rash    Vancomycin Rash     Medications:  all current active meds have been reviewed  Past Medical History:  Past Medical History:   Diagnosis Date    Achalasia     ADHD     Anemia     Anxiety     Anxiety and depression     Arthritis     [facility]otic [facility]     3 yrs    Chronic pain     chronic pain    Chronic pain disorder     Colon polyp     COPD (chronic obstructive pulmonary disease) [facility]     Depression     Diabetes mellitus ([facility]     Disease of thyroid gland     hypo    Esophageal spasm     Fibromyalgia     Fibromyalgia, primary     GERD (gastroesophageal reflux disease)     Hiatal hernia     Hyperlipidemia     Hypertension     Iron deficiency anemia     Irritable bowel syndrome     Migraine     Polysubstance abuse [facility]     Psychiatric disorder     major depressive    RA [facility]rheumatoid arthritis) ([facility] [address]     Seasonal allergies      Past Surgical History:  Past Surgical History:   Procedure Laterality Date    AMPUTATION      RBKA    ANKLE FUSION      APPENDECTOMY      BELOW KNEE LEG AMPUTATION Right     CARPAL TUNNEL RELEASE  2021    left arm    CATARACT EXTRACTION       SECTION      COLONOSCOPY  2010    COLONOSCOPY  2019    diverticula, 3 mm polyp and 6 mm polyp in rectum, grade III internal hemorrhoids, hyperplastic polyp, 5 year recall December 2024     EGD  08/18/2010    FOREARM FASCIOTOMY Right     FRACTURE SURGERY      HYSTERECTOMY      KNEE ARTHROSCOPY Left     ORTHOPEDIC SURGERY      MO REVISE ULNAR NERVE AT ELBOW Left 1/11/2021    Procedure: Left carpal and cubital tunnel release;  Surgeon: Myke Kauffman MD;  Location: [address] [place] [address];  Service: Orthopedics    UPPER GASTROINTESTINAL ENDOSCOPY       Family History:  Family History   Problem Relation Age of Onset    Ulcerative colitis Mother     Lung cancer Mother     Diabetes Mother     Alcohol abuse Father     Diabetes Sister     Cancer Brother     Colon polyps Neg Hx     Colon cancer Neg Hx      Social History:  Social History     Substance and Sexual Activity   Alcohol Use No     Social History     Substance and Sexual Activity   Drug Use Never     Social History     Tobacco Use   Smoking Status Current Every Day Smoker    Packs/day: 0 50    Types: Cigarettes    Start date: 1/1/1977   Smokeless Tobacco Never Used     Review of Systems   Constitutional: Negative for chills and fever  HENT: Negative for ear pain and sore throat  Eyes: Negative for pain and visual disturbance  Respiratory: Negative for cough and shortness of breath  Cardiovascular: Negative for chest pain and palpitations  Gastrointestinal: Negative for abdominal pain and vomiting  Genitourinary: Negative for dysuria and hematuria  Musculoskeletal: Positive for arthralgias (left knee), gait problem (Arriving in an electric wheelchair) and joint swelling (Left knee)  Negative for back pain  Skin: Negative for color change and rash  Neurological: Negative for seizures and syncope  Psychiatric/Behavioral: Negative  All other systems reviewed and are negative          Objective:  BP Readings from Last 1 Encounters:   09/21/21 130/82      Wt Readings from Last 1 Encounters:   08/20/21 77 1 kg (170 lb)      BMI:   Estimated body mass index is 31 09 kg/m² as calculated from the following:    Height as of 8/20/21: 5' 2" (1 575 m)  Weight as of 8/20/21: 77 1 kg (170 lb)  BSA:   Estimated body surface area is 1 78 meters squared as calculated from the following:    Height as of 8/20/21: 5' 2" (1 575 m)  Weight as of 8/20/21: 77 1 kg (170 lb)  Physical Exam  Vitals and nursing note reviewed  Constitutional:       Appearance: Normal appearance  She is well-developed  HENT:      Head: Normocephalic and atraumatic  Right Ear: External ear normal       Left Ear: External ear normal    Eyes:      Extraocular Movements: Extraocular movements intact  Conjunctiva/sclera: Conjunctivae normal       Pupils: Pupils are equal, round, and reactive to light  Cardiovascular:      Rate and Rhythm: Regular rhythm  Heart sounds: Normal heart sounds  No murmur heard  No gallop  Pulmonary:      Breath sounds: Normal breath sounds  Abdominal:      Palpations: Abdomen is soft  Musculoskeletal:      Cervical back: Normal range of motion and neck supple  Left knee: Effusion present  Instability Tests: Medial Jamal test positive and lateral Jamal test positive  Skin:     General: Skin is warm  Neurological:      Mental Status: She is alert and oriented to person, place, and time  Psychiatric:         Mood and Affect: Mood normal          Behavior: Behavior normal        Left Knee Exam     Muscle Strength   The patient has normal left knee strength  Tenderness   The patient is experiencing tenderness in the medial joint line  Range of Motion   Extension: normal Left knee extension: pain  Flexion: normal Left knee flexion: pain       Tests   Jamal:  Medial - positive Lateral - positive  Varus: negative Valgus: negative  Patellar apprehension: negative    Other   Erythema: absent  Scars: absent  Sensation: normal  Pulse: present  Swelling: none  Effusion: effusion present    Comments:  Crepitation with motion Calf is supple             I have personally reviewed pertinent films in PACS and my interpretation is Prior left knee x-ray show tricompartment osteoarthritis with medial joint narrowing  Marginal osteophyte  Slight varus alignment       Scribe Attestation    I,:  Amie Warner am acting as a scribe while in the presence of the attending physician :       I,:  Carlos Lopez MD personally performed the services described in this documentation    as scribed in my presence :

## 2021-09-22 ENCOUNTER — TELEPHONE (OUTPATIENT)
Dept: OBGYN CLINIC | Facility: OTHER | Age: 66
End: 2021-09-22

## 2021-09-22 NOTE — TELEPHONE ENCOUNTER
Patients care giver Ila Fabry calling in to say that Dr Ronda Brantley said she should not be on this with her upcoming surgery   Methotrexate  6 tablets once a week 2 5 mg  They were told it could cause infection  Can she just stop it or is there something else?     She is supposed to take the medication tomorrow    C/b # [phone]

## 2021-09-23 DIAGNOSIS — M79.10 MYALGIA: ICD-10-CM

## 2021-09-23 DIAGNOSIS — R13.10 DYSPHAGIA, UNSPECIFIED TYPE: ICD-10-CM

## 2021-09-24 ENCOUNTER — TELEPHONE (OUTPATIENT)
Dept: OBGYN CLINIC | Facility: HOSPITAL | Age: 66
End: 2021-09-24

## 2021-09-24 RX ORDER — PANTOPRAZOLE SODIUM 40 MG/1
TABLET, DELAYED RELEASE ORAL
Qty: 28 TABLET | Refills: 5 | Status: SHIPPED | OUTPATIENT
Start: 2021-09-24 | End: 2022-03-06

## 2021-09-24 NOTE — TELEPHONE ENCOUNTER
Patient sees Slick Smith    Pt's Aid Ramon Castañeda called and stated since the patient has stopped the medication: Methotrexate  She has been in a lot of pain, and is there anything else the patient can take for pain      Pharmacy: [address] [place] [address], [facility] [place], [employer] -     Please Advise  cb - [phone]

## 2021-09-27 RX ORDER — CYCLOBENZAPRINE HCL 10 MG
TABLET ORAL
Qty: 60 TABLET | Refills: 0 | Status: SHIPPED | OUTPATIENT
Start: 2021-09-27 | End: 2021-10-25

## 2021-09-27 NOTE — TELEPHONE ENCOUNTER
Spoke with patients aid Tha Bowden  I relayed information  She also asked that we fax the lab orders over to [facility] so she can have them done prior to appt on 10/4/21   Fax number is [phone]

## 2021-09-29 DIAGNOSIS — K58.0 IRRITABLE BOWEL SYNDROME WITH DIARRHEA: ICD-10-CM

## 2021-09-29 RX ORDER — DICYCLOMINE HCL 20 MG
TABLET ORAL
Qty: 120 TABLET | Refills: 1 | Status: SHIPPED | OUTPATIENT
Start: 2021-09-29 | End: 2021-10-28

## 2021-09-30 ENCOUNTER — TELEPHONE (OUTPATIENT)
Dept: OBGYN CLINIC | Facility: OTHER | Age: 66
End: 2021-09-30

## 2021-09-30 LAB
ALBUMIN SERPL-MCNC: 4.4 G/DL (ref 3.6–5.1)
ALBUMIN/GLOB SERPL: 1.5 (CALC) (ref 1–2.5)
ALP SERPL-CCNC: 110 U/L (ref 37–153)
ALT SERPL-CCNC: 30 U/L (ref 6–29)
AST SERPL-CCNC: 34 U/L (ref 10–35)
BASOPHILS # BLD AUTO: 78 CELLS/UL (ref 0–200)
BASOPHILS NFR BLD AUTO: 0.8 %
BILIRUB SERPL-MCNC: 0.4 MG/DL (ref 0.2–1.2)
BUN SERPL-MCNC: 10 MG/DL (ref 7–25)
BUN/CREAT SERPL: ABNORMAL (CALC) (ref 6–22)
CALCIUM SERPL-MCNC: 9.6 MG/DL (ref 8.6–10.4)
CHLORIDE SERPL-SCNC: 101 MMOL/L (ref 98–110)
CO2 SERPL-SCNC: 31 MMOL/L (ref 20–32)
CREAT SERPL-MCNC: 0.73 MG/DL (ref 0.5–0.99)
EOSINOPHIL # BLD AUTO: 431 CELLS/UL (ref 15–500)
EOSINOPHIL NFR BLD AUTO: 4.4 %
ERYTHROCYTE [DISTWIDTH] IN BLOOD BY AUTOMATED COUNT: 18.6 % (ref 11–15)
EST. AVERAGE GLUCOSE BLD GHB EST-MCNC: 177 (CALC)
EST. AVERAGE GLUCOSE BLD GHB EST-SCNC: 9.8 (CALC)
GLOBULIN SER CALC-MCNC: 2.9 G/DL (CALC) (ref 1.9–3.7)
GLUCOSE SERPL-MCNC: 133 MG/DL (ref 65–99)
HBA1C MFR BLD: 7.8 % OF TOTAL HGB
HCT VFR BLD AUTO: 45 % (ref 35–45)
HGB BLD-MCNC: 13.9 G/DL (ref 11.7–15.5)
IRON SATN MFR SERPL: 18 % (CALC) (ref 16–45)
IRON SERPL-MCNC: 76 MCG/DL (ref 45–160)
LYMPHOCYTES # BLD AUTO: 2205 CELLS/UL (ref 850–3900)
LYMPHOCYTES NFR BLD AUTO: 22.5 %
MCH RBC QN AUTO: 25.6 PG (ref 27–33)
MCHC RBC AUTO-ENTMCNC: 30.9 G/DL (ref 32–36)
MCV RBC AUTO: 82.9 FL (ref 80–100)
MONOCYTES # BLD AUTO: 794 CELLS/UL (ref 200–950)
MONOCYTES NFR BLD AUTO: 8.1 %
NEUTROPHILS # BLD AUTO: 6292 CELLS/UL (ref 1500–7800)
NEUTROPHILS NFR BLD AUTO: 64.2 %
PLATELET # BLD AUTO: 315 THOUSAND/UL (ref 140–400)
POTASSIUM SERPL-SCNC: 4.9 MMOL/L (ref 3.5–5.3)
PROT SERPL-MCNC: 7.3 G/DL (ref 6.1–8.1)
RBC # BLD AUTO: 5.43 MILLION/UL (ref 3.8–5.1)
SL AMB EGFR AFRICAN AMERICAN: 101 ML/MIN/1.73M2
SL AMB EGFR NON AFRICAN AMERICAN: 87 ML/MIN/1.73M2
SODIUM SERPL-SCNC: 142 MMOL/L (ref 135–146)
TIBC SERPL-MCNC: 430 MCG/DL (CALC) (ref 250–450)
WBC # BLD AUTO: 9.8 THOUSAND/UL (ref 3.8–10.8)

## 2021-09-30 NOTE — TELEPHONE ENCOUNTER
Saba @ [facility] called with a question in regards to the Labs ordered, she needs clarification   Anemia Panel with Reflex  They do not have that, what is it specifically referencing       Patient is currently at [facility]    C/b # [phone]

## 2021-09-30 NOTE — TELEPHONE ENCOUNTER
attempted to reach Sanket Martinez, unable to reach to advise anemia or iron panel is what is ordered  "person you are trying does not have a voicemail set up yet"

## 2021-10-04 ENCOUNTER — TELEPHONE (OUTPATIENT)
Dept: OBGYN CLINIC | Facility: HOSPITAL | Age: 66
End: 2021-10-04

## 2021-10-04 ENCOUNTER — OFFICE VISIT (OUTPATIENT)
Dept: RHEUMATOLOGY | Facility: CLINIC | Age: 66
End: 2021-10-04
Payer: MEDICARE

## 2021-10-04 VITALS
BODY MASS INDEX: 31.09 KG/M2 | DIASTOLIC BLOOD PRESSURE: 78 MMHG | HEIGHT: 62 IN | HEART RATE: 84 BPM | SYSTOLIC BLOOD PRESSURE: 104 MMHG

## 2021-10-04 DIAGNOSIS — R53.83 MALAISE AND FATIGUE: ICD-10-CM

## 2021-10-04 DIAGNOSIS — M19.90 ARTHRITIS: ICD-10-CM

## 2021-10-04 DIAGNOSIS — R53.81 MALAISE AND FATIGUE: ICD-10-CM

## 2021-10-04 DIAGNOSIS — M13.0 POLYARTHRITIS: ICD-10-CM

## 2021-10-04 DIAGNOSIS — E27.8 ADRENAL NODULE (HCC): Primary | ICD-10-CM

## 2021-10-04 DIAGNOSIS — M14.872: ICD-10-CM

## 2021-10-04 DIAGNOSIS — M54.50 CHRONIC BILATERAL LOW BACK PAIN WITHOUT SCIATICA: ICD-10-CM

## 2021-10-04 DIAGNOSIS — G89.29 CHRONIC BILATERAL LOW BACK PAIN WITHOUT SCIATICA: ICD-10-CM

## 2021-10-04 PROCEDURE — 99213 OFFICE O/P EST LOW 20 MIN: CPT | Performed by: INTERNAL MEDICINE

## 2021-10-04 RX ORDER — LIDOCAINE 4 G/G
PATCH TOPICAL
Qty: 90 PATCH | Refills: 1 | Status: SHIPPED | OUTPATIENT
Start: 2021-10-04 | End: 2021-11-01 | Stop reason: HOSPADM

## 2021-10-04 RX ORDER — FOLIC ACID 1 MG/1
2 TABLET ORAL
Qty: 180 TABLET | Refills: 3 | Status: SHIPPED | OUTPATIENT
Start: 2021-10-04 | End: 2022-04-06 | Stop reason: SDUPTHER

## 2021-10-04 RX ORDER — FOLIC ACID 1 MG/1
2 TABLET ORAL
Qty: 180 TABLET | Refills: 3 | Status: SHIPPED | OUTPATIENT
Start: 2021-10-04 | End: 2021-10-04 | Stop reason: SDUPTHER

## 2021-10-05 ENCOUNTER — PATIENT OUTREACH (OUTPATIENT)
Dept: OBGYN CLINIC | Facility: HOSPITAL | Age: 66
End: 2021-10-05

## 2021-10-05 DIAGNOSIS — M19.90 ARTHRITIS: ICD-10-CM

## 2021-10-19 DIAGNOSIS — M79.10 MYALGIA: ICD-10-CM

## 2021-10-20 ENCOUNTER — TELEPHONE (OUTPATIENT)
Dept: OBGYN CLINIC | Facility: HOSPITAL | Age: 66
End: 2021-10-20

## 2021-10-25 RX ORDER — CYCLOBENZAPRINE HCL 10 MG
TABLET ORAL
Qty: 60 TABLET | Refills: 0 | Status: SHIPPED | OUTPATIENT
Start: 2021-10-25 | End: 2021-11-01 | Stop reason: HOSPADM

## 2021-10-27 DIAGNOSIS — K58.0 IRRITABLE BOWEL SYNDROME WITH DIARRHEA: ICD-10-CM

## 2021-10-28 RX ORDER — DICYCLOMINE HCL 20 MG
TABLET ORAL
Qty: 120 TABLET | Refills: 1 | Status: SHIPPED | OUTPATIENT
Start: 2021-10-28 | End: 2021-12-27

## 2021-11-01 ENCOUNTER — OFFICE VISIT (OUTPATIENT)
Dept: OBGYN CLINIC | Facility: CLINIC | Age: 66
End: 2021-11-01
Payer: MEDICARE

## 2021-11-01 VITALS — BODY MASS INDEX: 31.09 KG/M2 | SYSTOLIC BLOOD PRESSURE: 162 MMHG | HEIGHT: 62 IN | DIASTOLIC BLOOD PRESSURE: 80 MMHG

## 2021-11-01 DIAGNOSIS — M19.031 ARTHRITIS OF RIGHT WRIST: Primary | ICD-10-CM

## 2021-11-01 PROCEDURE — 99214 OFFICE O/P EST MOD 30 MIN: CPT | Performed by: ORTHOPAEDIC SURGERY

## 2021-11-01 RX ORDER — CLINDAMYCIN PHOSPHATE 900 MG/50ML
900 INJECTION INTRAVENOUS ONCE
Status: CANCELLED | OUTPATIENT
Start: 2022-01-20 | End: 2021-11-01

## 2021-11-04 ENCOUNTER — OFFICE VISIT (OUTPATIENT)
Dept: GASTROENTEROLOGY | Facility: CLINIC | Age: 66
End: 2021-11-04
Payer: MEDICARE

## 2021-11-04 VITALS — HEIGHT: 62 IN | BODY MASS INDEX: 31.09 KG/M2 | SYSTOLIC BLOOD PRESSURE: 138 MMHG | DIASTOLIC BLOOD PRESSURE: 80 MMHG

## 2021-11-04 DIAGNOSIS — R19.7 DIARRHEA, UNSPECIFIED TYPE: Primary | ICD-10-CM

## 2021-11-04 PROCEDURE — 99214 OFFICE O/P EST MOD 30 MIN: CPT | Performed by: NURSE PRACTITIONER

## 2021-11-04 RX ORDER — CHOLESTYRAMINE 4 G/9G
1 POWDER, FOR SUSPENSION ORAL
Qty: 30 PACKET | Refills: 2 | Status: SHIPPED | OUTPATIENT
Start: 2021-11-04 | End: 2022-02-03

## 2021-11-16 DIAGNOSIS — M79.10 MYALGIA: ICD-10-CM

## 2021-11-17 RX ORDER — CYCLOBENZAPRINE HCL 10 MG
TABLET ORAL
Qty: 60 TABLET | Refills: 0 | Status: SHIPPED | OUTPATIENT
Start: 2021-11-17 | End: 2021-12-15

## 2021-11-27 DIAGNOSIS — Z01.818 PREOP TESTING: ICD-10-CM

## 2021-11-27 DIAGNOSIS — M17.12 PRIMARY OSTEOARTHRITIS OF LEFT KNEE: ICD-10-CM

## 2021-11-29 RX ORDER — FERROUS SULFATE TAB EC 324 MG (65 MG FE EQUIVALENT) 324 (65 FE) MG
TABLET DELAYED RESPONSE ORAL
Qty: 60 TABLET | Refills: 1 | Status: SHIPPED | OUTPATIENT
Start: 2021-11-29 | End: 2022-02-08

## 2021-12-01 DIAGNOSIS — R11.0 NAUSEA: Primary | ICD-10-CM

## 2021-12-10 RX ORDER — ONDANSETRON 4 MG/1
4 TABLET, FILM COATED ORAL EVERY 8 HOURS PRN
Qty: 20 TABLET | Refills: 0 | Status: SHIPPED | OUTPATIENT
Start: 2021-12-10 | End: 2022-01-21 | Stop reason: SDUPTHER

## 2021-12-14 DIAGNOSIS — M19.90 ARTHRITIS: ICD-10-CM

## 2021-12-14 DIAGNOSIS — Z01.818 PREOP TESTING: ICD-10-CM

## 2021-12-14 DIAGNOSIS — M17.12 PRIMARY OSTEOARTHRITIS OF LEFT KNEE: ICD-10-CM

## 2021-12-14 DIAGNOSIS — M79.10 MYALGIA: ICD-10-CM

## 2021-12-15 RX ORDER — ASCORBIC ACID 500 MG
TABLET ORAL
Qty: 56 TABLET | Refills: 1 | Status: SHIPPED | OUTPATIENT
Start: 2021-12-15 | End: 2022-02-08

## 2021-12-15 RX ORDER — CYCLOBENZAPRINE HCL 10 MG
TABLET ORAL
Qty: 56 TABLET | Refills: 1 | Status: SHIPPED | OUTPATIENT
Start: 2021-12-15 | End: 2022-01-19

## 2021-12-15 RX ORDER — LEFLUNOMIDE 10 MG/1
TABLET ORAL
Qty: 28 TABLET | Refills: 1 | Status: SHIPPED | OUTPATIENT
Start: 2021-12-15 | End: 2022-02-03

## 2021-12-20 ENCOUNTER — TELEMEDICINE (OUTPATIENT)
Dept: RHEUMATOLOGY | Facility: CLINIC | Age: 66
End: 2021-12-20
Payer: MEDICARE

## 2021-12-20 DIAGNOSIS — M13.0 POLYARTHRITIS: Primary | ICD-10-CM

## 2021-12-20 PROCEDURE — 99442 PR PHYS/QHP TELEPHONE EVALUATION 11-20 MIN: CPT | Performed by: INTERNAL MEDICINE

## 2021-12-22 DIAGNOSIS — K58.0 IRRITABLE BOWEL SYNDROME WITH DIARRHEA: ICD-10-CM

## 2021-12-27 RX ORDER — DICYCLOMINE HCL 20 MG
TABLET ORAL
Qty: 112 TABLET | Refills: 1 | Status: SHIPPED | OUTPATIENT
Start: 2021-12-27 | End: 2022-05-06 | Stop reason: SDUPTHER

## 2022-01-01 DIAGNOSIS — Z89.511 S/P BKA (BELOW KNEE AMPUTATION) UNILATERAL, RIGHT (HCC): Primary | ICD-10-CM

## 2022-01-01 DIAGNOSIS — R05.9 COUGH: Primary | ICD-10-CM

## 2022-01-01 DIAGNOSIS — R50.9 FEVER, UNSPECIFIED FEVER CAUSE: ICD-10-CM

## 2022-01-01 DIAGNOSIS — R11.14 BILIOUS VOMITING WITH NAUSEA: ICD-10-CM

## 2022-01-01 DIAGNOSIS — Z00.00 HEALTH CARE MAINTENANCE: Primary | ICD-10-CM

## 2022-01-01 DIAGNOSIS — I10 PRIMARY HYPERTENSION: Primary | ICD-10-CM

## 2022-01-01 LAB
LEFT EYE DIABETIC RETINOPATHY: NORMAL
RIGHT EYE DIABETIC RETINOPATHY: NORMAL

## 2022-01-01 RX ORDER — ADHESIVE BANDAGE 3/4"
BANDAGE TOPICAL DAILY PRN
Qty: 1 EACH | Refills: 0 | Status: SHIPPED | OUTPATIENT
Start: 2022-01-01

## 2022-01-01 RX ORDER — MULTIVITAMIN WITH FOLIC ACID 400 MCG
1 TABLET ORAL DAILY
Qty: 30 TABLET | Refills: 5 | Status: SHIPPED | OUTPATIENT
Start: 2022-01-01 | End: 2023-01-01

## 2022-01-01 RX ORDER — ONDANSETRON 4 MG/1
4 TABLET, ORALLY DISINTEGRATING ORAL EVERY 6 HOURS PRN
Qty: 40 TABLET | Refills: 0 | Status: SHIPPED | OUTPATIENT
Start: 2022-01-01 | End: 2022-01-01

## 2022-01-01 RX ORDER — BENZONATATE 100 MG/1
100 CAPSULE ORAL 3 TIMES DAILY PRN
Qty: 30 CAPSULE | Refills: 0 | Status: SHIPPED | OUTPATIENT
Start: 2022-01-01 | End: 2022-01-01 | Stop reason: SDUPTHER

## 2022-01-12 ENCOUNTER — APPOINTMENT (OUTPATIENT)
Dept: PREADMISSION TESTING | Facility: HOSPITAL | Age: 67
End: 2022-01-12
Payer: COMMERCIAL

## 2022-01-12 ENCOUNTER — TELEPHONE (OUTPATIENT)
Dept: RHEUMATOLOGY | Facility: CLINIC | Age: 67
End: 2022-01-12

## 2022-01-12 ENCOUNTER — ANESTHESIA EVENT (OUTPATIENT)
Dept: PERIOP | Facility: HOSPITAL | Age: 67
End: 2022-01-12
Payer: COMMERCIAL

## 2022-01-12 NOTE — TELEPHONE ENCOUNTER
Pt called and left a voicemail requesting for Dr Alex Glover to send her bloodwork papers over to [facility]: [phone]

## 2022-01-14 ENCOUNTER — PREP FOR PROCEDURE (OUTPATIENT)
Dept: OBGYN CLINIC | Facility: CLINIC | Age: 67
End: 2022-01-14

## 2022-01-17 ENCOUNTER — TELEPHONE (OUTPATIENT)
Dept: OBGYN CLINIC | Facility: HOSPITAL | Age: 67
End: 2022-01-17

## 2022-01-17 NOTE — TELEPHONE ENCOUNTER
Dr Gabrielle Mary  RE: Surgical    Patient asked to speak to Dr Flex Corral coordinator regarding pre surgical clearance for 01/20

## 2022-01-18 NOTE — PRE-PROCEDURE INSTRUCTIONS
Pre-Surgery Instructions:  Have you had / have a sore throat? No  have you had / have a cough less than 1 week? no  Have you had / have a fever greater than 100 0 - 100  4? no  Are you experiencing any shortness of breath? No    Pre procedure instructions given, verbalizes understanding awaiting TOS call on 1/19       Medication Instructions    acetaminophen (TYLENOL) 325 mg tablet Instructed patient per Anesthesia Guidelines   albuterol (PROVENTIL HFA,VENTOLIN HFA) 90 mcg/act inhaler Instructed patient per Anesthesia Guidelines  will take on DOS    ascorbic acid (VITAMIN C) 500 MG tablet Instructed patient per Anesthesia Guidelines   atorvastatin (LIPITOR) 20 mg tablet Instructed patient per Anesthesia Guidelines   cyclobenzaprine (FLEXERIL) 10 mg tablet Instructed patient per Anesthesia Guidelines  Will take on DOS    dicyclomine (BENTYL) 20 mg tablet Instructed patient per Anesthesia Guidelines   ferrous sulfate 324 (65 Fe) mg Instructed patient per Anesthesia Guidelines   folic acid (FOLVITE) 1 mg tablet Instructed patient per Anesthesia Guidelines   gabapentin (NEURONTIN) 600 MG tablet Instructed patient per Anesthesia Guidelines  will take on DOS    Galcanezumab-gnlm (EMGALITY) 120 MG/ML SOSY Instructed patient per Anesthesia Guidelines   glipiZIDE (GLUCOTROL) 5 mg tablet Instructed patient per Anesthesia Guidelines  Hold on DOS    ipratropium-albuterol (DUO-NEB) 0 5-2 5 mg/3 mL nebulizer solution Instructed patient per Anesthesia Guidelines   leflunomide (ARAVA) 10 MG tablet Instructed patient per Anesthesia Guidelines   levothyroxine 150 mcg tablet Instructed patient per Anesthesia Guidelines  Will take on DOS    linaGLIPtin (Tradjenta) 5 MG TABS Instructed patient per Anesthesia Guidelines  hold 1/20    metFORMIN (GLUCOPHAGE) 500 mg tablet Instructed patient per Anesthesia Guidelines  Hold on 1/20    methotrexate 2 5 mg tablet Instructed patient per Anesthesia Guidelines  hold on 1/20    Multiple Vitamins-Minerals (multivitamin with minerals) tablet Instructed patient per Anesthesia Guidelines   pantoprazole (PROTONIX) 40 mg tablet Instructed patient per Anesthesia Guidelines  take on DOS    venlafaxine (EFFEXOR-XR) 150 mg 24 hr capsule Instructed patient per Anesthesia Guidelines  Take on DOS

## 2022-01-19 DIAGNOSIS — M79.10 MYALGIA: ICD-10-CM

## 2022-01-19 RX ORDER — CYCLOBENZAPRINE HCL 10 MG
TABLET ORAL
Qty: 56 TABLET | Refills: 1 | Status: SHIPPED | OUTPATIENT
Start: 2022-01-19 | End: 2022-04-03

## 2022-01-20 ENCOUNTER — HOSPITAL ENCOUNTER (OUTPATIENT)
Dept: RADIOLOGY | Facility: HOSPITAL | Age: 67
Setting detail: OUTPATIENT SURGERY
Discharge: HOME | End: 2022-01-20
Payer: COMMERCIAL

## 2022-01-20 ENCOUNTER — ANESTHESIA (OUTPATIENT)
Dept: PERIOP | Facility: HOSPITAL | Age: 67
End: 2022-01-20
Payer: COMMERCIAL

## 2022-01-20 ENCOUNTER — HOSPITAL ENCOUNTER (OUTPATIENT)
Facility: HOSPITAL | Age: 67
Setting detail: OUTPATIENT SURGERY
Discharge: HOME | End: 2022-01-20
Attending: ORTHOPAEDIC SURGERY | Admitting: ORTHOPAEDIC SURGERY
Payer: COMMERCIAL

## 2022-01-20 VITALS
SYSTOLIC BLOOD PRESSURE: 101 MMHG | HEIGHT: 67 IN | HEART RATE: 114 BPM | WEIGHT: 195.5 LBS | RESPIRATION RATE: 18 BRPM | TEMPERATURE: 97.7 F | BODY MASS INDEX: 30.69 KG/M2 | DIASTOLIC BLOOD PRESSURE: 57 MMHG | OXYGEN SATURATION: 93 %

## 2022-01-20 DIAGNOSIS — M19.039 WRIST ARTHROPATHY: ICD-10-CM

## 2022-01-20 DIAGNOSIS — M19.131 SLAC (SCAPHOLUNATE ADVANCED COLLAPSE) OF WRIST, RIGHT: Primary | ICD-10-CM

## 2022-01-20 PROBLEM — J44.9 COPD (CHRONIC OBSTRUCTIVE PULMONARY DISEASE) (HCC): Status: ACTIVE | Noted: 2021-01-08

## 2022-01-20 PROBLEM — M06.9 RHEUMATOID ARTHRITIS (HCC): Status: ACTIVE | Noted: 2021-01-08

## 2022-01-20 LAB
GLUCOSE SERPL-MCNC: 117 MG/DL (ref 65–140)
GLUCOSE SERPL-MCNC: 138 MG/DL (ref 65–140)

## 2022-01-20 PROCEDURE — C1713 ANCHOR/SCREW BN/BN,TIS/BN: HCPCS | Performed by: ORTHOPAEDIC SURGERY

## 2022-01-20 PROCEDURE — 73110 X-RAY EXAM OF WRIST: CPT

## 2022-01-20 PROCEDURE — 25310 TRANSPLANT FOREARM TENDON: CPT | Performed by: ORTHOPAEDIC SURGERY

## 2022-01-20 PROCEDURE — 82948 REAGENT STRIP/BLOOD GLUCOSE: CPT

## 2022-01-20 PROCEDURE — NC001 PR NO CHARGE: Performed by: ORTHOPAEDIC SURGERY

## 2022-01-20 PROCEDURE — 64772 INCISION OF SPINAL NERVE: CPT | Performed by: ORTHOPAEDIC SURGERY

## 2022-01-20 PROCEDURE — 25810 ARTHRD WRST ILIAC/OTH AGRFT: CPT | Performed by: ORTHOPAEDIC SURGERY

## 2022-01-20 PROCEDURE — C1781 MESH (IMPLANTABLE): HCPCS | Performed by: ORTHOPAEDIC SURGERY

## 2022-01-20 DEVICE — 3.5MM STARDRIVE CORTEX SCREW SELF-TAPPING 20MM: Type: IMPLANTABLE DEVICE | Site: WRIST | Status: FUNCTIONAL

## 2022-01-20 DEVICE — 2.7MM LOCKING SCREW SLF-TPNG WITH T8 STARDRIVE RECESS 14MM: Type: IMPLANTABLE DEVICE | Site: WRIST | Status: FUNCTIONAL

## 2022-01-20 DEVICE — 2.7MM LOCKING SCREW SLF-TPNG WITH T8 STARDRIVE RECESS 20MM: Type: IMPLANTABLE DEVICE | Site: WRIST | Status: FUNCTIONAL

## 2022-01-20 DEVICE — 3.5MM STARDRIVE CORTEX SCREW SELF-TAPPING 18MM: Type: IMPLANTABLE DEVICE | Site: WRIST | Status: FUNCTIONAL

## 2022-01-20 DEVICE — 3.5MM STARDRIVE CORTEX SCREW SELF-TAPPING 16MM: Type: IMPLANTABLE DEVICE | Site: WRIST | Status: FUNCTIONAL

## 2022-01-20 DEVICE — GRAFT BONE CRUSHED CANC CHIPS 0.1-4MM 30ML FREEZE DRIED: Type: IMPLANTABLE DEVICE | Site: WRIST | Status: FUNCTIONAL

## 2022-01-20 DEVICE — LCP WRIST FUSION SHORT BEND PLATE
Type: IMPLANTABLE DEVICE | Site: WRIST | Status: FUNCTIONAL
Brand: LCP

## 2022-01-20 DEVICE — 3.5MM CORTEX SCREW SELF-TAPPING 16MM: Type: IMPLANTABLE DEVICE | Site: WRIST | Status: FUNCTIONAL

## 2022-01-20 DEVICE — 2.7MM CORTEX SCREW SLF-TPNG WITH T8 STARDRIVE RECESS 14MM: Type: IMPLANTABLE DEVICE | Site: WRIST | Status: FUNCTIONAL

## 2022-01-20 RX ORDER — LIDOCAINE HYDROCHLORIDE 10 MG/ML
INJECTION, SOLUTION EPIDURAL; INFILTRATION; INTRACAUDAL; PERINEURAL
Status: COMPLETED | OUTPATIENT
Start: 2022-01-20 | End: 2022-01-20

## 2022-01-20 RX ORDER — FENTANYL CITRATE 50 UG/ML
INJECTION, SOLUTION INTRAMUSCULAR; INTRAVENOUS AS NEEDED
Status: DISCONTINUED | OUTPATIENT
Start: 2022-01-20 | End: 2022-01-20

## 2022-01-20 RX ORDER — ROPIVACAINE HYDROCHLORIDE 5 MG/ML
INJECTION, SOLUTION EPIDURAL; INFILTRATION; PERINEURAL
Status: COMPLETED | OUTPATIENT
Start: 2022-01-20 | End: 2022-01-20

## 2022-01-20 RX ORDER — LIDOCAINE HYDROCHLORIDE 10 MG/ML
0.5 INJECTION, SOLUTION EPIDURAL; INFILTRATION; INTRACAUDAL; PERINEURAL ONCE AS NEEDED
Status: DISCONTINUED | OUTPATIENT
Start: 2022-01-20 | End: 2022-01-20 | Stop reason: HOSPADM

## 2022-01-20 RX ORDER — ONDANSETRON 2 MG/ML
INJECTION INTRAMUSCULAR; INTRAVENOUS AS NEEDED
Status: DISCONTINUED | OUTPATIENT
Start: 2022-01-20 | End: 2022-01-20

## 2022-01-20 RX ORDER — FENTANYL CITRATE 50 UG/ML
INJECTION, SOLUTION INTRAMUSCULAR; INTRAVENOUS
Status: COMPLETED | OUTPATIENT
Start: 2022-01-20 | End: 2022-01-20

## 2022-01-20 RX ORDER — PROPOFOL 10 MG/ML
INJECTION, EMULSION INTRAVENOUS AS NEEDED
Status: DISCONTINUED | OUTPATIENT
Start: 2022-01-20 | End: 2022-01-20

## 2022-01-20 RX ORDER — MIDAZOLAM HYDROCHLORIDE 2 MG/2ML
INJECTION, SOLUTION INTRAMUSCULAR; INTRAVENOUS
Status: COMPLETED
Start: 2022-01-20 | End: 2022-01-20

## 2022-01-20 RX ORDER — SODIUM CHLORIDE, SODIUM LACTATE, POTASSIUM CHLORIDE, CALCIUM CHLORIDE 600; 310; 30; 20 MG/100ML; MG/100ML; MG/100ML; MG/100ML
125 INJECTION, SOLUTION INTRAVENOUS CONTINUOUS
Status: DISCONTINUED | OUTPATIENT
Start: 2022-01-20 | End: 2022-01-20 | Stop reason: HOSPADM

## 2022-01-20 RX ORDER — OXYCODONE HYDROCHLORIDE 5 MG/1
5 TABLET ORAL EVERY 6 HOURS PRN
Qty: 10 TABLET | Refills: 0 | Status: SHIPPED | OUTPATIENT
Start: 2022-01-20 | End: 2022-01-24 | Stop reason: SDUPTHER

## 2022-01-20 RX ORDER — SENNOSIDES 8.6 MG
650 CAPSULE ORAL EVERY 8 HOURS PRN
Qty: 30 TABLET | Refills: 0 | Status: SHIPPED | OUTPATIENT
Start: 2022-01-20 | End: 2022-01-31 | Stop reason: SDUPTHER

## 2022-01-20 RX ORDER — MAGNESIUM HYDROXIDE 1200 MG/15ML
LIQUID ORAL AS NEEDED
Status: DISCONTINUED | OUTPATIENT
Start: 2022-01-20 | End: 2022-01-20 | Stop reason: HOSPADM

## 2022-01-20 RX ORDER — FENTANYL CITRATE 50 UG/ML
INJECTION, SOLUTION INTRAMUSCULAR; INTRAVENOUS
Status: COMPLETED
Start: 2022-01-20 | End: 2022-01-20

## 2022-01-20 RX ORDER — MIDAZOLAM HYDROCHLORIDE 2 MG/2ML
INJECTION, SOLUTION INTRAMUSCULAR; INTRAVENOUS AS NEEDED
Status: DISCONTINUED | OUTPATIENT
Start: 2022-01-20 | End: 2022-01-20

## 2022-01-20 RX ORDER — DEXAMETHASONE SODIUM PHOSPHATE 10 MG/ML
INJECTION, SOLUTION INTRAMUSCULAR; INTRAVENOUS AS NEEDED
Status: DISCONTINUED | OUTPATIENT
Start: 2022-01-20 | End: 2022-01-20

## 2022-01-20 RX ORDER — CLINDAMYCIN PHOSPHATE 900 MG/50ML
900 INJECTION INTRAVENOUS ONCE
Status: COMPLETED | OUTPATIENT
Start: 2022-01-20 | End: 2022-01-20

## 2022-01-20 RX ORDER — ONDANSETRON 2 MG/ML
4 INJECTION INTRAMUSCULAR; INTRAVENOUS ONCE AS NEEDED
Status: DISCONTINUED | OUTPATIENT
Start: 2022-01-20 | End: 2022-01-20 | Stop reason: HOSPADM

## 2022-01-20 RX ORDER — MIDAZOLAM HYDROCHLORIDE 2 MG/2ML
INJECTION, SOLUTION INTRAMUSCULAR; INTRAVENOUS
Status: COMPLETED | OUTPATIENT
Start: 2022-01-20 | End: 2022-01-20

## 2022-01-20 RX ADMIN — ROPIVACAINE HYDROCHLORIDE 30 ML: 5 INJECTION, SOLUTION EPIDURAL; INFILTRATION; PERINEURAL at 12:40

## 2022-01-20 RX ADMIN — SODIUM CHLORIDE, SODIUM LACTATE, POTASSIUM CHLORIDE, AND CALCIUM CHLORIDE 125 ML/HR: .6; .31; .03; .02 INJECTION, SOLUTION INTRAVENOUS at 12:19

## 2022-01-20 RX ADMIN — LIDOCAINE HYDROCHLORIDE 3 ML: 10 INJECTION, SOLUTION EPIDURAL; INFILTRATION; INTRACAUDAL; PERINEURAL at 12:40

## 2022-01-20 RX ADMIN — FENTANYL CITRATE 100 MCG: 50 INJECTION, SOLUTION INTRAMUSCULAR; INTRAVENOUS at 12:40

## 2022-01-20 RX ADMIN — DEXAMETHASONE SODIUM PHOSPHATE 8 MG: 10 INJECTION, SOLUTION INTRAMUSCULAR; INTRAVENOUS at 13:09

## 2022-01-20 RX ADMIN — PHENYLEPHRINE HYDROCHLORIDE 50 MCG/MIN: 10 INJECTION INTRAVENOUS at 13:46

## 2022-01-20 RX ADMIN — PROPOFOL 200 MG: 10 INJECTION, EMULSION INTRAVENOUS at 13:04

## 2022-01-20 RX ADMIN — CLINDAMYCIN IN 5 PERCENT DEXTROSE 900 MG: 18 INJECTION, SOLUTION INTRAVENOUS at 12:57

## 2022-01-20 RX ADMIN — MIDAZOLAM 4 MG: 1 INJECTION INTRAMUSCULAR; INTRAVENOUS at 12:40

## 2022-01-20 RX ADMIN — ONDANSETRON 4 MG: 2 INJECTION INTRAMUSCULAR; INTRAVENOUS at 13:09

## 2022-01-20 NOTE — H&P
H&P Exam - Orthopedics   Roro Jalloh 61 y o  female MRN: [id number]  Unit/Bed#: APU 07    Assessment/Plan   Assessment:  Right SLAC wrist with concurrent rheumatoid arthritis and ulnar carpal subluxation    Plan:  Right total wrist fusion    History of Present Illness   HPI:  Roro Jalloh is a 61 y o  female who presents with right wrist pain and deformity  Patient has not had improvement with non-operative modalities  She would like to proceed with operative intervention      Historical Information  Review Of Systems:   · Skin: Normal  · Neuro: See HPI  · Musculoskeletal: See HPI  · 14 point review of systems negative except as stated above     Past Medical History:   Past Medical History:   Diagnosis Date    Achalasia     ADHD     Anemia     Anxiety     Anxiety and depression     Arthritis     [facility] narcotic dependence ([facility]     3 yrs    Chronic pain     chronic pain    Chronic pain disorder     Colon polyp     COPD (chronic obstructive pulmonary disease) ([facility])     Depression     Diabetes mellitus [facility] [address] [address] [facility]     Disease of thyroid gland     hypo    Esophageal spasm     Fibromyalgia     Fibromyalgia, primary     GERD (gastroesophageal reflux disease)     Hiatal hernia     Hyperlipidemia     Hypertension     Iron deficiency anemia     Irritable bowel syndrome     Migraine     Polysubstance abuse [facility] [address] [facility]     Psychiatric disorder     major depressive    RA (rheumatoid arthritis) ([facility] [facility]     Seasonal allergies        Past Surgical History:   Past Surgical History:   Procedure Laterality Date    AMPUTATION      RBKA    ANKLE FUSION      APPENDECTOMY      BELOW KNEE LEG AMPUTATION Right     CARPAL TUNNEL RELEASE  2021    left arm    CATARACT EXTRACTION       SECTION      COLONOSCOPY  2010    COLONOSCOPY  2019    diverticula, 3 mm polyp and 6 mm polyp in rectum, grade III internal hemorrhoids, hyperplastic polyp, 5 year recall 2024     EGD 08/18/2010    FOREARM FASCIOTOMY Right     FRACTURE SURGERY      HYSTERECTOMY      KNEE ARTHROSCOPY Left     ORTHOPEDIC SURGERY      ID REVISE ULNAR NERVE AT ELBOW Left 1/11/2021    Procedure: Left carpal and cubital tunnel release;  Surgeon: Fabian Miller MD;  Location: [address] [place] [address] OR;  Service: Orthopedics    UPPER GASTROINTESTINAL ENDOSCOPY         Family History:  Family history reviewed and non-contributory  Family History   Problem Relation Age of Onset    Ulcerative colitis Mother     Lung cancer Mother     Diabetes Mother     Alcohol abuse Father     Diabetes Sister     Cancer Brother     Cervical cancer Daughter     Colon polyps Neg Hx     Colon cancer Neg Hx        Social History:  Social History     Socioeconomic History    Marital status:      Spouse name: None    Number of children: None    Years of education: None    Highest education level: None   Occupational History    None   Tobacco Use    Smoking status: Current Every Day Smoker     Packs/day: 0 04     Types: Cigarettes     Start date: 1/1/1977    Smokeless tobacco: Never Used    Tobacco comment: 3 cigs a day   Vaping Use    Vaping Use: Never used   Substance and Sexual Activity    Alcohol use: No    Drug use: Never    Sexual activity: Not Currently   Other Topics Concern    None   Social History Narrative    None     Social Determinants of Health     Financial Resource Strain: Not on file   Food Insecurity: Not on file   Transportation Needs: Not on file   Physical Activity: Not on file   Stress: Not on file   Social Connections: Not on file   Intimate Partner Violence: Not on file   Housing Stability: Not on file       Allergies: Allergies   Allergen Reactions    Morphine Itching and Anaphylaxis     u  Other reaction(s):  Other (See Comments)  u  Other reaction(s): Feeling agitated (finding)      Penicillins Anaphylaxis    Cephalexin      Other reaction(s): Unknown    Cephalosporins Hives     Other reaction(s): Unknown Reaction      Codeine Hives     Other reaction(s): Unknown  Other reaction(s): Unknown Reaction  Other reaction(s): Unknown      Levaquin [Levofloxacin] Hives    Nsaids GI Bleeding     Other reaction(s): Unknown Reaction    Aspirin Hives, Other (See Comments) and Rash     Other reaction(s): Unknown Reaction      Chlorhexidine Rash    Medical Tape Rash    Vancomycin Rash           Labs:  0   Lab Value Date/Time    HCT 45 0 09/30/2021 1106    HCT 37 1 06/07/2021 1047    HCT 50 0 (H) 08/18/2020 1303    HCT 48 8 (H) 05/23/2016 1134    HCT 45 4 03/27/2016 1650    HCT 47 1 (H) 03/26/2016 0851    HCT 30 1 (L) 04/01/2015 0650    HCT 34 3 (L) 03/30/2015 1200    HGB 13 9 09/30/2021 1106    HGB 11 2 (L) 06/07/2021 1047    HGB 14 5 08/18/2020 1303    HGB 15 5 05/23/2016 1134    HGB 15 0 03/27/2016 1650    HGB 15 8 (H) 03/26/2016 0851    HGB 8 7 (L) 04/01/2015 0650    HGB 10 3 (L) 03/30/2015 1200    INR 0 98 03/26/2016 0851    WBC 9 8 09/30/2021 1106    WBC 12 4 (H) 06/07/2021 1047    WBC 12 65 (H) 08/18/2020 1303    WBC 11 7 (H) 05/23/2016 1134    WBC 15 75 (H) 03/27/2016 1650    WBC 11 32 (H) 03/26/2016 0851    WBC 11 00 (H) 04/01/2015 0650    WBC 19 68 (H) 03/30/2015 1200    ESR 77 (H) 08/18/2020 1303    CRP 62 4 (H) 06/07/2021 1047    CRP 12 2 (H) 08/18/2020 1303       Meds:    Current Facility-Administered Medications:     clindamycin (CLEOCIN) IVPB (premix in dextrose) 900 mg 50 mL, 900 mg, Intravenous, Once, [facility]    lactated ringers infusion, 125 mL/hr, Intravenous, Continuous, Jesse Oliveira MD    lidocaine (PF) (XYLOCAINE-MPF) 1 % injection 0 5 mL, 0 5 mL, Infiltration, Once PRN, Jesse Oliveira MD    Blood Culture:   No results found for: BLOODCX    Wound Culture:   No results found for: WOUNDCULT    Ins and Outs:  No intake/output data recorded              Physical Exam  LMP  (LMP Unknown)   LMP  (LMP Unknown)   Gen: Alert and oriented to person, place, time  HEENT: EOMI, eyes clear, moist mucus membranes, hearing intact  Respiratory: Bilateral chest rise   No audible wheezing found  Cardiovascular: Regular Rate and Rhythm  Abdomen: soft nontender/nondistended  Ortho Exam: wrist flexion 10, wrist extension 50, wrist deformity and swelling  Neuro Exam: sensation intact    Lab Results: Reviewed  Imaging: Reviewed

## 2022-01-20 NOTE — DISCHARGE INSTRUCTIONS
Post Operative Instructions    You have had surgery on your arm today, please read and follow the information below:  · Elevate your hand above your elbow during the next 24-48 hours to help with swelling  · Place your hand and arm over your head with motion at your shoulder three times a day  · Do not apply any cream/ointment/oil to your incisions including antibiotics  · Do not soak your hands in standing water (dishwater, tubs, Jacuzzi's, pools, etc ) until given permission (typically 2-3 weeks after injury)    Call the office if you notice any:  · Increased numbness or tingling of your hand or fingers that is not relieved with elevation  · Increasing pain that is not controlled with medication  · Difficulty chewing, breathing, swallowing  · Chest pains or shortness of breath  · Fever over 101 4 degrees  Bandage: Do NOT remove bandage until follow-up appointment  Motion: Move fingers into a fist 5 times a day, DO NOT move any splinted fingers  Weight bearing status: The operated extremity should be non-weight bearing until further notice  Ice: Ice for 10 minutes every hour as needed for swelling x 24 hours  Sling: Sling for comfort for 2-3 days  Medications:   Tylenol Extended Release 650 mg every 8 hours  Norco/Hydrocodone one tab every 6 hours AS NEEDED for pain     Follow-up Appointment: 7-10 days  Please call the office if you have any questions or concerns regarding your post-operative care

## 2022-01-20 NOTE — OP NOTE
OPERATIVE REPORT  PATIENT NAME: Alexi Rojas  :  1955  MRN: [id number]  Pt Location:  MAIN OR    SURGERY DATE: 22    Surgeon(s) and Role:     * Edison Scott MD - Primary     * Ernestina Salguero [place] - [address], MD - Assisting    Pre-Op Diagnosis:  Arthritis of right wrist [M19 031]    Post-Op Diagnosis:    Arthritis of right wrist [M19 031]    Procedure(s) (LRB):  Total wrist fusion of the right wrist (Right)  Right wrist posterior interosseous nerve neurectomy (Right)  Extensor pollicis longus tendon transposition right wrist (N/A)    Specimen(s):  * No orders in the log *    Estimated Blood Loss:   Minimal      Anesthesia Type:   General    Operative Indications: The patient has a history of significant inflammatory arthritis of the right wrist with ulnar subluxation scapholunate advanced collapse arthritic change within the radiocarpal and midcarpal joints that was recalcitrant to conservative management  The decision was made to bring the patient to the operating room for right total wrist arthrodesis  Risks of the procedure were explained which include, but are not limited to bleeding; infection; damage to nerves, arteries,veins, tendons; scar; pain; need for reoperation; failure to give desired result; and risks of anaesthesia  All questions were answered to satisfaction and they were willing to proceed  Operative Findings:  Ulnocarpal subluxation, scapholunate advanced collapse, significant arthritic change localized to the radial carpal and midcarpal joint    Complications:   None    Procedure and Technique:  After the patient, site, and procedure were identified, the patient was brought into the operating room in a supine position  Regional and general anaesthesia were provided  A well padded tourniquet was applied to the extremity, set at 250 mmHg    The  right upper extremity was then prepped and drapped in a normal, sterile, orthopedic fashion  After the patient, site, and procedure identified attention was turned towards the right arm  An Esmarch bandage was used to exsanguinate the limb the tourniquet was inflated to 250 mmHg  Dorsal incision made over the hand and wrist   We dissected down through skin subcutaneous tissues elevating full-thickness flaps on both the radial ulnar side  Care was taken maintain hemostasis  This is a liver this down to the level of the extensor retinaculum  Third extensor compartment was then opened  We then opened the 2nd, 4th, and 5th extensor compartments reflecting these tendons  Posterior interosseous nerve was then identified in a posterior interosseous nerve neurectomy was then performed for pain relief afterwards  T-shaped capsulotomy into the radiocarpal and midcarpal joint were then made  With significant synovitis noted and a synovectomy was performed  Significant arthritis noted on the proximal pole of the scaphoid as well as the proximal pole of the lunate, scaphoid facet of the distal radius lunate facet of the distal radius, proximal pole of the capitate and hamate  There was complete tear of the scapholunate interosseous ligament and ulnar subluxation of the lunate and triquetrum  The wrist was then mobilized, utilizing irrigation and the use of a knee intraoperative bur, the remaining articular surface on the proximal portion of the  scaphoid, and lunate were removed back down to healthy raw bleeding subchondral bone  We also removed the articular surface of the scaphoid and lunate facet of the distal radius, distal aspects of the scaphoid, lunate, and triquetrum as well as proximal aspects of the capitate and hamate  Once we were satisfied with removal of all articular cartilage, a small C-wire was then used to perforate the subchondral cortices to allow for increased blood supply  Wounds were copiously irrigated with sterile saline solution    We opened up the periosteum overlying the long finger with care taken to protect the extensor tendons as long as the dorsal shaft of the radius  Lisseth's tubercle was removed and the dorsal rim of the distal radius which shaped to allow for a Synthes locked wrist fusion plate  Radiocarpal and midcarpal joints were irrigated and packed with bone grafting  A small K-wire was then placed through the distal radius and used to secure the lunate into a reduced position both on the coronal and sagittal planes to allow for appropriate carpal alignment during the fusion  Proper plate was selected utilizing fluoroscopic guidance was placed into appropriate position  The fusion plate was secured with 1 bicortical nonlocked or and 2 bicortical locking screws to the metacarpal of the long finger  Plate was then secured to the distal radius in compression form with 4 bicortical nonlocking screws  The final oblique locking screw through the lunate into capitate was then placed through the plate  At the completion of the procedure, there was excellent compression and alignment  AP lateral and oblique radiographs confirmed good screw placement and hardware placement with excellent fusion and realignment  The stay wire was then removed and final x-rays were taken again confirming good hardware placement, reduction, and fusion  Joint was then copiously irrigated with sterile saline solution the capsule was closed utilizing 2-0 Ethibond in an interrupted fashion  Tourniquet was deflated  Wound was once again copiously irrigated and extensor retinaculum was repaired, the extensor pollicis longus tendon was transposed dorsally and radially  At the completion of the procedure, hemostasis was obtained with cautery and direct pressure  The wounds were copiously irrigated with sterile solution  The wounds were closed with Vicryl and Prolene  Sterile dressings were applied, including Xeroform, gauze, tweeners, webril, ACE    Please note, all sponge, needle, and instrument counts were correct prior to closure  Loupe magnification was utilized  The patient tolerated the procedure well       I was present for all critical portions of the procedure    Patient Disposition:  PACU , hemodynamically stable and extubated and stable    SIGNATURE: Jearldine Goodell, MD  DATE: 01/20/22  TIME: 2:58 PM

## 2022-01-20 NOTE — ANESTHESIA PREPROCEDURE EVALUATION
Procedure:  ARTHRODESIS/ FUSION WRIST - total wrist fusion (Right Wrist)    Relevant Problems   ANESTHESIA (within normal limits)      CARDIO   (+) Moderate mixed hyperlipidemia not requiring statin therapy      GI/HEPATIC   (+) Dysphagia   (-) Gastroesophageal reflux disease      MUSCULOSKELETAL   (+) Cervical spondylosis   (+) Fibromyalgia   (+) Rheumatoid arthritis (HCC)      NEURO/PSYCH   (+) Depression   (+) Fibromyalgia      PULMONARY   (+) COPD (chronic obstructive pulmonary disease) (HCC)   (+) Smoking   (-) Sleep apnea   (-) URI (upper respiratory infection)      Other   (+) S/P BKA (below knee amputation) unilateral, right (HCC)    BMI 31    Physical Exam    Airway    Mallampati score: II  TM Distance: >3 FB  Neck ROM: limited     Dental   upper dentures and lower dentures,     Cardiovascular      Pulmonary      Other Findings        Anesthesia Plan  ASA Score- 3     Anesthesia Type- general and regional with ASA Monitors  Additional Monitors:   Airway Plan: LMA  Comment: Long discussion option for block for post op pain control  Pt has significant neuropathy all fingers right hand preexisting, clumsy hand  [facility] Setting discussion possibility of nerve injury with block although her current symptoms sound more peripheral, unlikely to be coming from brachial plexus  Plan Factors-Exercise tolerance (METS): >4 METS  Chart reviewed  Existing labs reviewed  Patient summary reviewed  Patient is not a current smoker  Induction- intravenous  Postoperative Plan-     Informed Consent- Anesthetic plan and risks discussed with patient and sibling  I personally reviewed this patient with the CRNA  Discussed and agreed on the Anesthesia Plan with the CRNA  Deanna Thompson

## 2022-01-20 NOTE — ANESTHESIA PROCEDURE NOTES
Peripheral Block    Patient location during procedure: holding area  Start time: 1/20/2022 12:40 PM  Reason for block: at surgeon's request and post-op pain management  Staffing  Performed: Anesthesiologist   Anesthesiologist: Ashish Banks MD  Resident/CRNA: Carolyn Silva CRNA  Preanesthetic Checklist  Completed: patient identified, IV checked, site marked, risks and benefits discussed, surgical consent, monitors and equipment checked, pre-op evaluation and timeout performed  Peripheral Block  Patient position: sitting  Prep: Betadine  Patient monitoring: heart rate, continuous pulse ox and frequent blood pressure checks  Block type: supraclavicular  Laterality: right  Injection technique: single-shot  Procedures: ultrasound guided, Ultrasound guidance required for the procedure to increase accuracy and safety of medication placement and decrease risk of complications  Ultrasound permanent image savedropivacaine (NAROPIN) 0 5 % perineural infiltration, 30 mL  midazolam (VERSED) 2 mg/2 mL IV, 4 mg  fentaNYL 50 mcg/mL IV, 100 mcg  lidocaine (PF) (XYLOCAINE-MPF) 1 % infiltration, 3 mL  Needle  Needle type: Stimuplex   Needle gauge: 22 G  Needle length: 10 cm  Needle localization: ultrasound guidance  Assessment  Injection assessment: incremental injection, local visualized surrounding nerve on ultrasound, negative aspiration for heme and no paresthesia on injection  Paresthesia pain: none  Heart rate change: no  Slow fractionated injection: yes  Post-procedure:  site cleaned  patient tolerated the procedure well with no immediate complications  Additional Notes  Pt severely anxious preop although verbally distractable  Calmer with premedication but continuously moving, talking during block, unable to follow directions to hold still  Easy visualization and injection but limited ability to safely optimize needle position/spread of local due to patient noncooperation    Appeared to have appropriate spread to cover surgical area and no concern for vascular puncture or PTX        Sister present for block

## 2022-01-20 NOTE — ANESTHESIA POSTPROCEDURE EVALUATION
Post-Op Assessment Note    CV Status:  Stable    Pain management: adequate     Mental Status:  Awake   Hydration Status:  Stable   PONV Controlled:  Controlled   Airway Patency:  Patent      Post Op Vitals Reviewed: Yes      Staff: Anesthesiologist, CRNA         No complications documented      BP (P) 130/58 (01/20/22 1515)    Temp      Pulse (P) 96 (01/20/22 1515)   Resp (P) 18 (01/20/22 1515)    SpO2 (P) 95 % (01/20/22 1515)

## 2022-01-21 ENCOUNTER — TELEPHONE (OUTPATIENT)
Dept: OBGYN CLINIC | Facility: CLINIC | Age: 67
End: 2022-01-21

## 2022-01-21 DIAGNOSIS — R11.0 NAUSEA: ICD-10-CM

## 2022-01-21 RX ORDER — ONDANSETRON 4 MG/1
4 TABLET, FILM COATED ORAL EVERY 8 HOURS PRN
Qty: 20 TABLET | Refills: 0 | Status: SHIPPED | OUTPATIENT
Start: 2022-01-21 | End: 2022-05-06

## 2022-01-21 NOTE — TELEPHONE ENCOUNTER
Pt left  mss stating she needs her Zofran refilled today; had surgery and her stomach is upset from pain; needs Rx phoned in to [facility] today so they can deliver tomorrow w/ other meds/has no other refills on the other bottle  # [phone]

## 2022-01-21 NOTE — TELEPHONE ENCOUNTER
Dr Julian Asp called to request you send her lab scripts to [address] [place] [address] fax  [phone]  Please call her once sent so she can make an appointment  [phone]    Thank you

## 2022-01-22 ENCOUNTER — TELEPHONE (OUTPATIENT)
Dept: OBGYN CLINIC | Facility: HOSPITAL | Age: 67
End: 2022-01-22

## 2022-01-22 NOTE — TELEPHONE ENCOUNTER
Patient sees Dr Charlie Burgos  Patients care giver Estephanie Cardenas is calling in stating that she had surgery on 1/20/22 to her right hand  She is in a lot of pain and is asking if she is able to get a medication refill of Oxycodone 5 mgs- she stated that this is not touching the pain at all for her please advise  She is asking if this can be sent to [address] on file and for a call back relating this at [phone]  Information forwarded over to on call  to assist further

## 2022-01-24 ENCOUNTER — TELEPHONE (OUTPATIENT)
Dept: OBGYN CLINIC | Facility: HOSPITAL | Age: 67
End: 2022-01-24

## 2022-01-24 DIAGNOSIS — M19.131 SLAC (SCAPHOLUNATE ADVANCED COLLAPSE) OF WRIST, RIGHT: ICD-10-CM

## 2022-01-24 RX ORDER — OXYCODONE HYDROCHLORIDE 5 MG/1
5 TABLET ORAL EVERY 6 HOURS PRN
Qty: 20 TABLET | Refills: 0 | Status: SHIPPED | OUTPATIENT
Start: 2022-01-24 | End: 2022-01-31 | Stop reason: SDUPTHER

## 2022-01-24 NOTE — TELEPHONE ENCOUNTER
Pt contacted Call Center requested refill of their medication  Medication Name:Oxycodone                                  Acetaminophen(tylenol)    Dosage of Med:Oxycodone 5mg  Tylenol 650 mg    Frequency of Med:oxycodone every 6 hrs  Tylenol every 8 hrs      Remaining Medication:Oxycodone 0  Tylenol a few    Pharmacy and [address] [place], [place] - [address] [facility] [address]   Phone:  [phone]  Fax:  [phone]         Pt   Preferred Callback Phone Ar Mcallister

## 2022-01-31 ENCOUNTER — APPOINTMENT (OUTPATIENT)
Dept: RADIOLOGY | Facility: CLINIC | Age: 67
End: 2022-01-31
Payer: COMMERCIAL

## 2022-01-31 ENCOUNTER — OFFICE VISIT (OUTPATIENT)
Dept: OBGYN CLINIC | Facility: CLINIC | Age: 67
End: 2022-01-31

## 2022-01-31 DIAGNOSIS — M19.131 SLAC (SCAPHOLUNATE ADVANCED COLLAPSE) OF WRIST, RIGHT: ICD-10-CM

## 2022-01-31 DIAGNOSIS — M19.131 SLAC (SCAPHOLUNATE ADVANCED COLLAPSE) OF WRIST, RIGHT: Primary | ICD-10-CM

## 2022-01-31 PROCEDURE — 99024 POSTOP FOLLOW-UP VISIT: CPT | Performed by: ORTHOPAEDIC SURGERY

## 2022-01-31 PROCEDURE — 73110 X-RAY EXAM OF WRIST: CPT

## 2022-01-31 RX ORDER — OXYCODONE HYDROCHLORIDE 5 MG/1
5 TABLET ORAL EVERY 6 HOURS PRN
Qty: 10 TABLET | Refills: 0 | Status: SHIPPED | OUTPATIENT
Start: 2022-01-31 | End: 2022-02-03

## 2022-01-31 RX ORDER — SENNOSIDES 8.6 MG
650 CAPSULE ORAL EVERY 8 HOURS PRN
Qty: 30 TABLET | Refills: 0 | Status: SHIPPED | OUTPATIENT
Start: 2022-01-31 | End: 2022-03-04

## 2022-01-31 NOTE — PROGRESS NOTES
Assessment:   S/P Total wrist fusion of the right wrist - Right, Right wrist posterior interosseous nerve neurectomy - Right, and Extensor pollicis longus tendon transposition right wrist on 1/20/2022    Plan: It was discussed with the patient that they may now begin to wash their incision site with soap and water  They were instructed to let the steri-strips fall off on their own at this point in time and when they do they may begin to apply lotion to their incision site  Lotion enriched in vitamin E, coca butter, scarzone and maderma was discussed with the patient  A 2lb lifting restriction was discussed with the patient  Follow Up:  4  week(s)    To Do Next Visit:  Repeat exam   and X-rays of the  right  wrist      CHIEF COMPLAINT:  Chief Complaint   Patient presents with    Right Wrist - Post-op, Suture / Staple Removal     S/P Total wrist fusion of the right wrist DOS 1/20/22    Right Hand - Post-op, Suture / Staple Removal     S/P Right wrist posterior interosseous nerve neurectomy (Right Hand)       Extensor pollicis longus tendon transposition right wrist (N/A Arm Lower) DOS 1/20/22         SUBJECTIVE:  Goran Nava is a 61 y o  female who presents for follow up after Total wrist fusion of the right wrist - Right, Right wrist posterior interosseous nerve neurectomy - Right, and Extensor pollicis longus tendon transposition right wrist on 1/20/2022  Today patient has pain in the wrist  She denies any numbness or tinglng  She denies any signs of an infection  She denies any other acute complaints         PHYSICAL EXAMINATION:  Vital signs: LMP  (LMP Unknown)   General: well developed and well nourished, alert, oriented times 3 and appears comfortable  Psychiatric: Normal    MUSCULOSKELETAL EXAMINATION:  Incision: healed  Range of Motion: As expected  Neurovascular status: Neuro intact, good cap refill  Activity Restrictions: Restrictions: 2lb  Done today: Sutures out and Steri strips applied      STUDIES REVIEWED:  Images were reviewed in PACS by Dr Rose Mary Boyle and demonstrate: stable appearance of orthopedic implant  Patient still has defined joint lines         PROCEDURES PERFORMED:  Procedures  No Procedures performed today

## 2022-02-03 ENCOUNTER — OFFICE VISIT (OUTPATIENT)
Dept: FAMILY MEDICINE CLINIC | Facility: HOSPITAL | Age: 67
End: 2022-02-03
Payer: COMMERCIAL

## 2022-02-03 ENCOUNTER — TELEPHONE (OUTPATIENT)
Dept: FAMILY MEDICINE CLINIC | Facility: HOSPITAL | Age: 67
End: 2022-02-03

## 2022-02-03 VITALS — TEMPERATURE: 97.9 F | HEART RATE: 74 BPM | SYSTOLIC BLOOD PRESSURE: 164 MMHG | DIASTOLIC BLOOD PRESSURE: 58 MMHG

## 2022-02-03 DIAGNOSIS — E07.9 DISEASE OF THYROID GLAND: ICD-10-CM

## 2022-02-03 DIAGNOSIS — E78.2 MODERATE MIXED HYPERLIPIDEMIA NOT REQUIRING STATIN THERAPY: ICD-10-CM

## 2022-02-03 DIAGNOSIS — Z89.511 S/P BKA (BELOW KNEE AMPUTATION) UNILATERAL, RIGHT (HCC): ICD-10-CM

## 2022-02-03 DIAGNOSIS — M06.9 RHEUMATOID ARTHRITIS, INVOLVING UNSPECIFIED SITE, UNSPECIFIED WHETHER RHEUMATOID FACTOR PRESENT (HCC): ICD-10-CM

## 2022-02-03 DIAGNOSIS — F41.9 ANXIETY: ICD-10-CM

## 2022-02-03 DIAGNOSIS — Z23 ENCOUNTER FOR IMMUNIZATION: ICD-10-CM

## 2022-02-03 DIAGNOSIS — L50.9 HIVES: ICD-10-CM

## 2022-02-03 DIAGNOSIS — J44.9 CHRONIC OBSTRUCTIVE PULMONARY DISEASE, UNSPECIFIED COPD TYPE (HCC): ICD-10-CM

## 2022-02-03 DIAGNOSIS — F33.1 MAJOR DEPRESSIVE DISORDER, RECURRENT, MODERATE (HCC): ICD-10-CM

## 2022-02-03 DIAGNOSIS — Z12.31 ENCOUNTER FOR SCREENING MAMMOGRAM FOR MALIGNANT NEOPLASM OF BREAST: ICD-10-CM

## 2022-02-03 DIAGNOSIS — E28.39 MENOPAUSE OVARIAN FAILURE: ICD-10-CM

## 2022-02-03 DIAGNOSIS — E11.65 UNCONTROLLED TYPE 2 DIABETES MELLITUS WITH HYPERGLYCEMIA (HCC): Primary | ICD-10-CM

## 2022-02-03 PROBLEM — M86.9 OSTEOMYELITIS (HCC): Status: RESOLVED | Noted: 2021-05-20 | Resolved: 2022-02-03

## 2022-02-03 PROCEDURE — 99204 OFFICE O/P NEW MOD 45 MIN: CPT | Performed by: INTERNAL MEDICINE

## 2022-02-03 PROCEDURE — 90471 IMMUNIZATION ADMIN: CPT | Performed by: INTERNAL MEDICINE

## 2022-02-03 PROCEDURE — 90682 RIV4 VACC RECOMBINANT DNA IM: CPT | Performed by: INTERNAL MEDICINE

## 2022-02-03 PROCEDURE — 90732 PPSV23 VACC 2 YRS+ SUBQ/IM: CPT | Performed by: INTERNAL MEDICINE

## 2022-02-03 PROCEDURE — 90472 IMMUNIZATION ADMIN EACH ADD: CPT | Performed by: INTERNAL MEDICINE

## 2022-02-03 RX ORDER — TRIAMCINOLONE ACETONIDE 1 MG/G
CREAM TOPICAL
COMMUNITY

## 2022-02-03 RX ORDER — LORATADINE 10 MG/1
10 TABLET ORAL 2 TIMES DAILY
Qty: 180 TABLET | Refills: 0 | Status: SHIPPED | OUTPATIENT
Start: 2022-02-03

## 2022-02-03 RX ORDER — LORATADINE 10 MG/1
10 TABLET ORAL 2 TIMES DAILY
Start: 2022-02-03 | End: 2022-02-03 | Stop reason: SDUPTHER

## 2022-02-03 RX ORDER — GLIPIZIDE 5 MG/1
5 TABLET, FILM COATED, EXTENDED RELEASE ORAL DAILY
COMMUNITY
Start: 2022-01-19 | End: 2022-05-23 | Stop reason: SDUPTHER

## 2022-02-03 RX ORDER — LANCETS 30 GAUGE
EACH MISCELLANEOUS
COMMUNITY
Start: 2021-09-27

## 2022-02-03 NOTE — ASSESSMENT & PLAN NOTE
Lab Results   Component Value Date    HGBA1C 7 8 (H) 09/30/2021   DM type 2 with hyperglycemia - uncontrolled with A1c 7 8 - will get updated BW and will do urine test in office at next visit, con't current Metformin and Glipizide for now, foot exam done today, will discuss eye exam next visit, on statin but no ACE/ARB

## 2022-02-03 NOTE — ASSESSMENT & PLAN NOTE
Had levothyroxine adjusted recently - check levels prior to next visit - order given, con't current levothyroxine for now

## 2022-02-03 NOTE — PROGRESS NOTES
Assessment/Plan:    Uncontrolled type 2 diabetes mellitus with hyperglycemia (HCC)    Lab Results   Component Value Date    HGBA1C 7 8 (H) 09/30/2021   DM type 2 with hyperglycemia - uncontrolled with A1c 7 8 - will get updated BW and will do urine test in office at next visit, con't current Metformin and Glipizide for now, foot exam done today, will discuss eye exam next visit, on statin but no ACE/ARB    Disease of thyroid gland  Had levothyroxine adjusted recently - check levels prior to next visit - order given, con't current levothyroxine for now    COPD (chronic obstructive pulmonary disease) ([facility] )  No current s/sx of exacerbation but is wheezing, urged to restart Wixela daily and NOT use just prn, con't rescue inhaler prn, urged to quit smoking (only smoking some days)    Rheumatoid arthritis ([facility] )  Follows with Rheum, on MTX/folate, will follow    Major depressive disorder, recurrent, moderate ([place] [address] )  Depression appears controlled but anxiety not so much, wishes to switch psychiatrist, urged to contact pharmacy and get list of providers - pt starts crying and overwhelmed with idea, will reach out to complex care manager for assistance helping pt    Anxiety  Not controlled on current Effexor, needs psych and unhappy with current office, will reach out to care managers to assist pt    Moderate mixed hyperlipidemia not requiring statin therapy  FLP due - order given, con't current statin for now    S/P BKA (below knee amputation) unilateral, right ([facility] )  NOT done for DM but rather OM, stump well healed       Diagnoses and all orders for this visit:    Uncontrolled type 2 diabetes mellitus with hyperglycemia ([facility] )  -     Cancel: Microalbumin / creatinine urine ratio  -     HEMOGLOBIN A1C W/ EAG ESTIMATION;  Future  -     HEMOGLOBIN A1C W/ EAG ESTIMATION    Chronic obstructive pulmonary disease, unspecified COPD type (HCC)  -     CBC and differential  -     Comprehensive metabolic panel  -     Lipid panel  - TSH, 3rd generation with Free T4 reflex  -     Cancel: Microalbumin / creatinine urine ratio  -     HEMOGLOBIN A1C W/ EAG ESTIMATION; Future  -     HEMOGLOBIN A1C W/ EAG ESTIMATION    Rheumatoid arthritis, involving unspecified site, unspecified whether rheumatoid factor present (HCC)  -     CBC and differential  -     Comprehensive metabolic panel  -     Lipid panel  -     TSH, 3rd generation with Free T4 reflex  -     Cancel: Microalbumin / creatinine urine ratio  -     HEMOGLOBIN A1C W/ EAG ESTIMATION; Future  -     HEMOGLOBIN A1C W/ EAG ESTIMATION    Moderate mixed hyperlipidemia not requiring statin therapy  -     Lipid panel    Major depressive disorder, recurrent, moderate (HCC)  -     Ambulatory Referral to Complex Care Management Program; Future    Anxiety  -     Ambulatory Referral to Complex Care Management Program; Future    Hives  Comments:  Hives triggered by anxiety - urged to f/u with psych to address anxiety, unsure if taking loradine - will start taking bid - rx sent, re-eval at next appt  Orders:  -     Discontinue: Allergy Relief 10 MG tablet; Take 1 tablet (10 mg total) by mouth 2 (two) times a day  -     Allergy Relief 10 MG tablet; Take 1 tablet (10 mg total) by mouth 2 (two) times a day    S/P BKA (below knee amputation) unilateral, right (HCC)    Disease of thyroid gland    Menopause ovarian failure  -     DXA bone density spine hip and pelvis; Future    Encounter for screening mammogram for malignant neoplasm of breast  -     Mammo screening bilateral w 3d & cad; Future    Other orders  -     fluticasone-salmeterol (Wixela Inhub) 100-50 mcg/dose inhaler; Inhale 1 puff 2 (two) times a day  -     glipiZIDE (GLUCOTROL XL) 5 mg 24 hr tablet; Take 5 mg by mouth daily  -     triamcinolone (KENALOG) 0 1 % cream; Use as directed  -     Lancets MISC;  Check sugars once weekly      Colonoscopy 12/6/19- 5 yrs    Mammo 9/15 - order given    Dexa - no baseline in chart - order given    PAP s/p hysterectomy    A1C 9/21  DM foot exam - none in chart  DM eye exam - none in chart  FLP 6/21    Pt agreeable to flu and pneumovax    I have spent 55 minutes with Patient  today in which greater than 50% of this time was spent in counseling/coordination of care regarding Diagnostic results, Prognosis, Risks and benefits of tx options, Intructions for management, Patient and family education, Importance of tx compliance, Risk factor reductions and Impressions          Subjective:      Patient ID: Roque Rivero is a 61 y o  female  HPI Pt here to re-establish care  Was seeing Dr Josef Farrell     Pt has been diabetic for the past year or so  Was "borderline for some time"  She has had R BKA d/t OM and she states it was not related to DM  She has a RLE prosthesis but has had sores and pain from it  She has not had a DM foot exam or eye exam in some time  She notes no sore/ulcers in the L foot  She notes no sores/ulcers  She has d/x of ADHD and anxiety and depression  She is seeing a psychiatrist (Dr Carissa Santos)  She is on Effexor  She is not down/sad but she is very anxious  She feels her anxiety is not being addressed  She was on Adderall and Strattera in past   Unclear why they were stopped  She has hives from being upset  She has loratadine on her med list but unsure if she is taking it  She has dx of RA and OA  She is seeing a rheumatologist  She is on MTX and folate  She just had a R wrist fusion  She has hypothyroidism and is on levothyroxine  She states her levels were just adjusted  Review of Systems   Constitutional: Negative for chills and fever  HENT: Positive for postnasal drip and rhinorrhea  Negative for congestion  Eyes: Negative for pain and visual disturbance  Respiratory: Positive for wheezing  Negative for cough and shortness of breath  Cardiovascular: Negative for chest pain and palpitations     Gastrointestinal: Positive for abdominal pain, diarrhea and nausea  Negative for blood in stool and constipation  Genitourinary: Positive for difficulty urinating  Negative for dysuria  Musculoskeletal: Positive for arthralgias and gait problem  Negative for neck pain  Skin: Positive for rash  Negative for wound  Neurological: Positive for headaches  Negative for dizziness  Hematological: Does not bruise/bleed easily  Psychiatric/Behavioral: Negative for dysphoric mood  The patient is nervous/anxious  Objective:    /58   Pulse 74   Temp 97 9 °F (36 6 °C) (Tympanic)   LMP  (LMP Unknown)      Physical Exam  Vitals and nursing note reviewed  Constitutional:       General: She is not in acute distress  Appearance: She is well-developed  She is obese  She is not ill-appearing  HENT:      Head: Normocephalic and atraumatic  Right Ear: Tympanic membrane and external ear normal  There is no impacted cerumen  Left Ear: Tympanic membrane and external ear normal  There is no impacted cerumen  Eyes:      General:         Right eye: No discharge  Left eye: No discharge  Conjunctiva/sclera: Conjunctivae normal    Neck:      Trachea: No tracheal deviation  Cardiovascular:      Rate and Rhythm: Normal rate and regular rhythm  Pulses: no weak pulses          Dorsalis pedis pulses are 2+ on the left side  Heart sounds: Normal heart sounds  No murmur heard  No friction rub  Pulmonary:      Effort: Pulmonary effort is normal  No respiratory distress  Breath sounds: Wheezing present  No rhonchi or rales  Abdominal:      General: There is no distension  Palpations: Abdomen is soft  Tenderness: There is abdominal tenderness  There is no guarding or rebound  Musculoskeletal:      Cervical back: Neck supple  Left lower leg: No edema  Feet:    Feet:      Right foot: amputated     Left foot:      Skin integrity: Callus and dry skin present   No ulcer, skin breakdown, erythema or warmth  Skin:     General: Skin is warm  Coloration: Skin is not pale  Comments: Scabbed macules scattered on face   Neurological:      Mental Status: She is alert  Motor: No abnormal muscle tone  Psychiatric:         Thought Content: Thought content normal       Comments: Anxious and tearful at times       Patient's shoes and socks removed  Right Foot/Ankle   Right Foot Inspection  Amputation: amputation right foot     Left Foot/Ankle  Left Foot Inspection  Skin Exam: skin normal, skin intact, dry skin and callus  No warmth, no erythema, no maceration, normal color, no pre-ulcer and no ulcer  Sensory   Vibration: intact  Monofilament testing: diminished    Vascular  The left DP pulse is 2+       Assign Risk Category  No deformity present  Loss of protective sensation  No weak pulses  Risk: 1

## 2022-02-03 NOTE — ASSESSMENT & PLAN NOTE
No current s/sx of exacerbation but is wheezing, urged to restart Wixela daily and NOT use just prn, con't rescue inhaler prn, urged to quit smoking (only smoking some days)

## 2022-02-03 NOTE — ASSESSMENT & PLAN NOTE
Not controlled on current Effexor, needs psych and unhappy with current office, will reach out to care managers to assist pt

## 2022-02-03 NOTE — ASSESSMENT & PLAN NOTE
Depression appears controlled but anxiety not so much, wishes to switch psychiatrist, urged to contact pharmacy and get list of providers - pt starts crying and overwhelmed with idea, will reach out to complex care manager for assistance helping pt

## 2022-02-04 ENCOUNTER — TELEPHONE (OUTPATIENT)
Dept: ADMINISTRATIVE | Facility: OTHER | Age: 67
End: 2022-02-04

## 2022-02-04 NOTE — TELEPHONE ENCOUNTER
Upon review of the In Basket request we were able to locate, review, and update the patient chart as requested for CRC: Colonoscopy  Any additional questions or concerns should be emailed to the Practice Liaisons via [email or web address] email, please do not reply via In Basket      Thank you  Rolo Mcclellan

## 2022-02-04 NOTE — TELEPHONE ENCOUNTER
----- Message from Clementine Rosas MA sent at 2/3/2022 12:34 PM EST -----  Regarding: colonoscopy screening  02/03/22 12:34 PM    Hello, our patient Laurita Archibald has had CRC: Colonoscopy completed/performed  Please assist in updating the patient chart by pulling the document from the Media Tab  The date of service is 12/6/2019       Thank you,  Clementine Rosas MA  [phone]

## 2022-02-06 DIAGNOSIS — M17.12 PRIMARY OSTEOARTHRITIS OF LEFT KNEE: ICD-10-CM

## 2022-02-06 DIAGNOSIS — M19.90 ARTHRITIS: ICD-10-CM

## 2022-02-06 DIAGNOSIS — Z01.818 PREOP TESTING: ICD-10-CM

## 2022-02-07 RX ORDER — LEFLUNOMIDE 10 MG/1
TABLET ORAL
Qty: 28 TABLET | Refills: 1 | Status: SHIPPED | OUTPATIENT
Start: 2022-02-07 | End: 2022-03-16

## 2022-02-08 ENCOUNTER — TELEPHONE (OUTPATIENT)
Dept: OBGYN CLINIC | Facility: HOSPITAL | Age: 67
End: 2022-02-08

## 2022-02-08 RX ORDER — FERROUS SULFATE TAB EC 324 MG (65 MG FE EQUIVALENT) 324 (65 FE) MG
TABLET DELAYED RESPONSE ORAL
Qty: 60 TABLET | Refills: 1 | Status: SHIPPED | OUTPATIENT
Start: 2022-02-08 | End: 2022-04-04

## 2022-02-08 RX ORDER — ASCORBIC ACID 500 MG
TABLET ORAL
Qty: 56 TABLET | Refills: 1 | Status: SHIPPED | OUTPATIENT
Start: 2022-02-08 | End: 2022-04-04

## 2022-02-08 NOTE — TELEPHONE ENCOUNTER
Pt sees Dr Leola Moreira    Pt is calling stating that she has a weird feeling in her fingers and is still in pain   Pt is not sure if this is normal and would like a call back    #[phone]

## 2022-02-08 NOTE — TELEPHONE ENCOUNTER
Spoke to patient  She stated she started to notice a strange feeling in her fingers  Stated it feels like the sensation after your fingers are numb, she has feeling in them but the sensation is off  Reports fingers are normal color, normal temp  Stated the wrist is warm to touch but not hot and there is some slight swelling on the R side of her wrist  Stated she did go to get up out of a chair on both Friday and Saturday and her wrist did bend backwards which was painful  But this feeling in her fingers didn't start until today  She is asking if there is a brace she can use that would prevent her from bending that wrist  Stated she tries not to but did for get twice and got a lot of pain after that  Stated the incision is healing well but dark  It is not swollen or draining  Asked for photo but patient stated she cannot send a photo, does not have email to send photo or a [employer] account  Please advise

## 2022-02-09 ENCOUNTER — PATIENT OUTREACH (OUTPATIENT)
Dept: FAMILY MEDICINE CLINIC | Facility: HOSPITAL | Age: 67
End: 2022-02-09

## 2022-02-09 DIAGNOSIS — Z71.89 COMPLEX CARE COORDINATION: Primary | ICD-10-CM

## 2022-02-09 NOTE — PROGRESS NOTES
Outpatient Care Management Note:    New direct referral received from Dr Maureen Victor  Chart reviewed  2/3/22 office notes states patient wishes to switch psychiatrists and needs assistance  Social work referral placed

## 2022-02-09 NOTE — TELEPHONE ENCOUNTER
Spoke to patient and information above provided  She states she does not have an aide for transport until Friday  Please advise if okay to place her in the 10:15 held slot for Friday?

## 2022-02-10 ENCOUNTER — PATIENT OUTREACH (OUTPATIENT)
Dept: CASE MANAGEMENT | Facility: OTHER | Age: 67
End: 2022-02-10

## 2022-02-10 NOTE — PROGRESS NOTES
This writer is covering for [employer] MSW who is out of the office this week  JOSE YANG received referral from [address] [place] [address] Drive  Patient is not happy with her psychiatrist and is interested in going else where  Chart review completed  Attempted outreach to the number listed in the chart  Unable to reach and left voicemail with both my contact information and Divina's for when she returns next week     JOSE YANG will continue to follow

## 2022-02-10 NOTE — TELEPHONE ENCOUNTER
Pt is calling stating that he cannot make her appt stating [place] is too far for her      Pt states she has no redness and it is no longer warm to the touch    Pt is still having some pain and would like to know if Dr Rose Mary Boyle can order something for her    Pt would like to wait until her appt with Antonella Nur in [facility] on 3/7    Please advise pt can be reached at [phone]

## 2022-02-11 DIAGNOSIS — M19.131 SLAC (SCAPHOLUNATE ADVANCED COLLAPSE) OF WRIST, RIGHT: Primary | ICD-10-CM

## 2022-02-11 RX ORDER — OXYCODONE HYDROCHLORIDE 5 MG/1
5 TABLET ORAL EVERY 4 HOURS PRN
Qty: 5 TABLET | Refills: 0 | Status: SHIPPED | OUTPATIENT
Start: 2022-02-11 | End: 2022-02-14 | Stop reason: SDUPTHER

## 2022-02-14 ENCOUNTER — APPOINTMENT (OUTPATIENT)
Dept: RADIOLOGY | Facility: CLINIC | Age: 67
End: 2022-02-14
Payer: COMMERCIAL

## 2022-02-14 ENCOUNTER — OFFICE VISIT (OUTPATIENT)
Dept: OBGYN CLINIC | Facility: CLINIC | Age: 67
End: 2022-02-14
Payer: COMMERCIAL

## 2022-02-14 ENCOUNTER — TELEPHONE (OUTPATIENT)
Dept: OBGYN CLINIC | Facility: HOSPITAL | Age: 67
End: 2022-02-14

## 2022-02-14 VITALS — HEIGHT: 67 IN | DIASTOLIC BLOOD PRESSURE: 90 MMHG | BODY MASS INDEX: 30.62 KG/M2 | SYSTOLIC BLOOD PRESSURE: 140 MMHG

## 2022-02-14 DIAGNOSIS — Z48.89 AFTERCARE FOLLOWING SURGERY: ICD-10-CM

## 2022-02-14 DIAGNOSIS — Z48.89 AFTERCARE FOLLOWING SURGERY: Primary | ICD-10-CM

## 2022-02-14 DIAGNOSIS — M19.131 SLAC (SCAPHOLUNATE ADVANCED COLLAPSE) OF WRIST, RIGHT: ICD-10-CM

## 2022-02-14 PROCEDURE — 29075 APPL CST ELBW FNGR SHORT ARM: CPT | Performed by: ORTHOPAEDIC SURGERY

## 2022-02-14 PROCEDURE — 73110 X-RAY EXAM OF WRIST: CPT

## 2022-02-14 PROCEDURE — 99024 POSTOP FOLLOW-UP VISIT: CPT | Performed by: ORTHOPAEDIC SURGERY

## 2022-02-14 RX ORDER — OXYCODONE HYDROCHLORIDE 5 MG/1
5 TABLET ORAL EVERY 6 HOURS PRN
Qty: 10 TABLET | Refills: 0 | Status: SHIPPED | OUTPATIENT
Start: 2022-02-14 | End: 2022-03-04

## 2022-02-14 NOTE — TELEPHONE ENCOUNTER
Patient called stating she would like a refill Oxycodone pharmacy on file in chart    She use [facility]

## 2022-02-14 NOTE — PROGRESS NOTES
Assessment:   S/P Total wrist fusion of the right wrist - Right, Right wrist posterior interosseous nerve neurectomy - Right, and Extensor pollicis longus tendon transposition right wrist on 1/20/2022    Plan:   Cast the RUE to provide added protection    Follow Up:  4  week(s)    To Do Next Visit:  X-rays of the  right  wrist   Out of cast    CHIEF COMPLAINT:  Chief Complaint   Patient presents with    Right Wrist - Post-op         Total wrist fusion of the right wrist (Right Wrist)       Right wrist posterior interosseous nerve neurectomy    Extensor pollicis longus tendon transposition right wrist DOS 1/20/22         SUBJECTIVE:  Goran Nava is a 61 y o  female who presents for follow up after Total wrist fusion of the right wrist - Right, Right wrist posterior interosseous nerve neurectomy - Right, and Extensor pollicis longus tendon transposition right wrist on 1/20/2022  Today patient has increased pain at this time  Patient states that she has had some episodes where she has put some pressure on her hands and has had increased level of pain  She denies any falls or accidents  She denies any numbness tingling  She denies any acute complaint         PHYSICAL EXAMINATION:  Vital signs: /90   Ht 5' 7" (1 702 m)   LMP  (LMP Unknown)   BMI 30 62 kg/m²   General: well developed and well nourished, alert, oriented times 3 and appears comfortable  Psychiatric: Normal    MUSCULOSKELETAL EXAMINATION:  Incision: Clean, dry, intact  Range of Motion: As expected  Neurovascular status: Neuro intact, good cap refill  Activity Restrictions: Cast/splint restrictions         STUDIES REVIEWED:  Images were reviewed in PACS by Dr Dayron Mon and demonstrate: stable positioning of the hardware with one of the locking screws starting to displace      PROCEDURES PERFORMED:  Cast application    Date/Time: 2/14/2022 3:27 PM  Performed by: Nimisha Ryan MD  Authorized by: Nimisha Ryan MD   Universal Protocol:  Consent: Verbal consent obtained      Procedure details:     Location:  Wrist    Wrist:  R wrist    Supplies:  Cotton padding and fiberglass          Scribe Attestation    I,:  Tay Workman PA-C am acting as a scribe while in the presence of the attending physician :       I,:  Jaron Allison MD personally performed the services described in this documentation    as scribed in my presence :

## 2022-02-18 LAB
ALBUMIN SERPL-MCNC: 4.2 G/DL (ref 3.8–4.8)
ALBUMIN/GLOB SERPL: 1.5 {RATIO} (ref 1.2–2.2)
ALP SERPL-CCNC: 115 IU/L (ref 44–121)
ALT SERPL-CCNC: 49 IU/L (ref 0–32)
AST SERPL-CCNC: 41 IU/L (ref 0–40)
BASOPHILS # BLD AUTO: 0.1 X10E3/UL (ref 0–0.2)
BASOPHILS NFR BLD AUTO: 1 %
BILIRUB SERPL-MCNC: 0.4 MG/DL (ref 0–1.2)
BUN SERPL-MCNC: 10 MG/DL (ref 8–27)
BUN/CREAT SERPL: 15 (ref 12–28)
CALCIUM SERPL-MCNC: 9.2 MG/DL (ref 8.7–10.3)
CHLORIDE SERPL-SCNC: 100 MMOL/L (ref 96–106)
CHOLEST SERPL-MCNC: 146 MG/DL (ref 100–199)
CHOLEST/HDLC SERPL: 3.8 RATIO (ref 0–4.4)
CO2 SERPL-SCNC: 27 MMOL/L (ref 20–29)
CREAT SERPL-MCNC: 0.68 MG/DL (ref 0.57–1)
EOSINOPHIL # BLD AUTO: 0.5 X10E3/UL (ref 0–0.4)
EOSINOPHIL NFR BLD AUTO: 5 %
ERYTHROCYTE [DISTWIDTH] IN BLOOD BY AUTOMATED COUNT: 17 % (ref 11.7–15.4)
EST. AVERAGE GLUCOSE BLD GHB EST-MCNC: 163 MG/DL
GLOBULIN SER-MCNC: 2.8 G/DL (ref 1.5–4.5)
GLUCOSE SERPL-MCNC: 107 MG/DL (ref 65–99)
HBA1C MFR BLD: 7.3 % (ref 4.8–5.6)
HCT VFR BLD AUTO: 45.1 % (ref 34–46.6)
HDLC SERPL-MCNC: 38 MG/DL
HGB BLD-MCNC: 14.1 G/DL (ref 11.1–15.9)
IMM GRANULOCYTES # BLD: 0.1 X10E3/UL (ref 0–0.1)
IMM GRANULOCYTES NFR BLD: 1 %
LDLC SERPL CALC-MCNC: 85 MG/DL (ref 0–99)
LYMPHOCYTES # BLD AUTO: 2 X10E3/UL (ref 0.7–3.1)
LYMPHOCYTES NFR BLD AUTO: 20 %
MCH RBC QN AUTO: 28.1 PG (ref 26.6–33)
MCHC RBC AUTO-ENTMCNC: 31.3 G/DL (ref 31.5–35.7)
MCV RBC AUTO: 90 FL (ref 79–97)
MONOCYTES # BLD AUTO: 0.6 X10E3/UL (ref 0.1–0.9)
MONOCYTES NFR BLD AUTO: 7 %
NEUTROPHILS # BLD AUTO: 6.5 X10E3/UL (ref 1.4–7)
NEUTROPHILS NFR BLD AUTO: 66 %
PLATELET # BLD AUTO: 319 X10E3/UL (ref 150–450)
POTASSIUM SERPL-SCNC: 4 MMOL/L (ref 3.5–5.2)
PROT SERPL-MCNC: 7 G/DL (ref 6–8.5)
RBC # BLD AUTO: 5.01 X10E6/UL (ref 3.77–5.28)
SL AMB EGFR AFRICAN AMERICAN: 107 ML/MIN/1.73
SL AMB EGFR NON AFRICAN AMERICAN: 93 ML/MIN/1.73
SL AMB VLDL CHOLESTEROL CALC: 23 MG/DL (ref 5–40)
SODIUM SERPL-SCNC: 141 MMOL/L (ref 134–144)
TRIGL SERPL-MCNC: 131 MG/DL (ref 0–149)
TSH SERPL DL<=0.005 MIU/L-ACNC: 0.08 UIU/ML (ref 0.45–4.5)
WBC # BLD AUTO: 9.7 X10E3/UL (ref 3.4–10.8)

## 2022-02-18 PROCEDURE — 3051F HG A1C>EQUAL 7.0%<8.0%: CPT | Performed by: INTERNAL MEDICINE

## 2022-02-22 ENCOUNTER — TELEPHONE (OUTPATIENT)
Dept: FAMILY MEDICINE CLINIC | Facility: HOSPITAL | Age: 67
End: 2022-02-22

## 2022-02-22 NOTE — TELEPHONE ENCOUNTER
Would prefer we check urine before start antibiotic - please drop off today or tomorrow and will send abx if urine indicates infection

## 2022-02-22 NOTE — TELEPHONE ENCOUNTER
[facility] CALLED - WE SENT OVER SCRIPT FOR ALLERGY RELIEF BID BUT THAT IS USUALLY A ONCE A DAY MEDICATION - PLEASE RETURN CALL TO VERIFY None known

## 2022-02-22 NOTE — TELEPHONE ENCOUNTER
Patient knows she has a uti - would like the yellow and black capsule sent to the pharmacy  Advised we would probably need to see her in the office  She said she does not have transportation today      pcb

## 2022-02-22 NOTE — TELEPHONE ENCOUNTER
Burning with urination and urine frequency  Started this morning  States she has had UTI before and knows that is what she has  Asked if she would be able to drop a sample off here or at the lab and she states she would not be able to until Friday  She uses [facility] and they deliver her meds  Requesting abx be sent to pharm

## 2022-02-25 ENCOUNTER — TELEPHONE (OUTPATIENT)
Dept: OBGYN CLINIC | Facility: HOSPITAL | Age: 67
End: 2022-02-25

## 2022-02-25 NOTE — TELEPHONE ENCOUNTER
DR Yareli Peña  RE: Questions in regard to labs  CB: [phone]    Patient called the call center asking to speak to Dr Yareli Peña in regard to [facility] and medications

## 2022-03-03 ENCOUNTER — TELEPHONE (OUTPATIENT)
Dept: OBGYN CLINIC | Facility: MEDICAL CENTER | Age: 67
End: 2022-03-03

## 2022-03-03 ENCOUNTER — OFFICE VISIT (OUTPATIENT)
Dept: OBGYN CLINIC | Facility: CLINIC | Age: 67
End: 2022-03-03
Payer: COMMERCIAL

## 2022-03-03 DIAGNOSIS — Z48.89 AFTERCARE FOLLOWING SURGERY: Primary | ICD-10-CM

## 2022-03-03 PROCEDURE — 1124F ACP DISCUSS-NO DSCNMKR DOCD: CPT | Performed by: PHYSICIAN ASSISTANT

## 2022-03-03 PROCEDURE — 99024 POSTOP FOLLOW-UP VISIT: CPT | Performed by: PHYSICIAN ASSISTANT

## 2022-03-03 PROCEDURE — 29075 APPL CST ELBW FNGR SHORT ARM: CPT | Performed by: PHYSICIAN ASSISTANT

## 2022-03-03 NOTE — TELEPHONE ENCOUNTER
Spoke to practice admin in [facility], patient is refusing to go to any other location  Patient will be seen at 1 pm today by Isabel Trent  Thank you!

## 2022-03-03 NOTE — TELEPHONE ENCOUNTER
Patient sees Dr Fan Dillard  Patient is scheduled to sees Dr Fan Dillard on 3/7/2022 for post op  She is complaining of increased swelling due to RA that is making her cast super tight  Pain of 8 starts in afternoon and is unbearable    She is asking to have cast redone    She is asking for a call back relating this, can she be seen sooner by PA or ?    Malena Cowan # [phone]

## 2022-03-03 NOTE — PROGRESS NOTES
Orthopaedic Surgery - Office Note  Ayden Hurley (16 y o  female)   : 1955   MRN: [id number]  Encounter Date: 3/3/2022    No chief complaint on file  Chief complaint cast check  Assessment/Plan  Diagnoses and all orders for this visit:    Aftercare following surgery-S/P Total wrist fusion of the right wrist - Right, Right wrist posterior interosseous nerve neurectomy - Right, and Extensor pollicis longus tendon transposition right wris  -     Cast application    Patient will continue to follow cast care instructions and follow-up as scheduled with Hand surgery team on 2022  Patient may continue Tylenol as needed for pain and was encouraged her symptoms should improve with a new cast   I would not recommend starting a new narcotic pain medication at this time as she was requesting to try tramadol  Return With Dr eBlen Rea on Monday as scheduled  With an x-ray        History of Present Illness  Patient isS/P Total wrist fusion of the right wrist - Right, Right wrist posterior interosseous nerve neurectomy - Right, and Extensor pollicis longus tendon transposition right wrist on 2022 with Dr Belen Rea  Patient reports today that she believes the cast is too tight due to her history of rheumatoid arthritis and swelling  No new trauma is reported  She is requesting a cast change due to tightness at the fingers  Patient reports some nerve pain in the region of her 5th digit    Patient reports I ain't no druggie, but I need some more pain meds due to this pain    Review of Systems  Pertinent items are noted in HPI  All other systems were reviewed and are negative  Physical Exam  LMP  (LMP Unknown)   Cons: Appears well  No apparent distress  Sitting electronic wheelchair  Patient has a right below-knee amputation  Psych: Alert  Oriented x3  Mood and affect normal        Patient's cast was tight distally    It was removed today and incision was visualized which is healing nicely there are no signs of active infection skin breakdown or lesions  Patient's short-arm cast will be reapplied today  And patient did report relief with the new cast which she reports was fitting much better  Studies Reviewed  Operative report as well as the last office note from Dr Seleta Habermann on 02/14/2022 were reviewed by myself  Case was briefly reviewed with attendings KANDACE       Cast application    Date/Time: 3/3/2022 12:37 PM  Performed by: Thalia Grover  Authorized by: Saint Barman, PA-C   Universal Protocol:  Consent: Verbal consent obtained  Risks and benefits: risks, benefits and alternatives were discussed  Consent given by: patient  Time out: Immediately prior to procedure a "time out" was called to verify the correct patient, procedure, equipment, support staff and site/side marked as required  Patient understanding: patient states understanding of the procedure being performed  Patient consent: the patient's understanding of the procedure matches consent given  Relevant documents: relevant documents present and verified  Test results: test results available and properly labeled  Site marked: the operative site was marked  Radiology Images displayed and confirmed  If images not available, report reviewed: imaging studies available      Pre-procedure details:     Sensation:  Normal  Procedure details:     Laterality:  Right    Location:  Wrist    Wrist:  R wristCast type: SAC  Post-procedure details:     Pain:  Improved    Sensation:  Normal    Skin color:  Wnl    Patient tolerance of procedure: Tolerated well, no immediate complications      Medical, Surgical, Family, and Social History  The patient's medical history, family history, and social history, were reviewed and updated as appropriate      Past Medical History:   Diagnosis Date    Achalasia     ADHD     Anxiety     Chronic narcotic dependence ([facility] [address] )     3 yrs    Chronic pain disorder     Colon polyp     COPD (chronic obstructive pulmonary disease) (HCC)     Depression     Disease of thyroid gland     hypo    Esophageal spasm     Fibromyalgia     Fibromyalgia, primary     GERD (gastroesophageal reflux disease)     Hiatal hernia     Hyperlipidemia     Hypertension     Iron deficiency anemia     Migraine     Polysubstance abuse ([address] )     Psychiatric disorder     major depressive       Past Surgical History:   Procedure Laterality Date    AMPUTATION      RBKA    ANKLE FUSION      APPENDECTOMY      BELOW KNEE LEG AMPUTATION Right     CARPAL TUNNEL RELEASE  2021    left arm    CATARACT EXTRACTION       SECTION      COLONOSCOPY  2010    COLONOSCOPY  2019    diverticula, 3 mm polyp and 6 mm polyp in rectum, grade III internal hemorrhoids, hyperplastic polyp, 5 year recall 2024     EGD  2010    FOREARM FASCIOTOMY Right     FRACTURE SURGERY      HYSTERECTOMY      KNEE ARTHROSCOPY Left     NERVE SURGERY Right 2022    Procedure: Right wrist posterior interosseous nerve neurectomy;  Surgeon: Jennifer Erickson MD;  Location:  MAIN OR;  Service: Orthopedics    ORTHOPEDIC SURGERY      GA FUSION OF WRIST JOINT Right 2022    Procedure: Total wrist fusion of the right wrist;  Surgeon: Jennifer Erickson MD;  Location:  MAIN OR;  Service: Orthopedics    GA REVISE ULNAR NERVE AT ELBOW Left 2021    Procedure: Left carpal and cubital tunnel release;  Surgeon:  Ron Sheffield MD;  Location: [address] [place] [address] MAIN OR;  Service: Orthopedics    UPPER GASTROINTESTINAL ENDOSCOPY      WRIST TENDON TRANSFER N/A 2022    Procedure: Extensor pollicis longus tendon transposition right wrist;  Surgeon: Jennifer Erickson MD;  Location:  MAIN OR;  Service: Orthopedics       Family History   Problem Relation Age of Onset    Ulcerative colitis Mother     Lung cancer Mother     Diabetes Mother     Alcohol abuse Father     Diabetes Sister     Cancer Brother     Cervical cancer Daughter     Colon polyps Neg Hx     Colon cancer Neg Hx        Social History     Occupational History    Occupation: disability   Tobacco Use    Smoking status: Current Some Day Smoker     Packs/day: 0 04     Types: Cigarettes     Start date: 1/1/1977    Smokeless tobacco: Never Used    Tobacco comment: 3 cigs a day   Vaping Use    Vaping Use: Never used   Substance and Sexual Activity    Alcohol use: No    Drug use: Never    Sexual activity: Not Currently       Allergies   Allergen Reactions    Morphine Itching and Anaphylaxis     u  Other reaction(s):  Other (See Comments)  u  Other reaction(s): Feeling agitated (finding)      Penicillins Anaphylaxis    Cephalexin      Other reaction(s): Unknown    Cephalosporins Hives     Other reaction(s): Unknown Reaction      Codeine Hives     Other reaction(s): Unknown  Other reaction(s): Unknown Reaction  Other reaction(s): Unknown      Levaquin [Levofloxacin] Hives    Nsaids GI Bleeding     Other reaction(s): Unknown Reaction    Aspirin Hives, Other (See Comments) and Rash     Other reaction(s): Unknown Reaction      Chlorhexidine Rash    Medical Tape Rash    Vancomycin Rash         Current Outpatient Medications:     acetaminophen (TYLENOL) 650 mg CR tablet, Take 1 tablet (650 mg total) by mouth every 8 (eight) hours as needed for mild pain, Disp: 30 tablet, Rfl: 0    albuterol (PROVENTIL HFA,VENTOLIN HFA) 90 mcg/act inhaler, Inhale 2 puffs every 4 (four) hours as needed for wheezing, Disp: , Rfl:     Allergy Relief 10 MG tablet, Take 1 tablet (10 mg total) by mouth 2 (two) times a day, Disp: 180 tablet, Rfl: 0    ascorbic acid (VITAMIN C) 500 mg tablet, TAKE ONE TABLET BY MOUTH TWICE DAILY, Disp: 56 tablet, Rfl: 1    atorvastatin (LIPITOR) 20 mg tablet, atorvastatin 20 mg tablet  TAKE ONE TABLET BY MOUTH EVERY EVENING, Disp: , Rfl:     cyclobenzaprine (FLEXERIL) 10 mg tablet, TAKE ONE TABLET BY MOUTH TWICE DAILY, Disp: 56 tablet, Rfl: 1    Diclofenac Sodium (VOLTAREN) 1 %, Apply 4 g topically 4 (four) times a day, Disp: 300 g, Rfl: 6    dicyclomine (BENTYL) 20 mg tablet, TAKE ONE TABLET BY MOUTH EVERY 6 HOURS, Disp: 112 tablet, Rfl: 1    ferrous sulfate 324 (65 Fe) mg, TAKE ONE TABLET BY MOUTH TWICE DAILY WITH MEALS, Disp: 60 tablet, Rfl: 1    fluticasone-salmeterol (Wixela Inhub) 100-50 mcg/dose inhaler, Inhale 1 puff 2 (two) times a day, Disp: , Rfl:     folic acid (FOLVITE) 1 mg tablet, Take 2 tablets (2 mg total) by mouth daily at bedtime, Disp: 180 tablet, Rfl: 3    gabapentin (NEURONTIN) 600 MG tablet, Take 600 mg by mouth 2 (two) times a day , Disp: , Rfl:     Galcanezumab-gnlm (EMGALITY) 120 MG/ML SOSY, Inject under the skin Once a month, Disp: , Rfl:     glipiZIDE (GLUCOTROL XL) 5 mg 24 hr tablet, Take 5 mg by mouth daily, Disp: , Rfl:     ipratropium-albuterol (DUO-NEB) 0 5-2 5 mg/3 mL nebulizer solution, Take 3 mL by nebulization every 6 (six) hours as needed , Disp: , Rfl:     Lactobacillus Acid-Pectin (Acidophilus/Citrus Pectin) TABS, Take 1 tablet by mouth daily, Disp: , Rfl:     Lancets MISC, Check sugars once weekly, Disp: , Rfl:     leflunomide (ARAVA) 10 MG tablet, TAKE ONE TABLET BY MOUTH EVERY MORNING, Disp: 28 tablet, Rfl: 1    levothyroxine 150 mcg tablet, Take 150 mcg by mouth daily, Disp: , Rfl:     metFORMIN (GLUCOPHAGE) 500 mg tablet, Take 500 mg by mouth 2 (two) times a day with meals  , Disp: , Rfl:     methotrexate 2 5 mg tablet, TAKE six TABLETS BY MOUTH ONCE A WEEK WITH FOOD OR AFTER EATING, Disp: 28 tablet, Rfl: 5    Multiple Vitamins-Minerals (multivitamin with minerals) tablet, Take 1 tablet by mouth daily, Disp: 30 tablet, Rfl: 1    ondansetron (ZOFRAN) 4 mg tablet, Take 1 tablet (4 mg total) by mouth every 8 (eight) hours as needed for nausea or vomiting, Disp: 20 tablet, Rfl: 0    oxyCODONE (ROXICODONE) 5 immediate release tablet, Take 1 tablet (5 mg total) by mouth every 6 (six) hours as needed (pain) Max Daily Amount: 20 mg, Disp: 10 tablet, Rfl: 0    pantoprazole (PROTONIX) 40 mg tablet, TAKE ONE TABLET BY MOUTH AT BEDTIME (Patient taking differently: Take 40 mg by mouth 2 (two) times a day  ), Disp: 28 tablet, Rfl: 5    triamcinolone (KENALOG) 0 1 % cream, Use as directed, Disp: , Rfl:     venlafaxine (EFFEXOR-XR) 150 mg 24 hr capsule, Take 150 mg by mouth daily, Disp: , Rfl:       [employer], PA-C

## 2022-03-03 NOTE — PATIENT INSTRUCTIONS
Cast Care   WHAT YOU NEED TO KNOW:   Cast care will help the cast dry and harden correctly, and then protect it until it comes off  Your cast may need up to 48 hours to dry and harden completely  Even after your cast hardens, it can be damaged  DISCHARGE INSTRUCTIONS:   Return to the emergency department if:   · Your cast breaks or gets damaged  · You see drainage, or your cast is stained or smells bad  · Your skin turns blue or pale  · Your skin tingles, burns, or is cold or numb  · You have severe pain that is getting worse and does not go away after you take pain medicine  · Your limb swells, or your cast looks or feels tighter than it was before  Contact your healthcare provider if:   · Something falls into your cast and gets stuck  · You have itching, pain, burning, or weakness where you have the cast      · You have a fever  · You have sores, blisters, or breaks on the skin around the edges of the cast     · You have questions or concerns about your condition or care  Follow up with your healthcare provider as directed: You will need to return to have your cast removed and your bones checked  Write down your questions so you remember to ask them during your visits  Care for your cast while it hardens:   · Protect the cast   Do not put weight on the cast  Do not bend, lean on, or hit the cast with anything  Use the palms of your hands when you move the cast  Do not use your fingers  Your fingers may leave marks on the cast as it dries  · Change positions often  Change your position every 2 hours to help the cast dry faster  Prop your cast on something soft, such as a pillow, to prevent a flat area on your cast      · Keep the cast dry  Tie plastic trash bags around your cast to keep it dry while you bathe  You may use a blow dryer on cool or the lowest heat setting to dry your cast if it gets wet  Do not use a high heat setting, because you may burn your skin   Certain casts can get wet  Ask if you have a waterproof cast     Care for your cast after it hardens:   · Check your cast every day  Contact your healthcare provider if you notice any cracks, dents, holes, or flaking on your cast      · Keep your cast clean and dry  Cover your cast with a towel when you eat  You may have a small piece of cast that can be removed to check on incisions under your cast  Make sure the small piece of cast is kept tightly closed  If your cast gets dirty, use a mild detergent and a damp washcloth to wipe off the outside of your cast  Continue to cover your cast with trash bags to keep it dry while you bathe  · Care for the edges of your cast   Cover the cast edges to keep them smooth  Use 4 inch pieces of waterproof tape  Place one end of the tape under the inside edge of your cast and fold it over to the outside surface  Overlap tape strips until the edges are completely covered  Change the tape as directed  Do not pull or repair any of the padding from inside the cast  This could cause blisters and sores on the skin under your cast      · Keep weight off your cast   Do not let anyone push down or lean on your cast  This may cause it to break  · Do not use sharp objects  Do not use a sharp or pointed object to scratch under your cast  This may cause wounds that can get infected, or you may lose the item inside the cast  If your skin itches, blow cool air under the cast  You may also gently scratch your skin outside the cast with a cloth  © Copyright [employer] 2022 Information is for End User's use only and may not be sold, redistributed or otherwise used for commercial purposes  All illustrations and images included in CareNotes® are the copyrighted property of [employer]  or [employer]   The above information is an  only  It is not intended as medical advice for individual conditions or treatments   Talk to your doctor, nurse or pharmacist before following any medical regimen to see if it is safe and effective for you

## 2022-03-04 ENCOUNTER — OFFICE VISIT (OUTPATIENT)
Dept: FAMILY MEDICINE CLINIC | Facility: HOSPITAL | Age: 67
End: 2022-03-04
Payer: COMMERCIAL

## 2022-03-04 ENCOUNTER — TELEPHONE (OUTPATIENT)
Dept: FAMILY MEDICINE CLINIC | Facility: HOSPITAL | Age: 67
End: 2022-03-04

## 2022-03-04 ENCOUNTER — TELEPHONE (OUTPATIENT)
Dept: OBGYN CLINIC | Facility: HOSPITAL | Age: 67
End: 2022-03-04

## 2022-03-04 VITALS
SYSTOLIC BLOOD PRESSURE: 110 MMHG | TEMPERATURE: 96.7 F | DIASTOLIC BLOOD PRESSURE: 80 MMHG | HEART RATE: 73 BPM | HEIGHT: 67 IN | BODY MASS INDEX: 30.62 KG/M2

## 2022-03-04 DIAGNOSIS — E07.9 DISEASE OF THYROID GLAND: ICD-10-CM

## 2022-03-04 DIAGNOSIS — E66.9 OBESITY (BMI 30.0-34.9): ICD-10-CM

## 2022-03-04 DIAGNOSIS — G43.109 MIGRAINE WITH AURA AND WITHOUT STATUS MIGRAINOSUS, NOT INTRACTABLE: ICD-10-CM

## 2022-03-04 DIAGNOSIS — E11.65 UNCONTROLLED TYPE 2 DIABETES MELLITUS WITH HYPERGLYCEMIA (HCC): ICD-10-CM

## 2022-03-04 DIAGNOSIS — G89.4 CHRONIC PAIN SYNDROME: ICD-10-CM

## 2022-03-04 DIAGNOSIS — R30.0 DYSURIA: Primary | ICD-10-CM

## 2022-03-04 DIAGNOSIS — F33.1 MAJOR DEPRESSIVE DISORDER, RECURRENT, MODERATE (HCC): ICD-10-CM

## 2022-03-04 DIAGNOSIS — F41.9 ANXIETY: ICD-10-CM

## 2022-03-04 LAB
SL AMB  POCT GLUCOSE, UA: ABNORMAL
SL AMB LEUKOCYTE ESTERASE,UA: ABNORMAL
SL AMB POCT BILIRUBIN,UA: ABNORMAL
SL AMB POCT BLOOD,UA: ABNORMAL
SL AMB POCT CLARITY,UA: CLEAR
SL AMB POCT COLOR,UA: YELLOW
SL AMB POCT KETONES,UA: ABNORMAL
SL AMB POCT NITRITE,UA: ABNORMAL
SL AMB POCT PH,UA: 6
SL AMB POCT SPECIFIC GRAVITY,UA: 1.03
SL AMB POCT URINE PROTEIN: ABNORMAL
SL AMB POCT UROBILINOGEN: ABNORMAL

## 2022-03-04 PROCEDURE — 81002 URINALYSIS NONAUTO W/O SCOPE: CPT | Performed by: INTERNAL MEDICINE

## 2022-03-04 PROCEDURE — 99215 OFFICE O/P EST HI 40 MIN: CPT | Performed by: INTERNAL MEDICINE

## 2022-03-04 PROCEDURE — 3061F NEG MICROALBUMINURIA REV: CPT | Performed by: INTERNAL MEDICINE

## 2022-03-04 RX ORDER — NITROFURANTOIN 25; 75 MG/1; MG/1
100 CAPSULE ORAL 2 TIMES DAILY
Qty: 10 CAPSULE | Refills: 0 | Status: SHIPPED | OUTPATIENT
Start: 2022-03-04 | End: 2022-03-09

## 2022-03-04 RX ORDER — LEVOTHYROXINE SODIUM 137 UG/1
137 TABLET ORAL DAILY
COMMUNITY
Start: 2022-02-16 | End: 2022-03-04 | Stop reason: SDUPTHER

## 2022-03-04 RX ORDER — MULTIVITAMIN WITH FOLIC ACID 400 MCG
1 TABLET ORAL DAILY
COMMUNITY
Start: 2022-02-16

## 2022-03-04 RX ORDER — GALCANEZUMAB 120 MG/ML
120 INJECTION, SOLUTION SUBCUTANEOUS
Qty: 1 ML | Refills: 2 | Status: SHIPPED | OUTPATIENT
Start: 2022-03-04 | End: 2022-05-06 | Stop reason: SDUPTHER

## 2022-03-04 RX ORDER — ATOMOXETINE 80 MG/1
80 CAPSULE ORAL DAILY
COMMUNITY
Start: 2022-02-16 | End: 2022-05-06

## 2022-03-04 RX ORDER — MECLIZINE HYDROCHLORIDE 25 MG/1
TABLET ORAL
COMMUNITY
Start: 2022-02-17

## 2022-03-04 RX ORDER — LEVOTHYROXINE SODIUM 0.12 MG/1
125 TABLET ORAL DAILY
Qty: 30 TABLET | Refills: 2 | Status: SHIPPED | OUTPATIENT
Start: 2022-03-04 | End: 2022-05-02 | Stop reason: SDUPTHER

## 2022-03-04 RX ORDER — ATOMOXETINE 10 MG/1
CAPSULE ORAL
COMMUNITY
Start: 2022-02-16 | End: 2022-05-06

## 2022-03-04 RX ORDER — VENLAFAXINE HYDROCHLORIDE 225 MG/1
225 TABLET, EXTENDED RELEASE ORAL DAILY
COMMUNITY
Start: 2022-02-16 | End: 2022-05-06

## 2022-03-04 RX ORDER — GABAPENTIN 600 MG/1
600 TABLET ORAL 3 TIMES DAILY
Qty: 90 TABLET | Refills: 2 | Status: SHIPPED | OUTPATIENT
Start: 2022-03-04 | End: 2022-05-02 | Stop reason: SDUPTHER

## 2022-03-04 NOTE — ASSESSMENT & PLAN NOTE
Mood is most pressing issue for pt - unhappy with psychiatrist, d/w pt that she needs to only get meds from one office and d/t the severity of her mental health issues and past history she needs to see psych, number for [facility] was given

## 2022-03-04 NOTE — PROGRESS NOTES
Assessment/Plan:    Uncontrolled type 2 diabetes mellitus with hyperglycemia (HCC)    Lab Results   Component Value Date    HGBA1C 7 3 (H) 02/18/2022   DM type 2 with hyperglycemia - uncontrolled with A1c 7 3 - con't low sugar/carb diet and keep as active as poss and get wgt down, con't current meds, recheck BW in 3 mos - WILL ORDER AT NEXT APPT, UTD on foot exam (2/22) and has eye appt scheduled, on statin but no ACE/ARB, will follow    Disease of thyroid gland  TSH a bit low - decrease levothyroxine from 137 mcg to 125 mcg, recheck TFT's in 6 wks - order given, will follow    Major depressive disorder, recurrent, moderate (HCC)  Mood is most pressing issue for pt - unhappy with psychiatrist, d/w pt that she needs to only get meds from one office and d/t the severity of her mental health issues and past history she needs to see psych, number for [facility] was given    Chronic pain syndrome  Gabapentin refilled upon request, following with Ortho and Rheum as well    Migraine with aura and without status migrainosus, not intractable  Missed appt with Neuro and they would not fill her rx - 3 mo supply of  meds to last till she see's Neuro was given    Anxiety  Pt very anxious and states she feels she is shaking internally, upset as psych won't give her an anxiety med, d/w pt that her Effexor is an anxiety med, risks/benefit of benzo's reviewed, urged pt to keep communication open and be aware other meds can be used for anxiety as well, number [facility] given as well upon request       Diagnoses and all orders for this visit:    Dysuria  Comments:  Urine dip + nitrites with typical UTI symptoms, will send for UC but start Macrobid bid x 5 days, urged to keep hydrated, call with new/worse symptoms  Orders:  -     POCT urine dip  -     Urine culture; Future  -     nitrofurantoin (MACROBID) 100 mg capsule;  Take 1 capsule (100 mg total) by mouth 2 (two) times a day for 5 days  -     Urine culture    Chronic pain syndrome  - gabapentin (NEURONTIN) 600 MG tablet; Take 1 tablet (600 mg total) by mouth 3 (three) times a day    Migraine with aura and without status migrainosus, not intractable  -     Galcanezumab-gnlm (Emgality) 120 MG/ML SOSY; Inject 120 mg under the skin every 30 (thirty) days Once a month    Uncontrolled type 2 diabetes mellitus with hyperglycemia (HCC)    Disease of thyroid gland  -     levothyroxine 125 mcg tablet; Take 1 tablet (125 mcg total) by mouth daily  -     TSH, 3rd generation with Free T4 reflex; Future  -     TSH, 3rd generation with Free T4 reflex    Major depressive disorder, recurrent, moderate ([facility] [address] )  -     Ambulatory Referral to Psychiatry; Future    Anxiety    Obesity (BMI 30 0-34  9)    BMI 30 0-30 9,adult    Other orders  -     atomoxetine (STRATTERA) 10 MG capsule; TAKE ONE CAPSULE BY MOUTH DAILY IN THE EVENING  -     atomoxetine (STRATTERA) 80 MG capsule; Take 80 mg by mouth daily  -     Discontinue: levothyroxine 137 mcg tablet; Take 137 mcg by mouth daily  -     meclizine (ANTIVERT) 25 mg tablet; TAKE ONE TABLET BY MOUTH THREE TIMES DAILY AS NEEDED for dizziness  -     Multiple Vitamin (Tab-A-Felicia) TABS; Take 1 tablet by mouth daily  -     venlafaxine 225 MG TB24; Take 225 mg by mouth daily      Colonoscopy 12/19 - 5 yrs    Mammo and Dexa order given at last appt    PAP s/p hysterectomy        I have spent 45 minutes with Patient  today in which greater than 50% of this time was spent in counseling/coordination of care regarding Diagnostic results, Risks and benefits of tx options, Intructions for management, Patient and family education, Importance of tx compliance and Impressions  Subjective:      Patient ID: Elizabeth Woodruff is a 61 y o  female  HPI Pt here for follow up appt and BW results    Pt has had some urinary symptoms for the past 1 5 wks  She has had burning with urination and increase in urinary frequency  She has had some mild incontinence   She has had no F/C/N/V/flank pain/blood in urine  She had some low abd pain yesterday  She notes no vaginal burning/pain/discharge  BW results were d/w pt in detail: FBS/A1C 107/7 3, mild elevation of AST/ALT were noted, rest of CMP/CBC were wnl, FLP with low HDL, TSH low  Def of controlled vs uncontrolled DM was reviewed  Diet was reviewed - she states she only eats once a day  She does not have a sweet tooth but does drink iced tea  She does not feel she has a high sodium diet  She does not exercise  BMI reviewed  She is taking her DM meds as directed  She is UTD on DM foot exam (2/22) and has her DM eye exam scheduled  She is on statin but no ARB/ACE  Pt is taking their thyroid medication daily w/o any other meds and prior to eating  They deny any significant wgt changes/C/tremor/palp/hair loss or skin changes  She has some loose stools which are not new for her  Goal FLP was d/w pt in detail  Diet/exercise reviewed as noted above  She is taking her Atorvastatin daily as directed w/o Se  She notes no stroke/TIA symptoms/CP  She con't to have frustration with her psychiatrist   She follows with him on the phone and she is frustrated as she feels he does not listen  Review of Systems   Constitutional: Negative for chills and fever  HENT: Negative for congestion and sore throat  Eyes: Negative for pain and visual disturbance  Respiratory: Negative for cough and shortness of breath  Cardiovascular: Negative for chest pain and palpitations  Gastrointestinal: Positive for diarrhea  Negative for abdominal pain, blood in stool, constipation, nausea and vomiting  Endocrine: Negative for polydipsia and polyuria  Genitourinary: Positive for dysuria and frequency  Negative for difficulty urinating, hematuria, vaginal bleeding and vaginal pain  Musculoskeletal: Positive for arthralgias and myalgias  Skin: Negative for rash and wound  Neurological: Positive for headaches  Negative for dizziness  Hematological: Does not bruise/bleed easily  Psychiatric/Behavioral: Positive for dysphoric mood  The patient is nervous/anxious  Objective:    /80   Pulse 73   Temp (!) 96 7 °F (35 9 °C)   Ht 5' 7" (1 702 m)   LMP  (LMP Unknown)   BMI 30 62 kg/m²      Physical Exam  Vitals and nursing note reviewed  Constitutional:       General: She is not in acute distress  Appearance: She is well-developed  She is not ill-appearing  HENT:      Head: Normocephalic and atraumatic  Eyes:      General:         Right eye: No discharge  Left eye: No discharge  Conjunctiva/sclera: Conjunctivae normal    Neck:      Trachea: No tracheal deviation  Cardiovascular:      Rate and Rhythm: Normal rate and regular rhythm  Heart sounds: Normal heart sounds  No murmur heard  No friction rub  Pulmonary:      Effort: Pulmonary effort is normal  No respiratory distress  Breath sounds: Rhonchi present  No wheezing  Abdominal:      Tenderness: There is no guarding  Musculoskeletal:      Cervical back: Neck supple  Left lower leg: Edema present  Comments: RLE BKA   Skin:     General: Skin is warm  Findings: No rash  Neurological:      Mental Status: She is alert  Motor: No abnormal muscle tone  Gait: Gait abnormal    Psychiatric:      Comments: Anxious and irritable and upset       BMI Counseling: Body mass index is 30 62 kg/m²  The BMI is above normal  Nutrition recommendations include reducing portion sizes, 3-5 servings of fruits/vegetables daily, consuming healthier snacks, moderation in carbohydrate intake, increasing intake of lean protein and reducing intake of saturated fat and trans fat  Exercise recommendations include exercising 3-5 times per week

## 2022-03-04 NOTE — ASSESSMENT & PLAN NOTE
Pt very anxious and states she feels she is shaking internally, upset as psych won't give her an anxiety med, d/w pt that her Effexor is an anxiety med, risks/benefit of benzo's reviewed, urged pt to keep communication open and be aware other meds can be used for anxiety as well, number [facility] given as well upon request

## 2022-03-04 NOTE — ASSESSMENT & PLAN NOTE
Lab Results   Component Value Date    HGBA1C 7 3 (H) 02/18/2022   DM type 2 with hyperglycemia - uncontrolled with A1c 7 3 - con't low sugar/carb diet and keep as active as poss and get wgt down, con't current meds, recheck BW in 3 mos - WILL ORDER AT NEXT APPT, UTD on foot exam (2/22) and has eye appt scheduled, on statin but no ACE/ARB, will follow

## 2022-03-04 NOTE — TELEPHONE ENCOUNTER
Not sure what prosthetic leg adjustment means - meaning eval for prosthesis or repair of prosthesis?

## 2022-03-04 NOTE — ASSESSMENT & PLAN NOTE
Missed appt with Neuro and they would not fill her rx - 3 mo supply of  meds to last till she see's Neuro was given

## 2022-03-04 NOTE — ASSESSMENT & PLAN NOTE
TSH a bit low - decrease levothyroxine from 137 mcg to 125 mcg, recheck TFT's in 6 wks - order given, will follow

## 2022-03-04 NOTE — PATIENT INSTRUCTIONS
Obesity   AMBULATORY CARE:   Obesity  means your body mass index (BMI) is greater than 30  Your healthcare provider will use your height and weight to measure your BMI  The risks of obesity include  many health problems, including injuries or physical disability  · Diabetes (high blood sugar level)    · High blood pressure or high cholesterol    · Heart disease    · Stroke    · Gallbladder or liver disease    · Cancer of the colon, breast, prostate, liver, or kidney    · Sleep apnea    · Arthritis or gout    Screening  is done to check for health conditions before you have signs or symptoms  If you are 28to 79years old, your blood sugar level may be checked every 3 years for signs of prediabetes or diabetes  Your healthcare provider will check your blood pressure at each visit  High blood pressure can lead to a stroke or other problems  Your provider may check for signs of heart disease, cancer, or other health problems  Seek care immediately if:   · You have a severe headache, confusion, or difficulty speaking  · You have weakness on one side of your body  · You have chest pain, sweating, or shortness of breath  Call your doctor if:   · You have symptoms of gallbladder or liver disease, such as pain in your upper abdomen  · You have knee or hip pain and discomfort while walking  · You have symptoms of diabetes, such as intense hunger and thirst, and frequent urination  · You have symptoms of sleep apnea, such as snoring or daytime sleepiness  · You have questions or concerns about your condition or care  Treatment for obesity  focuses on helping you lose weight to improve your health  Even a small decrease in BMI can reduce the risk for many health problems  Your healthcare provider will help you set a weight-loss goal   · Lifestyle changes  are the first step in treating obesity  These include making healthy food choices and getting regular physical activity   Your healthcare provider may suggest a weight-loss program that involves coaching, education, and therapy  · Medicine  may help you lose weight when it is used with a healthy foods and physical activity  · Surgery  can help you lose weight if you are very obese and have other health problems  There are several types of weight-loss surgery  Ask your healthcare provider for more information  Tips for safe weight loss:   · Set small, realistic goals  An example of a small goal is to walk for 20 minutes 5 days a week  Anther goal is to lose 5% of your body weight  · Tell friends, family members, and coworkers about your goals  and ask for their support  Ask a friend to lose weight with you, or join a weight-loss support group  · Identify foods or triggers that may cause you to overeat , and find ways to avoid them  Remove tempting high-calorie foods from your home and workplace  Place a bowl of fresh fruit on your kitchen counter  If stress causes you to eat, then find other ways to cope with stress  A counselor or therapist may be able to help you  · Keep a diary to track what you eat and drink  Also write down how many minutes of physical activity you do each day  Weigh yourself once a week and record it in your diary  Eating changes: You will need to eat 500 to 1,000 fewer calories each day than you currently eat to lose 1 to 2 pounds a week  The following changes will help you cut calories:  · Eat smaller portions  Use small plates, no larger than 9 inches in diameter  Fill your plate half full of fruits and vegetables  Measure your food using measuring cups until you know what a serving size looks like  · Eat 3 meals and 1 or 2 snacks each day  Plan your meals in advance  [employer] and eat at home most of the time  Eat slowly  Do not skip meals  Skipping meals can lead to overeating later in the day  This can make it harder for you to lose weight   Talk with a dietitian to help you make a meal plan and schedule that is right for you  · Eat fruits and vegetables at every meal   They are low in calories and high in fiber, which makes you feel full  Do not add butter, margarine, or cream sauce to vegetables  Use herbs to season steamed vegetables  · Eat less fat and fewer fried foods  Eat more baked or grilled chicken and fish  These protein sources are lower in calories and fat than red meat  Limit fast food  Dress your salads with olive oil and vinegar instead of bottled dressing  · Limit the amount of sugar you eat  Do not drink sugary beverages  Limit alcohol  Activity changes:  Physical activity is good for your body in many ways  It helps you burn calories and build strong muscles  It decreases stress and depression, and improves your mood  It can also help you sleep better  Talk to your healthcare provider before you begin an exercise program   · Exercise for at least 30 minutes 5 days a week  Start slowly  Set aside time each day for physical activity that you enjoy and that is convenient for you  It is best to do both weight training and an activity that increases your heart rate, such as walking, bicycling, or swimming  · Find ways to be more active  Do yard work and housecleaning  Walk up the stairs instead of using elevators  Spend your leisure time going to events that require walking, such as outdoor festivals or fairs  This extra physical activity can help you lose weight and keep it off  Follow up with your doctor as directed: You may need to meet with a dietitian  Write down your questions so you remember to ask them during your visits  © Copyright [employer] 2022 Information is for End User's use only and may not be sold, redistributed or otherwise used for commercial purposes  All illustrations and images included in CareNotes® are the copyrighted property of A D A M , Inc  or [employer]   The above information is an  only   It is not intended as medical advice for individual conditions or treatments  Talk to your doctor, nurse or pharmacist before following any medical regimen to see if it is safe and effective for you  Heart Healthy Diet   AMBULATORY CARE:   A heart healthy diet  is an eating plan low in unhealthy fats and sodium (salt)  The plan is high in healthy fats and fiber  A heart healthy diet helps improve your cholesterol levels and lowers your risk for heart disease and stroke  A dietitian will teach you how to read and understand food labels  Heart healthy diet guidelines to follow:   · Choose foods that contain healthy fats  ? Unsaturated fats  include monounsaturated and polyunsaturated fats  Unsaturated fat is found in foods such as soybean, canola, olive, corn, and safflower oils  It is also found in soft tub margarine that is made with liquid vegetable oil  ? Omega-3 fat  is found in certain fish, such as salmon, tuna, and trout, and in walnuts and flaxseed  Eat fish high in omega-3 fats at least 2 times a week  · Get 20 to 30 grams of fiber each day  Fruits, vegetables, whole-grain foods, and legumes (cooked beans) are good sources of fiber  · Limit or do not have unhealthy fats  ? Cholesterol  is found in animal foods, such as eggs and lobster, and in dairy products made from whole milk  Limit cholesterol to less than 200 mg each day  ? Saturated fat  is found in meats, such as denson and hamburger  It is also found in chicken or turkey skin, whole milk, and butter  Limit saturated fat to less than 7% of your total daily calories  ? Trans fat  is found in packaged foods, such as potato chips and cookies  It is also in hard margarine, some fried foods, and shortening  Do not eat foods that contain trans fats  · Limit sodium as directed  You may be told to limit sodium to 2,000 to 2,300 mg each day  Choose low-sodium or no-salt-added foods  Add little or no salt to food you prepare   Use herbs and spices in place of salt  Include the following in your heart healthy plan:  Ask your dietitian or healthcare provider how many servings to have from each of the following food groups:  · Grains:      ? Whole-wheat breads, cereals, and pastas, and brown rice    ? Low-fat, low-sodium crackers and chips    · Vegetables:      ? Broccoli, green beans, green peas, and spinach    ? Collards, kale, and lima beans    ? Carrots, sweet potatoes, tomatoes, and peppers    ? Canned vegetables with no salt added    · Fruits:      ? Bananas, peaches, pears, and pineapple    ? Grapes, raisins, and dates    ? Oranges, tangerines, grapefruit, orange juice, and grapefruit juice    ? Apricots, mangoes, melons, and papaya    ? Raspberries and strawberries    ? Canned fruit with no added sugar    · Low-fat dairy:      ? Nonfat (skim) milk, 1% milk, and low-fat almond, cashew, or soy milks fortified with calcium    ? Low-fat cheese, regular or frozen yogurt, and cottage cheese    · Meats and proteins:      ? Lean cuts of beef and pork (loin, leg, round), skinless chicken and turkey    ? Legumes, soy products, egg whites, or nuts    Limit or do not include the following in your heart healthy plan:   · Unhealthy fats and oils:      ? Whole or 2% milk, cream cheese, sour cream, or cheese    ? High-fat cuts of beef (T-bone steaks, ribs), chicken or turkey with skin, and organ meats such as liver    ? Butter, stick margarine, shortening, and cooking oils such as coconut or palm oil    · Foods and liquids high in sodium:      ? Packaged foods, such as frozen dinners, cookies, macaroni and cheese, and cereals with more than 300 mg of sodium per serving    ? Vegetables with added sodium, such as instant potatoes, vegetables with added sauces, or regular canned vegetables    ? Cured or smoked meats, such as hot dogs, denson, and sausage    ?  High-sodium ketchup, barbecue sauce, salad dressing, pickles, olives, soy sauce, or miso    · Foods and liquids high in sugar:      ? Candy, cake, cookies, pies, or doughnuts    ? Soft drinks (soda), sports drinks, or sweetened tea    ? Canned or dry mixes for cakes, soups, sauces, or gravies    Other healthy heart guidelines:   · Do not smoke  Nicotine and other chemicals in cigarettes and cigars can cause lung and heart damage  Ask your healthcare provider for information if you currently smoke and need help to quit  E-cigarettes or smokeless tobacco still contain nicotine  Talk to your healthcare provider before you use these products  · Limit or do not drink alcohol as directed  Alcohol can damage your heart and raise your blood pressure  Your healthcare provider may give you specific daily and weekly limits  The general recommended limit is 1 drink a day for women 21 or older and for men 72 or older  Do not have more than 3 drinks in a day or 7 in a week  The recommended limit is 2 drinks a day for men 24to 59years of age  Do not have more than 4 drinks in a day or 14 in a week  A drink of alcohol is 12 ounces of beer, 5 ounces of wine, or 1½ ounces of liquor  · Exercise regularly  Exercise can help you maintain a healthy weight and improve your blood pressure and cholesterol levels  Regular exercise can also decrease your risk for heart problems  Ask your healthcare provider about the best exercise plan for you  Do not start an exercise program without asking your healthcare provider  Follow up with your doctor or cardiologist as directed:  Write down your questions so you remember to ask them during your visits  © Copyright [employer] 2022 Information is for End User's use only and may not be sold, redistributed or otherwise used for commercial purposes  All illustrations and images included in CareNotes® are the copyrighted property of [employer]  or [facility] [employer]   The above information is an  only   It is not intended as medical advice for individual conditions or treatments  Talk to your doctor, nurse or pharmacist before following any medical regimen to see if it is safe and effective for you

## 2022-03-04 NOTE — TELEPHONE ENCOUNTER
Patient sees Dr Mila Moran  Patients caregiver Ever Acuña is calling in stating that the patient is scheduled to be seen for a follow up on 4/6  She is asking if lab work needs to be done prior to the patient being seen, please advise and reach back out to her           Call back# [phone]

## 2022-03-06 DIAGNOSIS — R13.10 DYSPHAGIA, UNSPECIFIED TYPE: ICD-10-CM

## 2022-03-06 RX ORDER — PANTOPRAZOLE SODIUM 40 MG/1
TABLET, DELAYED RELEASE ORAL
Qty: 28 TABLET | Refills: 5 | Status: SHIPPED | OUTPATIENT
Start: 2022-03-06 | End: 2022-05-06 | Stop reason: SDUPTHER

## 2022-03-07 ENCOUNTER — OFFICE VISIT (OUTPATIENT)
Dept: OBGYN CLINIC | Facility: CLINIC | Age: 67
End: 2022-03-07

## 2022-03-07 ENCOUNTER — APPOINTMENT (OUTPATIENT)
Dept: RADIOLOGY | Facility: CLINIC | Age: 67
End: 2022-03-07
Payer: COMMERCIAL

## 2022-03-07 VITALS — SYSTOLIC BLOOD PRESSURE: 126 MMHG | BODY MASS INDEX: 30.62 KG/M2 | HEIGHT: 67 IN | DIASTOLIC BLOOD PRESSURE: 70 MMHG

## 2022-03-07 DIAGNOSIS — E66.01 MORBID OBESITY (HCC): ICD-10-CM

## 2022-03-07 DIAGNOSIS — Z48.89 AFTERCARE FOLLOWING SURGERY: ICD-10-CM

## 2022-03-07 DIAGNOSIS — M19.131 SLAC (SCAPHOLUNATE ADVANCED COLLAPSE) OF WRIST, RIGHT: ICD-10-CM

## 2022-03-07 DIAGNOSIS — Z48.89 AFTERCARE FOLLOWING SURGERY: Primary | ICD-10-CM

## 2022-03-07 PROCEDURE — 73110 X-RAY EXAM OF WRIST: CPT

## 2022-03-07 PROCEDURE — 99024 POSTOP FOLLOW-UP VISIT: CPT | Performed by: ORTHOPAEDIC SURGERY

## 2022-03-07 RX ORDER — SENNOSIDES 8.6 MG
650 CAPSULE ORAL EVERY 8 HOURS
Qty: 30 TABLET | Refills: 0 | Status: SHIPPED | OUTPATIENT
Start: 2022-03-07 | End: 2022-06-14

## 2022-03-07 RX ORDER — TRAMADOL HYDROCHLORIDE 50 MG/1
50 TABLET ORAL EVERY 6 HOURS PRN
Qty: 10 TABLET | Refills: 0 | Status: SHIPPED | OUTPATIENT
Start: 2022-03-07 | End: 2022-03-17

## 2022-03-07 NOTE — PROGRESS NOTES
Assessment:   S/P Total wrist fusion of the right wrist - Right, Right wrist posterior interosseous nerve neurectomy - Right, and Extensor pollicis longus tendon transposition right wrist on 1/20/2022    Plan:   Patient will begin formal OT for edema control and finger ROM  This will be the final prescription of narcotics for the patient  We will send in tramadol and tylenol 650 ER  She was educated that we will be happy we will also send a message to Dr Jalyn Hardy to inquire about possible medications due to her fibromyalgia  We would be more than happy to continue to prescribe non narcotics after this visit  Follow Up:  8  week(s)    To Do Next Visit:  X-rays of the  right  wrist      CHIEF COMPLAINT:  Chief Complaint   Patient presents with    Right Wrist - Post-op      Total wrist fusion of the right wrist (Right Wrist)       Right wrist posterior interosseous nerve neurectomy    Extensor pollicis longus tendon transposition right wrist DOS 1/20/22         SUBJECTIVE:  Alyson Archibald is a 61 y o  female who presents for follow up after Total wrist fusion of the right wrist - Right, Right wrist posterior interosseous nerve neurectomy - Right, and Extensor pollicis longus tendon transposition right wrist on 1/20/2022  Today patient has Pain  Severe  Constant  Sharp and Aching         PHYSICAL EXAMINATION:  Vital signs: /70   Ht 5' 7" (1 702 m)   LMP  (LMP Unknown)   BMI 30 62 kg/m²   General: well developed and well nourished, alert, oriented times 3 and appears comfortable  Psychiatric: Normal    MUSCULOSKELETAL EXAMINATION:  Incision: healed  Range of Motion: As expected near full composite fist formation  Neurovascular status: Neuro intact, good cap refill  Activity Restrictions: No restrictions  Done today: Cast removed      STUDIES REVIEWED:  Images were reviewed in PACS by Dr Niels Iyer and demonstrate: xray of right wrist on 3/7/2022 demonstrates stable alignment s/p wrist fusion PROCEDURES PERFORMED:  Procedures  No Procedures performed today   Scribe Attestation    I,:  [employer] am acting as a scribe while in the presence of the attending physician :       I,:  Dina Hooper MD personally performed the services described in this documentation    as scribed in my presence :

## 2022-03-07 NOTE — TELEPHONE ENCOUNTER
[facility] and patient was called and notified

## 2022-03-08 LAB
BACTERIA UR CULT: ABNORMAL
Lab: ABNORMAL
SL AMB ANTIMICROBIAL SUSCEPTIBILITY: ABNORMAL

## 2022-03-11 ENCOUNTER — TELEPHONE (OUTPATIENT)
Dept: FAMILY MEDICINE CLINIC | Facility: HOSPITAL | Age: 67
End: 2022-03-11

## 2022-03-11 NOTE — TELEPHONE ENCOUNTER
Not all wheezing in a smoker needs antibiotics, as discussed with pt at last appt I don't call in abx over the phone    She has to be seen

## 2022-03-11 NOTE — TELEPHONE ENCOUNTER
FELECIA BELIEVES SHE HAS BRONCHITIS - COUGHING SO MUCH- SHE IS ASKING FOR AN ANTIBIOTIC  TO BE CALLED INTO [facility]  - SHE SAID SHE KNOWS WHEN SHE NEEDS AN ANTIBIOTIC

## 2022-03-11 NOTE — TELEPHONE ENCOUNTER
Pt states she was here last week and you told her she had some wheezing when you listened to her lungs  Pt states she needs a z pack and prednisone  Are you ok with sending something in or does she need to be seen? Pt aware you are not in until the afternoon, said she wanted to wait for an answer from you

## 2022-03-14 ENCOUNTER — OFFICE VISIT (OUTPATIENT)
Dept: URGENT CARE | Facility: CLINIC | Age: 67
End: 2022-03-14
Payer: COMMERCIAL

## 2022-03-14 VITALS
BODY MASS INDEX: 27.32 KG/M2 | HEIGHT: 66 IN | WEIGHT: 170 LBS | TEMPERATURE: 98.1 F | OXYGEN SATURATION: 94 % | RESPIRATION RATE: 18 BRPM | HEART RATE: 108 BPM

## 2022-03-14 DIAGNOSIS — R06.2 WHEEZING: ICD-10-CM

## 2022-03-14 DIAGNOSIS — R05.1 ACUTE COUGH: Primary | ICD-10-CM

## 2022-03-14 PROCEDURE — 99213 OFFICE O/P EST LOW 20 MIN: CPT | Performed by: PHYSICIAN ASSISTANT

## 2022-03-14 PROCEDURE — G0463 HOSPITAL OUTPT CLINIC VISIT: HCPCS | Performed by: PHYSICIAN ASSISTANT

## 2022-03-14 RX ORDER — AZITHROMYCIN 250 MG/1
TABLET, FILM COATED ORAL
Qty: 6 TABLET | Refills: 0 | Status: SHIPPED | OUTPATIENT
Start: 2022-03-14 | End: 2022-03-18

## 2022-03-14 RX ORDER — METHYLPREDNISOLONE 4 MG/1
TABLET ORAL
Qty: 1 EACH | Refills: 0 | Status: SHIPPED | OUTPATIENT
Start: 2022-03-14 | End: 2022-05-06

## 2022-03-14 NOTE — PATIENT INSTRUCTIONS
Patient was educated on cough  Patient was prescribed a medrol dose pack  Patient was educated on side effects  Patient is a diabetic  Patient reports she had been on steroids in the past  Patient was educated on antibiotics  Patient was told to eat on antibiotics  Patient was told to follow up with wrist surgeon regarding wrist pain  Patient understood  Acute Cough   WHAT YOU NEED TO KNOW:   An acute cough can last up to 3 weeks  Common causes of an acute cough include a cold, allergies, or a lung infection  DISCHARGE INSTRUCTIONS:   Return to the emergency department if:   · You have trouble breathing or feel short of breath  · You cough up blood, or you see blood in your mucus  · You faint or feel weak or dizzy  · You have chest pain when you cough or take a deep breath  · You have new wheezing  Contact your healthcare provider if:   · You have a fever  · Your cough lasts longer than 4 weeks  · Your symptoms do not improve with treatment  · You have questions or concerns about your condition or care  Medicines:   · Medicines  may be needed to stop the cough, decrease swelling in your airways, or help open your airways  Medicine may also be given to help you cough up mucus  Ask your healthcare provider what over-the-counter medicines you can take  If you have an infection caused by bacteria, you may need antibiotics  · Take your medicine as directed  Contact your healthcare provider if you think your medicine is not helping or if you have side effects  Tell him or her if you are allergic to any medicine  Keep a list of the medicines, vitamins, and herbs you take  Include the amounts, and when and why you take them  Bring the list or the pill bottles to follow-up visits  Carry your medicine list with you in case of an emergency  Manage your symptoms:   · Do not smoke and stay away from others who smoke    Nicotine and other chemicals in cigarettes and cigars can cause lung damage and make your cough worse  Ask your healthcare provider for information if you currently smoke and need help to quit  E-cigarettes or smokeless tobacco still contain nicotine  Talk to your healthcare provider before you use these products  · Drink extra liquids as directed  Liquids will help thin and loosen mucus so you can cough it up  Liquids will also help prevent dehydration  Examples of good liquids to drink include water, fruit juice, and broth  Do not drink liquids that contain caffeine  Caffeine can increase your risk for dehydration  Ask your healthcare provider how much liquid to drink each day  · Rest as directed  Do not do activities that make your cough worse, such as exercise  · Use a humidifier or vaporizer  Use a cool mist humidifier or a vaporizer to increase air moisture in your home  This may make it easier for you to breathe and help decrease your cough  · Eat 2 to 5 mL of honey 2 times each day  Honey can help thin mucus and decrease your cough  · Use cough drops or lozenges  These can help decrease throat irritation and your cough  Follow up with your healthcare provider as directed:  Write down your questions so you remember to ask them during your visits  © Copyright [employer] 2022 Information is for End User's use only and may not be sold, redistributed or otherwise used for commercial purposes  All illustrations and images included in CareNotes® are the copyrighted property of A D A M , Inc  or [facility] [employer]  The above information is an  only  It is not intended as medical advice for individual conditions or treatments  Talk to your doctor, nurse or pharmacist before following any medical regimen to see if it is safe and effective for you

## 2022-03-14 NOTE — PROGRESS NOTES
[facility]        NAME: Roro Jalloh is a 61 y o  female  : 1955    MRN: [id number]  DATE: 2022  TIME: 12:57 PM    Assessment and Plan   Acute cough [R05 1]  1  Acute cough  methylPREDNISolone 4 MG tablet therapy pack    azithromycin (ZITHROMAX) 250 mg tablet   2  Wheezing  methylPREDNISolone 4 MG tablet therapy pack    azithromycin (ZITHROMAX) 250 mg tablet   Patient was offered COVID and flu testing and declined  Patient Instructions       Patient was educated on cough  Patient was prescribed a medrol dose pack  Patient was educated on side effects  Patient is a diabetic  Patient reports she had been on steroids in the past  Patient was educated on antibiotics  Patient was told to eat on antibiotics  Patient was told to follow up with wrist surgeon regarding wrist pain  Patient understood  Chief Complaint     Chief Complaint   Patient presents with    Cough     Patient reports productive cough for approx 5 days  She is unsure of any fever  History of Present Illness       Patient is here today with caregiver Cally Her  Patient reports cough, shortness of breath,  body aches and headache  Patient reports she is vaccinated for COVID 19 and flu  Patient is in a wheel chair  Patient denies any chest pain  Patient admits history of diabetes  Patient admits she has been on steroids in the past  Admits allergies to Pencillin  Review of Systems   Review of Systems   Constitutional: Negative  Respiratory: Positive for cough, shortness of breath and wheezing  Cardiovascular: Negative  Musculoskeletal: Positive for myalgias  Neurological: Positive for headaches  Psychiatric/Behavioral: Negative            Current Medications       Current Outpatient Medications:     acetaminophen (Tylenol 8 Hour) 650 mg CR tablet, Take 1 tablet (650 mg total) by mouth every 8 (eight) hours, Disp: 30 tablet, Rfl: 0    albuterol (PROVENTIL HFA,VENTOLIN HFA) 90 mcg/act inhaler, Inhale 2 puffs every 4 (four) hours as needed for wheezing, Disp: , Rfl:     Allergy Relief 10 MG tablet, Take 1 tablet (10 mg total) by mouth 2 (two) times a day, Disp: 180 tablet, Rfl: 0    ascorbic acid (VITAMIN C) 500 mg tablet, TAKE ONE TABLET BY MOUTH TWICE DAILY, Disp: 56 tablet, Rfl: 1    atorvastatin (LIPITOR) 20 mg tablet, atorvastatin 20 mg tablet  TAKE ONE TABLET BY MOUTH EVERY EVENING, Disp: , Rfl:     cyclobenzaprine (FLEXERIL) 10 mg tablet, TAKE ONE TABLET BY MOUTH TWICE DAILY, Disp: 56 tablet, Rfl: 1    dicyclomine (BENTYL) 20 mg tablet, TAKE ONE TABLET BY MOUTH EVERY 6 HOURS, Disp: 112 tablet, Rfl: 1    ferrous sulfate 324 (65 Fe) mg, TAKE ONE TABLET BY MOUTH TWICE DAILY WITH MEALS, Disp: 60 tablet, Rfl: 1    fluticasone-salmeterol (Wixela Inhub) 100-50 mcg/dose inhaler, Inhale 1 puff 2 (two) times a day, Disp: , Rfl:     gabapentin (NEURONTIN) 600 MG tablet, Take 1 tablet (600 mg total) by mouth 3 (three) times a day, Disp: 90 tablet, Rfl: 2    Galcanezumab-gnlm (Emgality) 120 MG/ML SOSY, Inject 120 mg under the skin every 30 (thirty) days Once a month, Disp: 1 mL, Rfl: 2    glipiZIDE (GLUCOTROL XL) 5 mg 24 hr tablet, Take 5 mg by mouth daily, Disp: , Rfl:     Lancets MISC, Check sugars once weekly, Disp: , Rfl:     leflunomide (ARAVA) 10 MG tablet, TAKE ONE TABLET BY MOUTH EVERY MORNING, Disp: 28 tablet, Rfl: 1    levothyroxine 125 mcg tablet, Take 1 tablet (125 mcg total) by mouth daily, Disp: 30 tablet, Rfl: 2    meclizine (ANTIVERT) 25 mg tablet, TAKE ONE TABLET BY MOUTH THREE TIMES DAILY AS NEEDED for dizziness, Disp: , Rfl:     metFORMIN (GLUCOPHAGE) 500 mg tablet, Take 500 mg by mouth 2 (two) times a day with meals  , Disp: , Rfl:     methotrexate 2 5 mg tablet, TAKE six TABLETS BY MOUTH ONCE A WEEK WITH FOOD OR AFTER EATING, Disp: 28 tablet, Rfl: 5    Multiple Vitamin (Tab-A-Felicia) TABS, Take 1 tablet by mouth daily, Disp: , Rfl:     Multiple Vitamins-Minerals (multivitamin with minerals) tablet, Take 1 tablet by mouth daily, Disp: 30 tablet, Rfl: 1    ondansetron (ZOFRAN) 4 mg tablet, Take 1 tablet (4 mg total) by mouth every 8 (eight) hours as needed for nausea or vomiting, Disp: 20 tablet, Rfl: 0    pantoprazole (PROTONIX) 40 mg tablet, TAKE ONE TABLET BY MOUTH AT BEDTIME, Disp: 28 tablet, Rfl: 5    venlafaxine 225 MG TB24, Take 225 mg by mouth daily, Disp: , Rfl:     atomoxetine (STRATTERA) 10 MG capsule, TAKE ONE CAPSULE BY MOUTH DAILY IN THE EVENING (Patient not taking: Reported on 3/14/2022), Disp: , Rfl:     atomoxetine (STRATTERA) 80 MG capsule, Take 80 mg by mouth daily (Patient not taking: Reported on 3/14/2022 ), Disp: , Rfl:     azithromycin (ZITHROMAX) 250 mg tablet, Take 2 tablets today then 1 tablet daily x 4 days, Disp: 6 tablet, Rfl: 0    Diclofenac Sodium (VOLTAREN) 1 %, Apply 4 g topically 4 (four) times a day, Disp: 300 g, Rfl: 6    folic acid (FOLVITE) 1 mg tablet, Take 2 tablets (2 mg total) by mouth daily at bedtime, Disp: 180 tablet, Rfl: 3    ipratropium-albuterol (DUO-NEB) 0 5-2 5 mg/3 mL nebulizer solution, Take 3 mL by nebulization every 6 (six) hours as needed , Disp: , Rfl:     Lactobacillus Acid-Pectin (Acidophilus/Citrus Pectin) TABS, Take 1 tablet by mouth daily, Disp: , Rfl:     methylPREDNISolone 4 MG tablet therapy pack, Use as directed on package, Disp: 1 each, Rfl: 0    traMADol (ULTRAM) 50 mg tablet, Take 1 tablet (50 mg total) by mouth every 6 (six) hours as needed for moderate pain for up to 10 days S/p surgery on wrist (Patient not taking: Reported on 3/14/2022 ), Disp: 10 tablet, Rfl: 0    triamcinolone (KENALOG) 0 1 % cream, Use as directed, Disp: , Rfl:     Current Allergies     Allergies as of 03/14/2022 - Reviewed 03/14/2022   Allergen Reaction Noted    Morphine Itching and Anaphylaxis 02/19/2015    Penicillins Anaphylaxis 03/26/2016    Cephalexin  10/19/2020    Cephalosporins Hives 02/19/2015    Codeine Hives 2015    Levaquin [levofloxacin] Hives 2016    Nsaids GI Bleeding 2015    Aspirin Hives, Other (See Comments), and Rash 2015    Chlorhexidine Rash 2018    Medical tape Rash 2018    Vancomycin Rash 2018            The following portions of the patient's history were reviewed and updated as appropriate: allergies, current medications, past family history, past medical history, past social history, past surgical history and problem list      Past Medical History:   Diagnosis Date    Achalasia     ADHD     Anxiety     Colon polyp     COPD (chronic obstructive pulmonary disease) ([address]     Depression     Disease of thyroid gland     hypo    Esophageal spasm     Fibromyalgia     Fibromyalgia, primary     GERD (gastroesophageal reflux disease)     Hiatal hernia     Hyperlipidemia     Hypertension     Iron deficiency anemia     Polysubstance abuse ([facility] )     Psychiatric disorder     major depressive       Past Surgical History:   Procedure Laterality Date    AMPUTATION      RBKA    ANKLE FUSION      APPENDECTOMY      BELOW KNEE LEG AMPUTATION Right     CARPAL TUNNEL RELEASE  2021    left arm    CATARACT EXTRACTION       SECTION      COLONOSCOPY  2010    COLONOSCOPY  2019    diverticula, 3 mm polyp and 6 mm polyp in rectum, grade III internal hemorrhoids, hyperplastic polyp, 5 year recall 2024     EGD  2010    FOREARM FASCIOTOMY Right     FRACTURE SURGERY      HYSTERECTOMY      KNEE ARTHROSCOPY Left     NERVE SURGERY Right 2022    Procedure: Right wrist posterior interosseous nerve neurectomy;  Surgeon: Yue Walsh MD;  Location:  MAIN OR;  Service: Orthopedics    ORTHOPEDIC SURGERY      TX FUSION OF WRIST JOINT Right 2022    Procedure:  Total wrist fusion of the right wrist;  Surgeon: Yue Walsh MD;  Location:  MAIN OR;  Service: Orthopedics    [address] [place] [address] AT [place] Left 1/11/2021    Procedure: Left carpal and cubital tunnel release;  Surgeon: Shira Harrison MD;  Location: [facility] MAIN OR;  Service: Orthopedics    UPPER GASTROINTESTINAL ENDOSCOPY      WRIST TENDON TRANSFER N/A 1/20/2022    Procedure: Extensor pollicis longus tendon transposition right wrist;  Surgeon: Jaron Allison MD;  Location:  MAIN OR;  Service: Orthopedics       Family History   Problem Relation Age of Onset    Ulcerative colitis Mother     Lung cancer Mother     Diabetes Mother     Alcohol abuse Father     Diabetes Sister     Cancer Brother     Cervical cancer Daughter     Colon polyps Neg Hx     Colon cancer Neg Hx          Medications have been verified  Objective   Pulse (!) 108   Temp 98 1 °F (36 7 °C)   Resp 18   Ht 5' 6" (1 676 m)   Wt 77 1 kg (170 lb)   LMP  (LMP Unknown)   SpO2 94%   BMI 27 44 kg/m²   No LMP recorded (lmp unknown)  Patient has had a hysterectomy  Physical Exam     Physical Exam  Constitutional:       Appearance: She is normal weight  HENT:      Head: Normocephalic  Comments: No pain over frontal or maxillary sinus     Right Ear: Tympanic membrane, ear canal and external ear normal       Left Ear: Tympanic membrane, ear canal and external ear normal       Nose: Nose normal       Mouth/Throat:      Mouth: Mucous membranes are moist       Pharynx: No oropharyngeal exudate or posterior oropharyngeal erythema  Eyes:      Pupils: Pupils are equal, round, and reactive to light  Neck:      Comments: No palpable lymph nodes  Cardiovascular:      Rate and Rhythm: Normal rate and regular rhythm  Heart sounds: Normal heart sounds  Pulmonary:      Breath sounds: Wheezing present  Skin:     Comments: Wrist incision is healing with mild redness  No purulent discharge  Small scab at end of incision   Neurological:      General: No focal deficit present  Mental Status: She is alert and oriented to person, place, and time  Psychiatric:         Mood and Affect: Mood normal          Behavior: Behavior normal

## 2022-03-15 ENCOUNTER — PATIENT OUTREACH (OUTPATIENT)
Dept: FAMILY MEDICINE CLINIC | Facility: HOSPITAL | Age: 67
End: 2022-03-15

## 2022-03-15 NOTE — PROGRESS NOTES
JOSE YANG called patient ([phone]) a second time to follow up regarding patient requesting a new psychiatrist  Patient answered and asked JOSE YANG to call back another time  JOSE YANG will attempt to outreach again at a later date

## 2022-03-16 DIAGNOSIS — M19.90 ARTHRITIS: ICD-10-CM

## 2022-03-16 RX ORDER — LEFLUNOMIDE 10 MG/1
TABLET ORAL
Qty: 28 TABLET | Refills: 1 | Status: SHIPPED | OUTPATIENT
Start: 2022-03-16 | End: 2022-05-16

## 2022-03-18 ENCOUNTER — PATIENT OUTREACH (OUTPATIENT)
Dept: FAMILY MEDICINE CLINIC | Facility: HOSPITAL | Age: 67
End: 2022-03-18

## 2022-03-18 NOTE — PROGRESS NOTES
JOSE YNAG called patient ([phone]) a second time to follow up regarding patient requesting a new psychiatrist     Britt Holman CM called patient a third time ([phone]) and introduced JOSE YANG role  JOSE YANG completed start of care psychosocial assessment with patient  Patient lives alone in her apartment  Patient is  and retired  Patient reports adequate coverage, no issues with obtaining medication and adequate health care coverage  Patient stated that she utilizes a wheelchair to ambulate as she had a BKA  Patient stated she has a HHA through the waiver program that typically comes Monday- Friday 9:00 AM- 3:30 PM  Patient stated she is very independent and her HHA mostly cleans her home, drives her to appointments, assist with grocery shopping and a few other needs  Patient stated she has ADHD, depression and anxiety  Patient denies any current or recent SI, HI or plan to harm herself or others  Patient stated she had a prior inpatient psychiatric stay over 20 years ago but does not remember where  Patient stated she has never been admitted to a state hospital      Patient would like to find a new psychiatrist  Patient is currently established with a Psychiatrist at [facility]  However, patient does not like this psychiatrist and would like to find a new one  Patient is willing to stay with her current psychiatrist until she can become connected with a new one  JOSE YANG discussed JOSE YANG sending patient list of local Psychiatrist and patient calling  Patient confirmed she felt comfortable calling on her own and JOSE YANG following up with patient and assisting as needed  JOSE YANG confirmed patient's e-mail address  ([email or web address])  Patient had no other questions, concerns or needs  JOSE YANG sent patient e-mail with list of local psychiatrist      Sharyn Castellon will follow up with patient and be available to assist as needed

## 2022-03-23 DIAGNOSIS — E11.65 UNCONTROLLED TYPE 2 DIABETES MELLITUS WITH HYPERGLYCEMIA (HCC): Primary | ICD-10-CM

## 2022-03-23 DIAGNOSIS — E78.2 MODERATE MIXED HYPERLIPIDEMIA NOT REQUIRING STATIN THERAPY: ICD-10-CM

## 2022-03-23 RX ORDER — ATORVASTATIN CALCIUM 20 MG/1
TABLET, FILM COATED ORAL
Qty: 90 TABLET | Refills: 1 | Status: SHIPPED | OUTPATIENT
Start: 2022-03-23

## 2022-03-24 ENCOUNTER — TELEPHONE (OUTPATIENT)
Dept: FAMILY MEDICINE CLINIC | Facility: HOSPITAL | Age: 67
End: 2022-03-24

## 2022-03-24 NOTE — TELEPHONE ENCOUNTER
Patient sees Dr Josef Terrell     Patient's daytime care taker called in with regard to the letter for the caretaker  Patient needs the letter to specify that the caretake must stay with the patient from now up until 2 weeks after her scheduled sx and that this caretaker is to stay through the night  [address] [place] [address] fax letter to [phone]  Patient needs to have letter within 1-2 days   Thank you No

## 2022-03-28 ENCOUNTER — OFFICE VISIT (OUTPATIENT)
Dept: OBGYN CLINIC | Facility: CLINIC | Age: 67
End: 2022-03-28
Payer: COMMERCIAL

## 2022-03-28 ENCOUNTER — TELEPHONE (OUTPATIENT)
Dept: OBGYN CLINIC | Facility: HOSPITAL | Age: 67
End: 2022-03-28

## 2022-03-28 ENCOUNTER — APPOINTMENT (OUTPATIENT)
Dept: RADIOLOGY | Facility: CLINIC | Age: 67
End: 2022-03-28
Payer: COMMERCIAL

## 2022-03-28 VITALS — SYSTOLIC BLOOD PRESSURE: 160 MMHG | BODY MASS INDEX: 27.44 KG/M2 | DIASTOLIC BLOOD PRESSURE: 100 MMHG | HEIGHT: 66 IN

## 2022-03-28 DIAGNOSIS — Z48.89 AFTERCARE FOLLOWING SURGERY: ICD-10-CM

## 2022-03-28 DIAGNOSIS — S62.322A CLOSED DISPLACED FRACTURE OF SHAFT OF THIRD METACARPAL BONE OF RIGHT HAND, INITIAL ENCOUNTER: ICD-10-CM

## 2022-03-28 DIAGNOSIS — M19.131 SLAC (SCAPHOLUNATE ADVANCED COLLAPSE) OF WRIST, RIGHT: ICD-10-CM

## 2022-03-28 DIAGNOSIS — Z48.89 AFTERCARE FOLLOWING SURGERY: Primary | ICD-10-CM

## 2022-03-28 PROCEDURE — 73110 X-RAY EXAM OF WRIST: CPT

## 2022-03-28 PROCEDURE — 99214 OFFICE O/P EST MOD 30 MIN: CPT | Performed by: ORTHOPAEDIC SURGERY

## 2022-03-28 PROCEDURE — 29075 APPL CST ELBW FNGR SHORT ARM: CPT | Performed by: ORTHOPAEDIC SURGERY

## 2022-03-28 RX ORDER — OXYCODONE HYDROCHLORIDE 5 MG/1
5 TABLET ORAL EVERY 4 HOURS PRN
Qty: 10 TABLET | Refills: 0 | Status: SHIPPED | OUTPATIENT
Start: 2022-03-28 | End: 2022-05-06

## 2022-03-28 NOTE — PROGRESS NOTES
Assessment:   S/P Total wrist fusion of the right wrist - Right, Right wrist posterior interosseous nerve neurectomy - Right, and Extensor pollicis longus tendon transposition right wrist on 1/20/2022    NEW right long finger metacarpal shaft fracture    Plan:   Placed patient in short arm cast today  Will need CT scan to assess fracture and fusion site to determine options for fixation    Follow Up: After Testing    To Do Next Visit:  Review imaging results      CHIEF COMPLAINT:  Chief Complaint   Patient presents with    Right Wrist - Post-op Problem, Pain, Swelling         SUBJECTIVE:  Roro Jalloh is a 61 y o  female who presents for follow up after Total wrist fusion of the right wrist - Right, Right wrist posterior interosseous nerve neurectomy - Right, and Extensor pollicis longus tendon transposition right wrist on 1/20/2022  Today patient has a significant increase in her pain today  She denies any injury to her hand  She denies using it for any activities  She states that a few days ago she had severe increase in pain and swelling, and notes redness today  She denies any other complaints  PHYSICAL EXAMINATION:  Vital signs: /100   Ht 5' 6" (1 676 m)   LMP  (LMP Unknown)   BMI 27 44 kg/m²   General: well developed and well nourished, alert, oriented times 3 and appears comfortable  Psychiatric: Normal    MUSCULOSKELETAL EXAMINATION:  Incision: healed incision  dorsum of hand is swollen and there is redness, but not true erythema  Range of Motion: Limited due to pain  Neurovascular status: Neuro intact, good cap refill  Activity Restrictions: Cast/splint restrictions  Done today: Cast placed      STUDIES REVIEWED:  Images were reviewed in PACS by Dr CHRISTOPHER MARIE and demonstrate: NEW long finger metacarpal shaft fracture under the plate  Incomplete appearance of wrist fusion         PROCEDURES PERFORMED:  Cast application    Date/Time: 3/28/2022 12:43 PM  Performed by: Yue Walsh MD  Authorized by: Lori Henson MD   Universal Protocol:  Consent: Verbal consent obtained  Consent given by: patient      Procedure details:     Laterality:  Right    Location:  Wrist    Wrist:  R wristCast type:  Short arm      Supplies:  Fiberglass and cotton padding  Post-procedure details:     Patient tolerance of procedure:   Tolerated well, no immediate complications          Scribe Attestation    I,:  Gavin Wallis PA-C am acting as a scribe while in the presence of the attending physician :       I,:  Lori Henson MD personally performed the services described in this documentation    as scribed in my presence :

## 2022-03-28 NOTE — TELEPHONE ENCOUNTER
Spoke to patient  She stated that for about a week she has had redness, heat to touch, and increased pain in her arm and hand  Stated she isn't sure about a fever but doesn't feel feverish currently  When she was seen in the [facility] on 3[facility]14 she was advised to follow up with us and is calling now for the follow up  Spoke to Leigh Lomeli about where we can get her on since she needs before 1 pm and Liliam Acosta only has an appointment at 330  She is scheduled for 11:45 with Dr Deejay Alonso per Leigh Lomeli  Thank you!

## 2022-03-28 NOTE — TELEPHONE ENCOUNTER
Patient called in along with someone else on her line to report that she has been experiencing pain, swelling, and redness in her right hand which she had surgery on in January  Patient is wondering if she should be seen  If so, she only has someone to drive her before 1 PM today  Please advise the patient  Patient's cb # [phone]    Thank you

## 2022-03-29 ENCOUNTER — HOSPITAL ENCOUNTER (OUTPATIENT)
Dept: CT IMAGING | Facility: HOSPITAL | Age: 67
Discharge: HOME | End: 2022-03-29
Payer: COMMERCIAL

## 2022-03-29 DIAGNOSIS — S62.322A CLOSED DISPLACED FRACTURE OF SHAFT OF THIRD METACARPAL BONE OF RIGHT HAND, INITIAL ENCOUNTER: ICD-10-CM

## 2022-03-29 DIAGNOSIS — Z48.89 AFTERCARE FOLLOWING SURGERY: ICD-10-CM

## 2022-03-29 DIAGNOSIS — M19.131 SLAC (SCAPHOLUNATE ADVANCED COLLAPSE) OF WRIST, RIGHT: ICD-10-CM

## 2022-03-29 PROCEDURE — 73200 CT UPPER EXTREMITY W/O DYE: CPT

## 2022-04-01 ENCOUNTER — PATIENT OUTREACH (OUTPATIENT)
Dept: FAMILY MEDICINE CLINIC | Facility: HOSPITAL | Age: 67
End: 2022-04-01

## 2022-04-01 NOTE — PROGRESS NOTES
JOSE YANG called patient ([phone]) to follow up regarding scheduling an outpatient mental health appointment and list of offices that JOSE YANG sent patient  Patient did not answer  JOSE YANG left a message including JOSE YANG contact information and requested a call back  JOSE YANG will attempt to outreach again at a later date

## 2022-04-03 DIAGNOSIS — M79.10 MYALGIA: ICD-10-CM

## 2022-04-03 DIAGNOSIS — Z01.818 PREOP TESTING: ICD-10-CM

## 2022-04-03 DIAGNOSIS — M17.12 PRIMARY OSTEOARTHRITIS OF LEFT KNEE: ICD-10-CM

## 2022-04-03 RX ORDER — CYCLOBENZAPRINE HCL 10 MG
TABLET ORAL
Qty: 56 TABLET | Refills: 1 | Status: SHIPPED | OUTPATIENT
Start: 2022-04-03 | End: 2022-06-01

## 2022-04-04 ENCOUNTER — TELEPHONE (OUTPATIENT)
Dept: GASTROENTEROLOGY | Facility: CLINIC | Age: 67
End: 2022-04-04

## 2022-04-04 ENCOUNTER — OFFICE VISIT (OUTPATIENT)
Dept: OBGYN CLINIC | Facility: CLINIC | Age: 67
End: 2022-04-04

## 2022-04-04 VITALS
WEIGHT: 170 LBS | SYSTOLIC BLOOD PRESSURE: 108 MMHG | BODY MASS INDEX: 27.32 KG/M2 | HEIGHT: 66 IN | DIASTOLIC BLOOD PRESSURE: 80 MMHG

## 2022-04-04 DIAGNOSIS — K22.70 BARRETT'S ESOPHAGUS WITHOUT DYSPLASIA: ICD-10-CM

## 2022-04-04 DIAGNOSIS — M06.9 RHEUMATOID ARTHRITIS, INVOLVING UNSPECIFIED SITE, UNSPECIFIED WHETHER RHEUMATOID FACTOR PRESENT (HCC): ICD-10-CM

## 2022-04-04 DIAGNOSIS — Z48.89 AFTERCARE FOLLOWING SURGERY: ICD-10-CM

## 2022-04-04 DIAGNOSIS — R11.14 BILIOUS VOMITING WITH NAUSEA: Primary | ICD-10-CM

## 2022-04-04 DIAGNOSIS — M19.031 ARTHRITIS OF RIGHT WRIST: Primary | ICD-10-CM

## 2022-04-04 DIAGNOSIS — M19.131 SLAC (SCAPHOLUNATE ADVANCED COLLAPSE) OF WRIST, RIGHT: ICD-10-CM

## 2022-04-04 PROCEDURE — 99024 POSTOP FOLLOW-UP VISIT: CPT | Performed by: ORTHOPAEDIC SURGERY

## 2022-04-04 RX ORDER — OXYCODONE HYDROCHLORIDE 5 MG/1
5 TABLET ORAL EVERY 6 HOURS PRN
Qty: 10 TABLET | Refills: 0 | Status: SHIPPED | OUTPATIENT
Start: 2022-04-04 | End: 2022-05-06

## 2022-04-04 RX ORDER — OMEPRAZOLE 40 MG/1
40 CAPSULE, DELAYED RELEASE ORAL DAILY
Qty: 30 CAPSULE | Refills: 2 | Status: SHIPPED | OUTPATIENT
Start: 2022-04-04 | End: 2022-05-06

## 2022-04-04 RX ORDER — PNV NO.95/FERROUS FUM/FOLIC AC 28MG-0.8MG
TABLET ORAL
Qty: 60 TABLET | Refills: 1 | Status: SHIPPED | OUTPATIENT
Start: 2022-04-04 | End: 2022-05-06

## 2022-04-04 RX ORDER — ASCORBIC ACID 500 MG
TABLET ORAL
Qty: 56 TABLET | Refills: 1 | Status: SHIPPED | OUTPATIENT
Start: 2022-04-04

## 2022-04-04 RX ORDER — ONDANSETRON 4 MG/1
4 TABLET, ORALLY DISINTEGRATING ORAL EVERY 6 HOURS PRN
Qty: 15 TABLET | Refills: 0 | Status: SHIPPED | OUTPATIENT
Start: 2022-04-04 | End: 2022-05-06 | Stop reason: SDUPTHER

## 2022-04-04 NOTE — TELEPHONE ENCOUNTER
Patient needs follow-up office visit  She is experience intermittent nausea and vomiting which she said has been occurring for several months  Recent wrist fracture and surgical repair  Reports pain is causing increase in her nausea and vomiting and she is requesting Zofran refill  Will give small prescription for Zofran but recommend she follow-up with her orthopedic surgeon for further management of postoperative pain and nausea  She needs follow-up -please call and schedule for appointment in 6 weeks    Thank

## 2022-04-04 NOTE — TELEPHONE ENCOUNTER
Pt states she had wrist surgery/stomach is bothering her; asks if she can refill Zofran to [address] [place] [address]? CB# [phone]

## 2022-04-04 NOTE — PROGRESS NOTES
ASSESSMENT/PLAN:    Assessment:   S/P Total wrist fusion of the right wrist - Right, Right wrist posterior interosseous nerve neurectomy - Right, and Extensor pollicis longus tendon transposition right wrist on 1/20/202 with subsequent fracture of right long finger metacarpal shaft fracture      Plan:   She will continue in [facility] at this time, CT scan reviewed shows evidence of osseus bridging across radiocarpal articular with stable minimally displaced 3rd metacarpal shaft fracture, stable hardware   Last script for oxycodone 5mg 10 pills given today, she will be referred to Dr Wagner Figueroa in pain management to discuss pain medication options    Follow Up:  4 weeks with imaging in cast       _____________________________________________________  CHIEF COMPLAINT:  Chief Complaint   Patient presents with    Right Hand - Follow-up         SUBJECTIVE:  Chris Gasca is a 61 y o  female who presents for follow up regarding total wrist fusion of the right wrist - Right, Right wrist posterior interosseous nerve neurectomy - Right, and Extensor pollicis longus tendon transposition right wrist on 1/20/2022  At last appt new fracture was seen right long finger metacarpal shaft w/o any injury or trauma  She did get CT scan to further evaluate and is here to review  She presents in [facility]  She is using narcotic pain medication for pain control       PAST MEDICAL HISTORY:  Past Medical History:   Diagnosis Date    Achalasia     ADHD     Anxiety     Colon polyp     COPD (chronic obstructive pulmonary disease) ([facility] [place] [address] )     Depression     Disease of thyroid gland     hypo    Esophageal spasm     Fibromyalgia     Fibromyalgia, primary     GERD (gastroesophageal reflux disease)     Hiatal hernia     Hyperlipidemia     Hypertension     Iron deficiency anemia     Polysubstance abuse ([facility] [place] [address] )     Psychiatric disorder     major depressive       PAST SURGICAL HISTORY:  Past Surgical History:   Procedure Laterality Date    AMPUTATION RBKA    ANKLE FUSION      APPENDECTOMY      BELOW KNEE LEG AMPUTATION Right     CARPAL TUNNEL RELEASE  2021    left arm    CATARACT EXTRACTION       SECTION      COLONOSCOPY  2010    COLONOSCOPY  2019    diverticula, 3 mm polyp and 6 mm polyp in rectum, grade III internal hemorrhoids, hyperplastic polyp, 5 year recall 2024     EGD  2010    FOREARM FASCIOTOMY Right     FRACTURE SURGERY      HYSTERECTOMY      KNEE ARTHROSCOPY Left     NERVE SURGERY Right 2022    Procedure: Right wrist posterior interosseous nerve neurectomy;  Surgeon: Shazia Zarate MD;  Location:  MAIN OR;  Service: Orthopedics    ORTHOPEDIC SURGERY      IN FUSION OF WRIST JOINT Right 2022    Procedure: Total wrist fusion of the right wrist;  Surgeon: Shazia Zarate MD;  Location:  MAIN OR;  Service: Orthopedics    IN REVISE ULNAR NERVE AT ELBOW Left 2021    Procedure: Left carpal and cubital tunnel release;  Surgeon:  Leah Bender MD;  Location: [address] [place] [address] MAIN OR;  Service: Orthopedics    UPPER GASTROINTESTINAL ENDOSCOPY      WRIST TENDON TRANSFER N/A 2022    Procedure: Extensor pollicis longus tendon transposition right wrist;  Surgeon: Shazia Zarate MD;  Location:  MAIN OR;  Service: Orthopedics       FAMILY HISTORY:  Family History   Problem Relation Age of Onset    Ulcerative colitis Mother     Lung cancer Mother     Diabetes Mother     Alcohol abuse Father     Diabetes Sister     Cancer Brother     Cervical cancer Daughter     Colon polyps Neg Hx     Colon cancer Neg Hx        SOCIAL HISTORY:  Social History     Tobacco Use    Smoking status: Current Some Day Smoker     Packs/day: 0 04     Types: Cigarettes     Start date: 1977    Smokeless tobacco: Never Used    Tobacco comment: 3 cigs a day   Vaping Use    Vaping Use: Never used   Substance Use Topics    Alcohol use: No    Drug use: Never       MEDICATIONS:    Current Outpatient Medications:     acetaminophen (Tylenol 8 Hour) 650 mg CR tablet, Take 1 tablet (650 mg total) by mouth every 8 (eight) hours, Disp: 30 tablet, Rfl: 0    albuterol (PROVENTIL HFA,VENTOLIN HFA) 90 mcg/act inhaler, Inhale 2 puffs every 4 (four) hours as needed for wheezing, Disp: , Rfl:     Allergy Relief 10 MG tablet, Take 1 tablet (10 mg total) by mouth 2 (two) times a day, Disp: 180 tablet, Rfl: 0    ascorbic acid (VITAMIN C) 500 mg tablet, TAKE ONE TABLET BY MOUTH TWICE DAILY, Disp: 56 tablet, Rfl: 1    atomoxetine (STRATTERA) 10 MG capsule, TAKE ONE CAPSULE BY MOUTH DAILY IN THE EVENING (Patient not taking: Reported on 3/14/2022), Disp: , Rfl:     atomoxetine (STRATTERA) 80 MG capsule, Take 80 mg by mouth daily (Patient not taking: Reported on 3/14/2022 ), Disp: , Rfl:     atorvastatin (LIPITOR) 20 mg tablet, TAKE ONE TABLET BY MOUTH DAILY IN THE EVENING, Disp: 90 tablet, Rfl: 1    cyclobenzaprine (FLEXERIL) 10 mg tablet, TAKE ONE TABLET BY MOUTH TWICE DAILY, Disp: 56 tablet, Rfl: 1    Diclofenac Sodium (VOLTAREN) 1 %, Apply 4 g topically 4 (four) times a day, Disp: 300 g, Rfl: 6    dicyclomine (BENTYL) 20 mg tablet, TAKE ONE TABLET BY MOUTH EVERY 6 HOURS, Disp: 112 tablet, Rfl: 1    ferrous sulfate 324 (65 Fe) mg, TAKE ONE TABLET BY MOUTH TWICE DAILY WITH MEALS, Disp: 60 tablet, Rfl: 1    fluticasone-salmeterol (Wixela Inhub) 100-50 mcg/dose inhaler, Inhale 1 puff 2 (two) times a day, Disp: , Rfl:     folic acid (FOLVITE) 1 mg tablet, Take 2 tablets (2 mg total) by mouth daily at bedtime, Disp: 180 tablet, Rfl: 3    gabapentin (NEURONTIN) 600 MG tablet, Take 1 tablet (600 mg total) by mouth 3 (three) times a day, Disp: 90 tablet, Rfl: 2    Galcanezumab-gnlm (Emgality) 120 MG/ML SOSY, Inject 120 mg under the skin every 30 (thirty) days Once a month, Disp: 1 mL, Rfl: 2    glipiZIDE (GLUCOTROL XL) 5 mg 24 hr tablet, Take 5 mg by mouth daily, Disp: , Rfl:     ipratropium-albuterol (DUO-NEB) 0 5-2 5 mg/3 mL nebulizer solution, Take 3 mL by nebulization every 6 (six) hours as needed , Disp: , Rfl:     Lactobacillus Acid-Pectin (Acidophilus/Citrus Pectin) TABS, Take 1 tablet by mouth daily, Disp: , Rfl:     Lancets MISC, Check sugars once weekly, Disp: , Rfl:     leflunomide (ARAVA) 10 MG tablet, TAKE 1 TABLET BY MOUTH EVERY MORNING, Disp: 28 tablet, Rfl: 1    levothyroxine 125 mcg tablet, Take 1 tablet (125 mcg total) by mouth daily, Disp: 30 tablet, Rfl: 2    meclizine (ANTIVERT) 25 mg tablet, TAKE ONE TABLET BY MOUTH THREE TIMES DAILY AS NEEDED for dizziness, Disp: , Rfl:     metFORMIN (GLUCOPHAGE) 500 mg tablet, TAKE ONE TABLET BY MOUTH TWICE DAILY WITH MEALS, Disp: 180 tablet, Rfl: 1    methotrexate 2 5 mg tablet, TAKE six TABLETS BY MOUTH ONCE A WEEK WITH FOOD OR AFTER EATING, Disp: 28 tablet, Rfl: 5    methylPREDNISolone 4 MG tablet therapy pack, Use as directed on package, Disp: 1 each, Rfl: 0    Multiple Vitamin (Tab-A-Felicia) TABS, Take 1 tablet by mouth daily, Disp: , Rfl:     Multiple Vitamins-Minerals (multivitamin with minerals) tablet, Take 1 tablet by mouth daily, Disp: 30 tablet, Rfl: 1    ondansetron (ZOFRAN) 4 mg tablet, Take 1 tablet (4 mg total) by mouth every 8 (eight) hours as needed for nausea or vomiting, Disp: 20 tablet, Rfl: 0    oxyCODONE (ROXICODONE) 5 immediate release tablet, Take 1 tablet (5 mg total) by mouth every 4 (four) hours as needed (pain) Max Daily Amount: 30 mg, Disp: 10 tablet, Rfl: 0    pantoprazole (PROTONIX) 40 mg tablet, TAKE ONE TABLET BY MOUTH AT BEDTIME, Disp: 28 tablet, Rfl: 5    triamcinolone (KENALOG) 0 1 % cream, Use as directed, Disp: , Rfl:     venlafaxine 225 MG TB24, Take 225 mg by mouth daily, Disp: , Rfl:     ALLERGIES:  Allergies   Allergen Reactions    Morphine Itching and Anaphylaxis     u  Other reaction(s):  Other (See Comments)  u  Other reaction(s): Feeling agitated (finding)      Penicillins Anaphylaxis    Cephalexin      Other reaction(s): Unknown    Cephalosporins Hives     Other reaction(s): Unknown Reaction      Codeine Hives     Other reaction(s): Unknown  Other reaction(s): Unknown Reaction  Other reaction(s): Unknown      Levaquin [Levofloxacin] Hives    Nsaids GI Bleeding     Other reaction(s): Unknown Reaction    Aspirin Hives, Other (See Comments) and Rash     Other reaction(s): Unknown Reaction      Chlorhexidine Rash    Medical Tape Rash    Vancomycin Rash       REVIEW OF SYSTEMS:  Pertinent items are noted in HPI  A comprehensive review of systems was negative      LABS:  HgA1c:   Lab Results   Component Value Date    HGBA1C 7 3 (H) 02/18/2022     BMP:   Lab Results   Component Value Date    GLUCOSE 90 04/01/2015    CALCIUM 9 6 09/30/2021     04/01/2015    K 4 0 02/18/2022    CO2 27 02/18/2022     02/18/2022    BUN 10 02/18/2022    CREATININE 0 68 02/18/2022           _____________________________________________________  PHYSICAL EXAMINATION:  Vital signs: /80   Ht 5' 6" (1 676 m)   Wt 77 1 kg (170 lb)   LMP  (LMP Unknown)   BMI 27 44 kg/m²   General: well developed and well nourished, alert, oriented times 3 and appears comfortable  Psychiatric: Normal  HEENT: Trachea Midline, No torticollis  Cardiovascular: No discernable arrhythmia  Pulmonary: No wheezing or stridor  Abdomen: No rebound or guarding  Extremities: No peripheral edema  Skin: No masses, erythema, lacerations, fluctation, ulcerations  Neurovascular: Sensation Intact to the Median, Ulnar, Radial Nerve, Motor Intact to the Median, Ulnar, Radial Nerve and Pulses Intact    MUSCULOSKELETAL EXAMINATION:  Right hand  Presents in [facility]  She is able to move all digits  Sensation intact all digits         _____________________________________________________  STUDIES REVIEWED:  CT scan  Right wrist dorsal plate and screw fixation traverses radius, carpal bone and 3rd metatarsal  Evidence of osseus bridging across the radiocarpal articulation  Oblique fracture 3rd metacarpal diaphysis with minimal displacement  Most distal metacarpal screw is located at fracture site with no fixation across the fracture         PROCEDURES PERFORMED:  Procedures  No Procedures performed today       Scribe Attestation    I,:  Regan Blue am acting as a scribe while in the presence of the attending physician :       I,:  Regan Majano MD personally performed the services described in this documentation    as scribed in my presence :         Yaneli Elliott,:  Regan Blue am acting as a scribe while in the presence of the attending physician :       I,:  Regan Majano MD personally performed the services described in this documentation    as scribed in my presence :

## 2022-04-06 ENCOUNTER — TELEPHONE (OUTPATIENT)
Dept: PAIN MEDICINE | Facility: CLINIC | Age: 67
End: 2022-04-06

## 2022-04-06 ENCOUNTER — TELEMEDICINE (OUTPATIENT)
Dept: RHEUMATOLOGY | Facility: CLINIC | Age: 67
End: 2022-04-06
Payer: COMMERCIAL

## 2022-04-06 DIAGNOSIS — M19.90 ARTHRITIS: Primary | ICD-10-CM

## 2022-04-06 PROCEDURE — 99442 PR PHYS/QHP TELEPHONE EVALUATION 11-20 MIN: CPT | Performed by: INTERNAL MEDICINE

## 2022-04-06 RX ORDER — LEUCOVORIN CALCIUM 10 MG/1
TABLET ORAL
Qty: 12 TABLET | Refills: 3 | Status: SHIPPED | OUTPATIENT
Start: 2022-04-06

## 2022-04-06 RX ORDER — FOLIC ACID 1 MG/1
2 TABLET ORAL
Qty: 180 TABLET | Refills: 3 | Status: SHIPPED | OUTPATIENT
Start: 2022-04-06 | End: 2022-07-18

## 2022-04-06 NOTE — PROGRESS NOTES
Virtual Brief Visit    Patient is located in the following state in which I hold an active license [place]      Assessment/Plan:    Problem List Items Addressed This Visit     None          Recent Visits  No visits were found meeting these conditions  Showing recent visits within past 7 days and meeting all other requirements  Future Appointments  No visits were found meeting these conditions  Showing future appointments within next 150 days and meeting all other requirements         I spent 15 minutes directly with the patient during this visit   Assessment and Plan:   Inflammatory arthritis s/p right hand surgery 1/20/22 Dr Aminah Doss and now beginning OT  Still c/o joint pain and stiffness  Topical analgesics recommended  Pain control as per ortho  Continue MTX 6 tabs weekly and folic acid 2 mg daily  Add leucovorin 10mg the day after the MTX as elevated LTS  If LFTs continue to be elevated will d/c MTX  Patient understands  Will repeat labs in 6-8 weeks and f/u in 3 mos  or sooner if necessary  Rheumatic Disease Summary  As above    Here for f/u visit of her inflammatory arthritis     S/p right hand surgery Jan 2022 Dr Aminah Doss  Just began OT  Using Tylenol/Tramadol PRN pain  Taking MTX and Arava for arthritis  Also taking folic acid 2 mg daily  No other complaints  Unable to travel to the office secondary to pain  The following portions of the patient's history were reviewed and updated as appropriate: allergies, current medications, past family history, past medical history, past social history, past surgical history and problem list     Review of Systems:   Review of Systems   Constitutional: Negative for fatigue  HENT: Negative for mouth sores  Eyes: Negative for pain  Respiratory: Negative for shortness of breath  Cardiovascular: Negative for leg swelling  Musculoskeletal: Positive for arthralgias  Negative for joint swelling  Skin: Negative for rash     Neurological: Negative for weakness  Hematological: Negative for adenopathy  Psychiatric/Behavioral: Negative for sleep disturbance         Home Medications:    Current Outpatient Medications:     acetaminophen (Tylenol 8 Hour) 650 mg CR tablet, Take 1 tablet (650 mg total) by mouth every 8 (eight) hours, Disp: 30 tablet, Rfl: 0    albuterol (PROVENTIL HFA,VENTOLIN HFA) 90 mcg/act inhaler, Inhale 2 puffs every 4 (four) hours as needed for wheezing, Disp: , Rfl:     Allergy Relief 10 MG tablet, Take 1 tablet (10 mg total) by mouth 2 (two) times a day, Disp: 180 tablet, Rfl: 0    ascorbic acid (VITAMIN C) 500 mg tablet, TAKE ONE TABLET BY MOUTH TWICE DAILY, Disp: 56 tablet, Rfl: 1    atomoxetine (STRATTERA) 10 MG capsule, TAKE ONE CAPSULE BY MOUTH DAILY IN THE EVENING (Patient not taking: Reported on 3/14/2022), Disp: , Rfl:     atomoxetine (STRATTERA) 80 MG capsule, Take 80 mg by mouth daily (Patient not taking: Reported on 3/14/2022 ), Disp: , Rfl:     atorvastatin (LIPITOR) 20 mg tablet, TAKE ONE TABLET BY MOUTH DAILY IN THE EVENING, Disp: 90 tablet, Rfl: 1    cyclobenzaprine (FLEXERIL) 10 mg tablet, TAKE ONE TABLET BY MOUTH TWICE DAILY, Disp: 56 tablet, Rfl: 1    Diclofenac Sodium (VOLTAREN) 1 %, Apply 4 g topically 4 (four) times a day, Disp: 300 g, Rfl: 6    dicyclomine (BENTYL) 20 mg tablet, TAKE ONE TABLET BY MOUTH EVERY 6 HOURS, Disp: 112 tablet, Rfl: 1    Ferrous Sulfate (Iron) 325 (65 Fe) MG TABS, TAKE ONE TABLET BY MOUTH TWICE DAILY WITH MEALS, Disp: 60 tablet, Rfl: 1    fluticasone-salmeterol (Wixela Inhub) 100-50 mcg/dose inhaler, Inhale 1 puff 2 (two) times a day, Disp: , Rfl:     folic acid (FOLVITE) 1 mg tablet, Take 2 tablets (2 mg total) by mouth daily at bedtime, Disp: 180 tablet, Rfl: 3    gabapentin (NEURONTIN) 600 MG tablet, Take 1 tablet (600 mg total) by mouth 3 (three) times a day, Disp: 90 tablet, Rfl: 2    Galcanezumab-gnlm (Emgality) 120 MG/ML SOSY, Inject 120 mg under the skin every 30 (thirty) days Once a month, Disp: 1 mL, Rfl: 2    glipiZIDE (GLUCOTROL XL) 5 mg 24 hr tablet, Take 5 mg by mouth daily, Disp: , Rfl:     ipratropium-albuterol (DUO-NEB) 0 5-2 5 mg/3 mL nebulizer solution, Take 3 mL by nebulization every 6 (six) hours as needed , Disp: , Rfl:     Lactobacillus Acid-Pectin (Acidophilus/Citrus Pectin) TABS, Take 1 tablet by mouth daily, Disp: , Rfl:     Lancets MISC, Check sugars once weekly, Disp: , Rfl:     leflunomide (ARAVA) 10 MG tablet, TAKE 1 TABLET BY MOUTH EVERY MORNING, Disp: 28 tablet, Rfl: 1    levothyroxine 125 mcg tablet, Take 1 tablet (125 mcg total) by mouth daily, Disp: 30 tablet, Rfl: 2    meclizine (ANTIVERT) 25 mg tablet, TAKE ONE TABLET BY MOUTH THREE TIMES DAILY AS NEEDED for dizziness, Disp: , Rfl:     metFORMIN (GLUCOPHAGE) 500 mg tablet, TAKE ONE TABLET BY MOUTH TWICE DAILY WITH MEALS, Disp: 180 tablet, Rfl: 1    methotrexate 2 5 mg tablet, TAKE six TABLETS BY MOUTH ONCE A WEEK WITH FOOD OR AFTER EATING, Disp: 28 tablet, Rfl: 5    methylPREDNISolone 4 MG tablet therapy pack, Use as directed on package, Disp: 1 each, Rfl: 0    Multiple Vitamin (Tab-A-Felicia) TABS, Take 1 tablet by mouth daily, Disp: , Rfl:     Multiple Vitamins-Minerals (multivitamin with minerals) tablet, Take 1 tablet by mouth daily, Disp: 30 tablet, Rfl: 1    omeprazole (PriLOSEC) 40 MG capsule, Take 1 capsule (40 mg total) by mouth daily, Disp: 30 capsule, Rfl: 2    ondansetron (Zofran ODT) 4 mg disintegrating tablet, Take 1 tablet (4 mg total) by mouth every 6 (six) hours as needed for nausea or vomiting, Disp: 15 tablet, Rfl: 0    ondansetron (ZOFRAN) 4 mg tablet, Take 1 tablet (4 mg total) by mouth every 8 (eight) hours as needed for nausea or vomiting, Disp: 20 tablet, Rfl: 0    oxyCODONE (ROXICODONE) 5 immediate release tablet, Take 1 tablet (5 mg total) by mouth every 4 (four) hours as needed (pain) Max Daily Amount: 30 mg, Disp: 10 tablet, Rfl: 0    oxyCODONE (Roxicodone) 5 immediate release tablet, Take 1 tablet (5 mg total) by mouth every 6 (six) hours as needed for moderate pain Max Daily Amount: 20 mg, Disp: 10 tablet, Rfl: 0    pantoprazole (PROTONIX) 40 mg tablet, TAKE ONE TABLET BY MOUTH AT BEDTIME, Disp: 28 tablet, Rfl: 5    triamcinolone (KENALOG) 0 1 % cream, Use as directed, Disp: , Rfl:     venlafaxine 225 MG TB24, Take 225 mg by mouth daily, Disp: , Rfl:     Objective: There were no vitals filed for this visit  Physical Exam  Constitutional:       General: She is not in acute distress  HENT:      Head: Normocephalic and atraumatic  Eyes:      Conjunctiva/sclera: Conjunctivae normal    Pulmonary:      Effort: Pulmonary effort is normal  No respiratory distress  Musculoskeletal:      Cervical back: Neck supple  Skin:     Coloration: Skin is not pale  Neurological:      Mental Status: She is alert  Mental status is at baseline  Psychiatric:         Mood and Affect: Mood normal          Behavior: Behavior normal          Reviewed labs and imaging  Imaging:   XR wrist 3+ vw right    Result Date: 3/30/2022  Narrative: RIGHT WRIST INDICATION:   Z48 89: Encounter for other specified surgical aftercare  COMPARISON:  Compared with 3/7/2022 VIEWS:  XR WRIST 3+ VW RIGHT FINDINGS: Plate and screw fixing the 3rd metacarpal carpal and the radius  Radiocarpal and ulnar carpal degenerative changes with 1st carpometacarpal degenerative changes  No lytic or blastic osseous lesion  Soft tissues are unremarkable  Impression: Intact hardware fusing the wrist joint  No interval change  Workstation performed: [id number]     XR wrist 3+ vw right    Result Date: 3/11/2022  Narrative: RIGHT WRIST INDICATION:   Z48 89: Encounter for other specified surgical aftercare  COMPARISON:  Right wrist x-ray 2/14/2022 VIEWS:  XR WRIST 3+ VW RIGHT Images: 3 FINDINGS: There is no acute fracture or dislocation   Stable plate and screw fixation hardware spanning from distal radius to 3rd metacarpal  Degenerative changes of the 1st carpal metacarpal [place]) articulation and radiocarpal joint  No lytic or blastic osseous lesion  Calcification dorsal soft tissues of the wrist      Impression: Stable right wrist fusion  Workstation performed: [id number]     CT upper extremity wo contrast right    Result Date: 3/29/2022  Narrative: CT right wrist without IV contrast INDICATION: Z48 89: Encounter for other specified surgical aftercare 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, right wrist S62 322A: Displaced fracture of shaft of third metacarpal bone, right hand, initial encounter for closed fracture  COMPARISON: X-ray 1/20/22, 3/28/22 TECHNIQUE: CT examination of the above was performed  This examination, like all CT scans performed in the [facility], was performed utilizing techniques to minimize radiation dose exposure, including the use of iterative reconstruction  and automated exposure control software  Sagittal and coronal two dimensional reconstructed images were also submitted for interpretation  Rad dose  527 89 mGy-cm FINDINGS: OSSEOUS STRUCTURES:  Dorsal plate and screw fixation traverses radius, carpal bones, and 3rd metatarsal   No evidence of osseous bridging across the radiocarpal articulation, for the 3rd CMC  These joint spaces are appreciated on series 2 images 82 and 66 respectively  Oblique fracture is noted within the 3rd metacarpal diaphysis, with minimal displacement  There is periosteal new bone formation and early callus formation  The most distal metacarpal screw is located at the fracture sites as noted on series 402/33, with no fixation present across the fracture  VISUALIZED MUSCULATURE:  Unremarkable  SOFT TISSUES:  Fiberglas cast Dorsal compartments skin defect is seen on the most proximal axial image series 400/83 OTHER PERTINENT FINDINGS:  None  Impression: Subacute 3rd metacarpal fracture    The distal most metacarpal screw is located at the fracture site  Workstation performed: [id number]       Labs:   Office Visit on 03/04/2022   Component Date Value Ref Range Status    LEUKOCYTE ESTERASE,UA 03/04/2022 neg   Final    NITRITE,UA 03/04/2022 +   Final    SL AMB POCT UROBILINOGEN 03/04/2022 neg   Final    POCT URINE PROTEIN 03/04/2022 neg   Final     PH,UA 03/04/2022 6 0   Final    BLOOD,UA 03/04/2022 neg   Final    SPECIFIC GRAVITY,UA 03/04/2022 1 030   Final    KETONES,UA 03/04/2022 neg   Final    BILIRUBIN,UA 03/04/2022 neg   Final    GLUCOSE, UA 03/04/2022 neg   Final     COLOR,UA 03/04/2022 yellow   Final    CLARITY,UA 03/04/2022 clear   Final    Urine Culture Result 03/04/2022 Final report*  Final    Result 1 03/04/2022 Escherichia coli*  Final    Comment: Cefazolin <=4 ug/mL  Cefazolin with an JOHN <=16 predicts susceptibility to the oral agents  cefaclor, cefdinir, cefpodoxime, cefprozil, cefuroxime, cephalexin,  and loracarbef when used for therapy of uncomplicated urinary tract  infections due to E  coli, Klebsiella pneumoniae, and Proteus  mirabilis    Greater than 100,000 colony forming units per mL      SL AMB ANTIMICROBIAL SUSCEPTIBILITY 03/04/2022 Comment   Final    Comment:       ** S = Susceptible; I = Intermediate; R = Resistant **                     P = Positive; N = Negative              MICS are expressed in micrograms per mL     Antibiotic                 RSLT#1    RSLT#2    RSLT#3    RSLT#4  Amoxicillin/Clavulanic Acid    S  Ampicillin                     R  Cefepime                       S  Ceftriaxone                    S  Cefuroxime                     S  Ciprofloxacin                  S  Ertapenem                      S  Gentamicin                     R  Imipenem                       S  Levofloxacin                   S  Meropenem                      S  Nitrofurantoin                 S  Piperacillin/Tazobactam        S  Tetracycline                   R  Tobramycin I  Trimethoprim/Sulfa             R     Office Visit on 02/03/2022   Component Date Value Ref Range Status    White Blood Cell Count 02/18/2022 9 7  3 4 - 10 8 x10E3/uL Final    Red Blood Cell Count 02/18/2022 5 01  3 77 - 5 28 x10E6/uL Final    Hemoglobin 02/18/2022 14 1  11 1 - 15 9 g/dL Final    HCT 02/18/2022 45 1  34 0 - 46 6 % Final    MCV 02/18/2022 90  79 - 97 fL Final    MCH 02/18/2022 28 1  26 6 - 33 0 pg Final    MCHC 02/18/2022 31 3* 31 5 - 35 7 g/dL Final    RDW 02/18/2022 17 0* 11 7 - 15 4 % Final    Platelet Count 67/70/7332 319  150 - 450 x10E3/uL Final    Neutrophils 02/18/2022 66  Not Estab  % Final    Lymphocytes 02/18/2022 20  Not Estab  % Final    Monocytes 02/18/2022 7  Not Estab  % Final    Eosinophils 02/18/2022 5  Not Estab  % Final    Basophils PCT 02/18/2022 1  Not Estab  % Final    Neutrophils (Absolute) 02/18/2022 6 5  1 4 - 7 0 x10E3/uL Final    Lymphocytes (Absolute) 02/18/2022 2 0  0 7 - 3 1 x10E3/uL Final    Monocytes (Absolute) 02/18/2022 0 6  0 1 - 0 9 x10E3/uL Final    Eosinophils (Absolute) 02/18/2022 0 5* 0 0 - 0 4 x10E3/uL Final    Basophils ABS 02/18/2022 0 1  0 0 - 0 2 x10E3/uL Final    Immature Granulocytes 02/18/2022 1  Not Estab  % Final    Immature Granulocytes (Absolute) 02/18/2022 0 1  0 0 - 0 1 x10E3/uL Final    Glucose, Random 02/18/2022 107* 65 - 99 mg/dL Final    BUN 02/18/2022 10  8 - 27 mg/dL Final    Creatinine 02/18/2022 0 68  0 57 - 1 00 mg/dL Final    eGFR Non  02/18/2022 93  >59 mL/min/1 73 Final    eGFR  02/18/2022 107  >59 mL/min/1 73 Final    Comment: **In accordance with recommendations from the NKF-ASN Task force,**    [facility] is in the process of updating its eGFR calculation to the    2021 CKD-EPI creatinine equation that estimates kidney function    without a race variable        SL AMB BUN/CREATININE RATIO 02/18/2022 15  12 - 28 Final    Sodium 02/18/2022 141  134 - 144 mmol/L Final    Potassium 02/18/2022 4 0  3 5 - 5 2 mmol/L Final    Chloride 02/18/2022 100  96 - 106 mmol/L Final    CO2 02/18/2022 27  20 - 29 mmol/L Final    CALCIUM 02/18/2022 9 2  8 7 - 10 3 mg/dL Final    Protein, Total 02/18/2022 7 0  6 0 - 8 5 g/dL Final    Albumin 02/18/2022 4 2  3 8 - 4 8 g/dL Final    Globulin, Total 02/18/2022 2 8  1 5 - 4 5 g/dL Final    Albumin/Globulin Ratio 02/18/2022 1 5  1 2 - 2 2 Final    TOTAL BILIRUBIN 02/18/2022 0 4  0 0 - 1 2 mg/dL Final    Alk Phos Isoenzymes 02/18/2022 115  44 - 121 IU/L Final    AST 02/18/2022 41* 0 - 40 IU/L Final    ALT 02/18/2022 49* 0 - 32 IU/L Final    Cholesterol, Total 02/18/2022 146  100 - 199 mg/dL Final    Triglycerides 02/18/2022 131  0 - 149 mg/dL Final    HDL 02/18/2022 38* >39 mg/dL Final    VLDL Cholesterol Calculated 02/18/2022 23  5 - 40 mg/dL Final    LDL Calculated 02/18/2022 85  0 - 99 mg/dL Final    T  Chol/HDL Ratio 02/18/2022 3 8  0 0 - 4 4 ratio Final    Comment:                                   T  Chol/HDL Ratio                                              Men  Women                                1/2 Avg  Risk  3 4    3 3                                    Avg Risk  5 0    4 4                                 2X Avg  Risk  9 6    7 1                                 3X Avg  Risk 23 4   11 0      TSH 02/18/2022 0 084* 0 450 - 4 500 uIU/mL Final    Hemoglobin A1C 02/18/2022 7 3* 4 8 - 5 6 % Final    Comment:          Prediabetes: 5 7 - 6 4           Diabetes: >6 4           Glycemic control for adults with diabetes: <7 0      Estimated Average Glucose 02/18/2022 163  mg/dL Final   Admission on 01/20/2022, Discharged on 01/20/2022   Component Date Value Ref Range Status    POC Glucose 01/20/2022 117  65 - 140 mg/dl Final    POC Glucose 01/20/2022 138  65 - 140 mg/dl Final   Office Visit on 09/21/2021   Component Date Value Ref Range Status    Glucose, Random 09/30/2021 133* 65 - 99 mg/dL Final    Comment:               Fasting reference interval     For someone without known diabetes, a glucose  value >125 mg/dL indicates that they may have  diabetes and this should be confirmed with a  follow-up test          BUN 09/30/2021 10  7 - 25 mg/dL Final    Creatinine 09/30/2021 0 73  0 50 - 0 99 mg/dL Final    Comment: For patients >52years of age, the reference limit  for Creatinine is approximately 13% higher for people  identified as -American   eGFR Non  09/30/2021 87  > OR = 60 mL/min/1 73m2 Final    eGFR  09/30/2021 101  > OR = 60 mL/min/1 73m2 Final    SL AMB BUN/CREATININE RATIO 47/74/1607 NOT APPLICABLE  6 - 22 (calc) Final    Sodium 09/30/2021 142  135 - 146 mmol/L Final    Potassium 09/30/2021 4 9  3 5 - 5 3 mmol/L Final    Chloride 09/30/2021 101  98 - 110 mmol/L Final    CO2 09/30/2021 31  20 - 32 mmol/L Final    Calcium 09/30/2021 9 6  8 6 - 10 4 mg/dL Final    Protein, Total 09/30/2021 7 3  6 1 - 8 1 g/dL Final    Albumin 09/30/2021 4 4  3 6 - 5 1 g/dL Final    Globulin 09/30/2021 2 9  1 9 - 3 7 g/dL (calc) Final    Albumin/Globulin Ratio 09/30/2021 1 5  1 0 - 2 5 (calc) Final    TOTAL BILIRUBIN 09/30/2021 0 4  0 2 - 1 2 mg/dL Final    Alkaline Phosphatase 09/30/2021 110  37 - 153 U/L Final    AST 09/30/2021 34  10 - 35 U/L Final    ALT 09/30/2021 30* 6 - 29 U/L Final    Hemoglobin A1C 09/30/2021 7 8* <5 7 % of total Hgb Final    Comment: For someone without known diabetes, a hemoglobin A1c  value of 6 5% or greater indicates that they may have   diabetes and this should be confirmed with a follow-up   test      For someone with known diabetes, a value <7% indicates   that their diabetes is well controlled and a value   greater than or equal to 7% indicates suboptimal   control  A1c targets should be individualized based on   duration of diabetes, age, comorbid conditions, and   other considerations       Currently, no consensus exists regarding use of  hemoglobin A1c for diagnosis of diabetes for children   Estimated Average Glucose 09/30/2021 177  (calc) Final    Estimated Average Glucose (mmol/L) 09/30/2021 9 8  (calc) Final    White Blood Cell Count 09/30/2021 9 8  3 8 - 10 8 Thousand/uL Final    Red Blood Cell Count 09/30/2021 5 43* 3 80 - 5 10 Million/uL Final    Hemoglobin 09/30/2021 13 9  11 7 - 15 5 g/dL Final    HCT 09/30/2021 45 0  35 0 - 45 0 % Final    MCV 09/30/2021 82 9  80 0 - 100 0 fL Final    MCH 09/30/2021 25 6* 27 0 - 33 0 pg Final    MCHC 09/30/2021 30 9* 32 0 - 36 0 g/dL Final    RDW 09/30/2021 18 6* 11 0 - 15 0 % Final    Platelet Count 58/11/3003 315  140 - 400 Thousand/uL Final    SL AMB MPV 09/30/2021   7 5 - 12 5 fL Final    Comment: Due to platelet or RBC variability in size or shape  the result cannot be reported accurately        Neutrophils (Absolute) 09/30/2021 6,292  1,500 - 7,800 cells/uL Final    Lymphocytes (Absolute) 09/30/2021 2,205  850 - 3,900 cells/uL Final    Monocytes (Absolute) 09/30/2021 794  200 - 950 cells/uL Final    Eosinophils (Absolute) 09/30/2021 431  15 - 500 cells/uL Final    Basophils ABS 09/30/2021 78  0 - 200 cells/uL Final    Neutrophils 09/30/2021 64 2  % Final    Lymphocytes 09/30/2021 22 5  % Final    Monocytes 09/30/2021 8 1  % Final    Eosinophils 09/30/2021 4 4  % Final    Basophils PCT 09/30/2021 0 8  % Final    Iron, Serum 09/30/2021 76  45 - 160 mcg/dL Final    Total Iron Binding Capacity (TIBC) 09/30/2021 430  250 - 450 mcg/dL (calc) Final    Iron Saturation 09/30/2021 18  16 - 45 % (calc) Final   Orders Only on 06/07/2021   Component Date Value Ref Range Status    Glucose, Random 06/07/2021 151* 65 - 99 mg/dL Final    Comment:               Fasting reference interval     For someone without known diabetes, a glucose  value >125 mg/dL indicates that they may have  diabetes and this should be confirmed with a  follow-up test          BUN 06/07/2021 16  7 - 25 mg/dL Final    Creatinine 06/07/2021 0 76  0 50 - 0 99 mg/dL Final    Comment: For patients >52years of age, the reference limit  for Creatinine is approximately 13% higher for people  identified as -American           eGFR Non  06/07/2021 83  > OR = 60 mL/min/1 73m2 Final    eGFR  06/07/2021 97  > OR = 60 mL/min/1 73m2 Final    SL AMB BUN/CREATININE RATIO 95/90/5057 NOT APPLICABLE  6 - 22 (calc) Final    Sodium 06/07/2021 138  135 - 146 mmol/L Final    Potassium 06/07/2021 4 4  3 5 - 5 3 mmol/L Final    Chloride 06/07/2021 100  98 - 110 mmol/L Final    CO2 06/07/2021 30  20 - 32 mmol/L Final    Calcium 06/07/2021 9 0  8 6 - 10 4 mg/dL Final    Protein, Total 06/07/2021 7 2  6 1 - 8 1 g/dL Final    Albumin 06/07/2021 4 0  3 6 - 5 1 g/dL Final    Globulin 06/07/2021 3 2  1 9 - 3 7 g/dL (calc) Final    Albumin/Globulin Ratio 06/07/2021 1 3  1 0 - 2 5 (calc) Final    TOTAL BILIRUBIN 06/07/2021 0 2  0 2 - 1 2 mg/dL Final    Alkaline Phosphatase 06/07/2021 104  37 - 153 U/L Final    AST 06/07/2021 19  10 - 35 U/L Final    ALT 06/07/2021 13  6 - 29 U/L Final    White Blood Cell Count 06/07/2021 12 4* 3 8 - 10 8 Thousand/uL Final    Red Blood Cell Count 06/07/2021 4 72  3 80 - 5 10 Million/uL Final    Hemoglobin 06/07/2021 11 2* 11 7 - 15 5 g/dL Final    HCT 06/07/2021 37 1  35 0 - 45 0 % Final    MCV 06/07/2021 78 6* 80 0 - 100 0 fL Final    MCH 06/07/2021 23 7* 27 0 - 33 0 pg Final    MCHC 06/07/2021 30 2* 32 0 - 36 0 g/dL Final    RDW 06/07/2021 17 7* 11 0 - 15 0 % Final    Platelet Count 94/26/2588 318  140 - 400 Thousand/uL Final    SL AMB MPV 06/07/2021 12 0  7 5 - 12 5 fL Final    Neutrophils (Absolute) 06/07/2021 8,159* 1,500 - 7,800 cells/uL Final    Lymphocytes (Absolute) 06/07/2021 2,654  850 - 3,900 cells/uL Final    Monocytes (Absolute) 06/07/2021 918  200 - 950 cells/uL Final    Eosinophils (Absolute) 06/07/2021 583* 15 - 500 cells/uL Final    Basophils ABS 06/07/2021 87  0 - 200 cells/uL Final    Neutrophils 06/07/2021 65 8  % Final    Lymphocytes 06/07/2021 21 4  % Final    Monocytes 06/07/2021 7 4  % Final    Eosinophils 06/07/2021 4 7  % Final    Basophils PCT 06/07/2021 0 7  % Final    Comment(s) 06/07/2021 Few atypical lymphocytes noted   Final    C-Reactive Protein, Quant 06/07/2021 62 4* <8 0 mg/L Final

## 2022-04-06 NOTE — TELEPHONE ENCOUNTER
S/w Pt regarding referral from Dr Kaaren Kanner    Unfortunately the Pt is requesting medication management which we don not offer    Explained to the Pt that we can schedule a consult for her to be seen but per Dr luu and Leeanne Toth we will not take over Opioids    Pt declined to schedule because she needs medication not injections for pain

## 2022-04-06 NOTE — PATIENT INSTRUCTIONS
Continue the folic acid 2 tabs every day  I ordered leucovorin (vitamin) to take the morning after you take your methotrexate because your liver tests were high and this will help with that  Please have your blood work repeated in about 6 weeks  I'll see you back in the office in about 3 months

## 2022-04-12 ENCOUNTER — TELEPHONE (OUTPATIENT)
Dept: PSYCHIATRY | Facility: CLINIC | Age: 67
End: 2022-04-12

## 2022-04-18 ENCOUNTER — PATIENT OUTREACH (OUTPATIENT)
Dept: FAMILY MEDICINE CLINIC | Facility: HOSPITAL | Age: 67
End: 2022-04-18

## 2022-04-18 NOTE — LETTER
[facility]    Re: Aiden Cai to [place]   4/18/2022       Dear Myrna Moore am a [address]  and wanted to be certain you had information to contact me should you desire assistance with or have questions about non-medical aspects of your care such as [but not limited to] medical insurance, housing, transportation, material needs, or emergency needs  If I do not have an answer I will assist you in finding the appropriate agency or individual who can help  Please feel free to contact me at [phone]  Thank You      Sincerely,         Patricia Frausto, MSW, LSW

## 2022-04-18 NOTE — PROGRESS NOTES
JOSE YANG called patient ([phone]) a second time  to follow up regarding scheduling an outpatient mental health appointment and list of offices that JOSE YANG sent patient  Patient did not answer  Patient's phone was busy, JOSE YANG unable to leave a message  JOSE YANG will place unable to reach letter in outgoing mail  JOSE YANG will close  Please re consult JOSE YANG as needed

## 2022-04-19 ENCOUNTER — TELEPHONE (OUTPATIENT)
Dept: OBGYN CLINIC | Facility: HOSPITAL | Age: 67
End: 2022-04-19

## 2022-04-20 DIAGNOSIS — M19.90 ARTHRITIS: ICD-10-CM

## 2022-04-20 RX ORDER — ACETAMINOPHEN 325 MG/1
650 TABLET ORAL EVERY 6 HOURS PRN
Qty: 120 TABLET | Refills: 5 | Status: SHIPPED | OUTPATIENT
Start: 2022-04-20 | End: 2022-06-14

## 2022-04-21 LAB — TSH SERPL DL<=0.005 MIU/L-ACNC: 2.87 UIU/ML (ref 0.45–4.5)

## 2022-04-25 ENCOUNTER — OFFICE VISIT (OUTPATIENT)
Dept: OBGYN CLINIC | Facility: CLINIC | Age: 67
End: 2022-04-25
Payer: COMMERCIAL

## 2022-04-25 ENCOUNTER — APPOINTMENT (OUTPATIENT)
Dept: RADIOLOGY | Facility: CLINIC | Age: 67
End: 2022-04-25
Payer: COMMERCIAL

## 2022-04-25 VITALS
HEIGHT: 66 IN | WEIGHT: 170 LBS | SYSTOLIC BLOOD PRESSURE: 110 MMHG | BODY MASS INDEX: 27.32 KG/M2 | DIASTOLIC BLOOD PRESSURE: 78 MMHG

## 2022-04-25 DIAGNOSIS — Z48.89 AFTERCARE FOLLOWING SURGERY: ICD-10-CM

## 2022-04-25 DIAGNOSIS — Z48.89 AFTERCARE FOLLOWING SURGERY: Primary | ICD-10-CM

## 2022-04-25 DIAGNOSIS — S62.322A CLOSED DISPLACED FRACTURE OF SHAFT OF THIRD METACARPAL BONE OF RIGHT HAND, INITIAL ENCOUNTER: ICD-10-CM

## 2022-04-25 PROCEDURE — 73130 X-RAY EXAM OF HAND: CPT

## 2022-04-25 PROCEDURE — 3008F BODY MASS INDEX DOCD: CPT | Performed by: ORTHOPAEDIC SURGERY

## 2022-04-25 PROCEDURE — 73110 X-RAY EXAM OF WRIST: CPT

## 2022-04-25 PROCEDURE — 99213 OFFICE O/P EST LOW 20 MIN: CPT | Performed by: ORTHOPAEDIC SURGERY

## 2022-04-25 NOTE — PROGRESS NOTES
ASSESSMENT/PLAN:    Assessment:   S/P Total wrist fusion of the right wrist - Right, Right wrist posterior interosseous nerve neurectomy - Right, and Extensor pollicis longus tendon transposition right wrist on 1/20/202 with subsequent fracture of right long finger metacarpal shaft fracture 3/28/22 with evidence of continued healing    Plan:   She will maintain [facility] for another 4 weeks and then in 4 weeks get CT scan with MARS protocol in cast and see back after to review and determine if patient will be removed from cast  Continue to be NWB with right upper extremity      _____________________________________________________  CHIEF COMPLAINT:  Chief Complaint   Patient presents with    Right Wrist - Fracture         SUBJECTIVE:  Dara Griffith is a 61 y o  female who presents for follow up regarding right wrist  S/P Total wrist fusion of the right wrist - Right, Right wrist posterior interosseous nerve neurectomy - Right, and Extensor pollicis longus tendon transposition right wrist on 1/20/202 with subsequent fracture of right long finger metacarpal shaft fracture  CT reviewed at last appt showed osseus bridging across radiocarpal joint  She presents in [facility]  Pain is well controlled  Denies any numbness or tingling       PAST MEDICAL HISTORY:  Past Medical History:   Diagnosis Date    Achalasia     ADHD     Anxiety     Colon polyp     COPD (chronic obstructive pulmonary disease) ([facility] [address] [facility]     Depression     Disease of thyroid gland     hypo    Esophageal spasm     Fibromyalgia     Fibromyalgia, primary     GERD (gastroesophageal reflux disease)     Hiatal hernia     Hyperlipidemia     Hypertension     Iron deficiency anemia     [facility]     Psychiatric disorder     major depressive       PAST SURGICAL HISTORY:  Past Surgical History:   Procedure Laterality Date    AMPUTATION      RBKA    ANKLE FUSION      APPENDECTOMY      BELOW KNEE LEG AMPUTATION Right     CARPAL TUNNEL RELEASE  2021    left arm    CATARACT EXTRACTION       SECTION      COLONOSCOPY  2010    COLONOSCOPY  2019    diverticula, 3 mm polyp and 6 mm polyp in rectum, grade III internal hemorrhoids, hyperplastic polyp, 5 year recall 2024     EGD  2010    FOREARM FASCIOTOMY Right     FRACTURE SURGERY      HYSTERECTOMY      KNEE ARTHROSCOPY Left     NERVE SURGERY Right 2022    Procedure: Right wrist posterior interosseous nerve neurectomy;  Surgeon: Santiago Berger MD;  Location:  MAIN OR;  Service: Orthopedics    ORTHOPEDIC SURGERY      NM FUSION OF WRIST JOINT Right 2022    Procedure: Total wrist fusion of the right wrist;  Surgeon: Santiago Berger MD;  Location:  MAIN OR;  Service: Orthopedics    NM REVISE ULNAR NERVE AT ELBOW Left 2021    Procedure: Left carpal and cubital tunnel release;  Surgeon:  Keri Arellano MD;  Location:  MAIN OR;  Service: Orthopedics    UPPER GASTROINTESTINAL ENDOSCOPY      WRIST TENDON TRANSFER N/A 2022    Procedure: Extensor pollicis longus tendon transposition right wrist;  Surgeon: Santiago Begrer MD;  Location:  MAIN OR;  Service: Orthopedics       FAMILY HISTORY:  Family History   Problem Relation Age of Onset    Ulcerative colitis Mother     Lung cancer Mother     Diabetes Mother     Alcohol abuse Father     Diabetes Sister     Cancer Brother     Cervical cancer Daughter     Colon polyps Neg Hx     Colon cancer Neg Hx        SOCIAL HISTORY:  Social History     Tobacco Use    Smoking status: Current Some Day Smoker     Packs/day: 0 04     Types: Cigarettes     Start date: 1977    Smokeless tobacco: Never Used    Tobacco comment: 3 cigs a day   Vaping Use    Vaping Use: Never used   Substance Use Topics    Alcohol use: No    Drug use: Never       MEDICATIONS:    Current Outpatient Medications:     acetaminophen (Tylenol 8 Hour) 650 mg CR tablet, Take 1 tablet (650 mg total) by mouth every 8 (eight) hours, Disp: 30 tablet, Rfl: 0    acetaminophen (TYLENOL) 325 mg tablet, Take 2 tablets (650 mg total) by mouth every 6 (six) hours as needed for mild pain, Disp: 120 tablet, Rfl: 5    albuterol (PROVENTIL HFA,VENTOLIN HFA) 90 mcg/act inhaler, Inhale 2 puffs every 4 (four) hours as needed for wheezing, Disp: , Rfl:     Allergy Relief 10 MG tablet, Take 1 tablet (10 mg total) by mouth 2 (two) times a day, Disp: 180 tablet, Rfl: 0    ascorbic acid (VITAMIN C) 500 mg tablet, TAKE ONE TABLET BY MOUTH TWICE DAILY, Disp: 56 tablet, Rfl: 1    atomoxetine (STRATTERA) 10 MG capsule, TAKE ONE CAPSULE BY MOUTH DAILY IN THE EVENING (Patient not taking: Reported on 3/14/2022), Disp: , Rfl:     atomoxetine (STRATTERA) 80 MG capsule, Take 80 mg by mouth daily (Patient not taking: Reported on 3/14/2022 ), Disp: , Rfl:     atorvastatin (LIPITOR) 20 mg tablet, TAKE ONE TABLET BY MOUTH DAILY IN THE EVENING, Disp: 90 tablet, Rfl: 1    cyclobenzaprine (FLEXERIL) 10 mg tablet, TAKE ONE TABLET BY MOUTH TWICE DAILY, Disp: 56 tablet, Rfl: 1    Diclofenac Sodium (VOLTAREN) 1 %, Apply 4 g topically 4 (four) times a day, Disp: 300 g, Rfl: 6    dicyclomine (BENTYL) 20 mg tablet, TAKE ONE TABLET BY MOUTH EVERY 6 HOURS, Disp: 112 tablet, Rfl: 1    Ferrous Sulfate (Iron) 325 (65 Fe) MG TABS, TAKE ONE TABLET BY MOUTH TWICE DAILY WITH MEALS, Disp: 60 tablet, Rfl: 1    fluticasone-salmeterol (Wixela Inhub) 100-50 mcg/dose inhaler, Inhale 1 puff 2 (two) times a day, Disp: , Rfl:     folic acid (FOLVITE) 1 mg tablet, Take 2 tablets (2 mg total) by mouth daily at bedtime, Disp: 180 tablet, Rfl: 3    gabapentin (NEURONTIN) 600 MG tablet, Take 1 tablet (600 mg total) by mouth 3 (three) times a day, Disp: 90 tablet, Rfl: 2    Galcanezumab-gnlm (Emgality) 120 MG/ML SOSY, Inject 120 mg under the skin every 30 (thirty) days Once a month, Disp: 1 mL, Rfl: 2    glipiZIDE (GLUCOTROL XL) 5 mg 24 hr tablet, Take 5 mg by mouth daily, Disp: , Rfl:     ipratropium-albuterol (DUO-NEB) 0 5-2 5 mg/3 mL nebulizer solution, Take 3 mL by nebulization every 6 (six) hours as needed , Disp: , Rfl:     Lactobacillus Acid-Pectin (Acidophilus/Citrus Pectin) TABS, Take 1 tablet by mouth daily, Disp: , Rfl:     Lancets MISC, Check sugars once weekly, Disp: , Rfl:     leflunomide (ARAVA) 10 MG tablet, TAKE 1 TABLET BY MOUTH EVERY MORNING, Disp: 28 tablet, Rfl: 1    leucovorin (WELLCOVORIN) 10 MG tablet, Take one tablet the morning after you take your methotrexate, Disp: 12 tablet, Rfl: 3    levothyroxine 125 mcg tablet, Take 1 tablet (125 mcg total) by mouth daily, Disp: 30 tablet, Rfl: 2    meclizine (ANTIVERT) 25 mg tablet, TAKE ONE TABLET BY MOUTH THREE TIMES DAILY AS NEEDED for dizziness, Disp: , Rfl:     metFORMIN (GLUCOPHAGE) 500 mg tablet, TAKE ONE TABLET BY MOUTH TWICE DAILY WITH MEALS, Disp: 180 tablet, Rfl: 1    methotrexate 2 5 mg tablet, TAKE six TABLETS BY MOUTH ONCE A WEEK WITH FOOD OR AFTER EATING, Disp: 28 tablet, Rfl: 5    methylPREDNISolone 4 MG tablet therapy pack, Use as directed on package, Disp: 1 each, Rfl: 0    Multiple Vitamin (Tab-A-Felicia) TABS, Take 1 tablet by mouth daily, Disp: , Rfl:     Multiple Vitamins-Minerals (multivitamin with minerals) tablet, Take 1 tablet by mouth daily, Disp: 30 tablet, Rfl: 1    omeprazole (PriLOSEC) 40 MG capsule, Take 1 capsule (40 mg total) by mouth daily, Disp: 30 capsule, Rfl: 2    ondansetron (Zofran ODT) 4 mg disintegrating tablet, Take 1 tablet (4 mg total) by mouth every 6 (six) hours as needed for nausea or vomiting, Disp: 15 tablet, Rfl: 0    ondansetron (ZOFRAN) 4 mg tablet, Take 1 tablet (4 mg total) by mouth every 8 (eight) hours as needed for nausea or vomiting, Disp: 20 tablet, Rfl: 0    oxyCODONE (ROXICODONE) 5 immediate release tablet, Take 1 tablet (5 mg total) by mouth every 4 (four) hours as needed (pain) Max Daily Amount: 30 mg, Disp: 10 tablet, Rfl: 0   oxyCODONE (Roxicodone) 5 immediate release tablet, Take 1 tablet (5 mg total) by mouth every 6 (six) hours as needed for moderate pain Max Daily Amount: 20 mg, Disp: 10 tablet, Rfl: 0    pantoprazole (PROTONIX) 40 mg tablet, TAKE ONE TABLET BY MOUTH AT BEDTIME, Disp: 28 tablet, Rfl: 5    triamcinolone (KENALOG) 0 1 % cream, Use as directed, Disp: , Rfl:     venlafaxine 225 MG TB24, Take 225 mg by mouth daily, Disp: , Rfl:     ALLERGIES:  Allergies   Allergen Reactions    Morphine Itching and Anaphylaxis     u  Other reaction(s): Other (See Comments)  u  Other reaction(s): Feeling agitated (finding)      Penicillins Anaphylaxis    Cephalexin      Other reaction(s): Unknown    Cephalosporins Hives     Other reaction(s): Unknown Reaction      Codeine Hives     Other reaction(s): Unknown  Other reaction(s): Unknown Reaction  Other reaction(s): Unknown      Levaquin [Levofloxacin] Hives    Nsaids GI Bleeding     Other reaction(s): Unknown Reaction    Aspirin Hives, Other (See Comments) and Rash     Other reaction(s): Unknown Reaction      Chlorhexidine Rash    Medical Tape Rash    Vancomycin Rash       REVIEW OF SYSTEMS:  Pertinent items are noted in HPI  A comprehensive review of systems was negative      LABS:  HgA1c:   Lab Results   Component Value Date    HGBA1C 7 3 (H) 02/18/2022     BMP:   Lab Results   Component Value Date    GLUCOSE 90 04/01/2015    CALCIUM 9 6 09/30/2021     04/01/2015    K 4 0 02/18/2022    CO2 27 02/18/2022     02/18/2022    BUN 10 02/18/2022    CREATININE 0 68 02/18/2022           _____________________________________________________  PHYSICAL EXAMINATION:  Vital signs: /78   Ht 5' 6" (1 676 m)   Wt 77 1 kg (170 lb)   LMP  (LMP Unknown)   BMI 27 44 kg/m²   General: well developed and well nourished, alert, oriented times 3 and appears comfortable  Psychiatric: Normal  HEENT: Trachea Midline, No torticollis  Cardiovascular: No discernable arrhythmia  Pulmonary: No wheezing or stridor  Abdomen: No rebound or guarding  Extremities: No peripheral edema  Skin: No masses, erythema, lacerations, fluctation, ulcerations  Neurovascular: Sensation Intact to the Median, Ulnar, Radial Nerve, Motor Intact to the Median, Ulnar, Radial Nerve and Pulses Intact    MUSCULOSKELETAL EXAMINATION:  Right hand  [facility]  She is able to move all digits  Sensation intact all digits        _____________________________________________________  STUDIES REVIEWED:  Images were reviewed in PACS by   [address] [place] [address] and demonstrate: xr right wrist stable dorsal plate and screw fixation radiocarpal joint and 3rd metacarpal with continued healing and evidence of broken screw base of 3rd metacarpal      PROCEDURES PERFORMED:  Procedures  No Procedures performed today       Scribe Attestation    I,:  Kristy Love am acting as a scribe while in the presence of the attending physician :       I,:  Padilla Reyes MD personally performed the services described in this documentation    as scribed in my presence :

## 2022-05-02 DIAGNOSIS — E07.9 DISEASE OF THYROID GLAND: ICD-10-CM

## 2022-05-02 DIAGNOSIS — G89.4 CHRONIC PAIN SYNDROME: ICD-10-CM

## 2022-05-02 RX ORDER — GABAPENTIN 600 MG/1
600 TABLET ORAL 3 TIMES DAILY
Qty: 90 TABLET | Refills: 2 | Status: SHIPPED | OUTPATIENT
Start: 2022-05-02 | End: 2022-07-07

## 2022-05-02 RX ORDER — LEVOTHYROXINE SODIUM 0.12 MG/1
125 TABLET ORAL DAILY
Qty: 30 TABLET | Refills: 2 | Status: SHIPPED | OUTPATIENT
Start: 2022-05-02 | End: 2022-07-07

## 2022-05-06 ENCOUNTER — OFFICE VISIT (OUTPATIENT)
Dept: FAMILY MEDICINE CLINIC | Facility: HOSPITAL | Age: 67
End: 2022-05-06
Payer: COMMERCIAL

## 2022-05-06 ENCOUNTER — TELEPHONE (OUTPATIENT)
Dept: FAMILY MEDICINE CLINIC | Facility: HOSPITAL | Age: 67
End: 2022-05-06

## 2022-05-06 VITALS — HEART RATE: 126 BPM | SYSTOLIC BLOOD PRESSURE: 134 MMHG | DIASTOLIC BLOOD PRESSURE: 92 MMHG | TEMPERATURE: 101.3 F

## 2022-05-06 DIAGNOSIS — Z12.2 ENCOUNTER FOR SCREENING FOR LUNG CANCER: ICD-10-CM

## 2022-05-06 DIAGNOSIS — K58.0 IRRITABLE BOWEL SYNDROME WITH DIARRHEA: ICD-10-CM

## 2022-05-06 DIAGNOSIS — R11.14 BILIOUS VOMITING WITH NAUSEA: Primary | ICD-10-CM

## 2022-05-06 DIAGNOSIS — Z11.59 NEED FOR HEPATITIS C SCREENING TEST: ICD-10-CM

## 2022-05-06 DIAGNOSIS — J44.9 CHRONIC OBSTRUCTIVE PULMONARY DISEASE, UNSPECIFIED COPD TYPE (HCC): ICD-10-CM

## 2022-05-06 DIAGNOSIS — Z89.511 S/P BKA (BELOW KNEE AMPUTATION) UNILATERAL, RIGHT (HCC): ICD-10-CM

## 2022-05-06 DIAGNOSIS — E11.65 UNCONTROLLED TYPE 2 DIABETES MELLITUS WITH HYPERGLYCEMIA (HCC): ICD-10-CM

## 2022-05-06 DIAGNOSIS — Z00.00 MEDICARE ANNUAL WELLNESS VISIT, SUBSEQUENT: ICD-10-CM

## 2022-05-06 DIAGNOSIS — G43.109 MIGRAINE WITH AURA AND WITHOUT STATUS MIGRAINOSUS, NOT INTRACTABLE: ICD-10-CM

## 2022-05-06 DIAGNOSIS — B34.9 VIRAL INFECTION, UNSPECIFIED: ICD-10-CM

## 2022-05-06 DIAGNOSIS — R13.10 DYSPHAGIA, UNSPECIFIED TYPE: ICD-10-CM

## 2022-05-06 PROCEDURE — 1090F PRES/ABSN URINE INCON ASSESS: CPT | Performed by: INTERNAL MEDICINE

## 2022-05-06 PROCEDURE — 3725F SCREEN DEPRESSION PERFORMED: CPT | Performed by: INTERNAL MEDICINE

## 2022-05-06 PROCEDURE — 87636 SARSCOV2 & INF A&B AMP PRB: CPT | Performed by: INTERNAL MEDICINE

## 2022-05-06 PROCEDURE — G0439 PPPS, SUBSEQ VISIT: HCPCS | Performed by: INTERNAL MEDICINE

## 2022-05-06 PROCEDURE — 3288F FALL RISK ASSESSMENT DOCD: CPT | Performed by: INTERNAL MEDICINE

## 2022-05-06 PROCEDURE — 1101F PT FALLS ASSESS-DOCD LE1/YR: CPT | Performed by: INTERNAL MEDICINE

## 2022-05-06 PROCEDURE — 99214 OFFICE O/P EST MOD 30 MIN: CPT | Performed by: INTERNAL MEDICINE

## 2022-05-06 RX ORDER — OSELTAMIVIR PHOSPHATE 75 MG/1
75 CAPSULE ORAL 2 TIMES DAILY
Qty: 10 CAPSULE | Refills: 0 | Status: SHIPPED | OUTPATIENT
Start: 2022-05-06 | End: 2022-05-11

## 2022-05-06 RX ORDER — DICYCLOMINE HCL 20 MG
20 TABLET ORAL EVERY 6 HOURS
Qty: 120 TABLET | Refills: 2 | Status: SHIPPED | OUTPATIENT
Start: 2022-05-06 | End: 2022-07-07 | Stop reason: SDUPTHER

## 2022-05-06 RX ORDER — VENLAFAXINE HYDROCHLORIDE 150 MG/1
150 CAPSULE, EXTENDED RELEASE ORAL EVERY MORNING
COMMUNITY
Start: 2022-04-19

## 2022-05-06 RX ORDER — ONDANSETRON 4 MG/1
4 TABLET, ORALLY DISINTEGRATING ORAL EVERY 6 HOURS PRN
Qty: 15 TABLET | Refills: 0 | Status: SHIPPED | OUTPATIENT
Start: 2022-05-06 | End: 2022-05-27 | Stop reason: SDUPTHER

## 2022-05-06 RX ORDER — GALCANEZUMAB 120 MG/ML
120 INJECTION, SOLUTION SUBCUTANEOUS
Qty: 1 ML | Refills: 2 | Status: SHIPPED | OUTPATIENT
Start: 2022-05-06

## 2022-05-06 RX ORDER — PANTOPRAZOLE SODIUM 40 MG/1
40 TABLET, DELAYED RELEASE ORAL
Qty: 30 TABLET | Refills: 5 | Status: SHIPPED | OUTPATIENT
Start: 2022-05-06 | End: 2022-05-13

## 2022-05-06 RX ORDER — HYDROXYZINE PAMOATE 50 MG/1
50 CAPSULE ORAL EVERY MORNING
COMMUNITY
Start: 2022-04-19 | End: 2022-07-07 | Stop reason: SDUPTHER

## 2022-05-06 NOTE — ASSESSMENT & PLAN NOTE
Flaring right now with GI illness/poss flu, refill Zofran and con't Imodium prn, to ED with intractable vomiting/blood in stool/worsening abd pain

## 2022-05-06 NOTE — PROGRESS NOTES
Assessment and Plan:     Problem List Items Addressed This Visit        Digestive    Dysphagia    Relevant Medications    pantoprazole (PROTONIX) 40 mg tablet    Irritable bowel syndrome with diarrhea    Relevant Medications    dicyclomine (BENTYL) 20 mg tablet       Endocrine    Uncontrolled type 2 diabetes mellitus with hyperglycemia (HCC)    Relevant Orders    Hemoglobin A1C    Comprehensive metabolic panel    Microalbumin / creatinine urine ratio       Respiratory    COPD (chronic obstructive pulmonary disease) (HCC)       Other    S/P BKA (below knee amputation) unilateral, right ([address] )      Other Visit Diagnoses     Bilious vomiting with nausea    -  Primary    Relevant Medications    ondansetron (Zofran ODT) 4 mg disintegrating tablet    Medicare annual wellness visit, subsequent        Encounter for screening for lung cancer        Relevant Orders    CT lung screening program    Need for hepatitis C screening test        Relevant Orders    Hepatitis C antibody    Viral infection, unspecified        Relevant Orders    Covid/Flu- Office Collect          Tobacco Cessation Counseling: Tobacco cessation counseling was provided  The patient is sincerely urged to quit consumption of tobacco  She is not ready to quit tobacco      Preventive health issues were discussed with patient, and age appropriate screening tests were ordered as noted in patient's After Visit Summary  Personalized health advice and appropriate referrals for health education or preventive services given if needed, as noted in patient's After Visit Summary       History of Present Illness:     Patient presents for Medicare Annual Wellness visit    Patient Care Team:  Concetta Mason DO as PCP - General (Internal Medicine)  Val Lance DO as PCP - [address] [place] [address] (RTE)  Sacha Burns RN as Nurse Navigator (Orthopedics)  Flores Hartman MD (Orthopedic Surgery)  Lowell Dumont DO (Gastroenterology)     Problem List:     Patient Active Problem List   Diagnosis    Dysphagia    Weight loss    Irritable bowel syndrome with diarrhea    Personal history of colonic polyps    Moderate mixed hyperlipidemia not requiring statin therapy    Rheumatoid arthritis (HCC)    Depression    Fibromyalgia    Smoking    COPD (chronic obstructive pulmonary disease) (HCC)    Primary osteoarthritis of right shoulder    Rotator cuff tear arthropathy of right shoulder    S/P BKA (below knee amputation) unilateral, right (HCC)    Morbid obesity ([facility] )    Primary osteoarthritis of left knee    Neck pain    Cervical spondylosis    Adrenal nodule (HCC)    Major depressive disorder, recurrent, moderate (HCC)    Uncontrolled type 2 diabetes mellitus with hyperglycemia (HCC)    Disease of thyroid gland    Anxiety    Chronic pain syndrome    Migraine with aura and without status migrainosus, not intractable      Past Medical and Surgical History:     Past Medical History:   Diagnosis Date    Achalasia     ADHD     Anxiety     Colon polyp     COPD (chronic obstructive pulmonary disease) (HCC)     Depression     Disease of thyroid gland     hypo    Esophageal spasm     Fibromyalgia     Fibromyalgia, primary     GERD (gastroesophageal reflux disease)     Hiatal hernia     Hyperlipidemia     Hypertension     Iron deficiency anemia     Polysubstance abuse ([facility] )     Psychiatric disorder     major depressive     Past Surgical History:   Procedure Laterality Date    AMPUTATION      RBKA    ANKLE FUSION      APPENDECTOMY      BELOW KNEE LEG AMPUTATION Right     CARPAL TUNNEL RELEASE  2021    left arm    CATARACT EXTRACTION       SECTION      COLONOSCOPY  2010    COLONOSCOPY  2019    diverticula, 3 mm polyp and 6 mm polyp in rectum, grade III internal hemorrhoids, hyperplastic polyp, 5 year recall 2024     EGD  2010    FOREARM FASCIOTOMY Right     FRACTURE SURGERY      HYSTERECTOMY      KNEE ARTHROSCOPY Left     NERVE SURGERY Right 1/20/2022    Procedure: Right wrist posterior interosseous nerve neurectomy;  Surgeon: Francisco Cadet MD;  Location:  MAIN OR;  Service: Orthopedics    ORTHOPEDIC SURGERY      AR FUSION OF WRIST JOINT Right 1/20/2022    Procedure: Total wrist fusion of the right wrist;  Surgeon: Francisco Cadet MD;  Location:  MAIN OR;  Service: Orthopedics    AR REVISE ULNAR NERVE AT ELBOW Left 1/11/2021    Procedure: Left carpal and cubital tunnel release;  Surgeon:  Zion Ng MD;  Location: [address] [place] [address] MAIN OR;  Service: Orthopedics    UPPER GASTROINTESTINAL ENDOSCOPY      WRIST TENDON TRANSFER N/A 1/20/2022    Procedure: Extensor pollicis longus tendon transposition right wrist;  Surgeon: Francisco Cadet MD;  Location:  MAIN OR;  Service: Orthopedics      Family History:     Family History   Problem Relation Age of Onset    Ulcerative colitis Mother     Lung cancer Mother     Diabetes Mother     Alcohol abuse Father     Diabetes Sister     Cancer Brother     Cervical cancer Daughter     Colon polyps Neg Hx     Colon cancer Neg Hx       Social History:     Social History     Socioeconomic History    Marital status:      Spouse name: None    Number of children: None    Years of education: None    Highest education level: None   Occupational History    Occupation: disability   Tobacco Use    Smoking status: Current Some Day Smoker     Packs/day: 0 04     Types: Cigarettes     Start date: 1/1/1977    Smokeless tobacco: Never Used    Tobacco comment: 3 cigs a day   Vaping Use    Vaping Use: Never used   Substance and Sexual Activity    Alcohol use: No    Drug use: Never    Sexual activity: Not Currently   Other Topics Concern    None   Social History Narrative    None     Social Determinants of Health     Financial Resource Strain: Not on file   Food Insecurity: Not on file   Transportation Needs: Not on file   Physical Activity: Not on file   Stress: Not on file   Social Connections: Not on file   Intimate Partner Violence: Not on file   Housing Stability: Not on file      Medications and Allergies:     Current Outpatient Medications   Medication Sig Dispense Refill    acetaminophen (Tylenol 8 Hour) 650 mg CR tablet Take 1 tablet (650 mg total) by mouth every 8 (eight) hours 30 tablet 0    acetaminophen (TYLENOL) 325 mg tablet Take 2 tablets (650 mg total) by mouth every 6 (six) hours as needed for mild pain 120 tablet 5    albuterol (PROVENTIL HFA,VENTOLIN HFA) 90 mcg/act inhaler Inhale 2 puffs every 4 (four) hours as needed for wheezing      Allergy Relief 10 MG tablet Take 1 tablet (10 mg total) by mouth 2 (two) times a day 180 tablet 0    ascorbic acid (VITAMIN C) 500 mg tablet TAKE ONE TABLET BY MOUTH TWICE DAILY 56 tablet 1    atorvastatin (LIPITOR) 20 mg tablet TAKE ONE TABLET BY MOUTH DAILY IN THE EVENING 90 tablet 1    cyclobenzaprine (FLEXERIL) 10 mg tablet TAKE ONE TABLET BY MOUTH TWICE DAILY 56 tablet 1    Diclofenac Sodium (VOLTAREN) 1 % Apply 4 g topically 4 (four) times a day 300 g 6    dicyclomine (BENTYL) 20 mg tablet Take 1 tablet (20 mg total) by mouth every 6 (six) hours 120 tablet 2    fluticasone-salmeterol (Wixela Inhub) 100-50 mcg/dose inhaler Inhale 1 puff 2 (two) times a day      folic acid (FOLVITE) 1 mg tablet Take 2 tablets (2 mg total) by mouth daily at bedtime 180 tablet 3    gabapentin (NEURONTIN) 600 MG tablet Take 1 tablet (600 mg total) by mouth 3 (three) times a day 90 tablet 2    Galcanezumab-gnlm (Emgality) 120 MG/ML SOSY Inject 120 mg under the skin every 30 (thirty) days Once a month 1 mL 2    glipiZIDE (GLUCOTROL XL) 5 mg 24 hr tablet Take 5 mg by mouth daily      hydrOXYzine pamoate (VISTARIL) 50 mg capsule Take 50 mg by mouth every morning      ipratropium-albuterol (DUO-NEB) 0 5-2 5 mg/3 mL nebulizer solution Take 3 mL by nebulization every 6 (six) hours as needed       Lactobacillus Acid-Pectin (Acidophilus/Citrus Pectin) TABS Take 1 tablet by mouth daily      Lancets MISC Check sugars once weekly      leflunomide (ARAVA) 10 MG tablet TAKE 1 TABLET BY MOUTH EVERY MORNING 28 tablet 1    leucovorin (WELLCOVORIN) 10 MG tablet Take one tablet the morning after you take your methotrexate 12 tablet 3    levothyroxine 125 mcg tablet Take 1 tablet (125 mcg total) by mouth daily 30 tablet 2    meclizine (ANTIVERT) 25 mg tablet TAKE ONE TABLET BY MOUTH THREE TIMES DAILY AS NEEDED for dizziness      metFORMIN (GLUCOPHAGE) 500 mg tablet TAKE ONE TABLET BY MOUTH TWICE DAILY WITH MEALS 180 tablet 1    methotrexate 2 5 mg tablet TAKE six TABLETS BY MOUTH ONCE A WEEK WITH FOOD OR AFTER EATING 28 tablet 5    Multiple Vitamin (Tab-A-Felicia) TABS Take 1 tablet by mouth daily      Multiple Vitamins-Minerals (multivitamin with minerals) tablet Take 1 tablet by mouth daily 30 tablet 1    ondansetron (Zofran ODT) 4 mg disintegrating tablet Take 1 tablet (4 mg total) by mouth every 6 (six) hours as needed for nausea or vomiting 15 tablet 0    pantoprazole (PROTONIX) 40 mg tablet Take 1 tablet (40 mg total) by mouth daily at bedtime 30 tablet 5    triamcinolone (KENALOG) 0 1 % cream Use as directed      venlafaxine (EFFEXOR-XR) 150 mg 24 hr capsule Take 150 mg by mouth every morning       No current facility-administered medications for this visit  Allergies   Allergen Reactions    Morphine Itching and Anaphylaxis     u  Other reaction(s):  Other (See Comments)  u  Other reaction(s): Feeling agitated (finding)      Penicillins Anaphylaxis    Cephalexin      Other reaction(s): Unknown    Cephalosporins Hives     Other reaction(s): Unknown Reaction      Codeine Hives     Other reaction(s): Unknown  Other reaction(s): Unknown Reaction  Other reaction(s): Unknown      Levaquin [Levofloxacin] Hives    Nsaids GI Bleeding     Other reaction(s): Unknown Reaction    Aspirin Hives, Other (See Comments) and Rash     Other reaction(s): Unknown Reaction      Chlorhexidine Rash    Medical Tape Rash    Vancomycin Rash      Immunizations:     Immunization History   Administered Date(s) Administered    COVID-19 PFIZER VACCINE 0 3 ML IM 03/10/2021, 03/31/2021    INFLUENZA 02/05/2018    Influenza, recombinant, quadrivalent,injectable, preservative free 02/03/2022    Pneumococcal Polysaccharide PPV23 02/03/2022    Tuberculin Skin Test-PPD Intradermal 03/30/2016    Zoster 05/12/2014      Health Maintenance:         Topic Date Due    Hepatitis C Screening  Never done    HIV Screening  Never done    Breast Cancer Screening: Mammogram  09/11/2016    Colorectal Cancer Screening  12/05/2024         Topic Date Due    DTaP,Tdap,and Td Vaccines (1 - Tdap) Never done    COVID-19 Vaccine (3 - Pfizer risk 4-dose series) 04/28/2021      Medicare Health Risk Assessment:     Review of Systems   Constitutional: Positive for appetite change, chills, fatigue and fever  HENT: Positive for congestion and sore throat  Negative for ear pain and trouble swallowing  Eyes: Positive for visual disturbance  Negative for pain  Respiratory: Positive for cough  Negative for shortness of breath and wheezing  Cardiovascular: Negative for chest pain  Gastrointestinal: Positive for diarrhea, nausea and vomiting  Negative for abdominal pain  Genitourinary: Negative for dysuria  Musculoskeletal: Positive for arthralgias and gait problem  Skin: Negative for rash and wound  Neurological: Positive for dizziness and headaches  Hematological: Does not bruise/bleed easily  Psychiatric/Behavioral: Positive for dysphoric mood  The patient is nervous/anxious  /92   Pulse (!) 126   Temp (!) 101 3 °F (38 5 °C) (Tympanic)   LMP  (LMP Unknown)      Physical Exam  Vitals and nursing note reviewed  Constitutional:       General: She is not in acute distress  Appearance: She is well-developed  She is ill-appearing  HENT:      Head: Normocephalic and atraumatic  Right Ear: There is impacted cerumen  Left Ear: Tympanic membrane and external ear normal  There is no impacted cerumen  Mouth/Throat:      Mouth: Mucous membranes are moist       Pharynx: Oropharynx is clear  No oropharyngeal exudate  Eyes:      General:         Right eye: No discharge  Left eye: No discharge  Conjunctiva/sclera: Conjunctivae normal    Neck:      Trachea: No tracheal deviation  Cardiovascular:      Rate and Rhythm: Normal rate and regular rhythm  Heart sounds: Normal heart sounds  No murmur heard  Pulmonary:      Effort: Pulmonary effort is normal  No respiratory distress  Breath sounds: Normal breath sounds  No wheezing, rhonchi or rales  Abdominal:      General: Bowel sounds are normal  There is no distension  Palpations: Abdomen is soft  Tenderness: There is abdominal tenderness  There is no guarding or rebound  Musculoskeletal:         General: Normal range of motion  Cervical back: Neck supple  Comments: R wrist in calf, R BKA   Lymphadenopathy:      Cervical: Cervical adenopathy present  Skin:     General: Skin is warm and dry  Coloration: Skin is not pale  Findings: No rash  Neurological:      General: No focal deficit present  Mental Status: She is alert  Motor: No abnormal muscle tone  Gait: Gait abnormal    Psychiatric:         Judgment: Judgment normal       Comments: Irritable, but cooperative and answers questions appropriately           Scooter Dudley is here for her Initial Wellness visit  Health Risk Assessment:   Patient rates overall health as fair  Patient feels that their physical health rating is much worse  Patient is satisfied with their life  Eyesight was rated as much worse  Hearing was rated as same  Patient feels that their emotional and mental health rating is same  Patients states they are never, rarely angry   Patient states they are always unusually tired/fatigued  Pain experienced in the last 7 days has been a lot  Patient's pain rating has been 9/10  Patient states that she has experienced no weight loss or gain in last 6 months  Physical health worse d/t wrist and L knee pain and current acute illness  Eyesight worse - "my eyes go blurry" if she watches TV or reads too much - has appt next week with eye doc    Depression Screening:   PHQ-9 Score: 12      Fall Risk Screening: In the past year, patient has experienced: history of falling in past year    Number of falls: 1  Injured during fall?: Yes    Feels unsteady when standing or walking?: Yes    Worried about falling?: Yes      Urinary Incontinence Screening:   Patient has not leaked urine accidently in the last six months  Home Safety:  Patient has trouble with stairs inside or outside of their home  Patient has working smoke alarms and has working carbon monoxide detector  Home safety hazards include: none  Nutrition:   Current diet is Regular  Medications:   Patient is currently taking over-the-counter supplements  OTC medications include: see medication list  Patient is able to manage medications  Activities of Daily Living (ADLs)/Instrumental Activities of Daily Living (IADLs):   Walk and transfer into and out of bed and chair?: Yes  Dress and groom yourself?: Yes    Bathe or shower yourself?: Yes    Feed yourself?  Yes  Do your laundry/housekeeping?: No  Manage your money, pay your bills and track your expenses?: Yes  Make your own meals?: Yes    Do your own shopping?: No    Previous Hospitalizations:   Any hospitalizations or ED visits within the last 12 months?: No      Advance Care Planning:   Living will: No    Durable POA for healthcare: No    Advanced directive: No    Five wishes given: No    Patient declined ACP directive: Yes      Cognitive Screening:   Provider or family/friend/caregiver concerned regarding cognition?: No    PREVENTIVE SCREENINGS      Cardiovascular Screening:    General: History Lipid Disorder, Risks and Benefits Discussed and Screening Current      Diabetes Screening:     General: History Diabetes, Risks and Benefits Discussed and Screening Current      Colorectal Cancer Screening:     General: Screening Current      Breast Cancer Screening:     General: Risks and Benefits Discussed    Due for: Mammogram        Cervical Cancer Screening:    General: Risks and Benefits Discussed and Screening Not Indicated      Osteoporosis Screening:    General: Risks and Benefits Discussed    Due for: DXA Axial      Abdominal Aortic Aneurysm (AAA) Screening:        General: Risks and Benefits Discussed and Screening Not Indicated      Lung Cancer Screening:     General: Screening Not Indicated and Risks and Benefits Discussed      Hepatitis C Screening:    General: Risks and Benefits Discussed    Hep C Screening Accepted: Yes      Screening, Brief Intervention, and Referral to Treatment (SBIRT)    Screening  Typical number of drinks in a day: 0  Typical number of drinks in a week: 0  Interpretation: Low risk drinking behavior      Single Item Drug Screening:  How often have you used an illegal drug (including marijuana) or a prescription medication for non-medical reasons in the past year? never    Single Item Drug Screen Score: 0  Interpretation: Negative screen for possible drug use disorder      Piper Meneses, DO

## 2022-05-06 NOTE — ASSESSMENT & PLAN NOTE
Lab Results   Component Value Date    HGBA1C 7 3 (H) 02/18/2022   Dm type 2 with hyperglycemia - uncontrolled but acceptable with A1c 7 3 - advised to hold Glipizide while not eating/drinking with viral illness, may con't Metformin, repeat DM labs early June - order given, states she has eye exam next week, UTD on foot exam (2/22), on statin but no ACE/ARB, will follow

## 2022-05-06 NOTE — PATIENT INSTRUCTIONS
Medicare Preventive Visit Patient Instructions  Thank you for completing your Welcome to Medicare Visit or Medicare Annual Wellness Visit today  Your next wellness visit will be due in one year (5/7/2023)  The screening/preventive services that you may require over the next 5-10 years are detailed below  Some tests may not apply to you based off risk factors and/or age  Screening tests ordered at today's visit but not completed yet may show as past due  Also, please note that scanned in results may not display below  Preventive Screenings:  Service Recommendations Previous Testing/Comments   Colorectal Cancer Screening  * Colonoscopy    * Fecal Occult Blood Test (FOBT)/Fecal Immunochemical Test (FIT)  * Fecal DNA/Cologuard Test  * Flexible Sigmoidoscopy Age: 54-65 years old   Colonoscopy: every 10 years (may be performed more frequently if at higher risk)  OR  FOBT/FIT: every 1 year  OR  Cologuard: every 3 years  OR  Sigmoidoscopy: every 5 years  Screening may be recommended earlier than age 48 if at higher risk for colorectal cancer  Also, an individualized decision between you and your healthcare provider will decide whether screening between the ages of 74-80 would be appropriate  Colonoscopy: 12/05/2019  FOBT/FIT: Not on file  Cologuard: Not on file  Sigmoidoscopy: Not on file    Screening Current     Breast Cancer Screening Age: 36 years old  Frequency: every 1-2 years  Not required if history of left and right mastectomy Mammogram: 09/11/2015        Cervical Cancer Screening Between the ages of 21-29, pap smear recommended once every 3 years  Between the ages of 33-67, can perform pap smear with HPV co-testing every 5 years     Recommendations may differ for women with a history of total hysterectomy, cervical cancer, or abnormal pap smears in past  Pap Smear: Not on file        Hepatitis C Screening Once for adults born between 1945 and 1965  More frequently in patients at high risk for Hepatitis C Hep C Antibody: Not on file        Diabetes Screening 1-2 times per year if you're at risk for diabetes or have pre-diabetes Fasting glucose: 107 mg/dL   A1C: 7 3 %    Screening Not Indicated  History Diabetes   Cholesterol Screening Once every 5 years if you don't have a lipid disorder  May order more often based on risk factors  Lipid panel: 02/18/2022    Screening Not Indicated  History Lipid Disorder     Other Preventive Screenings Covered by Medicare:  1  Abdominal Aortic Aneurysm (AAA) Screening: covered once if your at risk  You're considered to be at risk if you have a family history of AAA  2  Lung Cancer Screening: covers low dose CT scan once per year if you meet all of the following conditions: (1) Age 50-69; (2) No signs or symptoms of lung cancer; (3) Current smoker or have quit smoking within the last 15 years; (4) You have a tobacco smoking history of at least 30 pack years (packs per day multiplied by number of years you smoked); (5) You get a written order from a healthcare provider  3  Glaucoma Screening: covered annually if you're considered high risk: (1) You have diabetes OR (2) Family history of glaucoma OR (3)  aged 48 and older OR (3)  American aged 72 and older  3  Osteoporosis Screening: covered every 2 years if you meet one of the following conditions: (1) You're estrogen deficient and at risk for osteoporosis based off medical history and other findings; (2) Have a vertebral abnormality; (3) On glucocorticoid therapy for more than 3 months; (4) Have primary hyperparathyroidism; (5) On osteoporosis medications and need to assess response to drug therapy  · Last bone density test (DXA Scan): Not on file  5  HIV Screening: covered annually if you're between the age of 12-76  Also covered annually if you are younger than 13 and older than 72 with risk factors for HIV infection   For pregnant patients, it is covered up to 3 times per pregnancy  Immunizations:  Immunization Recommendations   Influenza Vaccine Annual influenza vaccination during flu season is recommended for all persons aged >= 6 months who do not have contraindications   Pneumococcal Vaccine (Prevnar and Pneumovax)  * Prevnar = PCV13  * Pneumovax = PPSV23   Adults 25-60 years old: 1-3 doses may be recommended based on certain risk factors  Adults 72 years old: Prevnar (PCV13) vaccine recommended followed by Pneumovax (PPSV23) vaccine  If already received PPSV23 since turning 65, then PCV13 recommended at least one year after PPSV23 dose  Hepatitis B Vaccine 3 dose series if at intermediate or high risk (ex: diabetes, end stage renal disease, liver disease)   Tetanus (Td) Vaccine - COST NOT COVERED BY MEDICARE PART B Following completion of primary series, a booster dose should be given every 10 years to maintain immunity against tetanus  Td may also be given as tetanus wound prophylaxis  Tdap Vaccine - COST NOT COVERED BY MEDICARE PART B Recommended at least once for all adults  For pregnant patients, recommended with each pregnancy  Shingles Vaccine (Shingrix) - COST NOT COVERED BY MEDICARE PART B  2 shot series recommended in those aged 48 and above     Health Maintenance Due:      Topic Date Due    Hepatitis C Screening  Never done    HIV Screening  Never done    Breast Cancer Screening: Mammogram  09/11/2016    Colorectal Cancer Screening  12/05/2024     Immunizations Due:      Topic Date Due    DTaP,Tdap,and Td Vaccines (1 - Tdap) Never done    COVID-19 Vaccine (3 - Pfizer risk 4-dose series) 04/28/2021     Advance Directives   What are advance directives? Advance directives are legal documents that state your wishes and plans for medical care  These plans are made ahead of time in case you lose your ability to make decisions for yourself  Advance directives can apply to any medical decision, such as the treatments you want, and if you want to donate organs  What are the types of advance directives? There are many types of advance directives, and each state has rules about how to use them  You may choose a combination of any of the following:  · Living will: This is a written record of the treatment you want  You can also choose which treatments you do not want, which to limit, and which to stop at a certain time  This includes surgery, medicine, IV fluid, and tube feedings  · Durable power of  for healthcare [facility]): This is a written record that states who you want to make healthcare choices for you when you are unable to make them for yourself  This person, called a proxy, is usually a family member or a friend  You may choose more than 1 proxy  · Do not resuscitate (DNR) order:  A DNR order is used in case your heart stops beating or you stop breathing  It is a request not to have certain forms of treatment, such as CPR  A DNR order may be included in other types of advance directives  · Medical directive: This covers the care that you want if you are in a coma, near death, or unable to make decisions for yourself  You can list the treatments you want for each condition  Treatment may include pain medicine, surgery, blood transfusions, dialysis, IV or tube feedings, and a ventilator (breathing machine)  · Values history: This document has questions about your views, beliefs, and how you feel and think about life  This information can help others choose the care that you would choose  Why are advance directives important? An advance directive helps you control your care  Although spoken wishes may be used, it is better to have your wishes written down  Spoken wishes can be misunderstood, or not followed  Treatments may be given even if you do not want them  An advance directive may make it easier for your family to make difficult choices about your care     Cigarette Smoking and Your Health   Risks to your health if you smoke:  Nicotine and other chemicals found in tobacco damage every cell in your body  Even if you are a light smoker, you have an increased risk for cancer, heart disease, and lung disease  If you are pregnant or have diabetes, smoking increases your risk for complications  Benefits to your health if you stop smoking:   · You decrease respiratory symptoms such as coughing, wheezing, and shortness of breath  · You reduce your risk for cancers of the lung, mouth, throat, kidney, bladder, pancreas, stomach, and cervix  If you already have cancer, you increase the benefits of chemotherapy  You also reduce your risk for cancer returning or a second cancer from developing  · You reduce your risk for heart disease, blood clots, heart attack, and stroke  · You reduce your risk for lung infections, and diseases such as pneumonia, asthma, chronic bronchitis, and emphysema  · Your circulation improves  More oxygen can be delivered to your body  If you have diabetes, you lower your risk for complications, such as kidney, artery, and eye diseases  You also lower your risk for nerve damage  Nerve damage can lead to amputations, poor vision, and blindness  · You improve your body's ability to heal and to fight infections  For more information and support to stop smoking:   · [employer]  Phone: 1- 719 - 377-5534  Web Address: www [employer]  Weight Management   Why it is important to manage your weight:  Being overweight increases your risk of health conditions such as heart disease, high blood pressure, type 2 diabetes, and certain types of cancer  It can also increase your risk for osteoarthritis, sleep apnea, and other respiratory problems  Aim for a slow, steady weight loss  Even a small amount of weight loss can lower your risk of health problems  How to lose weight safely:  A safe and healthy way to lose weight is to eat fewer calories and get regular exercise   You can lose up about 1 pound a week by decreasing the number of calories you eat by 500 calories each day  Healthy meal plan for weight management:  A healthy meal plan includes a variety of foods, contains fewer calories, and helps you stay healthy  A healthy meal plan includes the following:  · Eat whole-grain foods more often  A healthy meal plan should contain fiber  Fiber is the part of grains, fruits, and vegetables that is not broken down by your body  Whole-grain foods are healthy and provide extra fiber in your diet  Some examples of whole-grain foods are whole-wheat breads and pastas, oatmeal, brown rice, and bulgur  · Eat a variety of vegetables every day  Include dark, leafy greens such as spinach, kale, jose greens, and mustard greens  Eat yellow and orange vegetables such as carrots, sweet potatoes, and winter squash  · Eat a variety of fruits every day  Choose fresh or canned fruit (canned in its own juice or light syrup) instead of juice  Fruit juice has very little or no fiber  · Eat low-fat dairy foods  Drink fat-free (skim) milk or 1% milk  Eat fat-free yogurt and low-fat cottage cheese  Try low-fat cheeses such as mozzarella and other reduced-fat cheeses  · Choose meat and other protein foods that are low in fat  Choose beans or other legumes such as split peas or lentils  Choose fish, skinless poultry (chicken or turkey), or lean cuts of red meat (beef or pork)  Before you cook meat or poultry, cut off any visible fat  · Use less fat and oil  Try baking foods instead of frying them  Add less fat, such as margarine, sour cream, regular salad dressing and mayonnaise to foods  Eat fewer high-fat foods  Some examples of high-fat foods include french fries, doughnuts, ice cream, and cakes  · Eat fewer sweets  Limit foods and drinks that are high in sugar  This includes candy, cookies, regular soda, and sweetened drinks  Exercise:  Exercise at least 30 minutes per day on most days of the week   Some examples of exercise include walking, biking, dancing, and swimming  You can also fit in more physical activity by taking the stairs instead of the elevator or parking farther away from stores  Ask your healthcare provider about the best exercise plan for you  © Copyright [employer] 2018 Information is for End User's use only and may not be sold, redistributed or otherwise used for commercial purposes  All illustrations and images included in CareNotes® are the copyrighted property of [employer]  or [facility] Preventive Visit Patient Instructions  Thank you for completing your Welcome to Medicare Visit or Medicare Annual Wellness Visit today  Your next wellness visit will be due in one year (5/7/2023)  The screening/preventive services that you may require over the next 5-10 years are detailed below  Some tests may not apply to you based off risk factors and/or age  Screening tests ordered at today's visit but not completed yet may show as past due  Also, please note that scanned in results may not display below  Preventive Screenings:  Service Recommendations Previous Testing/Comments   Colorectal Cancer Screening  * Colonoscopy    * Fecal Occult Blood Test (FOBT)/Fecal Immunochemical Test (FIT)  * Fecal DNA/Cologuard Test  * Flexible Sigmoidoscopy Age: 54-65 years old   Colonoscopy: every 10 years (may be performed more frequently if at higher risk)  OR  FOBT/FIT: every 1 year  OR  Cologuard: every 3 years  OR  Sigmoidoscopy: every 5 years  Screening may be recommended earlier than age 48 if at higher risk for colorectal cancer  Also, an individualized decision between you and your healthcare provider will decide whether screening between the ages of 74-80 would be appropriate   Colonoscopy: 12/05/2019  FOBT/FIT: Not on file  Cologuard: Not on file  Sigmoidoscopy: Not on file    Screening Current     Breast Cancer Screening Age: 36 years old  Frequency: every 1-2 years  Not required if history of left and right mastectomy Mammogram: 09/11/2015        Cervical Cancer Screening Between the ages of 18-28, pap smear recommended once every 3 years  Between the ages of 33-67, can perform pap smear with HPV co-testing every 5 years  Recommendations may differ for women with a history of total hysterectomy, cervical cancer, or abnormal pap smears in past  Pap Smear: Not on file        Hepatitis C Screening Once for adults born between 1945 and 1965  More frequently in patients at high risk for Hepatitis C Hep C Antibody: Not on file        Diabetes Screening 1-2 times per year if you're at risk for diabetes or have pre-diabetes Fasting glucose: 107 mg/dL   A1C: 7 3 %    Screening Not Indicated  History Diabetes   Cholesterol Screening Once every 5 years if you don't have a lipid disorder  May order more often based on risk factors  Lipid panel: 02/18/2022    Screening Not Indicated  History Lipid Disorder     Other Preventive Screenings Covered by Medicare:  6  Abdominal Aortic Aneurysm (AAA) Screening: covered once if your at risk  You're considered to be at risk if you have a family history of AAA  7  Lung Cancer Screening: covers low dose CT scan once per year if you meet all of the following conditions: (1) Age 50-69; (2) No signs or symptoms of lung cancer; (3) Current smoker or have quit smoking within the last 15 years; (4) You have a tobacco smoking history of at least 30 pack years (packs per day multiplied by number of years you smoked); (5) You get a written order from a healthcare provider  8  Glaucoma Screening: covered annually if you're considered high risk: (1) You have diabetes OR (2) Family history of glaucoma OR (3)  aged 48 and older OR (3)  American aged 72 and older  5   Osteoporosis Screening: covered every 2 years if you meet one of the following conditions: (1) You're estrogen deficient and at risk for osteoporosis based off medical history and other findings; (2) Have a vertebral abnormality; (3) On glucocorticoid therapy for more than 3 months; (4) Have primary hyperparathyroidism; (5) On osteoporosis medications and need to assess response to drug therapy  · Last bone density test (DXA Scan): Not on file  10  HIV Screening: covered annually if you're between the age of 12-76  Also covered annually if you are younger than 13 and older than 72 with risk factors for HIV infection  For pregnant patients, it is covered up to 3 times per pregnancy  Immunizations:  Immunization Recommendations   Influenza Vaccine Annual influenza vaccination during flu season is recommended for all persons aged >= 6 months who do not have contraindications   Pneumococcal Vaccine (Prevnar and Pneumovax)  * Prevnar = PCV13  * Pneumovax = PPSV23   Adults 25-60 years old: 1-3 doses may be recommended based on certain risk factors  Adults 72 years old: Prevnar (PCV13) vaccine recommended followed by Pneumovax (PPSV23) vaccine  If already received PPSV23 since turning 65, then PCV13 recommended at least one year after PPSV23 dose  Hepatitis B Vaccine 3 dose series if at intermediate or high risk (ex: diabetes, end stage renal disease, liver disease)   Tetanus (Td) Vaccine - COST NOT COVERED BY MEDICARE PART B Following completion of primary series, a booster dose should be given every 10 years to maintain immunity against tetanus  Td may also be given as tetanus wound prophylaxis  Tdap Vaccine - COST NOT COVERED BY MEDICARE PART B Recommended at least once for all adults  For pregnant patients, recommended with each pregnancy     Shingles Vaccine (Shingrix) - COST NOT COVERED BY MEDICARE PART B  2 shot series recommended in those aged 48 and above     Health Maintenance Due:      Topic Date Due    Hepatitis C Screening  Never done    HIV Screening  Never done    Breast Cancer Screening: Mammogram  09/11/2016    Colorectal Cancer Screening  12/05/2024     Immunizations Due:      Topic Date Due    DTaP,Tdap,and Td Vaccines (1 - Tdap) Never done    COVID-19 Vaccine (3 - Pfizer risk 4-dose series) 04/28/2021     Advance Directives   What are advance directives? Advance directives are legal documents that state your wishes and plans for medical care  These plans are made ahead of time in case you lose your ability to make decisions for yourself  Advance directives can apply to any medical decision, such as the treatments you want, and if you want to donate organs  What are the types of advance directives? There are many types of advance directives, and each state has rules about how to use them  You may choose a combination of any of the following:  · Living will: This is a written record of the treatment you want  You can also choose which treatments you do not want, which to limit, and which to stop at a certain time  This includes surgery, medicine, IV fluid, and tube feedings  · Durable power of  for healthcare [facility]): This is a written record that states who you want to make healthcare choices for you when you are unable to make them for yourself  This person, called a proxy, is usually a family member or a friend  You may choose more than 1 proxy  · Do not resuscitate (DNR) order:  A DNR order is used in case your heart stops beating or you stop breathing  It is a request not to have certain forms of treatment, such as CPR  A DNR order may be included in other types of advance directives  · Medical directive: This covers the care that you want if you are in a coma, near death, or unable to make decisions for yourself  You can list the treatments you want for each condition  Treatment may include pain medicine, surgery, blood transfusions, dialysis, IV or tube feedings, and a ventilator (breathing machine)  · Values history: This document has questions about your views, beliefs, and how you feel and think about life  This information can help others choose the care that you would choose    Why are advance directives important? An advance directive helps you control your care  Although spoken wishes may be used, it is better to have your wishes written down  Spoken wishes can be misunderstood, or not followed  Treatments may be given even if you do not want them  An advance directive may make it easier for your family to make difficult choices about your care  Cigarette Smoking and Your Health   Risks to your health if you smoke:  Nicotine and other chemicals found in tobacco damage every cell in your body  Even if you are a light smoker, you have an increased risk for cancer, heart disease, and lung disease  If you are pregnant or have diabetes, smoking increases your risk for complications  Benefits to your health if you stop smoking:   · You decrease respiratory symptoms such as coughing, wheezing, and shortness of breath  · You reduce your risk for cancers of the lung, mouth, throat, kidney, bladder, pancreas, stomach, and cervix  If you already have cancer, you increase the benefits of chemotherapy  You also reduce your risk for cancer returning or a second cancer from developing  · You reduce your risk for heart disease, blood clots, heart attack, and stroke  · You reduce your risk for lung infections, and diseases such as pneumonia, asthma, chronic bronchitis, and emphysema  · Your circulation improves  More oxygen can be delivered to your body  If you have diabetes, you lower your risk for complications, such as kidney, artery, and eye diseases  You also lower your risk for nerve damage  Nerve damage can lead to amputations, poor vision, and blindness  · You improve your body's ability to heal and to fight infections  For more information and support to stop smoking:   · [employer]  Phone: 9- 548 - [phone]  Web Address: www [employer]  Weight Management   Why it is important to manage your weight:  Being overweight increases your risk of health conditions such as heart disease, high blood pressure, type 2 diabetes, and certain types of cancer  It can also increase your risk for osteoarthritis, sleep apnea, and other respiratory problems  Aim for a slow, steady weight loss  Even a small amount of weight loss can lower your risk of health problems  How to lose weight safely:  A safe and healthy way to lose weight is to eat fewer calories and get regular exercise  You can lose up about 1 pound a week by decreasing the number of calories you eat by 500 calories each day  Healthy meal plan for weight management:  A healthy meal plan includes a variety of foods, contains fewer calories, and helps you stay healthy  A healthy meal plan includes the following:  · Eat whole-grain foods more often  A healthy meal plan should contain fiber  Fiber is the part of grains, fruits, and vegetables that is not broken down by your body  Whole-grain foods are healthy and provide extra fiber in your diet  Some examples of whole-grain foods are whole-wheat breads and pastas, oatmeal, brown rice, and bulgur  · Eat a variety of vegetables every day  Include dark, leafy greens such as spinach, kale, jose greens, and mustard greens  Eat yellow and orange vegetables such as carrots, sweet potatoes, and winter squash  · Eat a variety of fruits every day  Choose fresh or canned fruit (canned in its own juice or light syrup) instead of juice  Fruit juice has very little or no fiber  · Eat low-fat dairy foods  Drink fat-free (skim) milk or 1% milk  Eat fat-free yogurt and low-fat cottage cheese  Try low-fat cheeses such as mozzarella and other reduced-fat cheeses  · Choose meat and other protein foods that are low in fat  Choose beans or other legumes such as split peas or lentils  Choose fish, skinless poultry (chicken or turkey), or lean cuts of red meat (beef or pork)  Before you cook meat or poultry, cut off any visible fat  · Use less fat and oil  Try baking foods instead of frying them   Add less fat, such as margarine, sour cream, regular salad dressing and mayonnaise to foods  Eat fewer high-fat foods  Some examples of high-fat foods include french fries, doughnuts, ice cream, and cakes  · Eat fewer sweets  Limit foods and drinks that are high in sugar  This includes candy, cookies, regular soda, and sweetened drinks  Exercise:  Exercise at least 30 minutes per day on most days of the week  Some examples of exercise include walking, biking, dancing, and swimming  You can also fit in more physical activity by taking the stairs instead of the elevator or parking farther away from stores  Ask your healthcare provider about the best exercise plan for you  © Copyright [employer] 2018 Information is for End User's use only and may not be sold, redistributed or otherwise used for commercial purposes   All illustrations and images included in CareNotes® are the copyrighted property of [employer]  or [address] [place] [address]

## 2022-05-06 NOTE — TELEPHONE ENCOUNTER
Patient needs Galcanezumab-gnlm (Emgality) 120 MG/ML SOSY   But needs to be called in for a [facility]   [phone]    Can you do this?

## 2022-05-06 NOTE — PROGRESS NOTES
Assessment/Plan:    Dysphagia  Has had eval with GI and EGD, needs PPI refilled - rx sent, needs f/u with GI    Irritable bowel syndrome with diarrhea  Flaring right now with GI illness/poss flu, refill Zofran and con't Imodium prn, to ED with intractable vomiting/blood in stool/worsening abd pain    Uncontrolled type 2 diabetes mellitus with hyperglycemia ([place] [id number] )    Lab Results   Component Value Date    HGBA1C 7 3 (H) 02/18/2022   Dm type 2 with hyperglycemia - uncontrolled but acceptable with A1c 7 3 - advised to hold Glipizide while not eating/drinking with viral illness, may con't Metformin, repeat DM labs early June - order given, states she has eye exam next week, UTD on foot exam (2/22), on statin but no ACE/ARB, will follow    COPD (chronic obstructive pulmonary disease) ([facility])  No current s/sx of flare and no current resp symptoms, inhaler refilled upon request    S/P BKA (below knee amputation) unilateral, right ([place] [id number] )  Not fitted for prosthesis yet, con't f/u as per specialist       Diagnoses and all orders for this visit:    Bilious vomiting with nausea  -     ondansetron (Zofran ODT) 4 mg disintegrating tablet; Take 1 tablet (4 mg total) by mouth every 6 (six) hours as needed for nausea or vomiting    Viral infection, unspecified  Comments:  COVID neg test this am, suspect flu - will send Tamiflu, swabbed today and pending, encouraged bland diet/Tylenol and rest/fluids, rx Zofran sent, to ED with new/worse symptoms/red flag symptoms  Orders:  -     Covid/Flu- Office Collect  -     oseltamivir (TAMIFLU) 75 mg capsule;  Take 1 capsule (75 mg total) by mouth 2 (two) times a day for 5 days    Uncontrolled type 2 diabetes mellitus with hyperglycemia ([facility])  -     Hemoglobin A1C  -     Comprehensive metabolic panel  -     Microalbumin / creatinine urine ratio    S/P BKA (below knee amputation) unilateral, right ([facility])    Chronic obstructive pulmonary disease, unspecified COPD type ([place] [id number] )    Dysphagia, unspecified type  -     pantoprazole (PROTONIX) 40 mg tablet; Take 1 tablet (40 mg total) by mouth daily at bedtime    Irritable bowel syndrome with diarrhea  -     dicyclomine (BENTYL) 20 mg tablet; Take 1 tablet (20 mg total) by mouth every 6 (six) hours    Medicare annual wellness visit, subsequent    Encounter for screening for lung cancer  -     CT lung screening program; Future    Need for hepatitis C screening test  -     Hepatitis C antibody; Future  -     Hepatitis C antibody    Other orders  -     venlafaxine (EFFEXOR-XR) 150 mg 24 hr capsule; Take 150 mg by mouth every morning  -     hydrOXYzine pamoate (VISTARIL) 50 mg capsule; Take 50 mg by mouth every morning      Colonoscopy 12/19 - 5 yrs    Mammo and Dexa ordered at past visit    PAP s/p hysterectomy    BW 2/22 (A1C 7 3)  DM foot exam 2/22  DM eye exam - overdue    Subjective:      Patient ID: Gavin Romero is a 61 y o  female  HPI Pt here for follow up and AWV    Pt c/o N/V and HA  Yesterday she felt off  Last night she woke up with N/V  She now has a HA and dizziness  She has had a fever in the office today  She did a home COVID test and it was negative  She has had no known COVID exposures  She is coughing and has some nasal congestion but she feels that is d/t allergies  She notes no ST but does feel a bit more achy then usually  She has not checked her sugars this am with not feeling well  She states her sugars are btw 110's-130's  She is taking both her Metformin and Glipizide  She is UTD on DM foot exam and has eye exam scheduled for next week  She is on a statin but no ACE/ARB  DM labs due later this  Month  She con't to have a lot of R wrist pain  She is back in a cast d/t interm fracture noted  She is going to have a CT scan and then if fracture is healed they will remove the plate from her wrist   She is on Gabapentin and Tylenol and Voltaren prn  Pt had a video visit with Rheum in April   She was supposed to start Leucovorin in April but states she never picked up the rx  She has a lot of joint pains  Review of Systems   Constitutional: Positive for appetite change, chills, fatigue and fever  HENT: Positive for congestion and sore throat  Negative for ear pain and trouble swallowing  Eyes: Positive for visual disturbance  Negative for pain  Respiratory: Positive for cough  Negative for shortness of breath and wheezing  Cardiovascular: Negative for chest pain and palpitations  Gastrointestinal: Positive for diarrhea, nausea and vomiting  Negative for abdominal pain and constipation  Endocrine: Negative for polydipsia and polyuria  Genitourinary: Negative for difficulty urinating and dysuria  Musculoskeletal: Positive for arthralgias and gait problem  Negative for back pain  Skin: Negative for rash and wound  Neurological: Positive for dizziness and headaches  Negative for light-headedness  Hematological: Negative for adenopathy  Psychiatric/Behavioral: Positive for dysphoric mood  The patient is nervous/anxious  Objective:    /92   Pulse (!) 126   Temp (!) 101 3 °F (38 5 °C) (Tympanic)   LMP  (LMP Unknown)      Physical Exam  Vitals and nursing note reviewed  Constitutional:       General: She is not in acute distress  Appearance: She is well-developed  She is ill-appearing  HENT:      Head: Normocephalic and atraumatic  Right Ear: There is impacted cerumen  Left Ear: Tympanic membrane normal  There is no impacted cerumen  Mouth/Throat:      Mouth: Mucous membranes are moist       Pharynx: No oropharyngeal exudate  Eyes:      General:         Right eye: No discharge  Left eye: No discharge  Conjunctiva/sclera: Conjunctivae normal    Neck:      Trachea: No tracheal deviation  Cardiovascular:      Rate and Rhythm: Normal rate and regular rhythm  Heart sounds: Normal heart sounds  No murmur heard  No friction rub     Pulmonary:      Effort: Pulmonary effort is normal  No respiratory distress  Breath sounds: Normal breath sounds  No wheezing, rhonchi or rales  Abdominal:      General: There is no distension  Palpations: Abdomen is soft  Tenderness: There is abdominal tenderness  There is no guarding or rebound  Musculoskeletal:      Cervical back: Neck supple  Comments: R BKA, R wrist in cast   Lymphadenopathy:      Cervical: Cervical adenopathy present  Skin:     General: Skin is warm  Coloration: Skin is not pale  Findings: No rash  Neurological:      Mental Status: She is alert  Motor: No abnormal muscle tone        Gait: Gait abnormal    Psychiatric:         Judgment: Judgment normal       Comments: Irritable but pleasant and cooperative

## 2022-05-07 LAB
FLUAV RNA RESP QL NAA+PROBE: NEGATIVE
FLUBV RNA RESP QL NAA+PROBE: NEGATIVE
SARS-COV-2 RNA RESP QL NAA+PROBE: NEGATIVE

## 2022-05-11 ENCOUNTER — TELEPHONE (OUTPATIENT)
Dept: FAMILY MEDICINE CLINIC | Facility: HOSPITAL | Age: 67
End: 2022-05-11

## 2022-05-11 NOTE — TELEPHONE ENCOUNTER
Calling with what she feels is pink eye  Eye red, mucous filled, crusted over this morning  Says she does not have a way to get to the office  Asking if something can be sent to the pharmacy    Please return call

## 2022-05-12 ENCOUNTER — OFFICE VISIT (OUTPATIENT)
Dept: FAMILY MEDICINE CLINIC | Facility: HOSPITAL | Age: 67
End: 2022-05-12
Payer: COMMERCIAL

## 2022-05-12 VITALS
DIASTOLIC BLOOD PRESSURE: 82 MMHG | SYSTOLIC BLOOD PRESSURE: 136 MMHG | BODY MASS INDEX: 27.44 KG/M2 | TEMPERATURE: 100.7 F | HEIGHT: 66 IN | HEART RATE: 114 BPM

## 2022-05-12 DIAGNOSIS — H10.33 ACUTE BACTERIAL CONJUNCTIVITIS OF BOTH EYES: ICD-10-CM

## 2022-05-12 DIAGNOSIS — J01.40 ACUTE NON-RECURRENT PANSINUSITIS: Primary | ICD-10-CM

## 2022-05-12 DIAGNOSIS — J40 BRONCHITIS: ICD-10-CM

## 2022-05-12 DIAGNOSIS — E27.8 ADRENAL NODULE (HCC): ICD-10-CM

## 2022-05-12 PROCEDURE — 99214 OFFICE O/P EST MOD 30 MIN: CPT | Performed by: NURSE PRACTITIONER

## 2022-05-12 RX ORDER — DOXYCYCLINE 100 MG/1
100 CAPSULE ORAL 2 TIMES DAILY
Qty: 20 CAPSULE | Refills: 0 | Status: SHIPPED | OUTPATIENT
Start: 2022-05-12 | End: 2022-05-23

## 2022-05-12 RX ORDER — FLUTICASONE PROPIONATE 50 MCG
2 SPRAY, SUSPENSION (ML) NASAL DAILY
Qty: 9.9 ML | Refills: 0 | Status: SHIPPED | OUTPATIENT
Start: 2022-05-12 | End: 2022-08-09 | Stop reason: SDUPTHER

## 2022-05-12 RX ORDER — POLYMYXIN B SULFATE AND TRIMETHOPRIM 1; 10000 MG/ML; [USP'U]/ML
1 SOLUTION OPHTHALMIC EVERY 4 HOURS
Qty: 10 ML | Refills: 0 | Status: SHIPPED | OUTPATIENT
Start: 2022-05-12 | End: 2022-05-23

## 2022-05-12 NOTE — PROGRESS NOTES
Assessment/Plan: At this point in time will treat with oral antibiotic given her comorbidity  Cannot completely exclude COVID as test was done on day 1 of symptoms  Defer further testing though  Given severity of conjunctivitis treat with antibiotic drops  Call with no improvement and/or worsening  Diagnoses and all orders for this visit:    Acute non-recurrent pansinusitis  -     doxycycline monohydrate (MONODOX) 100 mg capsule; Take 1 capsule (100 mg total) by mouth in the morning and 1 capsule (100 mg total) in the evening  Do all this for 10 days  -     fluticasone (FLONASE) 50 mcg/act nasal spray; 2 sprays into each nostril in the morning  Bronchitis  -     doxycycline monohydrate (MONODOX) 100 mg capsule; Take 1 capsule (100 mg total) by mouth in the morning and 1 capsule (100 mg total) in the evening  Do all this for 10 days  Acute bacterial conjunctivitis of both eyes  -     polymyxin b-trimethoprim (POLYTRIM) ophthalmic solution; Apply 1 drop to eye every 4 (four) hours for 10 days    Adrenal nodule (HCC)          Subjective:     Patient ID: Ramon Sibley is a 61 y o  female  In today for pink eye  Right eye is closed shut with copious amounts of drainage  Has been going on for a few days  Using warm compresses  Painful  Now left eye is starting  Itchy and red  Was seen in office last week with 101 fever, cough and congestion  Pt reports she had been up all night vomiting prior to that appointment  She continues with cough and congestions  Blowing out green mucus  Reports glands were very swollen yesterday  No sore throat but glands hurt  She was started on Tamiflu for suspected flu and swabbed for COVID and flu which were negative  She is not checking temp at home but temp was 100 6 in office today  She reports feeling lousy  Review of Systems   Constitutional: Positive for fatigue and fever  HENT: Positive for congestion and rhinorrhea  Negative for sore throat  Eyes: Positive for pain, discharge, redness and itching  Respiratory: Positive for cough, shortness of breath and wheezing  The following portions of the patient's history were reviewed and updated as appropriate: allergies, current medications, past family history, past medical history, past social history, past surgical history and problem list     Objective:  Vitals:    05/12/22 1245   BP: 136/82   Pulse: (!) 114   Temp: (!) 100 7 °F (38 2 °C)      Physical Exam  Constitutional:       Appearance: Normal appearance  She is not ill-appearing  HENT:      Mouth/Throat:      Mouth: Mucous membranes are moist       Pharynx: Oropharynx is clear  Eyes:      Conjunctiva/sclera:      Right eye: Exudate present  Left eye: Left conjunctiva is injected  Comments: Right eye sealed shut with copious amount of yellow discharge  No periorbital erythema or swelling noted   Cardiovascular:      Rate and Rhythm: Regular rhythm  Tachycardia present  Heart sounds: Normal heart sounds  No murmur heard  Pulmonary:      Effort: Pulmonary effort is normal       Breath sounds: Examination of the right-upper field reveals rhonchi  Examination of the left-upper field reveals rhonchi  Examination of the right-middle field reveals rhonchi  Examination of the left-middle field reveals rhonchi  Examination of the right-lower field reveals rhonchi  Examination of the left-lower field reveals rhonchi  Rhonchi present  No wheezing  Lymphadenopathy:      Cervical: No cervical adenopathy (but tenderness)  Skin:     General: Skin is warm and dry  Neurological:      Mental Status: She is alert and oriented to person, place, and time  Psychiatric:         Mood and Affect: Mood normal          Behavior: Behavior normal          Thought Content:  Thought content normal          Judgment: Judgment normal

## 2022-05-13 DIAGNOSIS — R13.10 DYSPHAGIA, UNSPECIFIED TYPE: ICD-10-CM

## 2022-05-13 DIAGNOSIS — M19.90 ARTHRITIS: ICD-10-CM

## 2022-05-13 RX ORDER — PANTOPRAZOLE SODIUM 40 MG/1
TABLET, DELAYED RELEASE ORAL
Qty: 30 TABLET | Refills: 5 | Status: SHIPPED | OUTPATIENT
Start: 2022-05-13

## 2022-05-16 RX ORDER — LEFLUNOMIDE 10 MG/1
TABLET ORAL
Qty: 28 TABLET | Refills: 1 | Status: SHIPPED | OUTPATIENT
Start: 2022-05-16 | End: 2022-07-11

## 2022-05-19 ENCOUNTER — HOSPITAL ENCOUNTER (OUTPATIENT)
Dept: CT IMAGING | Facility: HOSPITAL | Age: 67
Discharge: HOME | End: 2022-05-19
Attending: ORTHOPAEDIC SURGERY
Payer: COMMERCIAL

## 2022-05-19 DIAGNOSIS — S62.322A CLOSED DISPLACED FRACTURE OF SHAFT OF THIRD METACARPAL BONE OF RIGHT HAND, INITIAL ENCOUNTER: ICD-10-CM

## 2022-05-19 PROCEDURE — G1004 CDSM NDSC: HCPCS

## 2022-05-19 PROCEDURE — 73200 CT UPPER EXTREMITY W/O DYE: CPT

## 2022-05-23 ENCOUNTER — OFFICE VISIT (OUTPATIENT)
Dept: OBGYN CLINIC | Facility: CLINIC | Age: 67
End: 2022-05-23
Payer: COMMERCIAL

## 2022-05-23 ENCOUNTER — APPOINTMENT (OUTPATIENT)
Dept: LAB | Facility: HOSPITAL | Age: 67
End: 2022-05-23
Payer: COMMERCIAL

## 2022-05-23 ENCOUNTER — OFFICE VISIT (OUTPATIENT)
Dept: RHEUMATOLOGY | Facility: CLINIC | Age: 67
End: 2022-05-23
Payer: COMMERCIAL

## 2022-05-23 VITALS
DIASTOLIC BLOOD PRESSURE: 90 MMHG | BODY MASS INDEX: 27.32 KG/M2 | SYSTOLIC BLOOD PRESSURE: 132 MMHG | HEIGHT: 66 IN | WEIGHT: 170 LBS

## 2022-05-23 VITALS — BODY MASS INDEX: 27.44 KG/M2 | HEIGHT: 66 IN

## 2022-05-23 DIAGNOSIS — M54.50 CHRONIC BILATERAL LOW BACK PAIN WITHOUT SCIATICA: Primary | ICD-10-CM

## 2022-05-23 DIAGNOSIS — E11.65 UNCONTROLLED TYPE 2 DIABETES MELLITUS WITH HYPERGLYCEMIA (HCC): Primary | ICD-10-CM

## 2022-05-23 DIAGNOSIS — Z48.89 AFTERCARE FOLLOWING SURGERY: Primary | ICD-10-CM

## 2022-05-23 DIAGNOSIS — M19.131 SLAC (SCAPHOLUNATE ADVANCED COLLAPSE) OF WRIST, RIGHT: ICD-10-CM

## 2022-05-23 DIAGNOSIS — M13.0 POLYARTHRITIS: ICD-10-CM

## 2022-05-23 DIAGNOSIS — Z11.59 SCREENING EXAMINATION FOR POLIOMYELITIS: Primary | ICD-10-CM

## 2022-05-23 DIAGNOSIS — S62.322G: ICD-10-CM

## 2022-05-23 DIAGNOSIS — G89.29 CHRONIC BILATERAL LOW BACK PAIN WITHOUT SCIATICA: Primary | ICD-10-CM

## 2022-05-23 LAB
ALBUMIN SERPL BCP-MCNC: 3.6 G/DL (ref 3.5–5)
ALP SERPL-CCNC: 107 U/L (ref 46–116)
ALT SERPL W P-5'-P-CCNC: 30 U/L (ref 12–78)
ANION GAP SERPL CALCULATED.3IONS-SCNC: 2 MMOL/L (ref 4–13)
AST SERPL W P-5'-P-CCNC: 27 U/L (ref 5–45)
BILIRUB SERPL-MCNC: 0.35 MG/DL (ref 0.2–1)
BUN SERPL-MCNC: 12 MG/DL (ref 5–25)
CALCIUM SERPL-MCNC: 9.4 MG/DL (ref 8.3–10.1)
CHLORIDE SERPL-SCNC: 102 MMOL/L (ref 100–108)
CO2 SERPL-SCNC: 35 MMOL/L (ref 21–32)
CREAT SERPL-MCNC: 0.81 MG/DL (ref 0.6–1.3)
EST. AVERAGE GLUCOSE BLD GHB EST-MCNC: 140 MG/DL
GFR SERPL CREATININE-BSD FRML MDRD: 76 ML/MIN/1.73SQ M
GLUCOSE P FAST SERPL-MCNC: 117 MG/DL (ref 65–99)
HBA1C MFR BLD: 6.5 %
HCV AB SER QL: NORMAL
POTASSIUM SERPL-SCNC: 4.1 MMOL/L (ref 3.5–5.3)
PROT SERPL-MCNC: 7.7 G/DL (ref 6.4–8.2)
SODIUM SERPL-SCNC: 139 MMOL/L (ref 136–145)

## 2022-05-23 PROCEDURE — 4004F PT TOBACCO SCREEN RCVD TLK: CPT | Performed by: INTERNAL MEDICINE

## 2022-05-23 PROCEDURE — 36415 COLL VENOUS BLD VENIPUNCTURE: CPT

## 2022-05-23 PROCEDURE — 86803 HEPATITIS C AB TEST: CPT

## 2022-05-23 PROCEDURE — 99214 OFFICE O/P EST MOD 30 MIN: CPT | Performed by: ORTHOPAEDIC SURGERY

## 2022-05-23 PROCEDURE — 99214 OFFICE O/P EST MOD 30 MIN: CPT | Performed by: INTERNAL MEDICINE

## 2022-05-23 PROCEDURE — 3008F BODY MASS INDEX DOCD: CPT | Performed by: ORTHOPAEDIC SURGERY

## 2022-05-23 PROCEDURE — 3044F HG A1C LEVEL LT 7.0%: CPT | Performed by: ORTHOPAEDIC SURGERY

## 2022-05-23 PROCEDURE — 80053 COMPREHEN METABOLIC PANEL: CPT

## 2022-05-23 PROCEDURE — 83036 HEMOGLOBIN GLYCOSYLATED A1C: CPT

## 2022-05-23 RX ORDER — GLIPIZIDE 5 MG/1
5 TABLET, FILM COATED, EXTENDED RELEASE ORAL DAILY
Qty: 30 TABLET | Refills: 5 | Status: SHIPPED | OUTPATIENT
Start: 2022-05-23 | End: 2022-07-07 | Stop reason: SDUPTHER

## 2022-05-23 RX ORDER — OMEPRAZOLE 40 MG/1
CAPSULE, DELAYED RELEASE ORAL
COMMUNITY
Start: 2022-05-12

## 2022-05-23 NOTE — PROGRESS NOTES
Assessment and Plan:   Inflammatory arthritis and chronic neck and back pain--continue MTX weekly, folic acid daily and leucovorin the day after MTX  Continue Arava 10mg daily  Continue to f/u with Dr Kishor Bond placed for comprehensive spine program     Anti-inflammatory diet/exercise  PT for C-spine  Topical NSAIDs/analgesics PRN  Repeat labs every 12 weeks and f/u in 4 mos  Rheumatic Disease Summary  As above    Here for f/u visit  Still with chronic neck/back pain  She's not sure if she is taking the leucovorin  Described the importance of having blood work every 12 weeks and she understands  The following portions of the patient's history were reviewed and updated as appropriate: allergies, current medications, past family history, past medical history, past social history, past surgical history and problem list     Review of Systems:   Review of Systems   Constitutional: Negative for fatigue  HENT: Negative for mouth sores  Eyes: Negative for pain  Respiratory: Negative for shortness of breath  Cardiovascular: Negative for leg swelling  Musculoskeletal: Positive for arthralgias, back pain and neck pain  Negative for joint swelling  Skin: Negative for rash  Neurological: Negative for weakness  Hematological: Negative for adenopathy  Psychiatric/Behavioral: Negative for sleep disturbance         Home Medications:    Current Outpatient Medications:     acetaminophen (Tylenol 8 Hour) 650 mg CR tablet, Take 1 tablet (650 mg total) by mouth every 8 (eight) hours, Disp: 30 tablet, Rfl: 0    acetaminophen (TYLENOL) 325 mg tablet, Take 2 tablets (650 mg total) by mouth every 6 (six) hours as needed for mild pain, Disp: 120 tablet, Rfl: 5    albuterol (PROVENTIL HFA,VENTOLIN HFA) 90 mcg/act inhaler, Inhale 2 puffs every 4 (four) hours as needed for wheezing, Disp: , Rfl:     Allergy Relief 10 MG tablet, Take 1 tablet (10 mg total) by mouth 2 (two) times a day, Disp: 180 tablet, Rfl: 0    ascorbic acid (VITAMIN C) 500 mg tablet, TAKE ONE TABLET BY MOUTH TWICE DAILY, Disp: 56 tablet, Rfl: 1    atorvastatin (LIPITOR) 20 mg tablet, TAKE ONE TABLET BY MOUTH DAILY IN THE EVENING, Disp: 90 tablet, Rfl: 1    cyclobenzaprine (FLEXERIL) 10 mg tablet, TAKE ONE TABLET BY MOUTH TWICE DAILY, Disp: 56 tablet, Rfl: 1    Diclofenac Sodium (VOLTAREN) 1 %, Apply 4 g topically 4 (four) times a day, Disp: 300 g, Rfl: 6    dicyclomine (BENTYL) 20 mg tablet, Take 1 tablet (20 mg total) by mouth every 6 (six) hours, Disp: 120 tablet, Rfl: 2    fluticasone (FLONASE) 50 mcg/act nasal spray, 2 sprays into each nostril in the morning , Disp: 9 9 mL, Rfl: 0    fluticasone-salmeterol (Advair) 100-50 mcg/dose inhaler, Inhale 1 puff 2 (two) times a day, Disp: , Rfl:     folic acid (FOLVITE) 1 mg tablet, Take 2 tablets (2 mg total) by mouth daily at bedtime, Disp: 180 tablet, Rfl: 3    gabapentin (NEURONTIN) 600 MG tablet, Take 1 tablet (600 mg total) by mouth 3 (three) times a day, Disp: 90 tablet, Rfl: 2    Galcanezumab-gnlm (Emgality) 120 MG/ML SOSY, Inject 120 mg under the skin every 30 (thirty) days Once a month, Disp: 1 mL, Rfl: 2    glipiZIDE (GLUCOTROL XL) 5 mg 24 hr tablet, Take 5 mg by mouth daily, Disp: , Rfl:     hydrOXYzine pamoate (VISTARIL) 50 mg capsule, Take 50 mg by mouth every morning, Disp: , Rfl:     ipratropium-albuterol (DUO-NEB) 0 5-2 5 mg/3 mL nebulizer solution, Take 3 mL by nebulization every 6 (six) hours as needed , Disp: , Rfl:     Lactobacillus Acid-Pectin (Acidophilus/Citrus Pectin) TABS, Take 1 tablet by mouth daily, Disp: , Rfl:     Lancets MISC, Check sugars once weekly, Disp: , Rfl:     leflunomide (ARAVA) 10 MG tablet, TAKE ONE TABLET BY MOUTH EVERY MORNING, Disp: 28 tablet, Rfl: 1    leucovorin (WELLCOVORIN) 10 MG tablet, Take one tablet the morning after you take your methotrexate, Disp: 12 tablet, Rfl: 3    levothyroxine 125 mcg tablet, Take 1 tablet (125 mcg total) by mouth daily, Disp: 30 tablet, Rfl: 2    meclizine (ANTIVERT) 25 mg tablet, TAKE ONE TABLET BY MOUTH THREE TIMES DAILY AS NEEDED for dizziness, Disp: , Rfl:     metFORMIN (GLUCOPHAGE) 500 mg tablet, TAKE ONE TABLET BY MOUTH TWICE DAILY WITH MEALS, Disp: 180 tablet, Rfl: 1    methotrexate 2 5 mg tablet, TAKE six TABLETS BY MOUTH ONCE A WEEK WITH FOOD OR AFTER EATING, Disp: 28 tablet, Rfl: 5    Multiple Vitamin (Tab-A-Felicia) TABS, Take 1 tablet by mouth daily, Disp: , Rfl:     Multiple Vitamins-Minerals (multivitamin with minerals) tablet, Take 1 tablet by mouth daily, Disp: 30 tablet, Rfl: 1    omeprazole (PriLOSEC) 40 MG capsule, , Disp: , Rfl:     ondansetron (Zofran ODT) 4 mg disintegrating tablet, Take 1 tablet (4 mg total) by mouth every 6 (six) hours as needed for nausea or vomiting, Disp: 15 tablet, Rfl: 0    pantoprazole (PROTONIX) 40 mg tablet, TAKE ONE TABLET BY MOUTH DAILY AT BEDTIME, Disp: 30 tablet, Rfl: 5    triamcinolone (KENALOG) 0 1 % cream, Use as directed, Disp: , Rfl:     venlafaxine (EFFEXOR-XR) 150 mg 24 hr capsule, Take 150 mg by mouth every morning, Disp: , Rfl:     Objective:    Vitals:    05/23/22 1039   BP: 132/90   Weight: 77 1 kg (170 lb)   Height: 5' 6" (1 676 m)       Physical Exam  Constitutional:       General: She is not in acute distress  HENT:      Head: Normocephalic and atraumatic  Eyes:      Conjunctiva/sclera: Conjunctivae normal    Cardiovascular:      Rate and Rhythm: Normal rate and regular rhythm  Heart sounds: S1 normal and S2 normal      No friction rub  Pulmonary:      Effort: Pulmonary effort is normal  No respiratory distress  Breath sounds: Wheezing present  No rhonchi or rales  Musculoskeletal:      Right hand: Tenderness present  Left hand: Tenderness present  Cervical back: Neck supple  Skin:     Coloration: Skin is not pale  Neurological:      Mental Status: She is alert  Mental status is at baseline  Psychiatric:         Mood and Affect: Mood normal          Behavior: Behavior normal          Reviewed labs and imaging  Imaging:   XR wrist 3+ vw right    Result Date: 4/29/2022  Narrative: RIGHT WRIST INDICATION:   [id number] 89: Encounter for other specified surgical aftercare  COMPARISON:  3/28/2022 VIEWS:  XR WRIST 3+ VW RIGHT Images: 3 FINDINGS: Plate and screw hardware extends from distal radius to mid third metacarpal, unchanged There is no acute fracture or dislocation  Degenerative changes throughout the wrist No lytic or blastic osseous lesion  Soft tissues are unremarkable  Impression: Stable plate and screw fixation of the wrist Workstation performed: [id number]     XR hand 3+ vw right    Result Date: 4/29/2022  Narrative: RIGHT HAND INDICATION:   S62 322A: Displaced fracture of shaft of third metacarpal bone, right hand, initial encounter for closed fracture  COMPARISON:  3/28/2022 VIEWS:  XR HAND 3+ VW RIGHT Images: 3 For the purposes of institution wide universal language the following terms will apply: (thumb=1st digit/finger, index finger=2nd digit/finger, long finger=3rd digit/finger, ring=4th digit/finger and small finger=5th digit/finger) FINDINGS: Plate and screw fixation extends from radius through third metacarpal, unchanged Degenerative changes involve the radiocarpal, intercarpal and first CMC joints Cast material is present There is no acute fracture or dislocation  No lytic or blastic osseous lesion  Soft tissues are unremarkable  Impression: Stable fusion hardware spanning radius to the third metacarpal Degenerative changes Cast material again evident Workstation performed: [id number]     CT upper extremity wo contrast right    Result Date: 5/19/2022  Narrative: CT right hand and wrist without IV contrast INDICATION: S62 322A: Displaced fracture of shaft of third metacarpal bone, right hand, initial encounter for closed fracture   Dr Maria R Dunne note from 4/25/202 and op note from 1/20/2022 was reviewed  Patient has 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  Patient underwent right total wrist arthrodesis with dorsal fusion plate placed  COMPARISON: Right wrist CT from 3/29/2022  Right hand and right wrist plain films from 4/25/2022  TECHNIQUE: CT examination of the above was performed  This examination was performed without intravenous contrast in the context of the critical nationwide Omnipaque shortage  This examination, like all CT scans performed in the [facility], was performed utilizing techniques to minimize radiation dose exposure, including the use of iterative reconstruction and automated exposure control software  Sagittal and coronal two dimensional reconstructed images were also submitted for interpretation  Rad dose  778 mGy-cm FINDINGS: OSSEOUS STRUCTURES:  Again seen is wrist arthrodesis with dorsal fusion plate, with screws in the distal radius, capitate, and 3rd metacarpal  There is interval increase in osseous fusion across the wrist, particularly between the radius and scaphoid (series 400 image 54 ) The oblique fracture of the 3rd metacarpal first identified on the 3/29/2022 CT, demonstrates no evidence of osseous fusion  Instead there is increased osteolysis at the fracture site, and the 2 screws at the fracture site have partially backed out (series 400 images 46-51, and series 401 images 27-34 ) Again seen is SLAC wrist  VISUALIZED MUSCULATURE:  Unremarkable  SOFT TISSUES:  Unremarkable  OTHER PERTINENT FINDINGS:  None  Impression: Again seen is wrist arthrodesis  There is interval increase in osseous fusion across the wrist, particularly between the radius and scaphoid (series 400 image 54 ) The oblique fracture of the 3rd metacarpal first identified on the 3/29/2022 CT, demonstrates no evidence of osseous fusion    Instead there is increased osteolysis at the fracture site, and the 2 screws at the fracture site have partially backed out (series 400 images 46-51, and series 401 images 27-34 ) Workstation performed: [id number]       Labs:   Office Visit on 05/06/2022   Component Date Value Ref Range Status    SARS-CoV-2 05/06/2022 Negative  Negative Final    INFLUENZA A PCR 05/06/2022 Negative  Negative Final    INFLUENZA B PCR 05/06/2022 Negative  Negative Final   Office Visit on 03/04/2022   Component Date Value Ref Range Status    LEUKOCYTE ESTERASE,UA 03/04/2022 neg   Final    Vaughn Legato 03/04/2022 +   Final    SL AMB POCT UROBILINOGEN 03/04/2022 neg   Final    POCT URINE PROTEIN 03/04/2022 neg   Final     PH,UA 03/04/2022 6 0   Final    BLOOD,UA 03/04/2022 neg   Final    SPECIFIC GRAVITY,UA 03/04/2022 1 030   Final    Geryl Raymond 03/04/2022 neg   Final    BILIRUBIN,UA 03/04/2022 neg   Final    GLUCOSE, UA 03/04/2022 neg   Final     COLOR,UA 03/04/2022 yellow   Final    CLARITY,UA 03/04/2022 clear   Final    TSH 04/20/2022 2 870  0 450 - 4 500 uIU/mL Final    Urine Culture Result 03/04/2022 Final report (A)  Final    Result 1 03/04/2022 Escherichia coli (A)  Final    Comment: Cefazolin <=4 ug/mL  Cefazolin with an JOHN <=16 predicts susceptibility to the oral agents  cefaclor, cefdinir, cefpodoxime, cefprozil, cefuroxime, cephalexin,  and loracarbef when used for therapy of uncomplicated urinary tract  infections due to E  coli, Klebsiella pneumoniae, and Proteus  mirabilis    Greater than 100,000 colony forming units per mL      SL AMB ANTIMICROBIAL SUSCEPTIBILITY 03/04/2022 Comment   Final    Comment:       ** S = Susceptible; I = Intermediate; R = Resistant **                     P = Positive; N = Negative              MICS are expressed in micrograms per mL     Antibiotic                 RSLT#1    RSLT#2    RSLT#3    RSLT#4  Amoxicillin/Clavulanic Acid    S  Ampicillin                     R  Cefepime                       S  Ceftriaxone S  Cefuroxime                     S  Ciprofloxacin                  S  Ertapenem                      S  Gentamicin                     R  Imipenem                       S  Levofloxacin                   S  Meropenem                      S  Nitrofurantoin                 S  Piperacillin/Tazobactam        S  Tetracycline                   R  Tobramycin                     I  Trimethoprim/Sulfa             R     Office Visit on 02/03/2022   Component Date Value Ref Range Status    White Blood Cell Count 02/18/2022 9 7  3 4 - 10 8 x10E3/uL Final    Red Blood Cell Count 02/18/2022 5 01  3 77 - 5 28 x10E6/uL Final    Hemoglobin 02/18/2022 14 1  11 1 - 15 9 g/dL Final    HCT 02/18/2022 45 1  34 0 - 46 6 % Final    MCV 02/18/2022 90  79 - 97 fL Final    MCH 02/18/2022 28 1  26 6 - 33 0 pg Final    MCHC 02/18/2022 31 3 (A) 31 5 - 35 7 g/dL Final    RDW 02/18/2022 17 0 (A) 11 7 - 15 4 % Final    Platelet Count 04/76/0672 319  150 - 450 x10E3/uL Final    Neutrophils 02/18/2022 66  Not Estab  % Final    Lymphocytes 02/18/2022 20  Not Estab  % Final    Monocytes 02/18/2022 7  Not Estab  % Final    Eosinophils 02/18/2022 5  Not Estab  % Final    Basophils PCT 02/18/2022 1  Not Estab  % Final    Neutrophils (Absolute) 02/18/2022 6 5  1 4 - 7 0 x10E3/uL Final    Lymphocytes (Absolute) 02/18/2022 2 0  0 7 - 3 1 x10E3/uL Final    Monocytes (Absolute) 02/18/2022 0 6  0 1 - 0 9 x10E3/uL Final    Eosinophils (Absolute) 02/18/2022 0 5 (A) 0 0 - 0 4 x10E3/uL Final    Basophils ABS 02/18/2022 0 1  0 0 - 0 2 x10E3/uL Final    Immature Granulocytes 02/18/2022 1  Not Estab  % Final    Immature Granulocytes (Absolute) 02/18/2022 0 1  0 0 - 0 1 x10E3/uL Final    Glucose, Random 02/18/2022 107 (A) 65 - 99 mg/dL Final    BUN 02/18/2022 10  8 - 27 mg/dL Final    Creatinine 02/18/2022 0 68  0 57 - 1 00 mg/dL Final    eGFR Non  02/18/2022 93  >59 mL/min/1 73 Final    eGFR  02/18/2022 107 >59 mL/min/1 73 Final    Comment: **In accordance with recommendations from the NKF-ASN Task force,**    [facility] is in the process of updating its eGFR calculation to the    2021 CKD-EPI creatinine equation that estimates kidney function    without a race variable   SL AMB BUN/CREATININE RATIO 02/18/2022 15  12 - 28 Final    Sodium 02/18/2022 141  134 - 144 mmol/L Final    Potassium 02/18/2022 4 0  3 5 - 5 2 mmol/L Final    Chloride 02/18/2022 100  96 - 106 mmol/L Final    CO2 02/18/2022 27  20 - 29 mmol/L Final    CALCIUM 02/18/2022 9 2  8 7 - 10 3 mg/dL Final    Protein, Total 02/18/2022 7 0  6 0 - 8 5 g/dL Final    Albumin 02/18/2022 4 2  3 8 - 4 8 g/dL Final    Globulin, Total 02/18/2022 2 8  1 5 - 4 5 g/dL Final    Albumin/Globulin Ratio 02/18/2022 1 5  1 2 - 2 2 Final    TOTAL BILIRUBIN 02/18/2022 0 4  0 0 - 1 2 mg/dL Final    Alk Phos Isoenzymes 02/18/2022 115  44 - 121 IU/L Final    AST 02/18/2022 41 (A) 0 - 40 IU/L Final    ALT 02/18/2022 49 (A) 0 - 32 IU/L Final    Cholesterol, Total 02/18/2022 146  100 - 199 mg/dL Final    Triglycerides 02/18/2022 131  0 - 149 mg/dL Final    HDL 02/18/2022 38 (A) >39 mg/dL Final    VLDL Cholesterol Calculated 02/18/2022 23  5 - 40 mg/dL Final    LDL Calculated 02/18/2022 85  0 - 99 mg/dL Final    T  Chol/HDL Ratio 02/18/2022 3 8  0 0 - 4 4 ratio Final    Comment:                                   T  Chol/HDL Ratio                                              Men  Women                                1/2 Avg  Risk  3 4    3 3                                    Avg Risk  5 0    4 4                                 2X Avg  Risk  9 6    7 1                                 3X Avg  Risk 23 4   11 0      TSH 02/18/2022 0 084 (A) 0 450 - 4 500 uIU/mL Final    Hemoglobin A1C 02/18/2022 7 3 (A) 4 8 - 5 6 % Final    Comment:          Prediabetes: 5 7 - 6 4           Diabetes: >6 4           Glycemic control for adults with diabetes: <7 0      Estimated Average Glucose 02/18/2022 163  mg/dL Final   Admission on 01/20/2022, Discharged on 01/20/2022   Component Date Value Ref Range Status    POC Glucose 01/20/2022 117  65 - 140 mg/dl Final    POC Glucose 01/20/2022 138  65 - 140 mg/dl Final   Office Visit on 09/21/2021   Component Date Value Ref Range Status    Glucose, Random 09/30/2021 133 (A) 65 - 99 mg/dL Final    Comment:               Fasting reference interval     For someone without known diabetes, a glucose  value >125 mg/dL indicates that they may have  diabetes and this should be confirmed with a  follow-up test          BUN 09/30/2021 10  7 - 25 mg/dL Final    Creatinine 09/30/2021 0 73  0 50 - 0 99 mg/dL Final    Comment: For patients >52years of age, the reference limit  for Creatinine is approximately 13% higher for people  identified as -American           eGFR Non  09/30/2021 87  > OR = 60 mL/min/1 73m2 Final    eGFR  09/30/2021 101  > OR = 60 mL/min/1 73m2 Final    SL AMB BUN/CREATININE RATIO 61/61/2818 NOT APPLICABLE  6 - 22 (calc) Final    Sodium 09/30/2021 142  135 - 146 mmol/L Final    Potassium 09/30/2021 4 9  3 5 - 5 3 mmol/L Final    Chloride 09/30/2021 101  98 - 110 mmol/L Final    CO2 09/30/2021 31  20 - 32 mmol/L Final    Calcium 09/30/2021 9 6  8 6 - 10 4 mg/dL Final    Protein, Total 09/30/2021 7 3  6 1 - 8 1 g/dL Final    Albumin 09/30/2021 4 4  3 6 - 5 1 g/dL Final    Globulin 09/30/2021 2 9  1 9 - 3 7 g/dL (calc) Final    Albumin/Globulin Ratio 09/30/2021 1 5  1 0 - 2 5 (calc) Final    TOTAL BILIRUBIN 09/30/2021 0 4  0 2 - 1 2 mg/dL Final    Alkaline Phosphatase 09/30/2021 110  37 - 153 U/L Final    AST 09/30/2021 34  10 - 35 U/L Final    ALT 09/30/2021 30 (A) 6 - 29 U/L Final    Hemoglobin A1C 09/30/2021 7 8 (A) <5 7 % of total Hgb Final    Comment: For someone without known diabetes, a hemoglobin A1c  value of 6 5% or greater indicates that they may have   diabetes and this should be confirmed with a follow-up   test      For someone with known diabetes, a value <7% indicates   that their diabetes is well controlled and a value   greater than or equal to 7% indicates suboptimal   control  A1c targets should be individualized based on   duration of diabetes, age, comorbid conditions, and   other considerations  Currently, no consensus exists regarding use of  hemoglobin A1c for diagnosis of diabetes for children   Estimated Average Glucose 09/30/2021 177  (calc) Final    Estimated Average Glucose (mmol/L) 09/30/2021 9 8  (calc) Final    White Blood Cell Count 09/30/2021 9 8  3 8 - 10 8 Thousand/uL Final    Red Blood Cell Count 09/30/2021 5 43 (A) 3 80 - 5 10 Million/uL Final    Hemoglobin 09/30/2021 13 9  11 7 - 15 5 g/dL Final    HCT 09/30/2021 45 0  35 0 - 45 0 % Final    MCV 09/30/2021 82 9  80 0 - 100 0 fL Final    MCH 09/30/2021 25 6 (A) 27 0 - 33 0 pg Final    MCHC 09/30/2021 30 9 (A) 32 0 - 36 0 g/dL Final    RDW 09/30/2021 18 6 (A) 11 0 - 15 0 % Final    Platelet Count 56/76/6469 315  140 - 400 Thousand/uL Final    SL AMB MPV 09/30/2021   7 5 - 12 5 fL Final    Comment: Due to platelet or RBC variability in size or shape  the result cannot be reported accurately        Neutrophils (Absolute) 09/30/2021 6,292  1,500 - 7,800 cells/uL Final    Lymphocytes (Absolute) 09/30/2021 2,205  850 - 3,900 cells/uL Final    Monocytes (Absolute) 09/30/2021 794  200 - 950 cells/uL Final    Eosinophils (Absolute) 09/30/2021 431  15 - 500 cells/uL Final    Basophils ABS 09/30/2021 78  0 - 200 cells/uL Final    Neutrophils 09/30/2021 64 2  % Final    Lymphocytes 09/30/2021 22 5  % Final    Monocytes 09/30/2021 8 1  % Final    Eosinophils 09/30/2021 4 4  % Final    Basophils PCT 09/30/2021 0 8  % Final    Iron, Serum 09/30/2021 76  45 - 160 mcg/dL Final    Total Iron Binding Capacity (TIBC) 09/30/2021 430  250 - 450 mcg/dL (calc) Final    Iron Saturation 09/30/2021 18 16 - 45 % (calc) Final   Orders Only on 06/07/2021   Component Date Value Ref Range Status    Glucose, Random 06/07/2021 151 (A) 65 - 99 mg/dL Final    Comment:               Fasting reference interval     For someone without known diabetes, a glucose  value >125 mg/dL indicates that they may have  diabetes and this should be confirmed with a  follow-up test          BUN 06/07/2021 16  7 - 25 mg/dL Final    Creatinine 06/07/2021 0 76  0 50 - 0 99 mg/dL Final    Comment: For patients >52years of age, the reference limit  for Creatinine is approximately 13% higher for people  identified as -American           eGFR Non  06/07/2021 83  > OR = 60 mL/min/1 73m2 Final    eGFR  06/07/2021 97  > OR = 60 mL/min/1 73m2 Final    SL AMB BUN/CREATININE RATIO 32/69/8728 NOT APPLICABLE  6 - 22 (calc) Final    Sodium 06/07/2021 138  135 - 146 mmol/L Final    Potassium 06/07/2021 4 4  3 5 - 5 3 mmol/L Final    Chloride 06/07/2021 100  98 - 110 mmol/L Final    CO2 06/07/2021 30  20 - 32 mmol/L Final    Calcium 06/07/2021 9 0  8 6 - 10 4 mg/dL Final    Protein, Total 06/07/2021 7 2  6 1 - 8 1 g/dL Final    Albumin 06/07/2021 4 0  3 6 - 5 1 g/dL Final    Globulin 06/07/2021 3 2  1 9 - 3 7 g/dL (calc) Final    Albumin/Globulin Ratio 06/07/2021 1 3  1 0 - 2 5 (calc) Final    TOTAL BILIRUBIN 06/07/2021 0 2  0 2 - 1 2 mg/dL Final    Alkaline Phosphatase 06/07/2021 104  37 - 153 U/L Final    AST 06/07/2021 19  10 - 35 U/L Final    ALT 06/07/2021 13  6 - 29 U/L Final    White Blood Cell Count 06/07/2021 12 4 (A) 3 8 - 10 8 Thousand/uL Final    Red Blood Cell Count 06/07/2021 4 72  3 80 - 5 10 Million/uL Final    Hemoglobin 06/07/2021 11 2 (A) 11 7 - 15 5 g/dL Final    HCT 06/07/2021 37 1  35 0 - 45 0 % Final    MCV 06/07/2021 78 6 (A) 80 0 - 100 0 fL Final    MCH 06/07/2021 23 7 (A) 27 0 - 33 0 pg Final    MCHC 06/07/2021 30 2 (A) 32 0 - 36 0 g/dL Final    RDW 06/07/2021 17 7 (A) 11 0 - 15 0 % Final    Platelet Count 47/81/7056 318  140 - 400 Thousand/uL Final    SL AMB MPV 06/07/2021 12 0  7 5 - 12 5 fL Final    Neutrophils (Absolute) 06/07/2021 8,159 (A) 1,500 - 7,800 cells/uL Final    Lymphocytes (Absolute) 06/07/2021 2,654  850 - 3,900 cells/uL Final    Monocytes (Absolute) 06/07/2021 918  200 - 950 cells/uL Final    Eosinophils (Absolute) 06/07/2021 583 (A) 15 - 500 cells/uL Final    Basophils ABS 06/07/2021 87  0 - 200 cells/uL Final    Neutrophils 06/07/2021 65 8  % Final    Lymphocytes 06/07/2021 21 4  % Final    Monocytes 06/07/2021 7 4  % Final    Eosinophils 06/07/2021 4 7  % Final    Basophils PCT 06/07/2021 0 7  % Final    Comment(s) 06/07/2021 Few atypical lymphocytes noted   Final    C-Reactive Protein, Quant 06/07/2021 62 4 (A) <8 0 mg/L Final

## 2022-05-23 NOTE — PATIENT INSTRUCTIONS
Methotrexate once a week  Folic acid 2 tabs every day (2 tabs together)  Leucovorin take the morning after the methotrexate  Arava (leflunomide) take one tablet daily  Please have the blood work done every 3 months  Continue to follow up with your primary care doctor and write down your blood pressures every day  Please have your blood work one today  I placed a consult for the comprehensive spine program for you

## 2022-05-23 NOTE — PROGRESS NOTES
ASSESSMENT/PLAN:    Assessment:   S/P Total wrist fusion of the right wrist - Right, Right wrist posterior interosseous nerve neurectomy - Right, and Extensor pollicis longus tendon transposition right wrist on 1/20/202 with subsequent fracture of right long finger metacarpal shaft fracture 3/28/22 with evidence of continued healing      Plan:   Surgery: right removal of wrist fusion hardware, ORIF right long finger metacarpal and revision fixation right wrist fusion    Follow Up: After Surgery    To Do Next Visit:  X-rays of the  right  wrist      Operative Discussions:     Standard Consent: The risks and benefits of the procedure were explained to the patient, which include, but are not limited to: Bleeding, infection, recurrence, pain, scar, damage to tendons, damage to nerves, and damage to blood vessels, failure to give desired results and complications related to anesthesia  These risks, along with alternative conservative treatment options, and postoperative protocols were voiced back and understood by the patient  All questions were answered to the patient's satisfaction  The patient agrees to comply with a standard postoperative protocol, and is willing to proceed  Education was provided via written and auditory forms  There were no barriers to learning  Written handouts regarding wound care, incision and scar care, and general preoperative information was provided to the patient  Prior to surgery, the patient may be requested to stop all anti-inflammatory medications  Prophylactic aspirin, Plavix, and Coumadin may be allowed to be continued  Medications including vitamin E , ginkgo, and fish oil are requested to be stopped approximately one week prior to surgery  Hypertensive medications and beta blockers, if taken, should be continued        _____________________________________________________  CHIEF COMPLAINT:  Chief Complaint   Patient presents with    Right Wrist - Follow-up, Fracture CT- 22         SUBJECTIVE:  León Krishnamurthy is a 61 y o  female who presents for follow up regarding right wrist  S/P Total wrist fusion of the right wrist - Right, Right wrist posterior interosseous nerve neurectomy - Right, and Extensor pollicis longus tendon transposition right wrist on  with subsequent fracture of right long finger metacarpal shaft fracture  PAST MEDICAL HISTORY:  Past Medical History:   Diagnosis Date    Achalasia     ADHD     Anxiety     Colon polyp     COPD [facility]chronic obstructive pulmonary disease) ([facility] [address] [facility]     Depression     Disease of thyroid gland     hypo    Esophageal spasm     Fibromyalgia     Fibromyalgia, primary     GERD (gastroesophageal reflux disease)     Hiatal hernia     Hyperlipidemia     Hypertension     Iron deficiency anemia     Polysubstance abuse ([facility] [address] )     Psychiatric disorder     major depressive       PAST SURGICAL HISTORY:  Past Surgical History:   Procedure Laterality Date    AMPUTATION      RBKA    ANKLE FUSION      APPENDECTOMY      BELOW KNEE LEG AMPUTATION Right     CARPAL TUNNEL RELEASE  2021    left arm    CATARACT EXTRACTION       SECTION      COLONOSCOPY  2010    COLONOSCOPY  2019    diverticula, 3 mm polyp and 6 mm polyp in rectum, grade III internal hemorrhoids, hyperplastic polyp, 5 year recall 2024     EGD  2010    FOREARM FASCIOTOMY Right     FRACTURE SURGERY      HYSTERECTOMY      KNEE ARTHROSCOPY Left     NERVE SURGERY Right 2022    Procedure: Right wrist posterior interosseous nerve neurectomy;  Surgeon: Regan Majano MD;  Location:  MAIN OR;  Service: Orthopedics    ORTHOPEDIC SURGERY      CO FUSION OF WRIST JOINT Right 2022    Procedure:  Total wrist fusion of the right wrist;  Surgeon: Regan Majano MD;  Location:  MAIN OR;  Service: Orthopedics    CO REVISE ULNAR NERVE AT ELBOW Left 2021    Procedure: Left carpal and cubital tunnel release;  Surgeon:  Zion Ng MD;  Location: [facility] MAIN OR;  Service: Orthopedics    UPPER GASTROINTESTINAL ENDOSCOPY      WRIST TENDON TRANSFER N/A 1/20/2022    Procedure: Extensor pollicis longus tendon transposition right wrist;  Surgeon: Francisco Cadet MD;  Location:  MAIN OR;  Service: Orthopedics       FAMILY HISTORY:  Family History   Problem Relation Age of Onset    Ulcerative colitis Mother     Lung cancer Mother     Diabetes Mother     Alcohol abuse Father     Diabetes Sister     Cancer Brother     Cervical cancer Daughter     Colon polyps Neg Hx     Colon cancer Neg Hx        SOCIAL HISTORY:  Social History     Tobacco Use    Smoking status: Current Some Day Smoker     Packs/day: 0 04     Types: Cigarettes     Start date: 1/1/1977    Smokeless tobacco: Never Used    Tobacco comment: 3 cigs a day   Vaping Use    Vaping Use: Never used   Substance Use Topics    Alcohol use: No    Drug use: Never       MEDICATIONS:    Current Outpatient Medications:     acetaminophen (Tylenol 8 Hour) 650 mg CR tablet, Take 1 tablet (650 mg total) by mouth every 8 (eight) hours, Disp: 30 tablet, Rfl: 0    acetaminophen (TYLENOL) 325 mg tablet, Take 2 tablets (650 mg total) by mouth every 6 (six) hours as needed for mild pain, Disp: 120 tablet, Rfl: 5    albuterol (PROVENTIL HFA,VENTOLIN HFA) 90 mcg/act inhaler, Inhale 2 puffs every 4 (four) hours as needed for wheezing, Disp: , Rfl:     Allergy Relief 10 MG tablet, Take 1 tablet (10 mg total) by mouth 2 (two) times a day, Disp: 180 tablet, Rfl: 0    ascorbic acid (VITAMIN C) 500 mg tablet, TAKE ONE TABLET BY MOUTH TWICE DAILY, Disp: 56 tablet, Rfl: 1    atorvastatin (LIPITOR) 20 mg tablet, TAKE ONE TABLET BY MOUTH DAILY IN THE EVENING, Disp: 90 tablet, Rfl: 1    cyclobenzaprine (FLEXERIL) 10 mg tablet, TAKE ONE TABLET BY MOUTH TWICE DAILY, Disp: 56 tablet, Rfl: 1    Diclofenac Sodium (VOLTAREN) 1 %, Apply 4 g topically 4 (four) times a day, Disp: 300 g, Rfl: 6    dicyclomine (BENTYL) 20 mg tablet, Take 1 tablet (20 mg total) by mouth every 6 (six) hours, Disp: 120 tablet, Rfl: 2    doxycycline monohydrate (MONODOX) 100 mg capsule, Take 1 capsule (100 mg total) by mouth in the morning and 1 capsule (100 mg total) in the evening  Do all this for 10 days  , Disp: 20 capsule, Rfl: 0    fluticasone (FLONASE) 50 mcg/act nasal spray, 2 sprays into each nostril in the morning , Disp: 9 9 mL, Rfl: 0    fluticasone-salmeterol (Advair) 100-50 mcg/dose inhaler, Inhale 1 puff 2 (two) times a day, Disp: , Rfl:     folic acid (FOLVITE) 1 mg tablet, Take 2 tablets (2 mg total) by mouth daily at bedtime, Disp: 180 tablet, Rfl: 3    gabapentin (NEURONTIN) 600 MG tablet, Take 1 tablet (600 mg total) by mouth 3 (three) times a day, Disp: 90 tablet, Rfl: 2    Galcanezumab-gnlm (Emgality) 120 MG/ML SOSY, Inject 120 mg under the skin every 30 (thirty) days Once a month, Disp: 1 mL, Rfl: 2    glipiZIDE (GLUCOTROL XL) 5 mg 24 hr tablet, Take 1 tablet (5 mg total) by mouth in the morning , Disp: 30 tablet, Rfl: 5    hydrOXYzine pamoate (VISTARIL) 50 mg capsule, Take 50 mg by mouth every morning, Disp: , Rfl:     ipratropium-albuterol (DUO-NEB) 0 5-2 5 mg/3 mL nebulizer solution, Take 3 mL by nebulization every 6 (six) hours as needed , Disp: , Rfl:     Lactobacillus Acid-Pectin (Acidophilus/Citrus Pectin) TABS, Take 1 tablet by mouth daily, Disp: , Rfl:     Lancets MISC, Check sugars once weekly, Disp: , Rfl:     leflunomide (ARAVA) 10 MG tablet, TAKE ONE TABLET BY MOUTH EVERY MORNING, Disp: 28 tablet, Rfl: 1    leucovorin (WELLCOVORIN) 10 MG tablet, Take one tablet the morning after you take your methotrexate, Disp: 12 tablet, Rfl: 3    levothyroxine 125 mcg tablet, Take 1 tablet (125 mcg total) by mouth daily, Disp: 30 tablet, Rfl: 2    meclizine (ANTIVERT) 25 mg tablet, TAKE ONE TABLET BY MOUTH THREE TIMES DAILY AS NEEDED for dizziness, Disp: , Rfl:    metFORMIN (GLUCOPHAGE) 500 mg tablet, TAKE ONE TABLET BY MOUTH TWICE DAILY WITH MEALS, Disp: 180 tablet, Rfl: 1    methotrexate 2 5 mg tablet, TAKE six TABLETS BY MOUTH ONCE A WEEK WITH FOOD OR AFTER EATING, Disp: 28 tablet, Rfl: 5    Multiple Vitamin (Tab-A-Felicia) TABS, Take 1 tablet by mouth daily, Disp: , Rfl:     Multiple Vitamins-Minerals (multivitamin with minerals) tablet, Take 1 tablet by mouth daily, Disp: 30 tablet, Rfl: 1    omeprazole (PriLOSEC) 40 MG capsule, , Disp: , Rfl:     ondansetron (Zofran ODT) 4 mg disintegrating tablet, Take 1 tablet (4 mg total) by mouth every 6 (six) hours as needed for nausea or vomiting, Disp: 15 tablet, Rfl: 0    pantoprazole (PROTONIX) 40 mg tablet, TAKE ONE TABLET BY MOUTH DAILY AT BEDTIME, Disp: 30 tablet, Rfl: 5    polymyxin b-trimethoprim (POLYTRIM) ophthalmic solution, Apply 1 drop to eye every 4 (four) hours for 10 days, Disp: 10 mL, Rfl: 0    triamcinolone (KENALOG) 0 1 % cream, Use as directed, Disp: , Rfl:     venlafaxine (EFFEXOR-XR) 150 mg 24 hr capsule, Take 150 mg by mouth every morning, Disp: , Rfl:     ALLERGIES:  Allergies   Allergen Reactions    Morphine Itching and Anaphylaxis     u  Other reaction(s): Other (See Comments)  u  Other reaction(s): Feeling agitated (finding)      Penicillins Anaphylaxis    Cephalexin      Other reaction(s): Unknown    Cephalosporins Hives     Other reaction(s): Unknown Reaction      Codeine Hives     Other reaction(s): Unknown  Other reaction(s): Unknown Reaction  Other reaction(s): Unknown      Levaquin [Levofloxacin] Hives    Nsaids GI Bleeding     Other reaction(s): Unknown Reaction    Aspirin Hives, Other (See Comments) and Rash     Other reaction(s): Unknown Reaction      Chlorhexidine Rash    Medical Tape Rash    Vancomycin Rash       REVIEW OF SYSTEMS:  Pertinent items are noted in HPI  A comprehensive review of systems was negative      LABS:  HgA1c:   Lab Results   Component Value Date    HGBA1C 6 5 (H) 05/23/2022     BMP:   Lab Results   Component Value Date    GLUCOSE 90 04/01/2015    CALCIUM 9 4 05/23/2022     04/01/2015    K 4 1 05/23/2022    CO2 35 (H) 05/23/2022     05/23/2022    BUN 12 05/23/2022    CREATININE 0 81 05/23/2022           _____________________________________________________  PHYSICAL EXAMINATION:  Vital signs: Ht 5' 6" (1 676 m)   LMP  (LMP Unknown)   BMI 27 44 kg/m²   General: well developed and well nourished, alert, oriented times 3 and appears comfortable  Psychiatric: Normal  HEENT: Trachea Midline, No torticollis  Cardiovascular: No discernable arrhythmia  Pulmonary: No wheezing or stridor  Abdomen: No rebound or guarding  Extremities: No peripheral edema  Skin: No masses, erythema, lacerations, fluctation, ulcerations  Neurovascular: Sensation Intact to the Median, Ulnar, Radial Nerve, Motor Intact to the Median, Ulnar, Radial Nerve and Pulses Intact    MUSCULOSKELETAL EXAMINATION:  Right hand  Presents in [facility]  She is able to move all digits  Sensation intact all digits     _____________________________________________________  STUDIES REVIEWED:  Images were reviewed in PACS by Dr Nacho Best and demonstrate: CT scan right wrist demonstrates healed wrist fusion, long finger MC fracture not healing      PROCEDURES PERFORMED:  Procedures  No Procedures performed today     Scribe Attestation    I,:  Jessica Carter am acting as a scribe while in the presence of the attending physician :       I,:  Vishnu Tony MD personally performed the services described in this documentation    as scribed in my presence :

## 2022-05-24 ENCOUNTER — TELEPHONE (OUTPATIENT)
Dept: PHYSICAL THERAPY | Facility: OTHER | Age: 67
End: 2022-05-24

## 2022-05-27 ENCOUNTER — TELEPHONE (OUTPATIENT)
Dept: FAMILY MEDICINE CLINIC | Facility: HOSPITAL | Age: 67
End: 2022-05-27

## 2022-05-27 ENCOUNTER — TELEPHONE (OUTPATIENT)
Dept: PHYSICAL THERAPY | Facility: OTHER | Age: 67
End: 2022-05-27

## 2022-05-27 ENCOUNTER — TELEPHONE (OUTPATIENT)
Dept: GASTROENTEROLOGY | Facility: CLINIC | Age: 67
End: 2022-05-27

## 2022-05-27 NOTE — TELEPHONE ENCOUNTER
Call placed to the patient per [facility] referral     Voice message left for patient to call back  Phone number and hours of business provided  This the 2nd attempt to reach the patient  Will close per protocol

## 2022-05-27 NOTE — TELEPHONE ENCOUNTER
Patient needs to be put on wait list mirian she only wants to see Dr Rajput Angry and she missed her appt today and no more openings left for him to schedule her in

## 2022-05-27 NOTE — TELEPHONE ENCOUNTER
Pt left very fragmented VMM stating she needs her Zofran refilled  Mssg from [phone]  Primary sent script today  Pt missed an appt today

## 2022-05-27 NOTE — TELEPHONE ENCOUNTER
Zofran sent, electric wheelchair order in chart but be aware this is usually a lengthy process to get approved and usually comes with very extensive forms that need to be filled out in a visit purely dedicated to it

## 2022-05-27 NOTE — TELEPHONE ENCOUNTER
Patient has surgery June 9 and is calling for a prescription for Zofran  She is also requesting a script for a  hoverround chair

## 2022-05-30 DIAGNOSIS — M13.0 POLYARTHRITIS: ICD-10-CM

## 2022-05-30 DIAGNOSIS — R53.83 MALAISE AND FATIGUE: ICD-10-CM

## 2022-05-30 DIAGNOSIS — R53.81 MALAISE AND FATIGUE: ICD-10-CM

## 2022-05-30 DIAGNOSIS — M79.10 MYALGIA: ICD-10-CM

## 2022-05-30 DIAGNOSIS — M19.90 ARTHRITIS: ICD-10-CM

## 2022-06-01 RX ORDER — CYCLOBENZAPRINE HCL 10 MG
TABLET ORAL
Qty: 56 TABLET | Refills: 5 | Status: SHIPPED | OUTPATIENT
Start: 2022-06-01 | End: 2022-07-07

## 2022-06-01 RX ORDER — METHOTREXATE 2.5 MG/1
TABLET ORAL
Qty: 72 TABLET | Refills: 1 | Status: SHIPPED | OUTPATIENT
Start: 2022-06-01 | End: 2022-07-11

## 2022-06-01 NOTE — PRE-PROCEDURE INSTRUCTIONS
Pre-Surgery Instructions:   Medication Instructions    acetaminophen (TYLENOL) 325 mg tablet Take day of surgery  if needed with sip of water     albuterol (PROVENTIL HFA,VENTOLIN HFA) 90 mcg/act inhaler Uses PRN- OK to take day of surgery    ascorbic acid (VITAMIN C) 500 mg tablet Stop taking 7 days prior to surgery   atorvastatin (LIPITOR) 20 mg tablet Take night before surgery    cyclobenzaprine (FLEXERIL) 10 mg tablet Take day of surgery  with sip of water     Diclofenac Sodium (VOLTAREN) 1 % Hold day of surgery   dicyclomine (BENTYL) 20 mg tablet Take day of surgery  with sip of water     fluticasone (FLONASE) 50 mcg/act nasal spray Take day of surgery   fluticasone-salmeterol (Advair) 100-50 mcg/dose inhaler Take day of surgery   folic acid (FOLVITE) 1 mg tablet Instructed to use up to the night before surgery     gabapentin (NEURONTIN) 600 MG tablet Take day of surgery  with sip of water     Galcanezumab-gnlm (Emgality) 120 MG/ML SOSY Pt reports verified use with prescribing MD for prior to surgery -takes every Wednesday    glipiZIDE (GLUCOTROL XL) 5 mg 24 hr tablet Hold day of surgery   hydrOXYzine pamoate (VISTARIL) 50 mg capsule Take day of surgery  with sip of water     ipratropium-albuterol (DUO-NEB) 0 5-2 5 mg/3 mL nebulizer solution Take day of surgery  if needed     Lactobacillus Acid-Pectin (Acidophilus/Citrus Pectin) TABS Stop taking 7 days prior to surgery   Lancets MISC Continue use as prescribed     leflunomide (ARAVA) 10 MG tablet Pt was instructed to verify use with prescribing MD prior to surgery    leucovorin (WELLCOVORIN) 10 MG tablet Pt instructed to verify use of this medication prior to surgery - pt did report has verified with MD -ok to use prior to surgery    levothyroxine 125 mcg tablet Take day of surgery  with sip of water     metFORMIN (GLUCOPHAGE) 500 mg tablet Hold day of surgery      methotrexate 2 5 MG tablet Pt reports takes once a week -Wednesdays - and reports has verified use with prescribing MD lópez to use prior to surgery    Multiple Vitamins-Minerals (multivitamin with minerals) tablet Stop taking 7 days prior to surgery   ondansetron (Zofran ODT) 4 mg disintegrating tablet Take day of surgery  with sip of water if needed    pantoprazole (PROTONIX) 40 mg tablet Take day of surgery  with sip of water     venlafaxine (EFFEXOR-XR) 150 mg 24 hr capsule Take day of surgery  with sip of water      Reviewed all medications and instructions for DOS  Reviewed all showering instructions and COVID visitation policy  Pt aware that [facility]  is location for DOS, instructed that pt pre op nurse will call on 6/8/22 to give specific instructions for DOS  Pt instructed to bring photo ID and insurance card for DOS, remove all jewelry and  NO valuables for DOS  Pt instructed to use only Tylenol between 6/2/22  and DOS, NO NSAID products  Pt informed transport is needed for DOS due to receiving anesthesia  Instructed patient to reduce smoking prior to surgery  No smoking day of surgery  Pt verbalized understanding of all instructions given and reviewed for DOS  Pt was very short and "not interested" with information discussed re:PAT interview   Pt is fully vaccinated for COVID with two boosters as well  My Surgical Experience    The following information was developed to assist you to prepare for your operation  What do I need to do before coming to the hospital?   Arrange for a responsible person to drive you to and from the hospital    Arrange care for your children at home  Children are not allowed in the recovery areas of the hospital   Plan to wear clothing that is easy to put on and take off  If you are having shoulder surgery, wear a shirt that buttons or zippers in the front  Bathing  o Shower the evening before and the morning of your surgery with an antibacterial soap   Please refer to the Pre Op Showering Instructions for Surgery Patients Sheet   o Remove nail polish and all body piercing jewelry  o Do not shave any body part for at least 24 hours before surgery-this includes face, arms, legs and upper body  Food  o Nothing to eat or drink after midnight the night before your surgery  This includes candy and chewing gum  o Exception: If your surgery is after 12:00pm (noon), you may have clear liquids such as 7-Up®, ginger ale, apple or cranberry juice, Jell-O®, water, or clear broth until 8:00 am  o Do not drink milk or juice with pulp on the morning before surgery  o Do not drink alcohol 24 hours before surgery  Medicine  o Follow instructions you received from your surgeon about which medicines you may take on the day of surgery  o If instructed to take medicine on the morning of surgery, take pills with just a small sip of water  Call your prescribing doctor for specific infroamtion on what to do if you take insulin    What should I bring to the hospital?    Bring:  Sherral Renetta or a walker, if you have them, for foot or knee surgery   A list of the daily medicines, vitamins, minerals, herbals and nutritional supplements you take  Include the dosages of medicines and the time you take them each day   Glasses, dentures or hearing aids   Minimal clothing; you will be wearing hospital sleepwear   Photo ID; required to verify your identity   If you have a Living Will or Power of , bring a copy of the documents   If you have an ostomy, bring an extra pouch and any supplies you use    Do not bring   Medicines or inhalers   Money, valuables or jewelry    What other information should I know about the day of surgery?  Notify your surgeons if you develop a cold, sore throat, cough, fever, rash or any other illness     Report to the Ambulatory Surgical/Same Day Surgery Unit   You will be instructed to stop at Registration only if you have not been pre-registered   Inform your  fi they do not stay that they will be asked by the staff to leave a phone number where they can be reached   Be available to be reached before surgery  In the event the operating room schedule changes, you may be asked to come in earlier or later than expected    *It is important to tell your doctor and others involved in your health care if you are taking or have been taking any non-prescription drugs, vitamins, minerals, herbals or other nutritional supplements   Any of these may interact with some food or medicines and cause a reaction

## 2022-06-03 LAB
ERYTHROCYTE [DISTWIDTH] IN BLOOD BY AUTOMATED COUNT: 14.3 % (ref 11.7–15.4)
HCT VFR BLD AUTO: 42.4 % (ref 34–46.6)
HGB BLD-MCNC: 13.8 G/DL (ref 11.1–15.9)
MCH RBC QN AUTO: 28.9 PG (ref 26.6–33)
MCHC RBC AUTO-ENTMCNC: 32.5 G/DL (ref 31.5–35.7)
MCV RBC AUTO: 89 FL (ref 79–97)
PLATELET # BLD AUTO: 246 X10E3/UL (ref 150–450)
RBC # BLD AUTO: 4.78 X10E6/UL (ref 3.77–5.28)
WBC # BLD AUTO: 8.6 X10E3/UL (ref 3.4–10.8)

## 2022-06-09 ENCOUNTER — TELEPHONE (OUTPATIENT)
Dept: OBGYN CLINIC | Facility: HOSPITAL | Age: 67
End: 2022-06-09

## 2022-06-09 NOTE — TELEPHONE ENCOUNTER
Dr Gupta Pitch    Patient asking for instruction on when to stop meds and which ones to stop before her sx  6/14       # [id number]

## 2022-06-13 ENCOUNTER — ANESTHESIA EVENT (OUTPATIENT)
Dept: PERIOP | Facility: HOSPITAL | Age: 67
End: 2022-06-13
Payer: COMMERCIAL

## 2022-06-13 NOTE — ANESTHESIA PREPROCEDURE EVALUATION
Procedure:  Removal of right wrist fusion plate (Right Wrist)  Revision fusion of right wrist (Right Wrist)  ORIF right long finger metacarpal fracture (Right Finger)    Relevant Problems   CARDIO   (+) Migraine with aura and without status migrainosus, not intractable   (+) Moderate mixed hyperlipidemia not requiring statin therapy      GI/HEPATIC   (+) Dysphagia      MUSCULOSKELETAL   (+) Cervical spondylosis   (+) Fibromyalgia   (+) Primary osteoarthritis of left knee   (+) Primary osteoarthritis of right shoulder   (+) Rheumatoid arthritis (HCC)      NEURO/PSYCH   (+) Anxiety   (+) Chronic pain syndrome   (+) Depression   (+) Fibromyalgia   (+) Major depressive disorder, recurrent, moderate (HCC)   (+) Migraine with aura and without status migrainosus, not intractable   (+) Personal history of colonic polyps      PULMONARY   (+) COPD (chronic obstructive pulmonary disease) (HCC)   (+) Smoking        Physical Exam    Airway    Mallampati score: II  TM Distance: >3 FB  Neck ROM: full     Dental   upper dentures,     Cardiovascular  Cardiovascular exam normal    Pulmonary  Pulmonary exam normal     Other Findings        Anesthesia Plan  ASA Score- 3     Anesthesia Type- general with ASA Monitors  Additional Monitors:   Airway Plan: LMA  Plan Factors-    Chart reviewed  Patient summary reviewed  Patient is not a current smoker  Obstructive sleep apnea risk education given perioperatively  Induction- intravenous  Postoperative Plan-     Informed Consent- Anesthetic plan and risks discussed with patient  I personally reviewed this patient with the CRNA  Discussed and agreed on the Anesthesia Plan with the CRNA  Britta Vu discussed

## 2022-06-14 ENCOUNTER — HOSPITAL ENCOUNTER (OUTPATIENT)
Facility: HOSPITAL | Age: 67
Setting detail: OUTPATIENT SURGERY
Discharge: HOME | End: 2022-06-14
Attending: ORTHOPAEDIC SURGERY | Admitting: ORTHOPAEDIC SURGERY
Payer: COMMERCIAL

## 2022-06-14 ENCOUNTER — HOSPITAL ENCOUNTER (OUTPATIENT)
Dept: RADIOLOGY | Facility: HOSPITAL | Age: 67
Setting detail: OUTPATIENT SURGERY
Discharge: HOME | End: 2022-06-14
Payer: COMMERCIAL

## 2022-06-14 ENCOUNTER — ANESTHESIA (OUTPATIENT)
Dept: PERIOP | Facility: HOSPITAL | Age: 67
End: 2022-06-14
Payer: COMMERCIAL

## 2022-06-14 VITALS
TEMPERATURE: 98 F | WEIGHT: 175 LBS | DIASTOLIC BLOOD PRESSURE: 76 MMHG | BODY MASS INDEX: 28.12 KG/M2 | HEART RATE: 104 BPM | OXYGEN SATURATION: 92 % | SYSTOLIC BLOOD PRESSURE: 126 MMHG | RESPIRATION RATE: 18 BRPM | HEIGHT: 66 IN

## 2022-06-14 DIAGNOSIS — Z48.89 AFTERCARE FOLLOWING SURGERY: ICD-10-CM

## 2022-06-14 DIAGNOSIS — S62.322G: ICD-10-CM

## 2022-06-14 DIAGNOSIS — M19.131 SLAC (SCAPHOLUNATE ADVANCED COLLAPSE) OF WRIST, RIGHT: ICD-10-CM

## 2022-06-14 DIAGNOSIS — G89.18 POST-OP PAIN: Primary | ICD-10-CM

## 2022-06-14 LAB — GLUCOSE SERPL-MCNC: 131 MG/DL (ref 65–140)

## 2022-06-14 PROCEDURE — C1713 ANCHOR/SCREW BN/BN,TIS/BN: HCPCS | Performed by: ORTHOPAEDIC SURGERY

## 2022-06-14 PROCEDURE — 88311 DECALCIFY TISSUE: CPT | Performed by: PATHOLOGY

## 2022-06-14 PROCEDURE — 87205 SMEAR GRAM STAIN: CPT | Performed by: ORTHOPAEDIC SURGERY

## 2022-06-14 PROCEDURE — C1781 MESH (IMPLANTABLE): HCPCS | Performed by: ORTHOPAEDIC SURGERY

## 2022-06-14 PROCEDURE — 88304 TISSUE EXAM BY PATHOLOGIST: CPT | Performed by: PATHOLOGY

## 2022-06-14 PROCEDURE — 82948 REAGENT STRIP/BLOOD GLUCOSE: CPT

## 2022-06-14 PROCEDURE — NC001 PR NO CHARGE: Performed by: PHYSICIAN ASSISTANT

## 2022-06-14 PROCEDURE — 87070 CULTURE OTHR SPECIMN AEROBIC: CPT | Performed by: ORTHOPAEDIC SURGERY

## 2022-06-14 PROCEDURE — 25800 ARTHRD WRIST COMPLETE WO GRF: CPT | Performed by: PHYSICIAN ASSISTANT

## 2022-06-14 PROCEDURE — 25800 ARTHRD WRIST COMPLETE WO GRF: CPT | Performed by: ORTHOPAEDIC SURGERY

## 2022-06-14 PROCEDURE — 87206 SMEAR FLUORESCENT/ACID STAI: CPT | Performed by: ORTHOPAEDIC SURGERY

## 2022-06-14 PROCEDURE — 26546 REPAIR NONUNION HAND: CPT | Performed by: ORTHOPAEDIC SURGERY

## 2022-06-14 PROCEDURE — 87252 VIRUS INOCULATION TISSUE: CPT | Performed by: ORTHOPAEDIC SURGERY

## 2022-06-14 PROCEDURE — 87116 MYCOBACTERIA CULTURE: CPT | Performed by: ORTHOPAEDIC SURGERY

## 2022-06-14 PROCEDURE — 73130 X-RAY EXAM OF HAND: CPT

## 2022-06-14 PROCEDURE — 87102 FUNGUS ISOLATION CULTURE: CPT | Performed by: ORTHOPAEDIC SURGERY

## 2022-06-14 PROCEDURE — 26546 REPAIR NONUNION HAND: CPT | Performed by: PHYSICIAN ASSISTANT

## 2022-06-14 PROCEDURE — 87075 CULTR BACTERIA EXCEPT BLOOD: CPT | Performed by: ORTHOPAEDIC SURGERY

## 2022-06-14 DEVICE — 2.0MM VA-LCKNG SCREW SLF-TPNG WITH T6 STARDRIVE RECESS 20MM: Type: IMPLANTABLE DEVICE | Site: WRIST | Status: FUNCTIONAL

## 2022-06-14 DEVICE — 2.0MM VA-LCKNG SCREW SLF-TPNG WITH T6 STARDRIVE RECESS 13MM: Type: IMPLANTABLE DEVICE | Site: WRIST | Status: FUNCTIONAL

## 2022-06-14 DEVICE — 2.0MM VA-LCKNG SCREW SLF-TPNG WITH T6 STARDRIVE RECESS 18MM: Type: IMPLANTABLE DEVICE | Site: WRIST | Status: FUNCTIONAL

## 2022-06-14 DEVICE — DBM T45010 10CC PASTE GRAFTON PLUS
Type: IMPLANTABLE DEVICE | Site: WRIST | Status: FUNCTIONAL
Brand: GRAFTON®AND GRAFTON PLUS®DEMINERALIZED BONE MATRIX (DBM)

## 2022-06-14 DEVICE — SPEED 13X12X12MM IMPLANT
Type: IMPLANTABLE DEVICE | Site: WRIST | Status: FUNCTIONAL
Brand: SPEED

## 2022-06-14 DEVICE — 2.0MM VA-LCKNG SCREW SLF-TPNG WITH T6 STARDRIVE RECESS 12MM: Type: IMPLANTABLE DEVICE | Site: WRIST | Status: FUNCTIONAL

## 2022-06-14 DEVICE — GRAFT BONE CRUSHED CANC CHIPS 0.1-4MM 30ML FREEZE DRIED: Type: IMPLANTABLE DEVICE | Site: WRIST | Status: FUNCTIONAL

## 2022-06-14 DEVICE — 2.0MM VAL T-PLATE 3 HOLES HD-7 HOLES SHAFT: Type: IMPLANTABLE DEVICE | Site: WRIST | Status: FUNCTIONAL

## 2022-06-14 RX ORDER — OXYCODONE HYDROCHLORIDE 5 MG/1
5 TABLET ORAL EVERY 6 HOURS PRN
Qty: 20 TABLET | Refills: 0 | Status: SHIPPED | OUTPATIENT
Start: 2022-06-14 | End: 2022-06-20 | Stop reason: SDUPTHER

## 2022-06-14 RX ORDER — GLYCOPYRROLATE 0.2 MG/ML
INJECTION INTRAMUSCULAR; INTRAVENOUS AS NEEDED
Status: DISCONTINUED | OUTPATIENT
Start: 2022-06-14 | End: 2022-06-14

## 2022-06-14 RX ORDER — PROPOFOL 10 MG/ML
INJECTION, EMULSION INTRAVENOUS AS NEEDED
Status: DISCONTINUED | OUTPATIENT
Start: 2022-06-14 | End: 2022-06-14

## 2022-06-14 RX ORDER — NEOSTIGMINE METHYLSULFATE 1 MG/ML
INJECTION INTRAVENOUS AS NEEDED
Status: DISCONTINUED | OUTPATIENT
Start: 2022-06-14 | End: 2022-06-14

## 2022-06-14 RX ORDER — FENTANYL CITRATE 50 UG/ML
INJECTION, SOLUTION INTRAMUSCULAR; INTRAVENOUS
Status: COMPLETED | OUTPATIENT
Start: 2022-06-14 | End: 2022-06-14

## 2022-06-14 RX ORDER — SODIUM CHLORIDE, SODIUM LACTATE, POTASSIUM CHLORIDE, CALCIUM CHLORIDE 600; 310; 30; 20 MG/100ML; MG/100ML; MG/100ML; MG/100ML
100 INJECTION, SOLUTION INTRAVENOUS CONTINUOUS
Status: CANCELLED | OUTPATIENT
Start: 2022-06-14

## 2022-06-14 RX ORDER — ROCURONIUM BROMIDE 10 MG/ML
INJECTION, SOLUTION INTRAVENOUS AS NEEDED
Status: DISCONTINUED | OUTPATIENT
Start: 2022-06-14 | End: 2022-06-14

## 2022-06-14 RX ORDER — DEXAMETHASONE SODIUM PHOSPHATE 10 MG/ML
INJECTION, SOLUTION INTRAMUSCULAR; INTRAVENOUS AS NEEDED
Status: DISCONTINUED | OUTPATIENT
Start: 2022-06-14 | End: 2022-06-14

## 2022-06-14 RX ORDER — MAGNESIUM HYDROXIDE 1200 MG/15ML
LIQUID ORAL AS NEEDED
Status: DISCONTINUED | OUTPATIENT
Start: 2022-06-14 | End: 2022-06-14 | Stop reason: HOSPADM

## 2022-06-14 RX ORDER — ROPIVACAINE HYDROCHLORIDE 5 MG/ML
INJECTION, SOLUTION EPIDURAL; INFILTRATION; PERINEURAL
Status: COMPLETED | OUTPATIENT
Start: 2022-06-14 | End: 2022-06-14

## 2022-06-14 RX ORDER — ALBUTEROL SULFATE 90 UG/1
AEROSOL, METERED RESPIRATORY (INHALATION) AS NEEDED
Status: DISCONTINUED | OUTPATIENT
Start: 2022-06-14 | End: 2022-06-14

## 2022-06-14 RX ORDER — ALBUTEROL SULFATE 2.5 MG/3ML
2.5 SOLUTION RESPIRATORY (INHALATION) ONCE AS NEEDED
Status: COMPLETED | OUTPATIENT
Start: 2022-06-14 | End: 2022-06-14

## 2022-06-14 RX ORDER — ONDANSETRON 2 MG/ML
4 INJECTION INTRAMUSCULAR; INTRAVENOUS ONCE AS NEEDED
Status: COMPLETED | OUTPATIENT
Start: 2022-06-14 | End: 2022-06-14

## 2022-06-14 RX ORDER — SODIUM CHLORIDE, SODIUM LACTATE, POTASSIUM CHLORIDE, CALCIUM CHLORIDE 600; 310; 30; 20 MG/100ML; MG/100ML; MG/100ML; MG/100ML
100 INJECTION, SOLUTION INTRAVENOUS CONTINUOUS
Status: DISCONTINUED | OUTPATIENT
Start: 2022-06-14 | End: 2022-06-14 | Stop reason: HOSPADM

## 2022-06-14 RX ORDER — MIDAZOLAM HYDROCHLORIDE 2 MG/2ML
INJECTION, SOLUTION INTRAMUSCULAR; INTRAVENOUS
Status: COMPLETED | OUTPATIENT
Start: 2022-06-14 | End: 2022-06-14

## 2022-06-14 RX ORDER — CLINDAMYCIN PHOSPHATE 900 MG/50ML
INJECTION INTRAVENOUS AS NEEDED
Status: DISCONTINUED | OUTPATIENT
Start: 2022-06-14 | End: 2022-06-14

## 2022-06-14 RX ORDER — SUCCINYLCHOLINE/SOD CL,ISO/PF 100 MG/5ML
SYRINGE (ML) INTRAVENOUS AS NEEDED
Status: DISCONTINUED | OUTPATIENT
Start: 2022-06-14 | End: 2022-06-14

## 2022-06-14 RX ORDER — ONDANSETRON 2 MG/ML
INJECTION INTRAMUSCULAR; INTRAVENOUS AS NEEDED
Status: DISCONTINUED | OUTPATIENT
Start: 2022-06-14 | End: 2022-06-14

## 2022-06-14 RX ORDER — SENNOSIDES 8.6 MG
650 CAPSULE ORAL EVERY 8 HOURS PRN
Qty: 30 TABLET | Refills: 0 | Status: SHIPPED | OUTPATIENT
Start: 2022-06-14 | End: 2022-06-14

## 2022-06-14 RX ORDER — SENNOSIDES 8.6 MG
650 CAPSULE ORAL EVERY 8 HOURS
Qty: 15 TABLET | Refills: 0 | Status: SHIPPED | OUTPATIENT
Start: 2022-06-14 | End: 2022-06-19

## 2022-06-14 RX ORDER — LIDOCAINE HYDROCHLORIDE 10 MG/ML
INJECTION, SOLUTION EPIDURAL; INFILTRATION; INTRACAUDAL; PERINEURAL AS NEEDED
Status: DISCONTINUED | OUTPATIENT
Start: 2022-06-14 | End: 2022-06-14

## 2022-06-14 RX ORDER — FENTANYL CITRATE/PF 50 MCG/ML
25 SYRINGE (ML) INJECTION
Status: DISCONTINUED | OUTPATIENT
Start: 2022-06-14 | End: 2022-06-14 | Stop reason: HOSPADM

## 2022-06-14 RX ADMIN — DEXAMETHASONE SODIUM PHOSPHATE 10 MG: 10 INJECTION, SOLUTION INTRAMUSCULAR; INTRAVENOUS at 14:10

## 2022-06-14 RX ADMIN — ALBUTEROL SULFATE 2.5 MG: 2.5 SOLUTION RESPIRATORY (INHALATION) at 17:01

## 2022-06-14 RX ADMIN — GLYCOPYRROLATE 0.6 MCG: 0.2 INJECTION INTRAMUSCULAR; INTRAVENOUS at 16:20

## 2022-06-14 RX ADMIN — MIDAZOLAM 0.5 MG: 1 INJECTION INTRAMUSCULAR; INTRAVENOUS at 12:20

## 2022-06-14 RX ADMIN — FENTANYL CITRATE 25 MCG: 50 INJECTION INTRAMUSCULAR; INTRAVENOUS at 15:29

## 2022-06-14 RX ADMIN — Medication 100 MG: at 13:51

## 2022-06-14 RX ADMIN — ONDANSETRON 4 MG: 2 INJECTION INTRAMUSCULAR; INTRAVENOUS at 15:59

## 2022-06-14 RX ADMIN — LIDOCAINE HYDROCHLORIDE 50 MG: 10 INJECTION, SOLUTION EPIDURAL; INFILTRATION; INTRACAUDAL; PERINEURAL at 13:51

## 2022-06-14 RX ADMIN — MIDAZOLAM 0.5 MG: 1 INJECTION INTRAMUSCULAR; INTRAVENOUS at 12:21

## 2022-06-14 RX ADMIN — ROCURONIUM BROMIDE 10 MG: 50 INJECTION, SOLUTION INTRAVENOUS at 15:09

## 2022-06-14 RX ADMIN — FENTANYL CITRATE 50 MCG: 50 INJECTION INTRAMUSCULAR; INTRAVENOUS at 12:22

## 2022-06-14 RX ADMIN — PROPOFOL 200 MG: 10 INJECTION, EMULSION INTRAVENOUS at 13:51

## 2022-06-14 RX ADMIN — SODIUM CHLORIDE, SODIUM LACTATE, POTASSIUM CHLORIDE, AND CALCIUM CHLORIDE: .6; .31; .03; .02 INJECTION, SOLUTION INTRAVENOUS at 15:48

## 2022-06-14 RX ADMIN — MIDAZOLAM 1 MG: 1 INJECTION INTRAMUSCULAR; INTRAVENOUS at 12:23

## 2022-06-14 RX ADMIN — SODIUM CHLORIDE, SODIUM LACTATE, POTASSIUM CHLORIDE, AND CALCIUM CHLORIDE 100 ML/HR: .6; .31; .03; .02 INJECTION, SOLUTION INTRAVENOUS at 11:30

## 2022-06-14 RX ADMIN — ALBUTEROL SULFATE 5 PUFF: 90 AEROSOL, METERED RESPIRATORY (INHALATION) at 15:05

## 2022-06-14 RX ADMIN — ALBUTEROL SULFATE 5 PUFF: 90 AEROSOL, METERED RESPIRATORY (INHALATION) at 13:50

## 2022-06-14 RX ADMIN — ROPIVACAINE HYDROCHLORIDE 25 ML: 5 INJECTION, SOLUTION EPIDURAL; INFILTRATION; PERINEURAL at 12:27

## 2022-06-14 RX ADMIN — CLINDAMYCIN PHOSPHATE 900 MG: 900 INJECTION, SOLUTION INTRAVENOUS at 14:02

## 2022-06-14 RX ADMIN — FENTANYL CITRATE 50 MCG: 50 INJECTION INTRAMUSCULAR; INTRAVENOUS at 13:51

## 2022-06-14 RX ADMIN — ONDANSETRON 4 MG: 2 INJECTION INTRAMUSCULAR; INTRAVENOUS at 16:49

## 2022-06-14 RX ADMIN — NEOSTIGMINE METHYLSULFATE 3 MG: 1 INJECTION INTRAVENOUS at 16:20

## 2022-06-14 RX ADMIN — FENTANYL CITRATE 25 MCG: 50 INJECTION INTRAMUSCULAR; INTRAVENOUS at 15:14

## 2022-06-14 RX ADMIN — ROCURONIUM BROMIDE 5 MG: 50 INJECTION, SOLUTION INTRAVENOUS at 13:51

## 2022-06-14 RX ADMIN — FENTANYL CITRATE 50 MCG: 50 INJECTION INTRAMUSCULAR; INTRAVENOUS at 12:20

## 2022-06-14 RX ADMIN — ROCURONIUM BROMIDE 25 MG: 50 INJECTION, SOLUTION INTRAVENOUS at 13:57

## 2022-06-14 NOTE — ANESTHESIA PROCEDURE NOTES
Peripheral Block    Patient location during procedure: post-op  Start time: 6/14/2022 12:21 PM  Reason for block: procedure for pain, at surgeon's request and post-op pain management  Staffing  Performed: Anesthesiologist   Anesthesiologist: Agustin Aguilera MD  Preanesthetic Checklist  Completed: patient identified, IV checked, site marked, risks and benefits discussed, surgical consent, monitors and equipment checked, pre-op evaluation and timeout performed  Peripheral Block  Patient position: sitting  Prep: ChloraPrep  Patient monitoring: heart rate, cardiac monitor, continuous pulse ox and frequent blood pressure checks  Block type: axillary  Laterality: right  Injection technique: single-shot  Procedures: ultrasound guided, Ultrasound guidance required for the procedure to increase accuracy and safety of medication placement and decrease risk of complications  Ultrasound permanent image savedropivacaine (NAROPIN) 0 5 % - Perineural   25 mL - 6/14/2022 12:27:00 PM  midazolam (VERSED) 2 mg/2 mL - Intravenous   0 5 mg - 6/14/2022 12:20:00 PM  fentaNYL 50 mcg/mL - Intravenous   50 mcg - 6/14/2022 12:20:00 PM  Needle  Needle type: Stimuplex   Needle gauge: 22 G  Needle length: 10 cm  Needle localization: ultrasound guidance  Needle insertion depth: 4 cm  Test dose: negative  Assessment  Injection assessment: incremental injection, local visualized surrounding nerve on ultrasound, negative aspiration for CSF and negative aspiration for heme  Heart rate change: no  Slow fractionated injection: yes  Post-procedure:  site cleaned  Additional Notes  Artery and 3 nerves seen well   No problems encountered

## 2022-06-14 NOTE — ANESTHESIA POSTPROCEDURE EVALUATION
Post-Op Assessment Note    CV Status:  Stable  Pain Score: 0    Pain management: adequate  Multimodal analgesia used between 6 hours prior to anesthesia start to PACU discharge    Mental Status:  Alert   Hydration Status:  Euvolemic   PONV Controlled:  Controlled   Airway Patency:  Patent      Post Op Vitals Reviewed: Yes      Staff: CRNA         No complications documented      /94 (06/14/22 1649)    Temp (!) 97 3 °F (36 3 °C) (06/14/22 1649)    Pulse 102 (06/14/22 1649)   Resp 19 (06/14/22 1649)    SpO2 92 % (06/14/22 1649)

## 2022-06-14 NOTE — DISCHARGE INSTRUCTIONS
Post Operative Instructions    You have had surgery on your arm today, please read and follow the information below:  Elevate your hand above your elbow during the next 24-48 hours to help with swelling  Place your hand and arm over your head with motion at your shoulder three times a day  Do not apply any cream/ointment/oil to your incisions including antibiotics  Do not soak your hands in standing water (dishwater, tubs, Jacuzzi's, pools, etc ) until given permission (typically 2-3 weeks after injury)    Call the office if you notice any:  Increased numbness or tingling of your hand or fingers that is not relieved with elevation  Increasing pain that is not controlled with medication  Difficulty chewing, breathing, swallowing  Chest pains or shortness of breath  Fever over 101 4 degrees  Bandage: Do NOT remove bandage until follow-up appointment  Motion: Move fingers into a fist 5 times a day, DO NOT move any splinted fingers  Weight bearing status: The operated extremity should be non-weight bearing until further notice  Ice: Ice for 10 minutes every hour as needed for swelling x 24 hours  Sling: Sling continuously  Medications:   Naproxen 220 mg two times a day   Tylenol Extended Release 650 mg every 8 hours  Norco/Hydrocodone one tab every 6 hours AS NEEDED for pain     Follow-up Appointment: 10-14 days  Please call the office if you have any questions or concerns regarding your post-operative care

## 2022-06-14 NOTE — H&P
H&P reviewed  After review and examination, I find no changes to the outlined plan in the current H&P  Proceed with surgery      Vitals:    06/14/22 1225   BP: 135/83   Pulse: 79   Resp: 20   Temp:    SpO2: 96%

## 2022-06-14 NOTE — OP NOTE
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]  Aftercare following surgery [Z48 89]  Arthrodesis right wrist nonunion    Post-Op Diagnosis:   Closed displaced fracture of shaft of third metacarpal bone of right hand with delayed healing, subsequent encounter [S62 322G]  Slac (scapholunate advanced collapse) of wrist, 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]  Aftercare following surgery [Z48 89]  Arthrodesis right wrist nonunion    Procedure(s) (LRB):  Removal of right wrist fusion plate (Right)  Revision fusion of right wrist (Right)  ORIF right long finger metacarpal fracture (Right)    Specimen(s):  Order Name Source Comment Collection Info Order Time   ANAEROBIC CULTURE AND GRAM STAIN Joint, Right Wrist  Collected By: Nimisha Ryan MD 2022  2:50 PM     Release to patient through Mychart   Immediate        FUNGAL CULTURE Joint, Right Wrist  Collected By: Nimisha Ryan MD 2022  2:50 PM     Release to patient through Mychart   Immediate        VIRUS CULTURE Joint, Right Wrist  Collected By: Nimisha Ryan MD 2022  2:50 PM     Release to patient through Mychart   Immediate        CULTURE, TISSUE AND [address], Right Wrist  Collected By: Nimisha Ryan MD 2022  2:50 PM     Release to patient through Mychart   Immediate        AFB CULTURE WITH STAIN Joint, Right Wrist  Collected By: Nimisha Ryan MD 2022  2:50 PM     Release to patient through Mychart   Immediate        [address], Right Wrist  Collected By: Nimisha Ryan MD 2022  3:12 PM     Release to patient through Mychart   Immediate        FUNGAL CULTURE Joint, Right Wrist  Collected By: Cleo Spicer MD 6/14/2022  3:12 PM     Release to patient through Mychart   Immediate        CULTURE, TISSUE AND GRAM STAIN Joint, Right Wrist  Collected By: Cleo Spicer MD 6/14/2022  3:12 PM     Release to patient through Mychart   Immediate        AFB CULTURE WITH STAIN Joint, Right Wrist  Collected By: Cleo Spicer MD 6/14/2022  3:12 PM     Release to patient through Mychart   Immediate        TISSUE EXAM Joint, Right Wrist  Collected By: Cleo Spicer MD 6/14/2022  2:50 PM     Release to patient through Mychart   Immediate            Estimated Blood Loss:   Minimal      Anesthesia Type:   General    Operative Indications: The patient has a history of a nonunion localized to her right wrist total arthrodesis with loosening of hardware and long finger metacarpal fracture that was recalcitrant to conservative management  The decision was made to bring the patient to the operating room for hardware removal and open reduction internal fixation right long finger metacarpal   Risks of the procedure were explained which include, but are not limited to bleeding; infection; damage to nerves, arteries,veins, tendons; scar; pain; need for reoperation; failure to give desired result; and risks of anaesthesia  All questions were answered to satisfaction and they were willing to proceed  Operative Findings:  Nonunion right wrist total wrist arthrodesis  Right long finger metacarpal fracture  No evidence of gross infection  Extremely soft and poor bone quality with limited vascularity    Complications:   None    Procedure and Technique:  After the patient, site, and procedure were identified, the patient was brought into the operating room in a supine position  Regional and general anaesthesia were provided  A well padded tourniquet was applied to the extremity, set at 250 mmHg    The  right upper extremity was then prepped and drapped in a normal, sterile, orthopedic fashion  After the patient, site, and procedure identified attention was turned towards the right arm  An Esmarch bandage was used to exsanguinate the limb the tourniquet was inflated to 250 mmHg  Previous dorsal longitudinal incision to the wrist was then made  We dissected down through skin subcutaneous tissues while maintaining hemostasis  Care was taken to elevate flaps both on the radial and ulnar side of the wrist   Care was taken to protect the extensor pollicis longus which was previously transposed  This brought us down to the level of the extensor retinaculum which was then opened  Extensor tendons were then mobilized and protected throughout the procedure  We then came down to the level of the long finger metacarpal, and the plate distally  We opened up the area around this, we then opened up the wrist capsule and retinaculum to open up the wrist joint exposing remainder of the plate at the wrist joint and proximally  There was evidence of a broken screw head as well as to backed out screws distally  The remainders of the screws were removed in their entirety  The proximal screws were also removed in the fusion plate was removed in its entirety  We then examined across the area and discovered no evidence of fusion to the radiocarpal joint, and there was minimal effusion localized into the carpometacarpal joint  There was also fracture of the long for can make it metacarpal with areas of limited vascularity  There was also large bone voids within all these areas  This point in time, utilizing sharp instrumentation, a thorough debridement was then performed with a scalpel, curette, and rongeur was  This was done to remove poor and what appeared to be nonviable bone as well as fibrinous material from the radiocarpal joint, carpometacarpal joint, as well as long finger metacarpal fracture sites    We did this to level of healthy bleeding bone to allow for adequate fixation and repair  All areas were then copiously irrigated with sterile saline solution  At this point time, we then selected a 2 0 mm plate from [employer]  The area of bone void to the long trigger metacarpal was then packed with a combination of demineralized bone matrix as well as cancellous allograft  A 2 0 mm locking plate was then placed spanning across the fracture site of the long finger metacarpal    This plate was secured with 3 bicortical screws distally, which were locking and 3 bicortical screws proximally which were locking  This point time we were satisfied with this level of fixation  Carpometacarpal joint was then bone grafted at the area incomplete union  The area at the radiocarpal joint was then irrigated and bone grafted  This was then secured x2  At this point everything moved as 1 solid unit  We were satisfied with the overall alignment  Tourniquet was deflated and we verified hemostasis  At the completion of the procedure, hemostasis was obtained with cautery and direct pressure  The wounds were copiously irrigated with sterile solution  The wounds were closed with Vicryl and Prolene  Sterile dressings were applied, including Xeroform, gauze, tweeners, webril, ACE and Volar Splint  Please note, all sponge, needle, and instrument counts were correct prior to closure  Loupe magnification was utilized  The patient tolerated the procedure well       I was present for all critical portions of the procedure, A qualified resident physician was not available and A physician assistant was required during the procedure for retraction tissue handling,dissection and suturing    Patient Disposition:  PACU , hemodynamically stable and extubated and stable    SIGNATURE: Cleo Spicer MD  DATE: 06/14/22  TIME: 5:45 PM

## 2022-06-17 LAB
BACTERIA SPEC ANAEROBE CULT: NO GROWTH
BACTERIA SPEC ANAEROBE CULT: NO GROWTH
BACTERIA TISS AEROBE CULT: NO GROWTH
BACTERIA TISS AEROBE CULT: NO GROWTH
GRAM STN SPEC: NORMAL
GRAM STN SPEC: NORMAL

## 2022-06-20 ENCOUNTER — TELEPHONE (OUTPATIENT)
Dept: FAMILY MEDICINE CLINIC | Facility: HOSPITAL | Age: 67
End: 2022-06-20

## 2022-06-20 ENCOUNTER — TELEPHONE (OUTPATIENT)
Dept: OBGYN CLINIC | Facility: CLINIC | Age: 67
End: 2022-06-20

## 2022-06-20 DIAGNOSIS — G89.18 POST-OP PAIN: ICD-10-CM

## 2022-06-20 RX ORDER — OXYCODONE HYDROCHLORIDE 5 MG/1
5 TABLET ORAL EVERY 6 HOURS PRN
Qty: 20 TABLET | Refills: 0 | Status: SHIPPED | OUTPATIENT
Start: 2022-06-20 | End: 2022-07-07

## 2022-06-20 NOTE — TELEPHONE ENCOUNTER
[address] who initially called us to let us know about the pre auth - advised they need to contact neurology to initiate this

## 2022-06-20 NOTE — TELEPHONE ENCOUNTER
Pt states she needs a pre auth for Galcanezumab-gnlm (Emgality) 120 MG/ML SOSY    Pt can be reached at [phone]

## 2022-06-20 NOTE — TELEPHONE ENCOUNTER
Patients Emgality is not covered  Alternative is Aimovig that should be covered under her insurance  Are you ok switching? Otherwise, a PA would need to be done

## 2022-06-20 NOTE — TELEPHONE ENCOUNTER
Patient is asking for more pain medicine to cover her until next appointment, she is still in a lot of pain and unable to sleep at night

## 2022-06-21 ENCOUNTER — RA CDI HCC (OUTPATIENT)
Dept: OTHER | Facility: HOSPITAL | Age: 67
End: 2022-06-21

## 2022-06-21 NOTE — PROGRESS NOTES
[facility]  coding opportunities       Chart reviewed, no opportunity found:   [address]        Patients Insurance     Medicare Insurance: [employer]

## 2022-06-27 ENCOUNTER — APPOINTMENT (OUTPATIENT)
Dept: RADIOLOGY | Facility: CLINIC | Age: 67
End: 2022-06-27
Payer: COMMERCIAL

## 2022-06-27 ENCOUNTER — OFFICE VISIT (OUTPATIENT)
Dept: OBGYN CLINIC | Facility: CLINIC | Age: 67
End: 2022-06-27
Payer: COMMERCIAL

## 2022-06-27 VITALS
SYSTOLIC BLOOD PRESSURE: 130 MMHG | WEIGHT: 175 LBS | HEIGHT: 66 IN | DIASTOLIC BLOOD PRESSURE: 80 MMHG | BODY MASS INDEX: 28.12 KG/M2

## 2022-06-27 DIAGNOSIS — Z09 SURGERY FOLLOW-UP: Primary | ICD-10-CM

## 2022-06-27 DIAGNOSIS — Z09 SURGERY FOLLOW-UP: ICD-10-CM

## 2022-06-27 PROCEDURE — 73130 X-RAY EXAM OF HAND: CPT

## 2022-06-27 PROCEDURE — 3008F BODY MASS INDEX DOCD: CPT | Performed by: ORTHOPAEDIC SURGERY

## 2022-06-27 PROCEDURE — 29075 APPL CST ELBW FNGR SHORT ARM: CPT

## 2022-06-27 PROCEDURE — 99024 POSTOP FOLLOW-UP VISIT: CPT | Performed by: PHYSICIAN ASSISTANT

## 2022-06-27 NOTE — PROGRESS NOTES
Orthopaedic Surgery - Office Note  Akash Bauer (61 y o  female)   : 1955   MRN: [id number]  Encounter Date: 2022    Chief Complaint   Patient presents with    Right Hand - Post-op         Assessment/Plan  Diagnoses and all orders for this visit:    Surgery follow-up-Removal of right wrist fusion plate (Right), Revision fusion of right wrist (Right), ORIF right long finger metacarpal fracture  -     XR hand 3+ vw right; Future    Other orders  -     Cast application    Patient will continue follow postoperative instructions provided by the surgical team        Return 3 weeks with Dr Kaaren Kanner with x-ray  History of Present Illness  Patient is here for postop visit after:  Removal of right wrist fusion plate (Right)  Revision fusion of right wrist (Right)  ORIF right long finger metacarpal fracture (Right) with Dr Kaaren Kanner on 22  Patient denies any fever chills or new trauma to the hand or wrist   She reports her pain is controlled  No paresthesias are reported  Review of Systems  Pertinent items are noted in HPI  All other systems were reviewed and are negative  Physical Exam  /80   Ht 5' 6" (1 676 m)   Wt 79 4 kg (175 lb)   LMP  (LMP Unknown)   BMI 28 25 kg/m²   Cons: Appears well  No apparent distress  Psych: Alert  Oriented x3  Mood and affect normal   Eyes: PERRLA, EOMI  Resp: Normal effort  No audible wheezing or stridor  CV: Palpable pulse  No discernable arrhythmia  Lymph:  No palpable cervical, axillary, or inguinal lymphadenopathy  Skin: Warm  No palpable masses  No visible lesions  Neuro: Normal muscle tone  Normal and symmetric DTR's  Patient's right wrist has well-healed surgical incision on the dorsal surface  Sutures were in place and removed today without incident  There is no cellulitic changes fluctuant masses and wound margins are aligned  She is neurovascularly grossly intact without any sensory deficits appreciated    Range of motion and strength were not assessed  Capillary refill of the middle finger is within normal limits  There are no sensory deficits  Studies Reviewed  X-rays performed in the office today do show postsurgical changes status post were revision wrist fusion and middle finger metacarpal ORIF  Hardware remains in place unchanged from intraoperative x-rays  No acute fractures or dislocations are seen  These are read from an orthopedic standpoint will await official radiologist interpretation    Cast application    Date/Time: 6/27/2022 11:05 AM  Performed by: Audrey Weber MA  Authorized by: Samm Bajwa PA-C   Universal Protocol:  Consent: Verbal consent obtained  Risks and benefits: risks, benefits and alternatives were discussed  Consent given by: patient  Time out: Immediately prior to procedure a "time out" was called to verify the correct patient, procedure, equipment, support staff and site/side marked as required  Patient understanding: patient states understanding of the procedure being performed  Patient consent: the patient's understanding of the procedure matches consent given  Relevant documents: relevant documents present and verified  Test results: test results available and properly labeled  Site marked: the operative site was marked  Radiology Images displayed and confirmed  If images not available, report reviewed: imaging studies available  Patient identity confirmed: verbally with patient      Pre-procedure details:     Sensation:  Normal    Skin color: Within normal limits  Procedure details:     Laterality:  Right    Location:  Wrist    Wrist:  R wristCast type:  Short arm    Post-procedure details:     Pain:  Unchanged    Sensation:  Normal    Skin color: Within normal limits    Patient tolerance of procedure:   Tolerated well, no immediate complications       Medical, Surgical, Family, and Social History  The patient's medical history, family history, and social history, were reviewed and updated as appropriate  Past Medical History:   Diagnosis Date    Achalasia     ADHD     Anxiety     Colon polyp     COPD (chronic obstructive pulmonary disease) (HCC)     Depression     Disease of thyroid gland     hypo    Esophageal spasm     Fibromyalgia     Fibromyalgia, primary     GERD (gastroesophageal reflux disease)     Hiatal hernia     History of tachycardia     22 Pt reports hx of tachycardia - no current issues at this time - reports tachycardia has been tied in with anxiety    Hyperlipidemia     Hypertension     Iron deficiency anemia     Polysubstance abuse ([address] )     Psychiatric disorder     major depressive       Past Surgical History:   Procedure Laterality Date    AMPUTATION      RBKA    ANKLE FUSION      APPENDECTOMY      BELOW KNEE LEG AMPUTATION Right     CARPAL TUNNEL RELEASE  2021    left arm    CATARACT EXTRACTION       SECTION      COLONOSCOPY  2010    COLONOSCOPY  2019    diverticula, 3 mm polyp and 6 mm polyp in rectum, grade III internal hemorrhoids, hyperplastic polyp, 5 year recall 2024     EGD  2010    FOREARM FASCIOTOMY Right     FRACTURE SURGERY      HYSTERECTOMY      KNEE ARTHROSCOPY Left     NERVE SURGERY Right 2022    Procedure: Right wrist posterior interosseous nerve neurectomy;  Surgeon: Hai Lopez MD;  Location: [facility];  Service: Orthopedics    ORTHOPEDIC SURGERY      NC FUSION OF WRIST JOINT Right 2022    Procedure:  Total wrist fusion of the right wrist;  Surgeon: Hai Lopez MD;  Location: [facility];  Service: Orthopedics    NC FUSION OF WRIST JOINT Right 2022    Procedure: Revision fusion of right wrist;  Surgeon: Hai Lopez MD;  [facility];  Service: Orthopedics    NC OPEN Rowan Radha EA BONE Right 2022    Procedure: ORIF right long finger metacarpal fracture;  Surgeon: Hai Lopez MD; [facility];  Service: Orthopedics    MS REMOVAL DEEP IMPLANT Right 2022    Procedure: Removal of right wrist fusion plate;  Surgeon: David Schwartz MD;  [facility];  Service: Orthopedics    MS REVISE ULNAR NERVE AT ELBOW Left 2021    Procedure: Left carpal and cubital tunnel release;  Surgeon: Holger Dior MD;  Location: [address] [place] [address] MAIN OR;  Service: Orthopedics    UPPER GASTROINTESTINAL ENDOSCOPY      WRIST TENDON TRANSFER N/A 2022    Procedure: Extensor pollicis longus tendon transposition right wrist;  Surgeon: David Schwartz MD;  Location:  MAIN OR;  Service: Orthopedics       Family History   Problem Relation Age of Onset    Ulcerative colitis Mother     Lung cancer Mother     Diabetes Mother     Alcohol abuse Father     Diabetes Sister     Cancer Brother     Cervical cancer Daughter     Colon polyps Neg Hx     Colon cancer Neg Hx     Anesthesia problems Neg Hx        Social History     Occupational History    Occupation: disability   Tobacco Use    Smoking status: Current Some Day Smoker     Packs/day: 0 04     Years: 25 00     Pack years: 1 00     Types: Cigarettes     Start date: 1977    Smokeless tobacco: Never Used    Tobacco comment: 4-5 cigs a day -   Vaping Use    Vaping Use: Never used   Substance and Sexual Activity    Alcohol use: No     Comment: Denies any alcohol use    Drug use: Not Currently     Comment: Pt denies smoking any marijuana currently at this time    Sexual activity: Not Currently     Comment:  Not active at this ltime       Allergies   Allergen Reactions    Levaquin [Levofloxacin] Hives    Morphine Itching and Anaphylaxis     u  Other reaction(s):  Other (See Comments)  u  Other reaction(s): Feeling agitated (finding)      Penicillins Anaphylaxis     "almost "- gives hives, nausea     Cephalosporins Hives     Other reaction(s): Unknown Reaction      Codeine Hives     Other reaction(s): Unknown  Other reaction(s): Unknown Reaction  Other reaction(s): Unknown  "Nausea and makes me itch"    Nsaids GI Bleeding     Other reaction(s): Unknown Reaction    Cephalexin Other (See Comments)     Other reaction(s): Unknown    Aspirin Hives, Other (See Comments) and Rash     Other reaction(s): Unknown Reaction      Chlorhexidine Rash    Medical Tape Rash    Vancomycin Rash     "Kills my stomach"         Current Outpatient Medications:     albuterol (PROVENTIL HFA,VENTOLIN HFA) 90 mcg/act inhaler, Inhale 2 puffs every 4 (four) hours as needed for wheezing, Disp: , Rfl:     Allergy Relief 10 MG tablet, Take 1 tablet (10 mg total) by mouth 2 (two) times a day, Disp: 180 tablet, Rfl: 0    ascorbic acid (VITAMIN C) 500 mg tablet, TAKE ONE TABLET BY MOUTH TWICE DAILY, Disp: 56 tablet, Rfl: 1    atorvastatin (LIPITOR) 20 mg tablet, TAKE ONE TABLET BY MOUTH DAILY IN THE EVENING, Disp: 90 tablet, Rfl: 1    cyclobenzaprine (FLEXERIL) 10 mg tablet, TAKE 1 TABLET BY MOUTH TWICE DAILY, Disp: 56 tablet, Rfl: 5    Diclofenac Sodium (VOLTAREN) 1 %, Apply 4 g topically 4 (four) times a day, Disp: 300 g, Rfl: 6    dicyclomine (BENTYL) 20 mg tablet, Take 1 tablet (20 mg total) by mouth every 6 (six) hours, Disp: 120 tablet, Rfl: 2    fluticasone (FLONASE) 50 mcg/act nasal spray, 2 sprays into each nostril in the morning , Disp: 9 9 mL, Rfl: 0    fluticasone-salmeterol (Advair) 100-50 mcg/dose inhaler, Inhale 1 puff 2 (two) times a day, Disp: , Rfl:     folic acid (FOLVITE) 1 mg tablet, Take 2 tablets (2 mg total) by mouth daily at bedtime, Disp: 180 tablet, Rfl: 3    gabapentin (NEURONTIN) 600 MG tablet, Take 1 tablet (600 mg total) by mouth 3 (three) times a day, Disp: 90 tablet, Rfl: 2    Galcanezumab-gnlm (Emgality) 120 MG/ML SOSY, Inject 120 mg under the skin every 30 (thirty) days Once a month (Patient taking differently: Inject 120 mg under the skin every 30 (thirty) days Once a month  6/1/22 Pt reports is due for injection prior to surgery- pt reports has verified this with prescribing MD -"ok to use prior to surgery"), Disp: 1 mL, Rfl: 2    glipiZIDE (GLUCOTROL XL) 5 mg 24 hr tablet, Take 1 tablet (5 mg total) by mouth in the morning , Disp: 30 tablet, Rfl: 5    hydrOXYzine pamoate (VISTARIL) 50 mg capsule, Take 50 mg by mouth every morning, Disp: , Rfl:     ipratropium-albuterol (DUO-NEB) 0 5-2 5 mg/3 mL nebulizer solution, Take 3 mL by nebulization every 6 (six) hours as needed , Disp: , Rfl:     Lactobacillus Acid-Pectin (Acidophilus/Citrus Pectin) TABS, Take 1 tablet by mouth daily, Disp: , Rfl:     Lancets MISC, Check sugars once weekly, Disp: , Rfl:     leflunomide (ARAVA) 10 MG tablet, TAKE ONE TABLET BY MOUTH EVERY MORNING, Disp: 28 tablet, Rfl: 1    leucovorin (WELLCOVORIN) 10 MG tablet, Take one tablet the morning after you take your methotrexate (Patient taking differently: Take one tablet the morning after you take your methotrexate 6/1/22 Pt reports has verified with prescribing MD -ok to use prior to surgery ), Disp: 12 tablet, Rfl: 3    levothyroxine 125 mcg tablet, Take 1 tablet (125 mcg total) by mouth daily, Disp: 30 tablet, Rfl: 2    meclizine (ANTIVERT) 25 mg tablet, TAKE ONE TABLET BY MOUTH THREE TIMES DAILY AS NEEDED for dizziness, Disp: , Rfl:     metFORMIN (GLUCOPHAGE) 500 mg tablet, TAKE ONE TABLET BY MOUTH TWICE DAILY WITH MEALS, Disp: 180 tablet, Rfl: 1    methotrexate 2 5 MG tablet, TAKE SIX TABLETS BY MOUTH ONCE A WEEK WITH FOOD OR AFTER EATING (Patient taking differently: TAKE SIX TABLETS BY MOUTH ONCE A WEEK WITH FOOD OR AFTER EATING-- takes on Wednesdays 6/1/22 pt reports has verified with prescribing MD Manny ok to use prior to surgery), Disp: 72 tablet, Rfl: 1    Multiple Vitamin (Tab-A-Felicia) TABS, Take 1 tablet by mouth daily, Disp: , Rfl:     Multiple Vitamins-Minerals (multivitamin with minerals) tablet, Take 1 tablet by mouth daily, Disp: 30 tablet, Rfl: 1    omeprazole (PriLOSEC) 40 MG capsule, , Disp: , Rfl:     ondansetron (Zofran ODT) 4 mg disintegrating tablet, Take 1 tablet (4 mg total) by mouth every 6 (six) hours as needed for nausea or vomiting, Disp: 40 tablet, Rfl: 0    oxyCODONE (ROXICODONE) 5 immediate release tablet, Take 1 tablet (5 mg total) by mouth every 6 (six) hours as needed for moderate pain or severe pain Max Daily Amount: 20 mg, Disp: 20 tablet, Rfl: 0    pantoprazole (PROTONIX) 40 mg tablet, TAKE ONE TABLET BY MOUTH DAILY AT BEDTIME, Disp: 30 tablet, Rfl: 5    triamcinolone (KENALOG) 0 1 % cream, Use as directed, Disp: , Rfl:     venlafaxine (EFFEXOR-XR) 150 mg 24 hr capsule, Take 150 mg by mouth every morning, Disp: , Rfl:       Marcelina Dhillon PA-C

## 2022-06-27 NOTE — PATIENT INSTRUCTIONS
Cast Care   WHAT YOU NEED TO KNOW:   Cast care will help the cast dry and harden correctly, and then protect it until it comes off  Your cast may need up to 48 hours to dry and harden completely  Even after your cast hardens, it can be damaged  DISCHARGE INSTRUCTIONS:   Return to the emergency department if:   Your cast breaks or gets damaged  You see drainage, or your cast is stained or smells bad  Your skin turns blue or pale  Your skin tingles, burns, or is cold or numb  You have severe pain that is getting worse and does not go away after you take pain medicine  Your limb swells, or your cast looks or feels tighter than it was before  Contact your healthcare provider if:   Something falls into your cast and gets stuck  You have itching, pain, burning, or weakness where you have the cast      You have a fever  You have sores, blisters, or breaks on the skin around the edges of the cast     You have questions or concerns about your condition or care  Follow up with your healthcare provider as directed: You will need to return to have your cast removed and your bones checked  Write down your questions so you remember to ask them during your visits  Care for your cast while it hardens:   Protect the cast   Do not put weight on the cast  Do not bend, lean on, or hit the cast with anything  Use the palms of your hands when you move the cast  Do not use your fingers  Your fingers may leave marks on the cast as it dries  Change positions often  Change your position every 2 hours to help the cast dry faster  Prop your cast on something soft, such as a pillow, to prevent a flat area on your cast      Keep the cast dry  Tie plastic trash bags around your cast to keep it dry while you bathe  You may use a blow dryer on cool or the lowest heat setting to dry your cast if it gets wet  Do not use a high heat setting, because you may burn your skin  Certain casts can get wet   Ask if you have a waterproof cast     Care for your cast after it hardens:   Check your cast every day  Contact your healthcare provider if you notice any cracks, dents, holes, or flaking on your cast      Keep your cast clean and dry  Cover your cast with a towel when you eat  You may have a small piece of cast that can be removed to check on incisions under your cast  Make sure the small piece of cast is kept tightly closed  If your cast gets dirty, use a mild detergent and a damp washcloth to wipe off the outside of your cast  Continue to cover your cast with trash bags to keep it dry while you bathe  Care for the edges of your cast   Cover the cast edges to keep them smooth  Use 4 inch pieces of waterproof tape  Place one end of the tape under the inside edge of your cast and fold it over to the outside surface  Overlap tape strips until the edges are completely covered  Change the tape as directed  Do not pull or repair any of the padding from inside the cast  This could cause blisters and sores on the skin under your cast      Keep weight off your cast   Do not let anyone push down or lean on your cast  This may cause it to break  Do not use sharp objects  Do not use a sharp or pointed object to scratch under your cast  This may cause wounds that can get infected, or you may lose the item inside the cast  If your skin itches, blow cool air under the cast  You may also gently scratch your skin outside the cast with a cloth  © Copyright [employer] 2022 Information is for End User's use only and may not be sold, redistributed or otherwise used for commercial purposes  All illustrations and images included in CareNotes® are the copyrighted property of A D A M , Inc  or [employer]   The above information is an  only  It is not intended as medical advice for individual conditions or treatments   Talk to your doctor, nurse or pharmacist before following any medical regimen to see if it is safe and effective for you

## 2022-06-30 ENCOUNTER — HOSPITAL ENCOUNTER (OUTPATIENT)
Dept: MAMMOGRAPHY | Facility: IMAGING CENTER | Age: 67
Discharge: HOME | End: 2022-06-30
Payer: COMMERCIAL

## 2022-06-30 ENCOUNTER — HOSPITAL ENCOUNTER (OUTPATIENT)
Dept: BONE DENSITY | Facility: IMAGING CENTER | Age: 67
Discharge: HOME | End: 2022-06-30
Payer: COMMERCIAL

## 2022-06-30 VITALS — WEIGHT: 175 LBS | BODY MASS INDEX: 28.12 KG/M2 | HEIGHT: 66 IN

## 2022-06-30 DIAGNOSIS — Z12.31 ENCOUNTER FOR SCREENING MAMMOGRAM FOR MALIGNANT NEOPLASM OF BREAST: ICD-10-CM

## 2022-06-30 DIAGNOSIS — E28.39 MENOPAUSE OVARIAN FAILURE: ICD-10-CM

## 2022-06-30 PROCEDURE — 77067 SCR MAMMO BI INCL CAD: CPT

## 2022-06-30 PROCEDURE — 77080 DXA BONE DENSITY AXIAL: CPT

## 2022-06-30 PROCEDURE — 77063 BREAST TOMOSYNTHESIS BI: CPT

## 2022-07-07 ENCOUNTER — OFFICE VISIT (OUTPATIENT)
Dept: FAMILY MEDICINE CLINIC | Facility: HOSPITAL | Age: 67
End: 2022-07-07
Payer: COMMERCIAL

## 2022-07-07 VITALS — DIASTOLIC BLOOD PRESSURE: 96 MMHG | TEMPERATURE: 98.5 F | HEART RATE: 108 BPM | SYSTOLIC BLOOD PRESSURE: 126 MMHG

## 2022-07-07 DIAGNOSIS — R11.14 BILIOUS VOMITING WITH NAUSEA: ICD-10-CM

## 2022-07-07 DIAGNOSIS — F41.9 ANXIETY: ICD-10-CM

## 2022-07-07 DIAGNOSIS — I10 PRIMARY HYPERTENSION: ICD-10-CM

## 2022-07-07 DIAGNOSIS — M79.7 FIBROMYALGIA: ICD-10-CM

## 2022-07-07 DIAGNOSIS — E11.65 UNCONTROLLED TYPE 2 DIABETES MELLITUS WITH HYPERGLYCEMIA (HCC): Primary | ICD-10-CM

## 2022-07-07 DIAGNOSIS — R53.83 MALAISE AND FATIGUE: ICD-10-CM

## 2022-07-07 DIAGNOSIS — K58.0 IRRITABLE BOWEL SYNDROME WITH DIARRHEA: ICD-10-CM

## 2022-07-07 DIAGNOSIS — M19.90 ARTHRITIS: ICD-10-CM

## 2022-07-07 DIAGNOSIS — G89.4 CHRONIC PAIN SYNDROME: ICD-10-CM

## 2022-07-07 DIAGNOSIS — M13.0 POLYARTHRITIS: ICD-10-CM

## 2022-07-07 DIAGNOSIS — M06.9 RHEUMATOID ARTHRITIS, INVOLVING UNSPECIFIED SITE, UNSPECIFIED WHETHER RHEUMATOID FACTOR PRESENT (HCC): ICD-10-CM

## 2022-07-07 DIAGNOSIS — R53.81 MALAISE AND FATIGUE: ICD-10-CM

## 2022-07-07 DIAGNOSIS — G89.29 CHRONIC WRIST PAIN, UNSPECIFIED LATERALITY: ICD-10-CM

## 2022-07-07 DIAGNOSIS — M25.539 CHRONIC WRIST PAIN, UNSPECIFIED LATERALITY: ICD-10-CM

## 2022-07-07 DIAGNOSIS — E07.9 DISEASE OF THYROID GLAND: ICD-10-CM

## 2022-07-07 PROCEDURE — 4010F ACE/ARB THERAPY RXD/TAKEN: CPT | Performed by: INTERNAL MEDICINE

## 2022-07-07 PROCEDURE — 99215 OFFICE O/P EST HI 40 MIN: CPT | Performed by: INTERNAL MEDICINE

## 2022-07-07 RX ORDER — GABAPENTIN 600 MG/1
TABLET ORAL
Qty: 90 TABLET | Refills: 2 | Status: SHIPPED | OUTPATIENT
Start: 2022-07-07 | End: 2022-10-17

## 2022-07-07 RX ORDER — LISINOPRIL 5 MG/1
5 TABLET ORAL DAILY
Qty: 30 TABLET | Refills: 1 | Status: SHIPPED | OUTPATIENT
Start: 2022-07-07 | End: 2022-08-04

## 2022-07-07 RX ORDER — GLIPIZIDE 5 MG/1
5 TABLET, FILM COATED, EXTENDED RELEASE ORAL DAILY
Qty: 90 TABLET | Refills: 2 | Status: SHIPPED | OUTPATIENT
Start: 2022-07-07

## 2022-07-07 RX ORDER — METHOCARBAMOL 500 MG/1
500 TABLET, FILM COATED ORAL 3 TIMES DAILY
Qty: 90 TABLET | Refills: 1 | Status: SHIPPED | OUTPATIENT
Start: 2022-07-07 | End: 2022-08-05

## 2022-07-07 RX ORDER — DICYCLOMINE HCL 20 MG
20 TABLET ORAL EVERY 6 HOURS
Qty: 120 TABLET | Refills: 2 | Status: SHIPPED | OUTPATIENT
Start: 2022-07-07 | End: 2022-10-17

## 2022-07-07 RX ORDER — ONDANSETRON 4 MG/1
4 TABLET, FILM COATED ORAL EVERY 8 HOURS PRN
Qty: 90 TABLET | Refills: 2 | Status: SHIPPED | OUTPATIENT
Start: 2022-07-07 | End: 2022-08-31

## 2022-07-07 RX ORDER — LEVOTHYROXINE SODIUM 0.12 MG/1
TABLET ORAL
Qty: 30 TABLET | Refills: 2 | Status: SHIPPED | OUTPATIENT
Start: 2022-07-07 | End: 2022-10-17

## 2022-07-07 RX ORDER — HYDROXYZINE PAMOATE 50 MG/1
50 CAPSULE ORAL 3 TIMES DAILY PRN
Qty: 90 CAPSULE | Refills: 1 | Status: SHIPPED | OUTPATIENT
Start: 2022-07-07

## 2022-07-07 NOTE — PROGRESS NOTES
Assessment/Plan:    Uncontrolled type 2 diabetes mellitus with hyperglycemia (HCC)    Lab Results   Component Value Date    HGBA1C 6 5 (H) 05/23/2022   DM type 2 with h/o BKA - now controlled with A1c 6 5 - con't healthy diet and keep active, con't current DM meds, BW q 6 mo - WILL ORDER AT NEXT APPT, UTD on DM foot exam (2/22) and will obtain copy from [employer] from recent eye exam, starting ACE today, she is not on a statin    Rheumatoid arthritis ([address] )  Just saw Rheum in May and was told to con't MTX/folate and leucovorin, con't meds and f/u as per Rheum    Irritable bowel syndrome with diarrhea  Still with GI symptoms and intermittent diarrhea, follow with GI, Ge refilled upon request today, call with new/worse symptoms or red flag GI symptoms, she is UTD on colonoscopy    Hypertension  BP elevated today and c/w HTN, pt with some symptoms that can be d/t elevated BP, start Lisinopril 5 mg 1 tab PO q day, SE reviewed, will check BMP in 1 wk - order given, will recheck BP in 4-5 wks    Fibromyalgia  Discussed increase in Gabapentin, pt requesting a different muscle relaxer - Robaxin sent, SE/sedation/fall risk reviewed - URGED NOT TO TAKE AT SAME TIME AS VISTARIL AS THEY BOTH CAN CAUSE SEDATION, will follow    Anxiety  A lot of issues with anxiety and irritability, unhappy with psych and asking for anxiety med, advised pt that with all her other sedating meds that Benzo's are not a good option - pt frustrated with that- agreeable to try increase in Vistaril - from q day to tid prn, SE/sedation reviewed, con't meds and f/u as per psych - urged to look for new psych if unhappy - long wait times for appt reviewed       Diagnoses and all orders for this visit:    Uncontrolled type 2 diabetes mellitus with hyperglycemia (HCC)  -     glipiZIDE (GLUCOTROL XL) 5 mg 24 hr tablet; Take 1 tablet (5 mg total) by mouth daily  -     metFORMIN (GLUCOPHAGE) 500 mg tablet;  Take 1 tablet (500 mg total) by mouth 2 (two) times a day with meals    Rheumatoid arthritis, involving unspecified site, unspecified whether rheumatoid factor present (HCC)    Chronic wrist pain, unspecified laterality  Comments:  s/p revision of hardware, pt unhappy with current level of pain, discussed increase in Gabapentin and she is deferring - does not feel it has helped much, discu    Irritable bowel syndrome with diarrhea  -     dicyclomine (BENTYL) 20 mg tablet; Take 1 tablet (20 mg total) by mouth every 6 (six) hours    Bilious vomiting with nausea  Comments:  Zofran refilled upon request, call with red flag GI symptoms  Orders:  -     ondansetron (ZOFRAN) 4 mg tablet; Take 1 tablet (4 mg total) by mouth every 8 (eight) hours as needed for nausea or vomiting    Primary hypertension  -     lisinopril (ZESTRIL) 5 mg tablet; Take 1 tablet (5 mg total) by mouth daily  -     Basic metabolic panel; Future  -     Basic metabolic panel    Fibromyalgia  -     methocarbamol (ROBAXIN) 500 mg tablet; Take 1 tablet (500 mg total) by mouth 3 (three) times a day    Anxiety  -     hydrOXYzine pamoate (VISTARIL) 50 mg capsule; Take 1 capsule (50 mg total) by mouth 3 (three) times a day as needed for itching      Colonoscopy 12/19 - 5 yrs    Mammo 6/22    Dexa 6/22 - nml    PAP s/p hysterectomy    I have spent 50 minutes with patient and caregiver today in which greater than 50% of this time was spent in counseling/coordination of care regarding Diagnostic results, Prognosis, Risks and benefits of tx options, Intructions for management, Patient and family education, Importance of tx compliance, Risk factor reductions and Impressions  Subjective:      Patient ID: Jj Loja is a 61 y o  female  HPI Pt here for follow up appt    Pt here for follow up appt and BW results    BW results were d/w pt in detail: FBS/A1C 117/6 5, CO2 35 but rest of CMP and Hep C ab were wnl  Def of controlled vs uncontrolled DM was reviewed    Diet was reviewed - wgt not obtained today d/t BKA and unable to stand  She is taking her DM meds as directed  She checks her sugars once a week and she was 138 last week  She states she can go up to 160's depending on what she eats  She is UTD on DM foot exam (2/22) and states she had her eye exam at [facility] recently  She is on statin and ACE  Pt saw Rheum (Dr London Lynch) in May for f/u inflammatory arthritis - OV note reviewed  She was told to con't her MTX/folate and leucovorin  She was given an order for PT for her C-spine  She was told to f/u with BW and in office appt in 4 mos  Pt had removal of hardware from her wrist with Dr Luis Angel Reece in June  She was seen for f/u with PA 6/27/22 - OV and Op note reviewed  She was told to f/u in 3 wks with an Xray  She states her pain is "ehh, it hurts"  She is taking Gabapentin as directed but does not feel it is helping much  She con't to have a lot of N/V and abd cramping  She notes it is worse with her anxiety  She needs a refill on Zofran (using approx 2-3 x's a day) and Bentyl (4 x's a day)  Review of Systems   Constitutional: Positive for fatigue  Negative for chills, fever and unexpected weight change  HENT: Positive for trouble swallowing  Negative for congestion  Eyes: Negative for pain and visual disturbance  Respiratory: Positive for cough and shortness of breath  Negative for wheezing  Cardiovascular: Negative for chest pain and palpitations  Gastrointestinal: Positive for diarrhea  Negative for abdominal pain, blood in stool, constipation, nausea and vomiting  Endocrine: Negative for polydipsia and polyuria  Genitourinary: Negative for difficulty urinating and dysuria  Musculoskeletal: Positive for arthralgias and gait problem  Negative for back pain and neck pain  Skin: Negative for rash and wound  Neurological: Positive for dizziness  Negative for light-headedness and headaches  Hematological: Does not bruise/bleed easily  Psychiatric/Behavioral: Positive for dysphoric mood  Negative for confusion  The patient is nervous/anxious  Objective:    /96   Pulse (!) 108   Temp 98 5 °F (36 9 °C) (Tympanic)   LMP  (LMP Unknown)      Physical Exam  Vitals and nursing note reviewed  Constitutional:       General: She is not in acute distress  Appearance: She is well-developed  She is not ill-appearing  HENT:      Head: Normocephalic and atraumatic  Eyes:      General:         Right eye: No discharge  Left eye: No discharge  Conjunctiva/sclera: Conjunctivae normal    Neck:      Trachea: No tracheal deviation  Cardiovascular:      Rate and Rhythm: Normal rate and regular rhythm  Heart sounds: Normal heart sounds  No murmur heard  No friction rub  Pulmonary:      Effort: Pulmonary effort is normal  No respiratory distress  Breath sounds: Rhonchi present  No wheezing  Musculoskeletal:      Cervical back: Neck supple  Left lower leg: No edema  Comments: R BKA   Skin:     General: Skin is warm  Coloration: Skin is not pale  Findings: No rash  Neurological:      Mental Status: She is alert  Mental status is at baseline  Motor: No abnormal muscle tone        Gait: Gait abnormal       Comments: wheelchair   Psychiatric:         Judgment: Judgment normal       Comments: Anxious and irritable

## 2022-07-07 NOTE — ASSESSMENT & PLAN NOTE
Lab Results   Component Value Date    HGBA1C 6 5 (H) 05/23/2022   DM type 2 with h/o BKA - now controlled with A1c 6 5 - con't healthy diet and keep active, con't current DM meds, BW q 6 mo - WILL ORDER AT NEXT APPT, MARIBEL on DM foot exam (2/22) and will obtain copy from [employer] from recent eye exam, starting ACE today, she is not on a statin

## 2022-07-07 NOTE — ASSESSMENT & PLAN NOTE
Still with GI symptoms and intermittent diarrhea, follow with Ge GENAO refilled upon request today, call with new/worse symptoms or red flag GI symptoms, she is UTD on colonoscopy

## 2022-07-07 NOTE — ASSESSMENT & PLAN NOTE
Discussed increase in Gabapentin, pt requesting a different muscle relaxer - Robaxin sent, SE/sedation/fall risk reviewed - URGED NOT TO TAKE AT SAME TIME AS VISTARIL AS THEY BOTH CAN CAUSE SEDATION, will follow

## 2022-07-07 NOTE — ASSESSMENT & PLAN NOTE
BP elevated today and c/w HTN, pt with some symptoms that can be d/t elevated BP, start Lisinopril 5 mg 1 tab PO q day, SE reviewed, will check BMP in 1 wk - order given, will recheck BP in 4-5 wks

## 2022-07-07 NOTE — ASSESSMENT & PLAN NOTE
A lot of issues with anxiety and irritability, unhappy with psych and asking for anxiety med, advised pt that with all her other sedating meds that Benzo's are not a good option - pt frustrated with that- agreeable to try increase in Vistaril - from q day to tid prn, SE/sedation reviewed, con't meds and f/u as per psych - urged to look for new psych if unhappy - long wait times for appt reviewed

## 2022-07-07 NOTE — ASSESSMENT & PLAN NOTE
Just saw Rheum in May and was told to con't MTX/folate and leucovorin, con't meds and f/u as per Rheum

## 2022-07-08 ENCOUNTER — TELEPHONE (OUTPATIENT)
Dept: FAMILY MEDICINE CLINIC | Facility: HOSPITAL | Age: 67
End: 2022-07-08

## 2022-07-08 NOTE — TELEPHONE ENCOUNTER
I never said I would refill her Oxy - to my knowledge that was given short term by surgeon for after her surgery     I dont' see it on her med list and will not refill it - can double check with pharmacy and see if I have refilled it in past and dose/amt if so

## 2022-07-08 NOTE — TELEPHONE ENCOUNTER
PT left message on med line that Dr Soriano Schools forgot to refill her Oxy  Please send to pharmacy and if there's a problem she would like a call

## 2022-07-11 RX ORDER — METHOTREXATE 2.5 MG/1
TABLET ORAL
Qty: 72 TABLET | Refills: 1 | Status: SHIPPED | OUTPATIENT
Start: 2022-07-11

## 2022-07-11 RX ORDER — LEFLUNOMIDE 10 MG/1
TABLET ORAL
Qty: 28 TABLET | Refills: 1 | Status: SHIPPED | OUTPATIENT
Start: 2022-07-11 | End: 2022-09-26 | Stop reason: SDUPTHER

## 2022-07-12 ENCOUNTER — TELEPHONE (OUTPATIENT)
Dept: FAMILY MEDICINE CLINIC | Facility: HOSPITAL | Age: 67
End: 2022-07-12

## 2022-07-12 NOTE — TELEPHONE ENCOUNTER
Pharmacy calling asking if patient is suppose to be on the flexeril  and methocarbamol?  Wants to know before she gives them to patient       Dakota Wick- pharmacist   [phone]

## 2022-07-12 NOTE — TELEPHONE ENCOUNTER
At last visit pt stated Flexeril did NOT help and we D/C'd the Flexeril and started Robaxin instead  Please ONLY fill the Robaxin and D/C Flexeril altogether    TY

## 2022-07-18 ENCOUNTER — OFFICE VISIT (OUTPATIENT)
Dept: OBGYN CLINIC | Facility: CLINIC | Age: 67
End: 2022-07-18
Payer: COMMERCIAL

## 2022-07-18 ENCOUNTER — APPOINTMENT (OUTPATIENT)
Dept: RADIOLOGY | Facility: CLINIC | Age: 67
End: 2022-07-18
Payer: COMMERCIAL

## 2022-07-18 VITALS — HEIGHT: 66 IN | SYSTOLIC BLOOD PRESSURE: 118 MMHG | BODY MASS INDEX: 28.25 KG/M2 | DIASTOLIC BLOOD PRESSURE: 72 MMHG

## 2022-07-18 DIAGNOSIS — Z09 SURGERY FOLLOW-UP: Primary | ICD-10-CM

## 2022-07-18 DIAGNOSIS — S62.322G: ICD-10-CM

## 2022-07-18 DIAGNOSIS — Z09 SURGERY FOLLOW-UP: ICD-10-CM

## 2022-07-18 LAB
FUNGUS SPEC CULT: NORMAL
FUNGUS SPEC CULT: NORMAL

## 2022-07-18 PROCEDURE — 73110 X-RAY EXAM OF WRIST: CPT

## 2022-07-18 PROCEDURE — 29075 APPL CST ELBW FNGR SHORT ARM: CPT | Performed by: ORTHOPAEDIC SURGERY

## 2022-07-18 PROCEDURE — 99024 POSTOP FOLLOW-UP VISIT: CPT | Performed by: ORTHOPAEDIC SURGERY

## 2022-07-18 RX ORDER — OXYCODONE HYDROCHLORIDE 5 MG/1
5 TABLET ORAL EVERY 6 HOURS PRN
Qty: 10 TABLET | Refills: 0 | Status: SHIPPED | OUTPATIENT
Start: 2022-07-18

## 2022-07-18 NOTE — PROGRESS NOTES
Assessment:   S/P Removal of right wrist fusion plate - Right, Revision fusion of right wrist - Right, and ORIF right long finger metacarpal fracture - Right on 6/14/2022    Plan: We placed an short-arm cast for the right hand  We will reorder oxycodone for pain relief  Minimal weight-bearing right hand  Follow-up in 6 weeks with cast off and x-ray  Follow Up:  6  week(s)    To Do Next Visit:  X-rays of the  right  wrist and Cast off      CHIEF COMPLAINT:  Chief Complaint   Patient presents with    Right Wrist - Post-op     S/P     Removal of right wrist fusion plate (Right Wrist)       Revision fusion of right wrist (Right Wrist)       ORIF right long finger metacarpal fracture (Right Finger) DOS 6/14/22         SUBJECTIVE:  Goran Nava is a 61 y o  female who presents for follow up after Removal of right wrist fusion plate - Right, Revision fusion of right wrist - Right, and ORIF right long finger metacarpal fracture - Right on 6/14/2022  Today patient has Pain  Severe  Constant  Sharp and Aching  PHYSICAL EXAMINATION:  Vital signs: /72   Ht 5' 6" (1 676 m)   LMP  (LMP Unknown)   BMI 28 25 kg/m²   General: well developed and well nourished, alert, oriented times 3 and appears comfortable  Psychiatric: Normal    MUSCULOSKELETAL EXAMINATION:  Incision: healed  Range of Motion: Limited due to pain and Limited due to stiffness  Neurovascular status: Neuro intact, good cap refill  Activity Restrictions: Cast/splint restrictions  Done today: Cast removed      STUDIES REVIEWED:  Images were reviewed in PACS by Dr Dayron Mon and demonstrate: xray of right wrist on 7/18/2022 demonstrates routine healing across radiocarpal joint and across long finger metacarpal        PROCEDURES PERFORMED:  Cast application    Date/Time: 7/18/2022 2:34 PM  Performed by: Nimisha Ryan MD  Authorized by: Nimisha Ryan MD   Universal Protocol:  Consent: Verbal consent obtained    Risks and benefits: risks, benefits and alternatives were discussed  Consent given by: patient  Site marked: the operative site was marked    Procedure details:     Laterality:  Right    Location:  Wrist    Wrist:  L wristCast type:  Short arm      Supplies:  Cotton padding and fiberglass  Post-procedure details:     Sensation:  Normal    Patient tolerance of procedure:   Tolerated well, no immediate complications          Scribe Attestation    I,:  Colten iWlkins am acting as a scribe while in the presence of the attending physician :       I,:  Veronica Araya MD personally performed the services described in this documentation    as scribed in my presence :

## 2022-07-18 NOTE — PATIENT INSTRUCTIONS
Cast and Splint Care    Splints and casts are supports that are used to protect injured bones and soft tissues  A cast completely encircles the limb with a hard, rigid outer shell  A splint provides rigid support along just the side(s) of the limb, with soft or open areas in between  Splints are often used in the immediate post-operative or injury phase, when there is a greater chance of swelling  A splint can better allow for swelling  Your doctor will decide which type of support is most appropriate for you and your arm condition  Materials  Casts are made with plaster or 'fiberglass' to form the hard supportive layer, and a soft lining of cotton or similar material for padding  Fiberglass is lighter, more durable, and breathes better than plaster  Plaster is less expensive and shapes better than fiberglass for some uses  Both materials come in strips and rolls, and are dipped in water to start the setting process  Most casts have a layer of padding underneath the hard material  X-rays can be taken through casts, but they do block some of the x-ray detail  Splints can be made with these same materials or with plastic, fabric, or padded aluminum  They can be custom-made or pre-made  They come in a variety of shapes and sizes to meet specific needs  They often have Velcro straps, which makes them easier to take on and off  For fiberglass casts, water-compatible padding does exist; ask your doctor if they have it available  Not all injuries can be treated with a waterproof cast  Waterproof casts can be submerged  It is best to avoid water from lakes, rivers and oceans when wearing a waterproof cast because your skin can become irritated if dirt or sand gets inside the cast  When you come in contact with chlorinated water or dirty water, rinse the cast with fresh water when you are done swimming  Allow the inside of the cast to drain as much as possible after it gets wet   If you have a cast that goes past your elbow, be sure to drain the area around the elbow well  The rest of the water will evaporate  Sweat will not harm the cast liner, but consider rinsing the cast with clean water after excessive sweating  Swelling  Swelling due to your injury or surgery is usually the worst during the first 2 or 3 days  Swelling can cause pressure in your splint or cast, making it feel tighter  To help avoid or reduce swelling, you should put your hand and arm above your heart by propping it up on pillows or some other support if possible  Elevation helps gravity to drain the blood and fluid that causes swelling out of your hand and arm  Elevation can also decrease pain  If swelling increases too much, a cast or splint can become too tight  The following signs and symptoms should be watched for and, if they occur, you should contact your doctor promptly for advice:   worsening pain   numbness and tingling in your hand or fingers, which may indicate excessive pressure on the nerves   burning and stinging, which may result from too much pressure on the skin   excessive swelling of the hand, which may mean the veins are being blocked   loss of active movement of your fingers, which may indicate muscle damage    Sometimes a cast may need to be changed if the cast is too tight or if it gets too loose when the swelling goes down  Cast and Splint Care  Keep your cast/splint clean and dry  Being in contact with damp padding can irritate your skin  Plaster gets softer and weaker when it gets wet  Use plastic bags or a waterproof cast cover to keep your splint or cast dry when bathing  Seal the bag with tape or rubber bands  Elevate your hand in the shower above your head, because otherwise water can still run under the seal and into your cast  Do not keep it constantly covered because moisture may build up from normal sweating   Do not let dirt, sand, or other materials get inside of your splint or cast  If you feel itchy, do not place anything inside your cast because you can injure your skin; instead, ask your doctor for advice  Never trim the cast or splint by yourself  If there are rough edges or if your skin gets irritated around the edges of the cast, notify your doctor, who has the proper tools to fix it  If the cast or splint develops cracks or soft spots, contact your doctor to see if it needs to be repaired or changed  Cast Removal  Never try to remove a cast yourself; you may cut your skin or prevent proper healing of your injury  A cast should be removed only by a professional with the proper tools and training  Casts are removed with a special type of saw that will not cut your skin  Remember, a cast is there to protect you while your injury heals  It is only a temporary inconvenience, with the goal of helping you recover  © 2012 [employer]  www handcare  org

## 2022-07-20 ENCOUNTER — TELEPHONE (OUTPATIENT)
Dept: FAMILY MEDICINE CLINIC | Facility: HOSPITAL | Age: 67
End: 2022-07-20

## 2022-07-20 NOTE — TELEPHONE ENCOUNTER
Patient asking for order for a new power scooter  Her old one   She will call back with where to fax the order

## 2022-07-28 NOTE — TELEPHONE ENCOUNTER
Let me know as I can place order through [employer] (does she know what DME provider she uses) rather then writing hand script - unless pt requests hand script - let me know

## 2022-08-02 LAB
MYCOBACTERIUM SPEC CULT: NORMAL
MYCOBACTERIUM SPEC CULT: NORMAL
RHODAMINE-AURAMINE STN SPEC: NORMAL
RHODAMINE-AURAMINE STN SPEC: NORMAL

## 2022-08-04 DIAGNOSIS — I10 PRIMARY HYPERTENSION: ICD-10-CM

## 2022-08-04 DIAGNOSIS — M79.7 FIBROMYALGIA: ICD-10-CM

## 2022-08-04 PROCEDURE — 4010F ACE/ARB THERAPY RXD/TAKEN: CPT | Performed by: INTERNAL MEDICINE

## 2022-08-04 RX ORDER — LISINOPRIL 5 MG/1
TABLET ORAL
Qty: 30 TABLET | Refills: 1 | Status: SHIPPED | OUTPATIENT
Start: 2022-08-04 | End: 2022-09-02

## 2022-08-05 RX ORDER — METHOCARBAMOL 500 MG/1
TABLET, FILM COATED ORAL
Qty: 90 TABLET | Refills: 1 | Status: SHIPPED | OUTPATIENT
Start: 2022-08-05 | End: 2022-09-28

## 2022-08-09 ENCOUNTER — HOSPITAL ENCOUNTER (OUTPATIENT)
Dept: CT IMAGING | Facility: HOSPITAL | Age: 67
Discharge: HOME | End: 2022-08-09
Payer: COMMERCIAL

## 2022-08-09 DIAGNOSIS — Z12.2 ENCOUNTER FOR SCREENING FOR LUNG CANCER: ICD-10-CM

## 2022-08-09 DIAGNOSIS — J01.40 ACUTE NON-RECURRENT PANSINUSITIS: ICD-10-CM

## 2022-08-09 DIAGNOSIS — E11.65 UNCONTROLLED TYPE 2 DIABETES MELLITUS WITH HYPERGLYCEMIA (HCC): Primary | ICD-10-CM

## 2022-08-09 PROCEDURE — 71271 CT THORAX LUNG CANCER SCR C-: CPT

## 2022-08-09 RX ORDER — FLUTICASONE PROPIONATE 50 MCG
2 SPRAY, SUSPENSION (ML) NASAL DAILY
Qty: 9.9 ML | Refills: 0 | Status: SHIPPED | OUTPATIENT
Start: 2022-08-09 | End: 2022-10-07 | Stop reason: SDUPTHER

## 2022-08-11 ENCOUNTER — OFFICE VISIT (OUTPATIENT)
Dept: FAMILY MEDICINE CLINIC | Facility: HOSPITAL | Age: 67
End: 2022-08-11
Payer: COMMERCIAL

## 2022-08-11 VITALS — SYSTOLIC BLOOD PRESSURE: 108 MMHG | HEART RATE: 98 BPM | DIASTOLIC BLOOD PRESSURE: 52 MMHG | TEMPERATURE: 97.3 F

## 2022-08-11 DIAGNOSIS — M79.7 FIBROMYALGIA: ICD-10-CM

## 2022-08-11 DIAGNOSIS — E11.65 UNCONTROLLED TYPE 2 DIABETES MELLITUS WITH HYPERGLYCEMIA (HCC): ICD-10-CM

## 2022-08-11 DIAGNOSIS — M25.561 CHRONIC PAIN OF RIGHT KNEE: ICD-10-CM

## 2022-08-11 DIAGNOSIS — J31.0 CHRONIC RHINITIS: ICD-10-CM

## 2022-08-11 DIAGNOSIS — M06.9 RHEUMATOID ARTHRITIS, INVOLVING UNSPECIFIED SITE, UNSPECIFIED WHETHER RHEUMATOID FACTOR PRESENT (HCC): ICD-10-CM

## 2022-08-11 DIAGNOSIS — F41.9 ANXIETY: ICD-10-CM

## 2022-08-11 DIAGNOSIS — G89.29 CHRONIC PAIN OF RIGHT KNEE: ICD-10-CM

## 2022-08-11 DIAGNOSIS — I10 PRIMARY HYPERTENSION: Primary | ICD-10-CM

## 2022-08-11 LAB
BUN SERPL-MCNC: 21 MG/DL (ref 8–27)
BUN/CREAT SERPL: 23 (ref 12–28)
CALCIUM SERPL-MCNC: 9.3 MG/DL (ref 8.7–10.3)
CHLORIDE SERPL-SCNC: 97 MMOL/L (ref 96–106)
CO2 SERPL-SCNC: 24 MMOL/L (ref 20–29)
CREAT SERPL-MCNC: 0.9 MG/DL (ref 0.57–1)
EGFR: 71 ML/MIN/1.73
GLUCOSE SERPL-MCNC: 158 MG/DL (ref 65–99)
POTASSIUM SERPL-SCNC: 4.4 MMOL/L (ref 3.5–5.2)
SODIUM SERPL-SCNC: 140 MMOL/L (ref 134–144)

## 2022-08-11 PROCEDURE — 99214 OFFICE O/P EST MOD 30 MIN: CPT | Performed by: INTERNAL MEDICINE

## 2022-08-11 NOTE — ASSESSMENT & PLAN NOTE
Noting benefit with muscle relaxer w/o significant SE, con't current regimen for now, deferring increase in Gabapentin as it is too sedating at higher doses

## 2022-08-11 NOTE — ASSESSMENT & PLAN NOTE
Labs due in Nov - order given, UTD On DM foot exam (2/22) and eye exam (4/22), on ACE and statin  Lab Results   Component Value Date    HGBA1C 6 5 (H) 05/23/2022

## 2022-08-11 NOTE — ASSESSMENT & PLAN NOTE
No great benefit with bid Vistaril - urged to increase to tid and con't all other meds and f/u as per pscy

## 2022-08-11 NOTE — ASSESSMENT & PLAN NOTE
Unsure if taking leucovorin states "If it on there I'm probably taking it, con't MTX/folate and tx and f/u as per rheum

## 2022-08-11 NOTE — PROGRESS NOTES
Assessment/Plan:    Hypertension  BP at goal with addition of Lisinopril, BMP wnl, con't current meds, recheck in 3 mos    Rheumatoid arthritis ([facility] )  Unsure if taking leucovorin states "If it on there I'm probably taking it, con't MTX/folate and tx and f/u as per rheum    Fibromyalgia  Noting benefit with muscle relaxer w/o significant SE, con't current regimen for now, deferring increase in Gabapentin as it is too sedating at higher doses    Anxiety  No great benefit with bid Vistaril - urged to increase to tid and con't all other meds and f/u as per pscy    Chronic rhinitis  Con't flonase as needed, UTT antihistamine, call with new/worse mood    Uncontrolled type 2 diabetes mellitus with hyperglycemia ([facility] )  Labs due in Nov - order given, UTD On DM foot exam (2/22) and eye exam (4/22), on ACE and statin  Lab Results   Component Value Date    HGBA1C 6 5 (H) 05/23/2022        Diagnoses and all orders for this visit:    Primary hypertension  -     CBC and differential; Future  -     Comprehensive metabolic panel; Future  -     CBC and differential  -     Comprehensive metabolic panel    Rheumatoid arthritis, involving unspecified site, unspecified whether rheumatoid factor present (HCC)  -     CBC and differential; Future  -     Comprehensive metabolic panel; Future  -     CBC and differential  -     Comprehensive metabolic panel    Fibromyalgia  -     CBC and differential; Future  -     Comprehensive metabolic panel; Future  -     CBC and differential  -     Comprehensive metabolic panel    Anxiety  -     CBC and differential; Future  -     Comprehensive metabolic panel; Future  -     CBC and differential  -     Comprehensive metabolic panel    Chronic rhinitis    Chronic pain of right knee  Comments:  Sounds more like phantom pain from stump but pt requesting knee xray - order given upon request, no concern for infection/ulceration on exam today  Orders:  -     XR knee 3 vw right non injury;  Future    Uncontrolled type 2 diabetes mellitus with hyperglycemia (HCC)  -     CBC and differential; Future  -     Comprehensive metabolic panel; Future  -     Hemoglobin A1C; Future  -     Microalbumin / creatinine urine ratio; Future  -     CBC and differential  -     Comprehensive metabolic panel  -     Hemoglobin A1C  -     Microalbumin / creatinine urine ratio      Colonoscopy 12/19 - 5 yrs    CT chest lung CA screening 8/9/22 - reading pending    Mammo 6/22    Dexa 6/22 - nml     PAP s/p hysterectomy    DM labs A1C 5/22 (6 5)  DM foot exam 2/22  DM eye exam 5/22        Subjective:      Patient ID: Roverto Garcia is a 61 y o  female  HPI Pt here for follow up appt    Last visit BP was elevated and we subsequently started lisinopril 5 mg 1 tab PO q day  She is here today for BP/med check  BP at goal today and meds were reviewed and med list is UTD  She denies missing doses of meds or SE with the meds  She had her BMP done yesterday and BUN/Cr and K was nml  She does not check her BP outside the office regularly but when she does she states she is around 130's/100's  She notes no frequent HA's/dizziness/double vision/CP  Last visit pt was also noting continued significant joint pains and muscle aches with her fibromyalgia and RA  She is on MTX/folate and leucovorin for her RA with Rheum  We did discuss increasing her Gabapentin but pt was instead opting for a new muscle relaxer as she felt the Gabapentin was making her too sleepy     We subsequently sent a new rx for Robaxin tid prn  SE/sedation was reviewed in detail with her other sedating meds  She is here for a med check for this as well  She is using the medication 3 x's daily w/benefit  She notes no sedation /sleepiness with the Robaxin  Last visit pt was also noting uncontrolled anxiety and irritability  She does follow with psych but has been unhappy with appt and tx  We discussed benzo's not being a good long term option for her anxiety    We did however increase her Vistaril from q day to tid  She is currently taking it bid w/o benefit  She notes no sedation with the rx  She has not gotten a hold of her psychiatrist and her next appt is not for 3 months yet  DM labs due in Nov   Never did urine test either  She has had a bit of a ST but feels it is d/t her allergies  She notes some PND and runny nose  She has benefit with Flonase but OTC antihistamine do not agree with her  Noting some pain and cracking with her R knee for a few mos when she puts pressure on it with transferring  She notes some swelling and redness - getachew with humid weather  She notes some lightening bolt sensation through the stump as well  She is asking for an Xray  Review of Systems   Constitutional: Negative for chills and fever  HENT: Positive for congestion, postnasal drip, rhinorrhea and sore throat  Eyes: Negative for pain and visual disturbance  Respiratory: Negative for cough, shortness of breath and wheezing  Cardiovascular: Negative for chest pain and palpitations  Gastrointestinal: Positive for abdominal pain and nausea  Negative for blood in stool, constipation, diarrhea and vomiting  Genitourinary: Negative for difficulty urinating and dysuria  Musculoskeletal: Positive for arthralgias and myalgias  Skin: Negative for rash and wound  Neurological: Negative for dizziness, light-headedness and headaches  Hematological: Does not bruise/bleed easily  Psychiatric/Behavioral: Positive for dysphoric mood  The patient is nervous/anxious  Objective:    /52   Pulse 98   Temp (!) 97 3 °F (36 3 °C) (Tympanic)   LMP  (LMP Unknown)      Physical Exam  Vitals and nursing note reviewed  Constitutional:       General: She is not in acute distress  Appearance: She is well-developed  HENT:      Head: Normocephalic and atraumatic  Eyes:      General:         Right eye: No discharge  Left eye: No discharge  Conjunctiva/sclera: Conjunctivae normal    Neck:      Trachea: No tracheal deviation  Cardiovascular:      Rate and Rhythm: Normal rate and regular rhythm  Heart sounds: Normal heart sounds  No murmur heard  No friction rub  Pulmonary:      Effort: Pulmonary effort is normal  No respiratory distress  Breath sounds: Normal breath sounds  No wheezing, rhonchi or rales  Abdominal:      General: There is no distension  Palpations: Abdomen is soft  Tenderness: There is no abdominal tenderness  There is no guarding or rebound  Musculoskeletal:      Cervical back: Neck supple  Right lower leg: No edema  Left lower leg: No edema  Comments: R stump with skin intact w/o swelling/redness/warmth/sores/ulcers   Skin:     General: Skin is warm  Findings: No rash  Neurological:      General: No focal deficit present  Mental Status: She is alert  Motor: No abnormal muscle tone  Gait: Gait abnormal       Comments: R SEFERINOA , wheelchair bound   Psychiatric:         Behavior: Behavior normal          Thought Content:  Thought content normal          Judgment: Judgment normal       Comments: irritable

## 2022-08-18 DIAGNOSIS — E11.65 UNCONTROLLED TYPE 2 DIABETES MELLITUS WITH HYPERGLYCEMIA (HCC): Primary | ICD-10-CM

## 2022-08-18 NOTE — TELEPHONE ENCOUNTER
[facility] is asking for someone from the office to call her back she has a question regarding her appt   And other questions

## 2022-08-19 RX ORDER — LANCETS 28 GAUGE
EACH MISCELLANEOUS DAILY
Qty: 100 EACH | Refills: 0 | Status: SHIPPED | OUTPATIENT
Start: 2022-08-19

## 2022-08-19 RX ORDER — BLOOD-GLUCOSE METER
1 KIT MISCELLANEOUS DAILY
Qty: 1 KIT | Refills: 0 | Status: SHIPPED | OUTPATIENT
Start: 2022-08-19

## 2022-08-19 RX ORDER — BLOOD-GLUCOSE METER
KIT MISCELLANEOUS
Qty: 100 EACH | Refills: 0 | Status: SHIPPED | OUTPATIENT
Start: 2022-08-19

## 2022-08-21 DIAGNOSIS — E78.2 MODERATE MIXED HYPERLIPIDEMIA NOT REQUIRING STATIN THERAPY: ICD-10-CM

## 2022-08-21 RX ORDER — ATORVASTATIN CALCIUM 20 MG/1
TABLET, FILM COATED ORAL
Qty: 90 TABLET | Refills: 1 | Status: SHIPPED | OUTPATIENT
Start: 2022-08-21

## 2022-08-29 ENCOUNTER — APPOINTMENT (OUTPATIENT)
Dept: RADIOLOGY | Facility: CLINIC | Age: 67
End: 2022-08-29
Payer: COMMERCIAL

## 2022-08-29 ENCOUNTER — OFFICE VISIT (OUTPATIENT)
Dept: OCCUPATIONAL THERAPY | Facility: CLINIC | Age: 67
End: 2022-08-29
Payer: COMMERCIAL

## 2022-08-29 ENCOUNTER — OFFICE VISIT (OUTPATIENT)
Dept: OBGYN CLINIC | Facility: CLINIC | Age: 67
End: 2022-08-29

## 2022-08-29 VITALS — BODY MASS INDEX: 28.25 KG/M2 | SYSTOLIC BLOOD PRESSURE: 122 MMHG | HEIGHT: 66 IN | DIASTOLIC BLOOD PRESSURE: 80 MMHG

## 2022-08-29 DIAGNOSIS — Z98.1 STATUS POST FUSION OF WRIST: Primary | ICD-10-CM

## 2022-08-29 DIAGNOSIS — Z09 SURGERY FOLLOW-UP: ICD-10-CM

## 2022-08-29 DIAGNOSIS — S62.322G: ICD-10-CM

## 2022-08-29 DIAGNOSIS — Z48.89 AFTERCARE FOLLOWING SURGERY: ICD-10-CM

## 2022-08-29 DIAGNOSIS — Z48.89 AFTERCARE FOLLOWING SURGERY: Primary | ICD-10-CM

## 2022-08-29 PROCEDURE — 97760 ORTHOTIC MGMT&TRAING 1ST ENC: CPT | Performed by: OCCUPATIONAL THERAPIST

## 2022-08-29 PROCEDURE — 99024 POSTOP FOLLOW-UP VISIT: CPT | Performed by: ORTHOPAEDIC SURGERY

## 2022-08-29 PROCEDURE — 73110 X-RAY EXAM OF WRIST: CPT

## 2022-08-29 RX ORDER — OXYCODONE HYDROCHLORIDE 5 MG/1
5 TABLET ORAL EVERY 6 HOURS PRN
Qty: 10 TABLET | Refills: 0 | Status: CANCELLED | OUTPATIENT
Start: 2022-08-29

## 2022-08-29 NOTE — PROGRESS NOTES
Assessment:   11 weeks S/P Removal of right wrist fusion plate - Right, Revision fusion of right wrist - Right, and ORIF right long finger metacarpal fracture - Right on 6/14/2022    Plan:   Short arm cast was removed today and repeat x-rays were obtained, she is starting to fusion and metacarpal fracture is healing  OT was ordered for edema control, finger ROM, no wrist ROM and no strengthening at this time  OT will fabricate a volar and dorsal clamshell short arm splint to protect fusion and long finger metacarpal ORIF  Minimal WB to RUE, splint may be removed for hygiene purposes only  Refill of pain medication may be sent to her pharmacy electronically depending on how many refill she was given as she is now 11 weeks out from surgery  Follow up in 8 weeks time with repeat x-rays  Follow Up:  8  week(s)    To Do Next Visit:  X-rays of the  right  wrist      CHIEF COMPLAINT:  Chief Complaint   Patient presents with    Right Wrist - Post-op     S/P Removal of right wrist fusion plate - Right, Revision fusion of right wrist - Right, and ORIF right long finger metacarpal fracture - Right on 6/14/2022         SUBJECTIVE:  Betito Benjamin is a 61 y o  female who presents for follow up after Removal of right wrist fusion plate - Right, Revision fusion of right wrist - Right, and ORIF right long finger metacarpal fracture - Right on 6/14/2022  Today patient has pain tot he dorsal aspect of her right hand/wrist  She is requesting a refill of pain medication         PHYSICAL EXAMINATION:  Vital signs: LMP  (LMP Unknown)   General: well developed and well nourished, alert, oriented times 3 and appears comfortable  Psychiatric: Normal    MUSCULOSKELETAL EXAMINATION:  Incision: healed  Range of Motion: As expected  Neurovascular status: Neuro intact, good cap refill  Activity Restrictions: Cast/splint restrictions  Done today: Cast removed      STUDIES REVIEWED:  Images were reviewed in PACS by Dr Subhash Zheng and demonstrate: x-rays of the right wrist obtained in the office today demonstrates healing across radiocarpal joint fusion and long finger metacarpal fracture with orthopedic hardware in good alignment and position         PROCEDURES PERFORMED:  Procedures  No Procedures performed today        Scribe Attestation    I,:  Trixie Macario am acting as a scribe while in the presence of the attending physician :       I,:  Yue Walsh MD personally performed the services described in this documentation    as scribed in my presence :

## 2022-08-29 NOTE — PROGRESS NOTES
Orthosis    Diagnosis:   1  Status post fusion of wrist     2  Aftercare following surgery  Ambulatory Referral to PT/OT Hand Therapy     Indication: Fracture and Motion Blocking    Location: Right  wrist, hand, long finger, ring finger and small finger  Supplies: Custom Fit Orthotic and Skin coverage   Orthosis type: Ulnar Gutter Hand-Forearm based  Wearing Schedule: Remove for hygiene only  Describe Position: INTRINSIC PLUS    Precautions: Fracture    Patient or Caregiver expresses understanding of wearing Schedule and Precautions? Yes  Patient or Caregiver able to don/doff orthotic independently? Yes    Written orders provided to patient?  Yes  Orders Obtained: Written  Orders Obtained from: Dr Brock Fus    Return for evaluation and treatment No

## 2022-08-31 ENCOUNTER — RA CDI HCC (OUTPATIENT)
Dept: OTHER | Facility: HOSPITAL | Age: 67
End: 2022-08-31

## 2022-08-31 ENCOUNTER — OFFICE VISIT (OUTPATIENT)
Dept: GASTROENTEROLOGY | Facility: CLINIC | Age: 67
End: 2022-08-31
Payer: COMMERCIAL

## 2022-08-31 VITALS
DIASTOLIC BLOOD PRESSURE: 82 MMHG | HEIGHT: 66 IN | HEART RATE: 98 BPM | SYSTOLIC BLOOD PRESSURE: 122 MMHG | BODY MASS INDEX: 28.25 KG/M2

## 2022-08-31 DIAGNOSIS — R11.14 BILIOUS VOMITING WITH NAUSEA: ICD-10-CM

## 2022-08-31 DIAGNOSIS — R10.84 GENERALIZED ABDOMINAL PAIN: ICD-10-CM

## 2022-08-31 DIAGNOSIS — K58.0 IRRITABLE BOWEL SYNDROME WITH DIARRHEA: Primary | ICD-10-CM

## 2022-08-31 DIAGNOSIS — R13.10 DYSPHAGIA, UNSPECIFIED TYPE: ICD-10-CM

## 2022-08-31 PROCEDURE — 99214 OFFICE O/P EST MOD 30 MIN: CPT | Performed by: INTERNAL MEDICINE

## 2022-08-31 PROCEDURE — 3074F SYST BP LT 130 MM HG: CPT | Performed by: INTERNAL MEDICINE

## 2022-08-31 PROCEDURE — 3079F DIAST BP 80-89 MM HG: CPT | Performed by: INTERNAL MEDICINE

## 2022-08-31 RX ORDER — ONDANSETRON 4 MG/1
4 TABLET, FILM COATED ORAL EVERY 8 HOURS PRN
Qty: 90 TABLET | Refills: 2 | Status: SHIPPED | OUTPATIENT
Start: 2022-08-31

## 2022-08-31 RX ORDER — AMITRIPTYLINE HYDROCHLORIDE 25 MG/1
25 TABLET, FILM COATED ORAL
Qty: 60 TABLET | Refills: 1 | Status: SHIPPED | OUTPATIENT
Start: 2022-08-31 | End: 2022-09-26 | Stop reason: ALTCHOICE

## 2022-08-31 NOTE — PROGRESS NOTES
[facility] [address]  coding opportunities       Chart reviewed, no opportunity found: CHART REVIEWED, NO OPPORTUNITY FOUND        Patients Insurance     Medicare Insurance: [employer]

## 2022-08-31 NOTE — PROGRESS NOTES
[id number] - Outpatient Follow[facility]up [facility]  Nyasia Galloway 61 y o  female MRN: [id number]  Encounter: [id number]    ASSESSMENT AND PLAN:      1  Irritable bowel syndrome with diarrhea  Patient with a long history of IBS with chronic intermittent abdominal pain and diarrhea  She underwent workup including EGD and colonoscopy  Pending CT scan  Likely has connections to her anxiety  She is planning to see a psychiatrist   Was previously on Effexor but does not believe it is helping her symptoms at all  Will attempt a TCA to help with her diarrhea and her functional abdominal pain  We have discussed the risk factors of cardiac arrhythmia  Previous EKG without QT prolongation  - if diarrhea continues, consider repeat colonoscopy and biopsy to rule out microscopic colitis  Consider empiric treatment with rifaximin  - amitriptyline (ELAVIL) 25 mg tablet; Take 1 tablet (25 mg total) by mouth daily at bedtime  Dispense: 60 tablet; Refill: 1    2  Dysphagia, unspecified type  Likely in the setting of achalasia  Was noted on manometry per patient  Recommended treatment with myotomy but patient refused  Currently deferring treatment options  Can consider pneumatic dilatation if she does not want to undergo myotomy  Will need to get report of positive esophageal manometry  3  Generalized abdominal pain  Patient continues that generalized abdominal pain periumbilically and has never had CT imaging  Will check CT scan of the abdomen pelvis to rule out other causes of abdominal pain  - CT abdomen pelvis w contrast; Future    4  Bilious vomiting with nausea  Has persistent episodes of vomiting and nausea in the setting of achalasia   P r n  use of Zofran  - ondansetron (ZOFRAN) 4 mg tablet; Take 1 tablet (4 mg total) by mouth every 8 (eight) hours as needed for nausea or vomiting  Dispense: 90 tablet;  Refill: 2      Follow up appointment:  3 months  ______________________________________________________________________    Chief Complaint   Patient presents with    Follow up-barretts, IBS, achalasia     HPI:   53-year-old female past medical history of IBS D, dysphagia secondary to achalasia, anxiety report follow-up regarding her IBS and dysphagia  Patient continues to have generalized abdominal pain that is change with bowel movements  Patient states that she has a bowel movement every time that she eats  Can be 2 to 3 times a day  Loose bowel movements without any blood or melena  She also states that she has trouble swallowing  Food can get stuck in the middle of her chest   Patient reports that she had an manometry that confirmed diagnosis of achalasia  She was recommended for a myotomy but patient declined  She does have persistent nausea and vomiting    History of a negative gastric emptying scan    GI History:  GI procedure Hx:  EGD 07/2020 showed small hiatal hernia bile gastritis, esophagus normal, empirically dilated  Colonoscopy 12/2019  with diverticulosis, hyperplastic polyps, 5 year recall recommended/2024  GI imaging Hx:  Barium swallow in 2020 did not show achalasia    Historical Information   Past Medical History:   Diagnosis Date    Achalasia     ADHD     Anxiety     Colon polyp     [facility]     Depression     Disease of thyroid gland     hypo    Esophageal spasm     Fibromyalgia     Fibromyalgia, primary     GERD (gastroesophageal reflux disease)     Hiatal hernia     History of tachycardia     6/1/22 Pt reports hx of tachycardia - no current issues at this time - reports tachycardia has been tied in with anxiety    Hyperlipidemia     Hypertension     Iron deficiency anemia     Polysubstance abuse ([facility] )     Psychiatric disorder     major depressive     Past Surgical History:   Procedure Laterality Date    AMPUTATION      RBKA    ANKLE FUSION      APPENDECTOMY  BELOW KNEE LEG AMPUTATION Right     CARPAL TUNNEL RELEASE  2021    left arm    CATARACT EXTRACTION       SECTION      COLONOSCOPY  2010    COLONOSCOPY  2019    diverticula, 3 mm polyp and 6 mm polyp in rectum, grade III internal hemorrhoids, hyperplastic polyp, 5 year recall 2024     EGD  2010    FOREARM FASCIOTOMY Right     FRACTURE SURGERY      HYSTERECTOMY      -age 44    KNEE ARTHROSCOPY Left     NERVE SURGERY Right 2022    Procedure: Right wrist posterior interosseous nerve neurectomy;  Surgeon: Neli Cabrera MD;  Location: [facility] MAIN OR;  Service: Orthopedics    ORTHOPEDIC SURGERY      MT FUSION OF WRIST JOINT Right 2022    Procedure: Total wrist fusion of the right wrist;  Surgeon: Neli Cabrera MD;  Location: [facility];  Service: Orthopedics    MT FUSION OF WRIST JOINT Right 2022    Procedure: Revision fusion of right wrist;  Surgeon: Neli Cabrera MD;  [facility];  Service: Orthopedics    MT OPEN Rowan Radha EA BONE Right 2022    Procedure: ORIF right long finger metacarpal fracture;  Surgeon: Neli Cabrera MD;  [facility];  Service: Orthopedics    MT REMOVAL DEEP IMPLANT Right 2022    Procedure: Removal of right wrist fusion plate;  Surgeon: Neli Cabrera MD;  [facility];  Service: Orthopedics    MT REVISE ULNAR NERVE AT ELBOW Left 2021    Procedure: Left carpal and cubital tunnel release;  Surgeon:  Cam Cowart MD;  Location: [facility] MAIN OR;  Service: Orthopedics    UPPER GASTROINTESTINAL ENDOSCOPY      WRIST TENDON TRANSFER N/A 2022    Procedure: Extensor pollicis longus tendon transposition right wrist;  Surgeon: Neli Cabrera MD;  Location: [facility];  Service: Orthopedics     Social History     Substance and Sexual Activity   Alcohol Use No    Comment: Denies any alcohol use     Social History     Substance and Sexual Activity   Drug Use Not Currently    Comment: Pt denies smoking any marijuana currently at this time     Social History     Tobacco Use   Smoking Status Current Some Day Smoker    Packs/day: 0 04    Years: 25 00    Pack years: 1 00    Types: Cigarettes    Start date: 1/1/1977   Smokeless Tobacco Never Used   Tobacco Comment    4-5 cigs a day -     Family History   Problem Relation Age of Onset    Ulcerative colitis Mother     Lung cancer Mother         Unknown age   [facility] Diabetes Mother    [facility] Alcohol abuse Father     Diabetes Sister     Cervical cancer Daughter 39    No Known Problems Maternal Grandmother     No Known Problems Maternal Grandfather     No Known Problems Paternal Grandmother     No Known Problems Paternal Grandfather     Cancer Brother     Testicular cancer Brother 23    Colon polyps Neg Hx     Colon cancer Neg Hx     Anesthesia problems Neg Hx          Current Outpatient Medications:     albuterol (PROVENTIL HFA,VENTOLIN HFA) 90 mcg/act inhaler    Allergy Relief 10 MG tablet    amitriptyline (ELAVIL) 25 mg tablet    ascorbic acid (VITAMIN C) 500 mg tablet    atorvastatin (LIPITOR) 20 mg tablet    Blood Glucose Monitoring Suppl (FreeStyle Lite) w/Device KIT    Diclofenac Sodium (VOLTAREN) 1 %    dicyclomine (BENTYL) 20 mg tablet    fluticasone (FLONASE) 50 mcg/act nasal spray    fluticasone-salmeterol (Advair) 100-50 mcg/dose inhaler    folic acid (FOLVITE) 1 mg tablet    gabapentin (NEURONTIN) 600 MG tablet    Galcanezumab-gnlm (Emgality) 120 MG/ML SOSY    glipiZIDE (GLUCOTROL XL) 5 mg 24 hr tablet    glucose blood (FREESTYLE LITE) test strip    glucose blood test strip    hydrOXYzine pamoate (VISTARIL) 50 mg capsule    ipratropium-albuterol (DUO-NEB) 0 5-2 5 mg/3 mL nebulizer solution    Lactobacillus Acid-Pectin (Acidophilus/Citrus Pectin) TABS    Lancets (freestyle) lancets    Lancets MISC    leflunomide (ARAVA) 10 MG tablet    leucovorin (WELLCOVORIN) 10 MG tablet    levothyroxine 125 mcg tablet    lisinopril (ZESTRIL) 5 mg tablet    meclizine (ANTIVERT) 25 mg tablet    metFORMIN (GLUCOPHAGE) 500 mg tablet    methocarbamol (ROBAXIN) 500 mg tablet    methotrexate 2 5 MG tablet    Multiple Vitamin (Tab-A-Felicia) TABS    Multiple Vitamins-Minerals (multivitamin with minerals) tablet    omeprazole (PriLOSEC) 40 MG capsule    ondansetron (ZOFRAN) 4 mg tablet    oxyCODONE (Roxicodone) 5 immediate release tablet    pantoprazole (PROTONIX) 40 mg tablet    triamcinolone (KENALOG) 0 1 % cream  Allergies   Allergen Reactions    Levaquin [Levofloxacin] Hives    Morphine Itching and Anaphylaxis     u  Other reaction(s): Other (See Comments)  u  Other reaction(s): Feeling agitated (finding)      Penicillins Anaphylaxis     "almost "- gives hives, nausea     Cephalosporins Hives     Other reaction(s): Unknown Reaction      Codeine Hives     Other reaction(s): Unknown  Other reaction(s): Unknown Reaction  Other reaction(s): Unknown  "Nausea and makes me itch"    Nsaids GI Bleeding     Other reaction(s): Unknown Reaction    Cephalexin Other (See Comments)     Other reaction(s): Unknown    Aspirin Hives, Other (See Comments) and Rash     Other reaction(s): Unknown Reaction      Chlorhexidine Rash    Medical Tape Rash    Vancomycin Rash     "Kills my stomach"     Reviewed medications and allergies and updated as indicated    PHYSICAL EXAM:    Blood pressure 122/82, pulse 98, height 5' 6" (1 676 m), not currently breastfeeding  Body mass index is 28 25 kg/m²  General Appearance: NAD, cooperative, alert  Eyes: Anicteric, EOMI  ENT:  Normocephalic, atraumatic  Neck:  Supple, symmetrical, trachea midline  Resp:  Air entry present bilaterally  CV: S1, S2 present    GI:  Soft, non-tender, non-distended; normal bowel sounds; no masses, no organomegaly   Rectal: Deferred  Musculoskeletal: No cyanosis, clubbing or edema    Skin:  No jaundice, rashes, or lesions   Psych: Normal affect, good eye contact  Neuro: No gross deficits    Lab Results:   Lab Results   Component Value Date    WBC 8 6 06/02/2022    WBC 9 7 02/18/2022    WBC 9 8 09/30/2021    HGB 13 8 06/02/2022    HGB 14 1 02/18/2022    HGB 13 9 09/30/2021    MCV 89 06/02/2022     06/02/2022     02/18/2022     09/30/2021     Lab Results   Component Value Date     04/01/2015    K 4 4 08/10/2022    CL 97 08/10/2022    CO2 24 08/10/2022    ANIONGAP 8 04/01/2015    BUN 21 08/10/2022    CREATININE 0 90 08/10/2022    GLUCOSE 90 04/01/2015    GLUF 117 (H) 05/23/2022    CALCIUM 9 4 05/23/2022    AST 27 05/23/2022    AST 41 (H) 02/18/2022    AST 34 09/30/2021    ALT 30 05/23/2022    ALT 49 (H) 02/18/2022    ALT 30 (H) 09/30/2021    ALKPHOS 107 05/23/2022    ALKPHOS 110 09/30/2021    ALKPHOS 104 06/07/2021    PROT 5 8 (L) 04/01/2015    BILITOT 0 2 04/01/2015    BILITOT 0 3 03/31/2015    BILITOT 0 4 03/30/2015    EGFR 71 08/10/2022     Lab Results   Component Value Date    IRON 76 09/30/2021    TIBC 430 09/30/2021     No results found for: LIPASE    Radiology Results:   CT lung screening program    Result Date: 8/13/2022  Narrative: CT CHEST LUNG CANCER SCREENING WITHOUT IV CONTRAST INDICATION:   Z12 2: Encounter for screening for malignant neoplasm of respiratory organs  COMPARISON: CT dated 8/5/2008 TECHNIQUE:  Unenhanced CT examination of the chest was performed utilizing a low dose protocol  Reformatted images were created in axial, sagittal, and coronal planes  Radiation dose length product (DLP) for this visit:  270 03 mGy-cm   This examination, like all CT scans performed in the [facility], was performed utilizing techniques to minimize radiation dose exposure, including the use of iterative  reconstruction and automated exposure control  FINDINGS: LUNGS:  There is trace amount of mucous/ secretion in the trachea; otherwise, the central airways are patent    There is moderate emphysema  Lung nodules as follows (series 3): 3 mm right lower lobe nodule on image #67, new  There are bibasilar subsegmental atelectasis/scarring  PLEURA:  Unremarkable  HEART/GREAT VESSELS: Moderate to severe coronary artery calcification  No pericardial effusion  No thoracic aortic aneurysm  MEDIASTINUM AND BERTHA:  Unremarkable  CHEST WALL AND LOWER NECK:  Unremarkable  VISUALIZED STRUCTURES IN THE UPPER ABDOMEN:  There is severe diffuse hepatic steatosis  Again seen is a 1 8 cm right adrenal nodule, which was present on the prior study from 2008 and likely an adrenal adenoma  OSSEOUS STRUCTURES:  Again seen are chronic left lower rib deformities  Impression: 1   3 mm right lung nodule  2   Moderate to severe coronary artery calcification  3   Severe fatty liver  Lung-RADS2, benign appearance or behavior  Continue annual screening with LDCT in 12 months   Workstation performed: [id number]

## 2022-09-02 ENCOUNTER — OFFICE VISIT (OUTPATIENT)
Dept: FAMILY MEDICINE CLINIC | Facility: HOSPITAL | Age: 67
End: 2022-09-02
Payer: COMMERCIAL

## 2022-09-02 VITALS — DIASTOLIC BLOOD PRESSURE: 76 MMHG | HEART RATE: 86 BPM | SYSTOLIC BLOOD PRESSURE: 118 MMHG | TEMPERATURE: 97.9 F

## 2022-09-02 DIAGNOSIS — E07.9 DISEASE OF THYROID GLAND: ICD-10-CM

## 2022-09-02 DIAGNOSIS — R91.1 LUNG NODULE: Primary | ICD-10-CM

## 2022-09-02 DIAGNOSIS — K76.0 HEPATIC STEATOSIS: ICD-10-CM

## 2022-09-02 DIAGNOSIS — I25.10 CORONARY ARTERY CALCIFICATION SEEN ON CT SCAN: ICD-10-CM

## 2022-09-02 DIAGNOSIS — H57.89 EYE DRAINAGE: ICD-10-CM

## 2022-09-02 PROCEDURE — 99214 OFFICE O/P EST MOD 30 MIN: CPT | Performed by: INTERNAL MEDICINE

## 2022-09-02 PROCEDURE — 4010F ACE/ARB THERAPY RXD/TAKEN: CPT | Performed by: INTERNAL MEDICINE

## 2022-09-02 RX ORDER — OLOPATADINE HYDROCHLORIDE 1 MG/ML
1 SOLUTION/ DROPS OPHTHALMIC 2 TIMES DAILY PRN
Qty: 5 ML | Refills: 2 | Status: SHIPPED | OUTPATIENT
Start: 2022-09-02

## 2022-09-02 NOTE — ASSESSMENT & PLAN NOTE
3 mm R lung nodule noted on CT chest lung CA screening, she will con't annual lung CA screening CT's, again urged to quit smoking, has patches at home and is "working on it", urged to call if any assistance is needed

## 2022-09-02 NOTE — ASSESSMENT & PLAN NOTE
Just saw GI and has CT A/P ordered, on statin, healthy diet/exercise/healthy wgt encouraged, will need further GI f/u

## 2022-09-02 NOTE — PROGRESS NOTES
Assessment/Plan:    Lung nodule  3 mm R lung nodule noted on CT chest lung CA screening, she will con't annual lung CA screening CT's, again urged to quit smoking, has patches at home and is "working on it", urged to call if any assistance is needed    Hepatic steatosis  Just saw GI and has CT A/P ordered, on statin, healthy diet/exercise/healthy wgt encouraged, will need further GI f/u        Diagnoses and all orders for this visit:    Lung nodule    Hepatic steatosis    Coronary artery calcification seen on CT scan  Comments:  CV RF modification reviewed, on statin and ASA, urged to quit smoking, healthy diet/keeping active/healthy wgt encouraged    Disease of thyroid gland  Comments:  pt requesting thyroid tests to be done, discussed TSH nml 4/22, she states she has them done every 3 mos - BW order given upon request  Orders:  -     TSH, 3rd generation with Free T4 reflex    Eye drainage  Comments:  No symptoms or abnormality on exam to suggest bacterial conjunctivitis -likely allergic - rx for patanol gtt sent, urged cool compresses and avoid rubbing eyes  Orders:  -     olopatadine (PATANOL) 0 1 % ophthalmic solution; Administer 1 drop to both eyes 2 (two) times a day as needed for allergies      Colonoscopy 12/19 - 5 yrs     CT chest lung CA screening 8/22    Mammo 6/22    Dexa 6/22 - nml     PAP s/p hysterectomy     DM labs A1C 5/22 (6 5)  DM foot exam 2/22  DM eye exam 5/22      Subjective:      Patient ID: Bobbi Rushing is a 61 y o  female  HPI Pt here for test results and f/u appt    Pt had CT lung CA screening 8/9/22 and results were reviewed with pt in detail today: 1   3 mm right lung nodule  2   Moderate to severe coronary artery calcification  3  Severe fatty liver  Pt was notified of results via phone and is here today to discuss further  Lung nodule and need for annual f/u for screening d/t her high risk smoking was reviewed  She smokes approx 5 cig a day    She is interested in quitting and has patches at home and is "working on it"  She notes a chronic cough and SOB with exertion and wheezing  Fatty liver reviewed  Pt saw GI (Dr Sacha Santiago) earlier this week - OV note reviewed  She was started on Elavil 25 mg 1 tab PO q day  She has a CT A/P scheduled for next week  She is on a statin  BMI reviewed  She does eat sweets but states "sometimes but not much"  She doesn't eat a lot of carbs  She does not eat a lot of red meat  She notes L>R eye discharge and irritation and itching  She notes no stuck shut in the am  The discharge is just in the corner of the eye and is persistent  She notes no decrease in visual acuity or pain in the eye itself          Review of Systems   Constitutional: Positive for fatigue  Negative for chills and fever  HENT: Negative for postnasal drip  Eyes: Positive for discharge and itching  Negative for pain and redness  Respiratory: Positive for cough, shortness of breath and wheezing  Cardiovascular: Negative for chest pain and palpitations  Gastrointestinal: Positive for abdominal pain and diarrhea  Negative for constipation and nausea  Genitourinary: Negative for difficulty urinating and dysuria  Musculoskeletal: Positive for arthralgias and back pain  Skin: Negative for rash and wound  Neurological: Negative for dizziness, light-headedness and headaches  Hematological: Does not bruise/bleed easily  Psychiatric/Behavioral: Positive for dysphoric mood  The patient is nervous/anxious  Objective:    /76   Pulse 86   Temp 97 9 °F (36 6 °C) (Tympanic)   LMP  (LMP Unknown)      Physical Exam  Vitals and nursing note reviewed  Constitutional:       General: She is not in acute distress  Appearance: She is well-developed  She is not ill-appearing  HENT:      Head: Normocephalic and atraumatic  Eyes:      General:         Right eye: Discharge present  Left eye: Discharge present       Extraocular Movements: Extraocular movements intact  Conjunctiva/sclera: Conjunctivae normal       Comments: White/yellow drainage in corner of eye's B/L, no conjunctiva erythema noted   Neck:      Trachea: No tracheal deviation  Cardiovascular:      Rate and Rhythm: Normal rate and regular rhythm  Heart sounds: Normal heart sounds  No murmur heard  No friction rub  Pulmonary:      Effort: Pulmonary effort is normal  No respiratory distress  Breath sounds: Normal breath sounds  No wheezing, rhonchi or rales  Abdominal:      Palpations: Abdomen is soft  Tenderness: There is abdominal tenderness  There is no guarding or rebound  Musculoskeletal:      Cervical back: Neck supple  Comments: R BKA   Skin:     General: Skin is warm  Coloration: Skin is not pale  Findings: No rash  Neurological:      Mental Status: She is alert  Motor: No abnormal muscle tone  Gait: Gait abnormal       Comments: wheelchair   Psychiatric:         Behavior: Behavior normal          Thought Content:  Thought content normal          Judgment: Judgment normal

## 2022-09-09 ENCOUNTER — EVALUATION (OUTPATIENT)
Dept: OCCUPATIONAL THERAPY | Facility: CLINIC | Age: 67
End: 2022-09-09
Payer: COMMERCIAL

## 2022-09-09 DIAGNOSIS — Z48.89 AFTERCARE FOLLOWING SURGERY: Primary | ICD-10-CM

## 2022-09-09 DIAGNOSIS — Z98.1 STATUS POST FUSION OF WRIST: ICD-10-CM

## 2022-09-09 PROCEDURE — 97165 OT EVAL LOW COMPLEX 30 MIN: CPT | Performed by: OCCUPATIONAL THERAPIST

## 2022-09-09 NOTE — PROGRESS NOTES
OT Evaluation     Today's date: 2022  Patient name: Roxanne Montano  : 1955  MRN: [id number]  Referring provider: Flower Alanis MD  Dx:   Encounter Diagnosis     ICD-10-CM    1  Aftercare following surgery  Z48 89    2  Status post fusion of wrist  Z98 1                   Assessment  Assessment details: Pt  Presents today for evaluation of digits for ROM s/p wrist fusion revision surgery  Pt  Instructed on tendon glides, joint blocking, AROM and thumb AROM exercises to be performed independently  She has good active motion of all the digits within limitations of wrist fusion and restrictions  She uses a powered W/C and utilizes the R hand for kristie stick control use  Pt  Will continue with digit ROM HEP independently, and f/u with department PRN  Impairments: abnormal or restricted ROM, lacks appropriate home exercise program and pain with function  Functional limitations: Pt  R hand is non functional due to wrist fusion restrictions  Plan  Patient would benefit from: OT eval  Planned modality interventions: thermotherapy: hydrocollator packs  Planned therapy interventions: home exercise program  Frequency: 1x week  Duration in visits: 1  Duration in weeks: 1  Plan of Care beginning date: 2022  Plan of Care expiration date: 2022        Subjective Evaluation    History of Present Illness  Mechanism of injury: 11 weeks S/P Removal of right wrist fusion plate - Right, Revision fusion of right wrist - Right, and ORIF right long finger metacarpal fracture - Right on 2022    Quality of life: good    Pain  Current pain ratin  At worst pain rating: 10  Quality: discomfort and radiating  Relieving factors: ice  Progression: improved    Social Support  Lives with: alone    Employment status: not working  Hand dominance: right    Treatments  Current treatment: occupational therapy  Patient Goals  Patient goals for therapy: decreased pain, increased motion and independence with ADLs/IADLs          Objective     Neurological Testing     Sensation     Wrist/Hand     Right   Intact: light touch    Active Range of Motion     Right Thumb   Opposition: Opposes to 5th digit    Right Digits   Flexion   Index     MCP: 60    PIP: 68    DIP: 30  Middle     MCP: 80    PIP: 60    DIP: 40    Swelling     Left Wrist/Hand   Circumference MCP: 19 4 cm    Right Wrist/Hand   Circumference MCP: 19 5 cm             Precautions: wrist fusion, NO wrist ROM,      Manuals 9/9             IE 30'                                                   Neuro Re-Ed             TGEs, AROM, jt   Blocking, thumb ROM HEP                                                                                          Ther Ex                                                                                                                     Ther Activity                                       Gait Training                                       Modalities

## 2022-09-12 ENCOUNTER — OFFICE VISIT (OUTPATIENT)
Dept: URGENT CARE | Facility: CLINIC | Age: 67
End: 2022-09-12
Payer: COMMERCIAL

## 2022-09-12 ENCOUNTER — TELEPHONE (OUTPATIENT)
Dept: FAMILY MEDICINE CLINIC | Facility: HOSPITAL | Age: 67
End: 2022-09-12

## 2022-09-12 VITALS
HEART RATE: 120 BPM | SYSTOLIC BLOOD PRESSURE: 122 MMHG | TEMPERATURE: 99.7 F | DIASTOLIC BLOOD PRESSURE: 82 MMHG | OXYGEN SATURATION: 95 % | RESPIRATION RATE: 16 BRPM

## 2022-09-12 DIAGNOSIS — R05.9 COUGH: Primary | ICD-10-CM

## 2022-09-12 PROCEDURE — 99213 OFFICE O/P EST LOW 20 MIN: CPT | Performed by: PHYSICIAN ASSISTANT

## 2022-09-12 RX ORDER — AZITHROMYCIN 250 MG/1
TABLET, FILM COATED ORAL
Qty: 6 TABLET | Refills: 0 | Status: SHIPPED | OUTPATIENT
Start: 2022-09-12 | End: 2022-09-16

## 2022-09-12 RX ORDER — VENLAFAXINE HYDROCHLORIDE 150 MG/1
CAPSULE, EXTENDED RELEASE ORAL
COMMUNITY
Start: 2022-08-31

## 2022-09-12 RX ORDER — PNV NO.95/FERROUS FUM/FOLIC AC 28MG-0.8MG
1 TABLET ORAL 2 TIMES DAILY WITH MEALS
COMMUNITY
Start: 2022-08-03

## 2022-09-12 NOTE — PROGRESS NOTES
NAME: Alessandra Nagy is a 61 y o  female  : 1955    MRN: [id number]      Assessment and Plan   Cough [R05 9]  1  Cough  azithromycin (ZITHROMAX) 250 mg tablet     will cover with z-mike but discussed close follow up with PCP and go to the ER if anything changes or worsens  She acknowledges  Patient Instructions     Patient Instructions   Azithromycin as directed  Over-the-counter cough medicines, fluids and rest  Follow-up with PCP in 1-2 days for recheck  ER if anything changes or worsens    Proceed to ER if symptoms worsen  Chief Complaint     Chief Complaint   Patient presents with    Cold Like Symptoms     Pt reports yesterday she developed a cough, b/l ears clogged, throat feels scratchy  Today temp 100 2  Tylenol at 0800  History of Present Illness   Patient with hx of COPD, fibromyalgia, HTN, hyperlipidemia, and hypothyroidism presents complaining of cough and congestion x 1 day  States she has fever, body aches, chills and fatigue as well  Reports she often gets bronchitis because of her COPD  Increase in sputum production  Denies any CP or SOB  Reports has not taken anything OTC  Review of Systems   Review of Systems   Constitutional: Positive for chills, fatigue and fever  HENT: Positive for congestion, ear pain, rhinorrhea, sinus pressure and sore throat  Respiratory: Positive for cough and chest tightness  Negative for shortness of breath, wheezing and stridor  Cardiovascular: Negative for chest pain, palpitations and leg swelling  Gastrointestinal: Negative for diarrhea, nausea and vomiting  Musculoskeletal: Negative for myalgias  Neurological: Negative for dizziness and weakness           Current Medications       Current Outpatient Medications:     albuterol (PROVENTIL HFA,VENTOLIN HFA) 90 mcg/act inhaler, Inhale 2 puffs every 4 (four) hours as needed for wheezing, Disp: , Rfl:     Allergy Relief 10 MG tablet, Take 1 tablet (10 mg total) by mouth 2 (two) times a day, Disp: 180 tablet, Rfl: 0    ascorbic acid (VITAMIN C) 500 mg tablet, TAKE ONE TABLET BY MOUTH TWICE DAILY, Disp: 56 tablet, Rfl: 1    atorvastatin (LIPITOR) 20 mg tablet, TAKE ONE TABLET BY MOUTH EVERY EVENING, Disp: 90 tablet, Rfl: 1    azithromycin (ZITHROMAX) 250 mg tablet, Take 2 tablets today then 1 tablet daily x 4 days, Disp: 6 tablet, Rfl: 0    dicyclomine (BENTYL) 20 mg tablet, Take 1 tablet (20 mg total) by mouth every 6 (six) hours, Disp: 120 tablet, Rfl: 2    fluticasone (FLONASE) 50 mcg/act nasal spray, 2 sprays into each nostril daily, Disp: 9 9 mL, Rfl: 0    fluticasone-salmeterol (Advair) 100-50 mcg/dose inhaler, Inhale 1 puff 2 (two) times a day, Disp: , Rfl:     gabapentin (NEURONTIN) 600 MG tablet, TAKE ONE TABLET BY MOUTH THREE TIMES DAILY, Disp: 90 tablet, Rfl: 2    Galcanezumab-gnlm (Emgality) 120 MG/ML SOSY, Inject 120 mg under the skin every 30 (thirty) days Once a month (Patient taking differently: Inject 120 mg under the skin every 30 (thirty) days Once a month  6/1/22 Pt reports is due for injection prior to surgery- pt reports has verified this with prescribing MD -"ok to use prior to surgery"), Disp: 1 mL, Rfl: 2    glipiZIDE (GLUCOTROL XL) 5 mg 24 hr tablet, Take 1 tablet (5 mg total) by mouth daily, Disp: 90 tablet, Rfl: 2    hydrOXYzine pamoate (VISTARIL) 50 mg capsule, Take 1 capsule (50 mg total) by mouth 3 (three) times a day as needed for itching, Disp: 90 capsule, Rfl: 1    ipratropium-albuterol (DUO-NEB) 0 5-2 5 mg/3 mL nebulizer solution, Take 3 mL by nebulization every 6 (six) hours as needed , Disp: , Rfl:     Lactobacillus Acid-Pectin (Acidophilus/Citrus Pectin) TABS, Take 1 tablet by mouth daily, Disp: , Rfl:     leflunomide (ARAVA) 10 MG tablet, TAKE 1 TABLET BY MOUTH EVERY MORNING, Disp: 28 tablet, Rfl: 1    leucovorin (WELLCOVORIN) 10 MG tablet, Take one tablet the morning after you take your methotrexate (Patient taking differently: Take one tablet the morning after you take your methotrexate 6/1/22 Pt reports has verified with prescribing MD -ok to use prior to surgery ), Disp: 12 tablet, Rfl: 3    levothyroxine 125 mcg tablet, TAKE ONE TABLET BY MOUTH DAILY, Disp: 30 tablet, Rfl: 2    lisinopril (ZESTRIL) 5 mg tablet, TAKE ONE TABLET BY MOUTH DAILY, Disp: 30 tablet, Rfl: 1    meclizine (ANTIVERT) 25 mg tablet, TAKE ONE TABLET BY MOUTH THREE TIMES DAILY AS NEEDED for dizziness, Disp: , Rfl:     metFORMIN (GLUCOPHAGE) 500 mg tablet, Take 1 tablet (500 mg total) by mouth 2 (two) times a day with meals, Disp: 180 tablet, Rfl: 2    methotrexate 2 5 MG tablet, TAKE SIX TABLETS BY MOUTH ONCE A WEEK WITH FOOD OR AFTER EATING, Disp: 72 tablet, Rfl: 1    Multiple Vitamin (Tab-A-Felicia) TABS, Take 1 tablet by mouth daily, Disp: , Rfl:     omeprazole (PriLOSEC) 40 MG capsule, , Disp: , Rfl:     pantoprazole (PROTONIX) 40 mg tablet, TAKE ONE TABLET BY MOUTH DAILY AT BEDTIME, Disp: 30 tablet, Rfl: 5    venlafaxine (EFFEXOR-XR) 150 mg 24 hr capsule, , Disp: , Rfl:     amitriptyline (ELAVIL) 25 mg tablet, Take 1 tablet (25 mg total) by mouth daily at bedtime (Patient not taking: Reported on 9/12/2022), Disp: 60 tablet, Rfl: 1    Blood Glucose Monitoring Suppl (FreeStyle Lite) w/Device KIT, Use 1 kit in the morning, Disp: 1 kit, Rfl: 0    Diclofenac Sodium (VOLTAREN) 1 %, Apply 4 g topically 4 (four) times a day, Disp: 300 g, Rfl: 6    Ferrous Sulfate (Iron) 325 (65 Fe) MG TABS, Take 1 tablet by mouth 2 (two) times a day with meals, Disp: , Rfl:     folic acid (FOLVITE) 1 mg tablet, Take 2 tablets (2 mg total) by mouth daily at bedtime, Disp: 180 tablet, Rfl: 3    glucose blood (FREESTYLE LITE) test strip, Test once daily, Disp: 100 each, Rfl: 0    glucose blood test strip, Use as instructed, Disp: 90 strip, Rfl: 0    Lancets (freestyle) lancets, Use in the morning Test once daily, Disp: 100 each, Rfl: 0    Lancets MISC, Check sugars once weekly, Disp: , Rfl:     methocarbamol (ROBAXIN) 500 mg tablet, TAKE 1 TABLET BY MOUTH THREE TIMES DAILY (Patient not taking: Reported on 9/12/2022), Disp: 90 tablet, Rfl: 1    Multiple Vitamins-Minerals (multivitamin with minerals) tablet, Take 1 tablet by mouth daily, Disp: 30 tablet, Rfl: 1    olopatadine (PATANOL) 0 1 % ophthalmic solution, Administer 1 drop to both eyes 2 (two) times a day as needed for allergies, Disp: 5 mL, Rfl: 2    ondansetron (ZOFRAN) 4 mg tablet, Take 1 tablet (4 mg total) by mouth every 8 (eight) hours as needed for nausea or vomiting, Disp: 90 tablet, Rfl: 2    oxyCODONE (Roxicodone) 5 immediate release tablet, Take 1 tablet (5 mg total) by mouth every 6 (six) hours as needed for moderate pain Max Daily Amount: 20 mg (Patient not taking: Reported on 9/12/2022), Disp: 10 tablet, Rfl: 0    triamcinolone (KENALOG) 0 1 % cream, Use as directed, Disp: , Rfl:     Current Allergies     Allergies as of 09/12/2022 - Reviewed 09/12/2022   Allergen Reaction Noted    Levaquin [levofloxacin] Hives 03/26/2016    Morphine Itching and Anaphylaxis 02/19/2015    Penicillins Anaphylaxis 03/26/2016    Cephalosporins Hives 02/19/2015    Codeine Hives 02/19/2015    Nsaids GI Bleeding 02/19/2015    Cephalexin Other (See Comments) 10/19/2020    Aspirin Hives, Other (See Comments), and Rash 02/19/2015    Chlorhexidine Rash 08/17/2018    Medical tape Rash 08/17/2018    Vancomycin Rash 02/16/2018              Past Medical History:   Diagnosis Date    Achalasia     ADHD     Anxiety     Colon polyp     COPD (chronic obstructive pulmonary disease) ([facility] [address] [facility]     Depression     Disease of thyroid gland     hypo    Esophageal spasm     Fibromyalgia     Fibromyalgia, primary     GERD (gastroesophageal reflux disease)     Hiatal hernia     History of tachycardia     6/1/22 Pt reports hx of tachycardia - no current issues at this time - reports tachycardia has been tied in with anxiety    Hyperlipidemia     Hypertension     Iron deficiency anemia     Polysubstance abuse ([facility] [address] )     Psychiatric disorder     major depressive       Past Surgical History:   Procedure Laterality Date    AMPUTATION      RBKA    ANKLE FUSION      APPENDECTOMY      BELOW KNEE LEG AMPUTATION Right     CARPAL TUNNEL RELEASE  2021    left arm    CATARACT EXTRACTION       SECTION      COLONOSCOPY  2010    COLONOSCOPY  2019    diverticula, 3 mm polyp and 6 mm polyp in rectum, grade III internal hemorrhoids, hyperplastic polyp, 5 year recall 2024     EGD  2010    FOREARM FASCIOTOMY Right     FRACTURE SURGERY      HYSTERECTOMY      s-age 44    KNEE ARTHROSCOPY Left     NERVE SURGERY Right 2022    Procedure: Right wrist posterior interosseous nerve neurectomy;  Surgeon: Yue Walsh MD;  Location: [facility];  Service: Orthopedics    ORTHOPEDIC SURGERY      MN FUSION OF WRIST JOINT Right 2022    Procedure: Total wrist fusion of the right wrist;  Surgeon: Yue Walsh MD;  Location: [facility];  Service: Orthopedics    MN FUSION OF WRIST JOINT Right 2022    Procedure: Revision fusion of right wrist;  Surgeon: Yue Walsh MD;  [facility];  Service: Orthopedics    MN OPEN Rowan Radha EA BONE Right 2022    Procedure: ORIF right long finger metacarpal fracture;  Surgeon: Yue Walsh MD;  [facility];  Service: Orthopedics    MN REMOVAL DEEP IMPLANT Right 2022    Procedure: Removal of right wrist fusion plate;  Surgeon: Yue Walsh MD;  [facility];  Service: Orthopedics    MN REVISE ULNAR NERVE AT ELBOW Left 2021    Procedure: Left carpal and cubital tunnel release;  Surgeon:  Stephanie Frost MD;  Location[facility] MAIN OR;  Service: Orthopedics    UPPER GASTROINTESTINAL ENDOSCOPY      WRIST TENDON TRANSFER N/A 2022    Procedure: Extensor pollicis longus tendon transposition right wrist;  Surgeon: Edison Scott MD;  Location:  MAIN OR;  Service: Orthopedics       Family History   Problem Relation Age of Onset    Ulcerative colitis Mother     Lung cancer Mother         Unknown age   Lety Mare Diabetes Mother     Alcohol abuse Father     Diabetes Sister     Cervical cancer Daughter 39    No Known Problems Maternal Grandmother     No Known Problems Maternal Grandfather     No Known Problems Paternal Grandmother     No Known Problems Paternal Grandfather     Cancer Brother     Testicular cancer Brother 21    Colon polyps Neg Hx     Colon cancer Neg Hx     Anesthesia problems Neg Hx          Medications have been verified  The following portions of the patient's history were reviewed and updated as appropriate: allergies, current medications, past family history, past medical history, past social history, past surgical history and problem list     Objective   /82   Pulse (!) 120   Temp 99 7 °F (37 6 °C)   Resp 16   LMP  (LMP Unknown)   SpO2 95%      Physical Exam     Physical Exam  Vitals and nursing note reviewed  Constitutional:       General: She is not in acute distress  Appearance: Normal appearance  She is not ill-appearing, toxic-appearing or diaphoretic  HENT:      Head:      Comments: TMs mildly erythematous bilaterally without injection or bulging  Posterior oropharynx is erythematous without edema, tonsillar hypertrophy or exudates   Cardiovascular:      Rate and Rhythm: Regular rhythm  Tachycardia present  Heart sounds: Normal heart sounds  Pulmonary:      Effort: Pulmonary effort is normal       Breath sounds: No stridor  Comments: B/l inspiratory and expiratory rhonchi  No wheezes or rales  No decreased or absent breath sounds,   Musculoskeletal:      Cervical back: Normal range of motion  No rigidity  Skin:     Capillary Refill: Capillary refill takes less than 2 seconds     Neurological:      Mental Status: She is alert and oriented to person, place, and time

## 2022-09-12 NOTE — TELEPHONE ENCOUNTER
Should be in office as needs pulkezia chu - she is usually a 40 min appt - if none available let her know it is an appt ONLY to address f/u from UC and not for all her chronic issues

## 2022-09-12 NOTE — TELEPHONE ENCOUNTER
LM ON OUR VM @ 1023AM    SEEN AT URGENT CARE - SUPPOSED TO FU WITH PCP WITHIN 2 DAYS    CAN THIS BE IN OFFICE OR DOES IT NEED TO BE VIRTUAL?     PLEASE ADVISE

## 2022-09-12 NOTE — PATIENT INSTRUCTIONS
Azithromycin as directed  Over-the-counter cough medicines, fluids and rest  Follow-up with PCP in 1-2 days for recheck  ER if anything changes or worsens

## 2022-09-19 ENCOUNTER — TELEPHONE (OUTPATIENT)
Dept: PSYCHIATRY | Facility: CLINIC | Age: 67
End: 2022-09-19

## 2022-09-19 ENCOUNTER — HOSPITAL ENCOUNTER (OUTPATIENT)
Dept: RADIOLOGY | Facility: HOSPITAL | Age: 67
Discharge: HOME | End: 2022-09-19
Payer: COMMERCIAL

## 2022-09-19 ENCOUNTER — TELEPHONE (OUTPATIENT)
Dept: FAMILY MEDICINE CLINIC | Facility: HOSPITAL | Age: 67
End: 2022-09-19

## 2022-09-19 DIAGNOSIS — R50.9 FEVER, UNSPECIFIED FEVER CAUSE: ICD-10-CM

## 2022-09-19 DIAGNOSIS — R05.9 COUGH: ICD-10-CM

## 2022-09-19 DIAGNOSIS — G89.29 CHRONIC PAIN OF RIGHT KNEE: ICD-10-CM

## 2022-09-19 DIAGNOSIS — M25.561 CHRONIC PAIN OF RIGHT KNEE: ICD-10-CM

## 2022-09-19 PROCEDURE — 73560 X-RAY EXAM OF KNEE 1 OR 2: CPT

## 2022-09-19 PROCEDURE — 71046 X-RAY EXAM CHEST 2 VIEWS: CPT

## 2022-09-19 NOTE — TELEPHONE ENCOUNTER
STAT CXR ordered and in Epic - she was given a rx for Azithromycin at  so I will hold on another round of abx until I see CXR results

## 2022-09-19 NOTE — TELEPHONE ENCOUNTER
FELECIA SAID SHE WAS URGENT CARE (I DON'T SEE ANY RECORD OF IT) SHE SAID THEY GAVE HER MEDICATION AND AN INHALER - SHE WANTS TO SEE DR Jenny Diaz BUT SHE HAS A 100 7 FEVER    DIDN'T KNOW HOW YOU WANTED TO HANDLE THIS

## 2022-09-19 NOTE — TELEPHONE ENCOUNTER
Patient says her leg is swollen says its "5 times bigger then normal" knee down   And she says her throat hurts

## 2022-09-19 NOTE — TELEPHONE ENCOUNTER
Advise pt take the zofran, she is currently out but she did call in for a refill that should arrive tomorrow, She did complete the CXR already

## 2022-09-19 NOTE — TELEPHONE ENCOUNTER
contacted patient off med mgmt waitlist to verify needs of services in attempts to update waitlist spoke w  patient whom stated they would like a call back at later time due to not feeling well at time of call unable to answer many questions

## 2022-09-20 ENCOUNTER — OFFICE VISIT (OUTPATIENT)
Dept: FAMILY MEDICINE CLINIC | Facility: HOSPITAL | Age: 67
End: 2022-09-20
Payer: COMMERCIAL

## 2022-09-20 VITALS
HEART RATE: 100 BPM | TEMPERATURE: 98.1 F | OXYGEN SATURATION: 93 % | SYSTOLIC BLOOD PRESSURE: 96 MMHG | DIASTOLIC BLOOD PRESSURE: 52 MMHG

## 2022-09-20 DIAGNOSIS — F17.200 SMOKING: ICD-10-CM

## 2022-09-20 DIAGNOSIS — J44.1 CHRONIC OBSTRUCTIVE PULMONARY DISEASE WITH ACUTE EXACERBATION (HCC): Primary | ICD-10-CM

## 2022-09-20 DIAGNOSIS — E11.65 UNCONTROLLED TYPE 2 DIABETES MELLITUS WITH HYPERGLYCEMIA (HCC): ICD-10-CM

## 2022-09-20 DIAGNOSIS — R21 RASH: ICD-10-CM

## 2022-09-20 PROCEDURE — 3078F DIAST BP <80 MM HG: CPT | Performed by: INTERNAL MEDICINE

## 2022-09-20 PROCEDURE — 99214 OFFICE O/P EST MOD 30 MIN: CPT | Performed by: INTERNAL MEDICINE

## 2022-09-20 PROCEDURE — 3074F SYST BP LT 130 MM HG: CPT | Performed by: INTERNAL MEDICINE

## 2022-09-20 RX ORDER — DOXYCYCLINE HYCLATE 100 MG/1
100 CAPSULE ORAL EVERY 12 HOURS SCHEDULED
Qty: 14 CAPSULE | Refills: 0 | Status: SHIPPED | OUTPATIENT
Start: 2022-09-20 | End: 2022-09-27

## 2022-09-20 RX ORDER — PREDNISONE 10 MG/1
TABLET ORAL
Qty: 39 TABLET | Refills: 0 | Status: SHIPPED | OUTPATIENT
Start: 2022-09-20 | End: 2022-10-07

## 2022-09-20 NOTE — ASSESSMENT & PLAN NOTE
Persistent cough/fever/SOB/wheezing with neg CXR - will tx for COPD exacerbation with Prednisone and Doxy 100 mg bid x 7 days, has already had 1 course of a Zpac from  as well, urged if no better OR if new/worse symptoms occur she would have to go to ED for failure of OP tx, con't current Advair and prn albuterol and nebulizer, gargles/hot tea/lozenges/fluids encourged

## 2022-09-20 NOTE — PROGRESS NOTES
Name: Bobbi Rushing      : 1955      MRN: [id number]  Encounter Provider: Cody Vaughan DO  Encounter Date: 2022   Encounter department: [facility] [address]  Chronic obstructive pulmonary disease with acute exacerbation (HCC)  Assessment & Plan:  Persistent cough/fever/SOB/wheezing with neg CXR - will tx for COPD exacerbation with Prednisone and Doxy 100 mg bid x 7 days, has already had 1 course of a Zpac from  as well, urged if no better OR if new/worse symptoms occur she would have to go to ED for failure of OP tx, con't current Advair and prn albuterol and nebulizer, gargles/hot tea/lozenges/fluids encourged    Orders:  -     predniSONE 10 mg tablet; 3 tab PO bid x 3 days then 2 tab PO bid x 3 days then 1 tab PO bid x 3 days then 1 tab PO q day x 3 days then stop  -     doxycycline hyclate (VIBRAMYCIN) 100 mg capsule; Take 1 capsule (100 mg total) by mouth every 12 (twelve) hours for 7 days    2  Uncontrolled type 2 diabetes mellitus with hyperglycemia (HCC)  Assessment & Plan:  SE of Prednisone with hyperglycemia and DM reviewed - urged to start checking sugars every day and if > 200 then increase to twice a day, call with BS > 250, watch sugars/carbs, con't current meds for now  Lab Results   Component Value Date    HGBA1C 6 5 (H) 2022         3  Smoking  Assessment & Plan:  Again urged to quit smoking and/or at least decrease significantly during acute illness noted above      4   Rash  Comments:  pt feels the rash is hives - does not look like hives at all but certainly could be and then she admittedly picks at them and they scab over, urged to avoid picking, keep clean with soap and water, steroid will help if truly hives, call with new/worse rash - may need Derm eval       Colonoscopy  - 5 yrs     CT chest lung CA screening     Mammo     Dexa  - nml     PAP s/p hysterectomy     DM labs A1C  (6 5)  DM foot exam 2/22  DM eye exam 5/22    Subjective      HPI Pt here for an acute visit    She notes fever with Tmax 100 the past week or so  She has had throat pain with swallowing  She notes some B/L ear discomfort with swallowing  She was asked if she has a ST /throat pain any other time then swallowing and she states "yes and no"  She notes no drainage from the ears  She states if she yawns/open her mouth the ears pop and symptoms improve  She is coughing but has no SOB but is wheezing  She was given a Zpack at [facility] but she noted no benefit with the rx  She had CXR and no pneumonia was noted  She is on Advair 100-50 1 puff INH bid  She has a rescue inhaler and nebulizer to use prn and is using them twice a day for a week or so  She is barely smoking with current resp symptoms  She is diabetic and checks her BS at home once a week or more if she doesn't feel well  Sugars have been around 138  She notes dark urine but notes no burning with urination/foul smell/flank pain  She states she always has a degree to abd discomfort  She is on Protonix and Bentyl  She does f/u with GI  She has had some LLE edema for about a week  She states she has had intermittent LE edema since she had surgery 20 yrs ago  She notes swelling is persistent and not better in the am   She notes no L calf pain/redness/warmth          Review of Systems    Current Outpatient Medications on File Prior to Visit   Medication Sig    albuterol (PROVENTIL HFA,VENTOLIN HFA) 90 mcg/act inhaler Inhale 2 puffs every 4 (four) hours as needed for wheezing    Allergy Relief 10 MG tablet Take 1 tablet (10 mg total) by mouth 2 (two) times a day    amitriptyline (ELAVIL) 25 mg tablet Take 1 tablet (25 mg total) by mouth daily at bedtime (Patient not taking: Reported on 9/12/2022)    ascorbic acid (VITAMIN C) 500 mg tablet TAKE ONE TABLET BY MOUTH TWICE DAILY    atorvastatin (LIPITOR) 20 mg tablet TAKE ONE TABLET BY MOUTH EVERY EVENING    benzonatate (DENNISE PERLES) 100 mg capsule Take 1 capsule (100 mg total) by mouth 3 (three) times a day as needed for cough    Blood Glucose Monitoring Suppl (FreeStyle Lite) w/Device KIT Use 1 kit in the morning    Diclofenac Sodium (VOLTAREN) 1 % Apply 4 g topically 4 (four) times a day    dicyclomine (BENTYL) 20 mg tablet Take 1 tablet (20 mg total) by mouth every 6 (six) hours    Ferrous Sulfate (Iron) 325 (65 Fe) MG TABS Take 1 tablet by mouth 2 (two) times a day with meals    fluticasone (FLONASE) 50 mcg/act nasal spray 2 sprays into each nostril daily    fluticasone-salmeterol (Advair) 100-50 mcg/dose inhaler Inhale 1 puff 2 (two) times a day    folic acid (FOLVITE) 1 mg tablet Take 2 tablets (2 mg total) by mouth daily at bedtime    gabapentin (NEURONTIN) 600 MG tablet TAKE ONE TABLET BY MOUTH THREE TIMES DAILY    Galcanezumab-gnlm (Emgality) 120 MG/ML SOSY Inject 120 mg under the skin every 30 (thirty) days Once a month (Patient taking differently: Inject 120 mg under the skin every 30 (thirty) days Once a month   6/1/22 Pt reports is due for injection prior to surgery- pt reports has verified this with prescribing MD -"ok to use prior to surgery")    glipiZIDE (GLUCOTROL XL) 5 mg 24 hr tablet Take 1 tablet (5 mg total) by mouth daily    glucose blood (FREESTYLE LITE) test strip Test once daily    glucose blood test strip Use as instructed    hydrOXYzine pamoate (VISTARIL) 50 mg capsule Take 1 capsule (50 mg total) by mouth 3 (three) times a day as needed for itching    ipratropium-albuterol (DUO-NEB) 0 5-2 5 mg/3 mL nebulizer solution Take 3 mL by nebulization every 6 (six) hours as needed     Lactobacillus Acid-Pectin (Acidophilus/Citrus Pectin) TABS Take 1 tablet by mouth daily    Lancets (freestyle) lancets Use in the morning Test once daily    Lancets MISC Check sugars once weekly    leflunomide (ARAVA) 10 MG tablet TAKE 1 TABLET BY MOUTH EVERY MORNING    leucovorin (WELLCOVORIN) 10 MG tablet Take one tablet the morning after you take your methotrexate (Patient taking differently: Take one tablet the morning after you take your methotrexate  6/1/22 Pt reports has verified with prescribing MD -ok to use prior to surgery )    levothyroxine 125 mcg tablet TAKE ONE TABLET BY MOUTH DAILY    lisinopril (ZESTRIL) 5 mg tablet TAKE ONE TABLET BY MOUTH DAILY    meclizine (ANTIVERT) 25 mg tablet TAKE ONE TABLET BY MOUTH THREE TIMES DAILY AS NEEDED for dizziness    metFORMIN (GLUCOPHAGE) 500 mg tablet Take 1 tablet (500 mg total) by mouth 2 (two) times a day with meals    methocarbamol (ROBAXIN) 500 mg tablet TAKE 1 TABLET BY MOUTH THREE TIMES DAILY (Patient not taking: Reported on 9/12/2022)    methotrexate 2 5 MG tablet TAKE SIX TABLETS BY MOUTH ONCE A WEEK WITH FOOD OR AFTER EATING    Multiple Vitamin (Tab-A-Felicia) TABS Take 1 tablet by mouth daily    Multiple Vitamins-Minerals (multivitamin with minerals) tablet Take 1 tablet by mouth daily    olopatadine (PATANOL) 0 1 % ophthalmic solution Administer 1 drop to both eyes 2 (two) times a day as needed for allergies    ondansetron (ZOFRAN) 4 mg tablet Take 1 tablet (4 mg total) by mouth every 8 (eight) hours as needed for nausea or vomiting    oxyCODONE (Roxicodone) 5 immediate release tablet Take 1 tablet (5 mg total) by mouth every 6 (six) hours as needed for moderate pain Max Daily Amount: 20 mg (Patient not taking: Reported on 9/12/2022)    pantoprazole (PROTONIX) 40 mg tablet TAKE ONE TABLET BY MOUTH DAILY AT BEDTIME    triamcinolone (KENALOG) 0 1 % cream Use as directed    venlafaxine (EFFEXOR-XR) 150 mg 24 hr capsule     [DISCONTINUED] omeprazole (PriLOSEC) 40 MG capsule        Objective     BP 96/52   Pulse 100   Temp 98 1 °F (36 7 °C) (Tympanic)   LMP  (LMP Unknown)   SpO2 93%     Physical Exam  Tonia Rosado DO

## 2022-09-20 NOTE — ASSESSMENT & PLAN NOTE
SE of Prednisone with hyperglycemia and DM reviewed - urged to start checking sugars every day and if > 200 then increase to twice a day, call with BS > 250, watch sugars/carbs, con't current meds for now  Lab Results   Component Value Date    HGBA1C 6 5 (H) 05/23/2022

## 2022-09-22 DIAGNOSIS — G43.109 MIGRAINE WITH AURA AND WITHOUT STATUS MIGRAINOSUS, NOT INTRACTABLE: ICD-10-CM

## 2022-09-22 RX ORDER — GALCANEZUMAB 120 MG/ML
120 INJECTION, SOLUTION SUBCUTANEOUS
Qty: 1 ML | Refills: 2 | Status: SHIPPED | OUTPATIENT
Start: 2022-09-22

## 2022-09-22 NOTE — TELEPHONE ENCOUNTER
Requested medication(s) are due for refill today: Yes  Patient has already received a courtesy refill: No  Other reason request has been forwarded to provider: Pt is unable to get to the neurologist  She has to find one locally, last time she saw her neurologist was a couple months ago

## 2022-09-26 ENCOUNTER — TELEPHONE (OUTPATIENT)
Dept: FAMILY MEDICINE CLINIC | Facility: HOSPITAL | Age: 67
End: 2022-09-26

## 2022-09-26 ENCOUNTER — OFFICE VISIT (OUTPATIENT)
Dept: RHEUMATOLOGY | Facility: CLINIC | Age: 67
End: 2022-09-26
Payer: COMMERCIAL

## 2022-09-26 VITALS
HEART RATE: 76 BPM | DIASTOLIC BLOOD PRESSURE: 62 MMHG | HEIGHT: 66 IN | BODY MASS INDEX: 28.25 KG/M2 | OXYGEN SATURATION: 95 % | SYSTOLIC BLOOD PRESSURE: 90 MMHG

## 2022-09-26 DIAGNOSIS — M13.0 POLYARTHRITIS: Primary | ICD-10-CM

## 2022-09-26 DIAGNOSIS — M19.90 ARTHRITIS: ICD-10-CM

## 2022-09-26 PROCEDURE — 99214 OFFICE O/P EST MOD 30 MIN: CPT | Performed by: INTERNAL MEDICINE

## 2022-09-26 RX ORDER — FOLIC ACID 1 MG/1
2 TABLET ORAL
Qty: 180 TABLET | Refills: 3 | Status: SHIPPED | OUTPATIENT
Start: 2022-09-26 | End: 2022-12-25

## 2022-09-26 RX ORDER — NORTRIPTYLINE HYDROCHLORIDE 10 MG/1
10 CAPSULE ORAL
Qty: 90 CAPSULE | Refills: 2 | Status: SHIPPED | OUTPATIENT
Start: 2022-09-26

## 2022-09-26 RX ORDER — LEFLUNOMIDE 10 MG/1
10 TABLET ORAL EVERY MORNING
Qty: 28 TABLET | Refills: 1 | Status: SHIPPED | OUTPATIENT
Start: 2022-09-26

## 2022-09-26 RX ORDER — LEUCOVORIN CALCIUM 10 MG/1
TABLET ORAL
Qty: 12 TABLET | Refills: 3 | Status: SHIPPED | OUTPATIENT
Start: 2022-09-26

## 2022-09-26 NOTE — PROGRESS NOTES
Assessment and Plan:   RA--continue MTX weekly and folic acid and leucovorin  Patient to have labs including LFTs every 12 weeks  Anti-inflammatory diet/exercise  Continue Arava 10mg daily  F/u with dermatology  Nortriptyline at bedtime for FM and hyperalgesia  Repeat labs and f/u in 4 mos  or sooner if necessary      Rheumatic Disease Summary  As above    Still with AM stiffness of her hands  Also with paresthesias of her hands   No f/c/ns  All previous labs reviewed in detail with patient  She continues to f/u with Dr Alegria  The following portions of the patient's history were reviewed and updated as appropriate: allergies, current medications, past family history, past medical history, past social history, past surgical history and problem list     Review of Systems:   Review of Systems   Constitutional: Negative for fatigue  HENT: Negative for mouth sores  Eyes: Negative for pain  Respiratory: Negative for shortness of breath  Cardiovascular: Negative for leg swelling  Musculoskeletal: Positive for arthralgias and myalgias  Negative for joint swelling  Skin: Negative for rash  Neurological: Negative for weakness  Hematological: Negative for adenopathy  Psychiatric/Behavioral: Negative for sleep disturbance         Home Medications:    Current Outpatient Medications:     albuterol (PROVENTIL HFA,VENTOLIN HFA) 90 mcg/act inhaler, Inhale 2 puffs every 4 (four) hours as needed for wheezing, Disp: , Rfl:     Allergy Relief 10 MG tablet, Take 1 tablet (10 mg total) by mouth 2 (two) times a day, Disp: 180 tablet, Rfl: 0    ascorbic acid (VITAMIN C) 500 mg tablet, TAKE ONE TABLET BY MOUTH TWICE DAILY, Disp: 56 tablet, Rfl: 1    atorvastatin (LIPITOR) 20 mg tablet, TAKE ONE TABLET BY MOUTH EVERY EVENING, Disp: 90 tablet, Rfl: 1    benzonatate (TESSALON PERLES) 100 mg capsule, Take 1 capsule (100 mg total) by mouth 3 (three) times a day as needed for cough, Disp: 30 capsule, Rfl: 0    Blood Glucose Monitoring Suppl (FreeStyle Lite) w/Device KIT, Use 1 kit in the morning, Disp: 1 kit, Rfl: 0    Blood Pressure Monitoring (Blood Pressure Cuff) MISC, Use daily as needed (dizziness), Disp: 1 each, Rfl: 0    dicyclomine (BENTYL) 20 mg tablet, Take 1 tablet (20 mg total) by mouth every 6 (six) hours, Disp: 120 tablet, Rfl: 2    doxycycline hyclate (VIBRAMYCIN) 100 mg capsule, Take 1 capsule (100 mg total) by mouth every 12 (twelve) hours for 7 days, Disp: 14 capsule, Rfl: 0    fluticasone (FLONASE) 50 mcg/act nasal spray, 2 sprays into each nostril daily, Disp: 9 9 mL, Rfl: 0    fluticasone-salmeterol (Advair) 100-50 mcg/dose inhaler, Inhale 1 puff 2 (two) times a day, Disp: , Rfl:     folic acid (FOLVITE) 1 mg tablet, Take 2 tablets (2 mg total) by mouth daily at bedtime, Disp: 180 tablet, Rfl: 3    gabapentin (NEURONTIN) 600 MG tablet, TAKE ONE TABLET BY MOUTH THREE TIMES DAILY, Disp: 90 tablet, Rfl: 2    Galcanezumab-gnlm (Emgality) 120 MG/ML SOSY, Inject 120 mg under the skin every 30 (thirty) days Once a month, Disp: 1 mL, Rfl: 2    glipiZIDE (GLUCOTROL XL) 5 mg 24 hr tablet, Take 1 tablet (5 mg total) by mouth daily, Disp: 90 tablet, Rfl: 2    glucose blood (FREESTYLE LITE) test strip, Test once daily, Disp: 100 each, Rfl: 0    glucose blood test strip, Use as instructed, Disp: 90 strip, Rfl: 0    hydrOXYzine pamoate (VISTARIL) 50 mg capsule, Take 1 capsule (50 mg total) by mouth 3 (three) times a day as needed for itching, Disp: 90 capsule, Rfl: 1    ipratropium-albuterol (DUO-NEB) 0 5-2 5 mg/3 mL nebulizer solution, Take 3 mL by nebulization every 6 (six) hours as needed , Disp: , Rfl:     Lactobacillus Acid-Pectin (Acidophilus/Citrus Pectin) TABS, Take 1 tablet by mouth daily, Disp: , Rfl:     Lancets (freestyle) lancets, Use in the morning Test once daily, Disp: 100 each, Rfl: 0    Lancets MISC, Check sugars once weekly, Disp: , Rfl:     leflunomide (ARAVA) 10 MG tablet, Take 1 tablet (10 mg total) by mouth every morning, Disp: 28 tablet, Rfl: 1    leucovorin (WELLCOVORIN) 10 MG tablet, Take one tablet the morning after you take your methotrexate, Disp: 12 tablet, Rfl: 3    levothyroxine 125 mcg tablet, TAKE ONE TABLET BY MOUTH DAILY, Disp: 30 tablet, Rfl: 2    lisinopril (ZESTRIL) 5 mg tablet, TAKE ONE TABLET BY MOUTH DAILY, Disp: 30 tablet, Rfl: 1    metFORMIN (GLUCOPHAGE) 500 mg tablet, Take 1 tablet (500 mg total) by mouth 2 (two) times a day with meals, Disp: 180 tablet, Rfl: 2    methocarbamol (ROBAXIN) 500 mg tablet, TAKE 1 TABLET BY MOUTH THREE TIMES DAILY, Disp: 90 tablet, Rfl: 1    methotrexate 2 5 MG tablet, TAKE SIX TABLETS BY MOUTH ONCE A WEEK WITH FOOD OR AFTER EATING, Disp: 72 tablet, Rfl: 1    Multiple Vitamin (Tab-A-Felicia) TABS, Take 1 tablet by mouth daily, Disp: , Rfl:     Multiple Vitamins-Minerals (multivitamin with minerals) tablet, Take 1 tablet by mouth daily, Disp: 30 tablet, Rfl: 1    nortriptyline (PAMELOR) 10 mg capsule, Take 1 capsule (10 mg total) by mouth daily at bedtime, Disp: 90 capsule, Rfl: 2    olopatadine (PATANOL) 0 1 % ophthalmic solution, Administer 1 drop to both eyes 2 (two) times a day as needed for allergies, Disp: 5 mL, Rfl: 2    ondansetron (ZOFRAN) 4 mg tablet, Take 1 tablet (4 mg total) by mouth every 8 (eight) hours as needed for nausea or vomiting, Disp: 90 tablet, Rfl: 2    pantoprazole (PROTONIX) 40 mg tablet, TAKE ONE TABLET BY MOUTH DAILY AT BEDTIME, Disp: 30 tablet, Rfl: 5    predniSONE 10 mg tablet, 3 tab PO bid x 3 days then 2 tab PO bid x 3 days then 1 tab PO bid x 3 days then 1 tab PO q day x 3 days then stop, Disp: 39 tablet, Rfl: 0    venlafaxine (EFFEXOR-XR) 150 mg 24 hr capsule, , Disp: , Rfl:     Diclofenac Sodium (VOLTAREN) 1 %, Apply 4 g topically 4 (four) times a day, Disp: 300 g, Rfl: 6    Ferrous Sulfate (Iron) 325 (65 Fe) MG TABS, Take 1 tablet by mouth 2 (two) times a day with meals, Disp: , Rfl:   meclizine (ANTIVERT) 25 mg tablet, TAKE ONE TABLET BY MOUTH THREE TIMES DAILY AS NEEDED for dizziness (Patient not taking: Reported on 9/26/2022), Disp: , Rfl:     oxyCODONE (Roxicodone) 5 immediate release tablet, Take 1 tablet (5 mg total) by mouth every 6 (six) hours as needed for moderate pain Max Daily Amount: 20 mg (Patient not taking: No sig reported), Disp: 10 tablet, Rfl: 0    triamcinolone (KENALOG) 0 1 % cream, Use as directed (Patient not taking: Reported on 9/26/2022), Disp: , Rfl:     Objective:    Vitals:    09/26/22 1042   BP: 90/62   Pulse: 76   SpO2: 95%   Height: 5' 6" (1 676 m)       Physical Exam  Constitutional:       General: She is not in acute distress  HENT:      Head: Normocephalic and atraumatic  Eyes:      Conjunctiva/sclera: Conjunctivae normal    Cardiovascular:      Rate and Rhythm: Normal rate and regular rhythm  Heart sounds: S1 normal and S2 normal      No friction rub  Pulmonary:      Effort: Pulmonary effort is normal  No respiratory distress  Breath sounds: Normal breath sounds  No wheezing, rhonchi or rales  Musculoskeletal:         General: Tenderness present  Right wrist: Tenderness present  Decreased range of motion  Right hand: Tenderness present  Left hand: Tenderness present  Cervical back: Neck supple  Skin:     Coloration: Skin is not pale  Neurological:      Mental Status: She is alert  Mental status is at baseline  Psychiatric:         Mood and Affect: Mood normal          Behavior: Behavior normal          Reviewed labs and imaging  Imaging:   XR chest pa & lateral    Result Date: 9/19/2022  Narrative: CHEST INDICATION:   R05 9: Cough, unspecified R50 9: Fever, unspecified  COMPARISON:  3/27/2016 EXAM PERFORMED/VIEWS:  XR CHEST PA & LATERAL Images: 4 FINDINGS: Cardiomediastinal silhouette appears unremarkable  Linear scarring lateral left lung base, unchanged The lungs are otherwise clear    No pneumothorax or pleural effusion  Osseous structures appear within normal limits for patient age  Impression: No acute cardiopulmonary disease  Findings are stable Workstation performed: [id number]     XR wrist 3+ vw right    Result Date: 9/1/2022  Narrative: RIGHT WRIST INDICATION:   Z09: Encounter for follow-up examination after completed treatment for conditions other than malignant neoplasm  COMPARISON:  7/18/2022 VIEWS:  XR WRIST 3+ VW RIGHT FINDINGS: Status post ORIF for a right 3rd metacarpal fracture without hardware complication  Fracture line remains visualized  Fixation staples x2 noted across the radiocarpal joint  Distal radius pain tracks noted after hardware removal  Prominent degenerative changes noted throughout the wrist but particularly involving the 1st CMC  No lytic or blastic osseous lesion  Soft tissues are unremarkable  Impression: Stable postoperative alignment without evidence of hardware complication  Workstation performed: [id number]     XR knee 1 or 2 vw right    Result Date: 9/23/2022  Narrative: RIGHT KNEE INDICATION:   M25 561: Pain in right knee G89 29: Other chronic pain  COMPARISON:  Compared 12/6/2005 VIEWS:  XR KNEE 1 OR 2 VW RIGHT FINDINGS: Below knee amputation  There is no acute fracture or dislocation  There is no joint effusion  Mild osteoarthritis with small osteophytes seen  No lytic or blastic osseous lesion  Soft tissues are unremarkable  Impression: No acute osseous abnormality   Workstation performed: [id number]       Labs:   Office Visit on 07/07/2022   Component Date Value Ref Range Status    Glucose, Random 08/10/2022 158 (A) 65 - 99 mg/dL Final    BUN 08/10/2022 21  8 - 27 mg/dL Final    Creatinine 08/10/2022 0 90  0 57 - 1 00 mg/dL Final    eGFR 08/10/2022 71  >59 mL/min/1 73 Final    SL AMB BUN/CREATININE RATIO 08/10/2022 23  12 - 28 Final    Sodium 08/10/2022 140  134 - 144 mmol/L Final    Potassium 08/10/2022 4 4  3 5 - 5 2 mmol/L Final    Chloride 08/10/2022 97  96 - 106 mmol/L Final    CO2 08/10/2022 24  20 - 29 mmol/L Final    CALCIUM 08/10/2022 9 3  8 7 - 10 3 mg/dL Final   Admission on 06/14/2022, Discharged on 06/14/2022   Component Date Value Ref Range Status    POC Glucose 06/14/2022 131  65 - 140 mg/dl Final    Anaerobic Culture 06/14/2022 No growth   Final    Fungus (Mycology) Culture 06/14/2022 No Fungus Isolated at 4 Weeks   Final    Tissue Culture 06/14/2022 No growth   Final    Gram Stain Result 06/14/2022 No Polys or Bacteria seen   Final    Case Report 06/14/2022    Final                    Value:Surgical Pathology Report                         Case: [id number]                                   Authorizing Provider:  Roseline Weinstein MD      Collected:           06/14/2022 1450              Ordering Location:     [address] [place] [address]      Received:            06/17/2022 [address] [place] [address] Operating Room                                                      Pathologist:           Mando Webb                                                                 Specimen:    Bone, right wrist culture                                                                  Final Diagnosis 06/14/2022    Final                    Value: This result contains rich text formatting which cannot be displayed here   Additional Information 06/14/2022    Final                    Value: This result contains rich text formatting which cannot be displayed here  Stuart Abt Gross Description 06/14/2022    Final                    Value: This result contains rich text formatting which cannot be displayed here      AFB Culture 06/14/2022 No AFB Isolated at 6 Weeks   Final    AFB Stain 06/14/2022 No acid fast bacilli seen   Final    Anaerobic Culture 06/14/2022 No growth   Final    Fungus (Mycology) Culture 06/14/2022 No Fungus Isolated at 4 Weeks   Final    Tissue Culture 06/14/2022 No growth   Final    Gram Stain Result 06/14/2022 No Polys or Bacteria seen   Final    AFB Culture 06/14/2022 No AFB Isolated at 6 Weeks   Final    AFB Stain 06/14/2022 No acid fast bacilli seen   Final   Office Visit on 05/23/2022   Component Date Value Ref Range Status    White Blood Cell Count 06/02/2022 8 6  3 4 - 10 8 x10E3/uL Final    Red Blood Cell Count 06/02/2022 4 78  3 77 - 5 28 x10E6/uL Final    Hemoglobin 06/02/2022 13 8  11 1 - 15 9 g/dL Final    HCT 06/02/2022 42 4  34 0 - 46 6 % Final    MCV 06/02/2022 89  79 - 97 fL Final    MCH 06/02/2022 28 9  26 6 - 33 0 pg Final    MCHC 06/02/2022 32 5  31 5 - 35 7 g/dL Final    RDW 06/02/2022 14 3  11 7 - 15 4 % Final    Platelet Count 10/04/7580 246  150 - 450 x10E3/uL Final   Appointment on 05/23/2022   Component Date Value Ref Range Status    Sodium 05/23/2022 139  136 - 145 mmol/L Final    Potassium 05/23/2022 4 1  3 5 - 5 3 mmol/L Final    Chloride 05/23/2022 102  100 - 108 mmol/L Final    CO2 05/23/2022 35 (A) 21 - 32 mmol/L Final    ANION GAP 05/23/2022 2 (A) 4 - 13 mmol/L Final    BUN 05/23/2022 12  5 - 25 mg/dL Final    Creatinine 05/23/2022 0 81  0 60 - 1 30 mg/dL Final    Standardized to IDMS reference method    Glucose, Fasting 05/23/2022 117 (A) 65 - 99 mg/dL Final    Specimen collection should occur prior to Sulfasalazine administration due to the potential for falsely depressed results  Specimen collection should occur prior to Sulfapyridine administration due to the potential for falsely elevated results   Calcium 05/23/2022 9 4  8 3 - 10 1 mg/dL Final    AST 05/23/2022 27  5 - 45 U/L Final    Specimen collection should occur prior to Sulfasalazine administration due to the potential for falsely depressed results   ALT 05/23/2022 30  12 - 78 U/L Final    Specimen collection should occur prior to Sulfasalazine and/or Sulfapyridine administration due to the potential for falsely depressed results       Alkaline Phosphatase 05/23/2022 107  46 - 116 U/L Final    Total Protein 05/23/2022 7 7  6 4 - 8 2 g/dL Final    Albumin 05/23/2022 3 6  3 5 - 5 0 g/dL Final    Total Bilirubin 05/23/2022 0 35  0 20 - 1 00 mg/dL Final    Use of this assay is not recommended for patients undergoing treatment with eltrombopag due to the potential for falsely elevated results   eGFR 05/23/2022 76  ml/min/1 73sq m Final    Hemoglobin A1C 05/23/2022 6 5 (A) Normal 3 8-5 6%; PreDiabetic 5 7-6 4%;  Diabetic >=6 5%; Glycemic control for adults with diabetes <7 0% % Final    EAG 05/23/2022 140  mg/dl Final    Hepatitis C Ab 05/23/2022 Non-reactive  Non-reactive Final   Orders Only on 05/18/2022   Component Date Value Ref Range Status    Right Eye Diabetic Retinopathy 05/18/2022 None   Final    Left Eye Diabetic Retinopathy 05/18/2022 None   Final   Office Visit on 05/06/2022   Component Date Value Ref Range Status    SARS-CoV-2 05/06/2022 Negative  Negative Final    INFLUENZA A PCR 05/06/2022 Negative  Negative Final    INFLUENZA B PCR 05/06/2022 Negative  Negative Final   Office Visit on 03/04/2022   Component Date Value Ref Range Status    LEUKOCYTE ESTERASE,UA 03/04/2022 neg   Final    NITRITE,UA 03/04/2022 +   Final    SL AMB POCT UROBILINOGEN 03/04/2022 neg   Final    POCT URINE PROTEIN 03/04/2022 neg   Final     PH,UA 03/04/2022 6 0   Final    BLOOD,UA 03/04/2022 neg   Final    SPECIFIC GRAVITY,UA 03/04/2022 1 030   Final    KETONES,UA 03/04/2022 neg   Final    BILIRUBIN,UA 03/04/2022 neg   Final    GLUCOSE, UA 03/04/2022 neg   Final     COLOR,UA 03/04/2022 yellow   Final    CLARITY,UA 03/04/2022 clear   Final    TSH 04/20/2022 2 870  0 450 - 4 500 uIU/mL Final    Urine Culture Result 03/04/2022 Final report (A)  Final    Result 1 03/04/2022 Escherichia coli (A)  Final    Comment: Cefazolin <=4 ug/mL  Cefazolin with an JOHN <=16 predicts susceptibility to the oral agents  cefaclor, cefdinir, cefpodoxime, cefprozil, cefuroxime, cephalexin,  and loracarbef when used for therapy of uncomplicated urinary tract  infections due to E  coli, Klebsiella pneumoniae, and Proteus  mirabilis    Greater than 100,000 colony forming units per mL      SL AMB ANTIMICROBIAL SUSCEPTIBILITY 03/04/2022 Comment   Final    Comment:       ** S = Susceptible; I = Intermediate; R = Resistant **                     P = Positive; N = Negative              MICS are expressed in micrograms per mL     Antibiotic                 RSLT#1    RSLT#2    RSLT#3    RSLT#4  Amoxicillin/Clavulanic Acid    S  Ampicillin                     R  Cefepime                       S  Ceftriaxone                    S  Cefuroxime                     S  Ciprofloxacin                  S  Ertapenem                      S  Gentamicin                     R  Imipenem                       S  Levofloxacin                   S  Meropenem                      S  Nitrofurantoin                 S  Piperacillin/Tazobactam        S  Tetracycline                   R  Tobramycin                     I  Trimethoprim/Sulfa             R     Office Visit on 02/03/2022   Component Date Value Ref Range Status    White Blood Cell Count 02/18/2022 9 7  3 4 - 10 8 x10E3/uL Final    Red Blood Cell Count 02/18/2022 5 01  3 77 - 5 28 x10E6/uL Final    Hemoglobin 02/18/2022 14 1  11 1 - 15 9 g/dL Final    HCT 02/18/2022 45 1  34 0 - 46 6 % Final    MCV 02/18/2022 90  79 - 97 fL Final    MCH 02/18/2022 28 1  26 6 - 33 0 pg Final    MCHC 02/18/2022 31 3 (A) 31 5 - 35 7 g/dL Final    RDW 02/18/2022 17 0 (A) 11 7 - 15 4 % Final    Platelet Count 10/58/0154 319  150 - 450 x10E3/uL Final    Neutrophils 02/18/2022 66  Not Estab  % Final    Lymphocytes 02/18/2022 20  Not Estab  % Final    Monocytes 02/18/2022 7  Not Estab  % Final    Eosinophils 02/18/2022 5  Not Estab  % Final    Basophils PCT 02/18/2022 1  Not Estab  % Final    Neutrophils (Absolute) 02/18/2022 6 5  1 4 - 7 0 x10E3/uL Final    Lymphocytes (Absolute) 02/18/2022 2 0  0 7 - 3 1 x10E3/uL Final    Monocytes (Absolute) 02/18/2022 0 6  0 1 - 0 9 x10E3/uL Final    Eosinophils (Absolute) 02/18/2022 0 5 (A) 0 0 - 0 4 x10E3/uL Final    Basophils ABS 02/18/2022 0 1  0 0 - 0 2 x10E3/uL Final    Immature Granulocytes 02/18/2022 1  Not Estab  % Final    Immature Granulocytes (Absolute) 02/18/2022 0 1  0 0 - 0 1 x10E3/uL Final    Glucose, Random 02/18/2022 107 (A) 65 - 99 mg/dL Final    BUN 02/18/2022 10  8 - 27 mg/dL Final    Creatinine 02/18/2022 0 68  0 57 - 1 00 mg/dL Final    eGFR Non  02/18/2022 93  >59 mL/min/1 73 Final    eGFR  02/18/2022 107  >59 mL/min/1 73 Final    Comment: **In accordance with recommendations from the NKF-ASN Task force,**    [facility] is in the process of updating its eGFR calculation to the    2021 CKD-EPI creatinine equation that estimates kidney function    without a race variable   SL AMB BUN/CREATININE RATIO 02/18/2022 15  12 - 28 Final    Sodium 02/18/2022 141  134 - 144 mmol/L Final    Potassium 02/18/2022 4 0  3 5 - 5 2 mmol/L Final    Chloride 02/18/2022 100  96 - 106 mmol/L Final    CO2 02/18/2022 27  20 - 29 mmol/L Final    CALCIUM 02/18/2022 9 2  8 7 - 10 3 mg/dL Final    Protein, Total 02/18/2022 7 0  6 0 - 8 5 g/dL Final    Albumin 02/18/2022 4 2  3 8 - 4 8 g/dL Final    Globulin, Total 02/18/2022 2 8  1 5 - 4 5 g/dL Final    Albumin/Globulin Ratio 02/18/2022 1 5  1 2 - 2 2 Final    TOTAL BILIRUBIN 02/18/2022 0 4  0 0 - 1 2 mg/dL Final    Alk Phos Isoenzymes 02/18/2022 115  44 - 121 IU/L Final    AST 02/18/2022 41 (A) 0 - 40 IU/L Final    ALT 02/18/2022 49 (A) 0 - 32 IU/L Final    Cholesterol, Total 02/18/2022 146  100 - 199 mg/dL Final    Triglycerides 02/18/2022 131  0 - 149 mg/dL Final    HDL 02/18/2022 38 (A) >39 mg/dL Final    VLDL Cholesterol Calculated 02/18/2022 23  5 - 40 mg/dL Final    LDL Calculated 02/18/2022 85  0 - 99 mg/dL Final    T   Chol/HDL Ratio 02/18/2022 3 8  0 0 - 4 4 ratio Final Comment:                                   T  Chol/HDL Ratio                                              Men  Women                                1/2 Avg  Risk  3 4    3 3                                    Avg Risk  5 0    4 4                                 2X Avg  Risk  9 6    7 1                                 3X Avg  Risk 23 4   11 0      TSH 02/18/2022 0 084 (A) 0 450 - 4 500 uIU/mL Final    Hemoglobin A1C 02/18/2022 7 3 (A) 4 8 - 5 6 % Final    Comment:          Prediabetes: 5 7 - 6 4           Diabetes: >6 4           Glycemic control for adults with diabetes: <7 0      Estimated Average Glucose 02/18/2022 163  mg/dL Final   Admission on 01/20/2022, Discharged on 01/20/2022   Component Date Value Ref Range Status    POC Glucose 01/20/2022 117  65 - 140 mg/dl Final    POC Glucose 01/20/2022 138  65 - 140 mg/dl Final

## 2022-09-26 NOTE — TELEPHONE ENCOUNTER
Can't order anything for a "bump on the lip" if I didn't evaluate it and know what it is    BP cuff order placed

## 2022-09-26 NOTE — PATIENT INSTRUCTIONS
Continue the methotrexate every week  Take the folic acid every day and the leucovorin the morning after the methotrexate  Please have your blood work done every 3 months  Continue to eat healthy  Use Vaseline to keep your hands moisturized

## 2022-09-26 NOTE — TELEPHONE ENCOUNTER
Patient wants to know if I can put an order in for a bp cuff? Also - patient says she has a bump on her bottom lip and wants to know if you can order something for it? She says her whole bottom lip hurts  [facility]

## 2022-10-07 ENCOUNTER — OFFICE VISIT (OUTPATIENT)
Dept: FAMILY MEDICINE CLINIC | Facility: HOSPITAL | Age: 67
End: 2022-10-07
Payer: COMMERCIAL

## 2022-10-07 VITALS — DIASTOLIC BLOOD PRESSURE: 80 MMHG | SYSTOLIC BLOOD PRESSURE: 108 MMHG | TEMPERATURE: 98.8 F | HEART RATE: 108 BPM

## 2022-10-07 DIAGNOSIS — R42 DIZZINESS: ICD-10-CM

## 2022-10-07 DIAGNOSIS — I10 PRIMARY HYPERTENSION: ICD-10-CM

## 2022-10-07 DIAGNOSIS — R05.9 COUGH: ICD-10-CM

## 2022-10-07 DIAGNOSIS — R07.0 THROAT PAIN IN ADULT: Primary | ICD-10-CM

## 2022-10-07 PROCEDURE — 99214 OFFICE O/P EST MOD 30 MIN: CPT | Performed by: INTERNAL MEDICINE

## 2022-10-07 RX ORDER — FLUTICASONE PROPIONATE 50 MCG
2 SPRAY, SUSPENSION (ML) NASAL DAILY
Qty: 16 G | Refills: 5 | Status: SHIPPED | OUTPATIENT
Start: 2022-10-07 | End: 2022-10-07 | Stop reason: SDUPTHER

## 2022-10-07 RX ORDER — FAMOTIDINE 20 MG/1
20 TABLET, FILM COATED ORAL 2 TIMES DAILY
Qty: 60 TABLET | Refills: 2 | Status: SHIPPED | OUTPATIENT
Start: 2022-10-07

## 2022-10-07 RX ORDER — BENZONATATE 100 MG/1
100 CAPSULE ORAL 3 TIMES DAILY PRN
Qty: 30 CAPSULE | Refills: 0 | Status: SHIPPED | OUTPATIENT
Start: 2022-10-07

## 2022-10-07 RX ORDER — MECLIZINE HYDROCHLORIDE 25 MG/1
25 TABLET ORAL EVERY 8 HOURS PRN
Qty: 60 TABLET | Refills: 1 | Status: SHIPPED | OUTPATIENT
Start: 2022-10-07

## 2022-10-07 RX ORDER — FLUTICASONE PROPIONATE 50 MCG
2 SPRAY, SUSPENSION (ML) NASAL DAILY
Qty: 16 G | Refills: 5 | Status: SHIPPED | OUTPATIENT
Start: 2022-10-07

## 2022-10-07 RX ORDER — LISINOPRIL 5 MG/1
2.5 TABLET ORAL DAILY
Qty: 30 TABLET | Refills: 1
Start: 2022-10-07

## 2022-10-07 NOTE — PROGRESS NOTES
Name: Montana Siegel      : 1955      MRN: [id number]  Encounter Provider: Elida Welch DO  Encounter Date: 10/7/2022   Encounter department: [facility]      1  Throat pain in adult  Comments:  No better after course of Doxy and Prednisone, less likely bacterial but will check throat culture and treat accordingly, has barium swallow ordered,  urged to restart Flonase daily and con't Benadryl tid for poss postnasal drip contributing to symptoms, on Protonix daily, will start Pepcid 20 mg bid in case any reflux contributing, d/w pt that with smoking she is at higher risk for head and neck cancers and needs to see ENT for further eval - number provided  Orders:  -     fluticasone (FLONASE) 50 mcg/act nasal spray; 2 sprays into each nostril daily  -     famotidine (PEPCID) 20 mg tablet; Take 1 tablet (20 mg total) by mouth 2 (two) times a day  -     Ambulatory Referral to Otolaryngology; Future  -     Throat culture; Future    2  Cough  Comments:  Tessalon Perles refilled upon request, urged again to quit smoking  Orders:  -     benzonatate (TESSALON PERLES) 100 mg capsule; Take 1 capsule (100 mg total) by mouth 3 (three) times a day as needed for cough    3  Dizziness  Comments:  Meclizine refilled upon request  Orders:  -     meclizine (ANTIVERT) 25 mg tablet; Take 1 tablet (25 mg total) by mouth every 8 (eight) hours as needed for dizziness    4  Primary hypertension  Assessment & Plan:  BP a bit low today and pt reporting some readings at home 70's/30's, she feels poorly, will decrease Lisinopril from 5 mg 1 tab daily to 5 mg 1/2 tab (2 5 mg total) daily, recheck in 6-8 wks    Orders:  -     lisinopril (ZESTRIL) 5 mg tablet;  Take 0 5 tablets (2 5 mg total) by mouth daily      Colonoscopy  - 5 yrs     CT chest lung CA screening     Mammo     Dexa  - nml     PAP s/p hysterectomy     DM labs A1C  (6 5)  DM foot exam   DM eye exam 5/22      Subjective      HPI Pt here for an acute visit    Pt noting throat pain since last visit  At that time she was treated for COPD exacerbation/resp infection and was tx with Doxy and Prednisone  She notes no benefit with the tx and her throat still hurts  The pain is "like a rock in there"  She notes no food getting stuck or difficulty swallowing  She notes no burning sensation/reguargitation of food  She notes no new/worse abd pain  She notes no blood in stool/black stools/unintentional wgt loss  She feels a bit hoarse and has had a change in voice  She feels she has some swollen glands that are tender  She has Flonase but is not using it at this time  She takes Benadry 1 tab tid  She is on Protonix once a day  She is not on Pepcid  Review of Systems   Constitutional: Positive for fatigue  Negative for chills, fever and unexpected weight change  HENT: Positive for congestion, postnasal drip, sore throat, trouble swallowing and voice change  Negative for ear pain  Eyes: Negative for pain and visual disturbance  Respiratory: Positive for cough  Negative for shortness of breath and wheezing  Cardiovascular: Positive for leg swelling  Negative for chest pain and palpitations  Gastrointestinal: Negative for abdominal pain, diarrhea and nausea  Genitourinary: Negative for dysuria  Musculoskeletal: Positive for arthralgias  Negative for back pain and neck pain  Skin: Negative for rash and wound  Neurological: Positive for dizziness  Negative for syncope and headaches  Hematological: Positive for adenopathy  Psychiatric/Behavioral: Negative for confusion         Current Outpatient Medications on File Prior to Visit   Medication Sig    albuterol (PROVENTIL HFA,VENTOLIN HFA) 90 mcg/act inhaler Inhale 2 puffs every 4 (four) hours as needed for wheezing    Allergy Relief 10 MG tablet Take 1 tablet (10 mg total) by mouth 2 (two) times a day    ascorbic acid (VITAMIN C) 500 mg tablet TAKE ONE TABLET BY MOUTH TWICE DAILY    atorvastatin (LIPITOR) 20 mg tablet TAKE ONE TABLET BY MOUTH EVERY EVENING    Blood Glucose Monitoring Suppl (FreeStyle Lite) w/Device KIT Use 1 kit in the morning    Blood Pressure Monitoring (Blood Pressure Cuff) MISC Use daily as needed (dizziness)    Diclofenac Sodium (VOLTAREN) 1 % Apply 4 g topically 4 (four) times a day    dicyclomine (BENTYL) 20 mg tablet Take 1 tablet (20 mg total) by mouth every 6 (six) hours    Ferrous Sulfate (Iron) 325 (65 Fe) MG TABS Take 1 tablet by mouth 2 (two) times a day with meals    fluticasone-salmeterol (Advair) 100-50 mcg/dose inhaler Inhale 1 puff 2 (two) times a day    folic acid (FOLVITE) 1 mg tablet Take 2 tablets (2 mg total) by mouth daily at bedtime    gabapentin (NEURONTIN) 600 MG tablet TAKE ONE TABLET BY MOUTH THREE TIMES DAILY    Galcanezumab-gnlm (Emgality) 120 MG/ML SOSY Inject 120 mg under the skin every 30 (thirty) days Once a month    glipiZIDE (GLUCOTROL XL) 5 mg 24 hr tablet Take 1 tablet (5 mg total) by mouth daily    glucose blood (FREESTYLE LITE) test strip Test once daily    glucose blood test strip Use as instructed    hydrOXYzine pamoate (VISTARIL) 50 mg capsule Take 1 capsule (50 mg total) by mouth 3 (three) times a day as needed for itching    ipratropium-albuterol (DUO-NEB) 0 5-2 5 mg/3 mL nebulizer solution Take 3 mL by nebulization every 6 (six) hours as needed     Lactobacillus Acid-Pectin (Acidophilus/Citrus Pectin) TABS Take 1 tablet by mouth daily    Lancets (freestyle) lancets Use in the morning Test once daily    Lancets MISC Check sugars once weekly    leflunomide (ARAVA) 10 MG tablet Take 1 tablet (10 mg total) by mouth every morning    leucovorin (WELLCOVORIN) 10 MG tablet Take one tablet the morning after you take your methotrexate    levothyroxine 125 mcg tablet TAKE ONE TABLET BY MOUTH DAILY    metFORMIN (GLUCOPHAGE) 500 mg tablet Take 1 tablet (500 mg total) by mouth 2 (two) times a day with meals    methocarbamol (ROBAXIN) 500 mg tablet TAKE ONE TABLET BY MOUTH THREE TIMES DAILY    methotrexate 2 5 MG tablet TAKE SIX TABLETS BY MOUTH ONCE A WEEK WITH FOOD OR AFTER EATING    Multiple Vitamin (Tab-A-Felicia) TABS Take 1 tablet by mouth daily    Multiple Vitamins-Minerals (multivitamin with minerals) tablet Take 1 tablet by mouth daily    nortriptyline (PAMELOR) 10 mg capsule Take 1 capsule (10 mg total) by mouth daily at bedtime    olopatadine (PATANOL) 0 1 % ophthalmic solution Administer 1 drop to both eyes 2 (two) times a day as needed for allergies    ondansetron (ZOFRAN) 4 mg tablet Take 1 tablet (4 mg total) by mouth every 8 (eight) hours as needed for nausea or vomiting    oxyCODONE (Roxicodone) 5 immediate release tablet Take 1 tablet (5 mg total) by mouth every 6 (six) hours as needed for moderate pain Max Daily Amount: 20 mg (Patient not taking: No sig reported)    pantoprazole (PROTONIX) 40 mg tablet TAKE ONE TABLET BY MOUTH DAILY AT BEDTIME    triamcinolone (KENALOG) 0 1 % cream Use as directed (Patient not taking: Reported on 9/26/2022)    venlafaxine (EFFEXOR-XR) 150 mg 24 hr capsule     [DISCONTINUED] benzonatate (TESSALON PERLES) 100 mg capsule Take 1 capsule (100 mg total) by mouth 3 (three) times a day as needed for cough    [DISCONTINUED] fluticasone (FLONASE) 50 mcg/act nasal spray 2 sprays into each nostril daily    [DISCONTINUED] lisinopril (ZESTRIL) 5 mg tablet TAKE ONE TABLET BY MOUTH DAILY    [DISCONTINUED] meclizine (ANTIVERT) 25 mg tablet TAKE ONE TABLET BY MOUTH THREE TIMES DAILY AS NEEDED for dizziness (Patient not taking: Reported on 9/26/2022)    [DISCONTINUED] predniSONE 10 mg tablet 3 tab PO bid x 3 days then 2 tab PO bid x 3 days then 1 tab PO bid x 3 days then 1 tab PO q day x 3 days then stop       Objective     /80   Pulse (!) 108   Temp 98 8 °F (37 1 °C) (Tympanic)   LMP  (LMP Unknown)     Physical Exam  Vitals and nursing note reviewed  Constitutional:       General: She is not in acute distress  Appearance: She is well-developed  She is not ill-appearing  HENT:      Head: Normocephalic and atraumatic  Right Ear: Tympanic membrane normal  No middle ear effusion  Tympanic membrane is not erythematous  Left Ear: Tympanic membrane normal   No middle ear effusion  Tympanic membrane is not erythematous  Mouth/Throat:      Mouth: Mucous membranes are dry  No oral lesions  Pharynx: Posterior oropharyngeal erythema present  No pharyngeal swelling or oropharyngeal exudate  Tonsils: No tonsillar exudate  Eyes:      General:         Right eye: No discharge  Left eye: No discharge  Conjunctiva/sclera: Conjunctivae normal    Neck:      Trachea: No tracheal deviation  Cardiovascular:      Rate and Rhythm: Normal rate and regular rhythm  Heart sounds: Normal heart sounds  No murmur heard  No friction rub  Pulmonary:      Effort: Pulmonary effort is normal  No respiratory distress  Breath sounds: Rhonchi present  No wheezing or rales  Musculoskeletal:      Cervical back: Neck supple  Lymphadenopathy:      Cervical: Cervical adenopathy present  Skin:     General: Skin is warm  Coloration: Skin is not pale  Findings: No rash  Neurological:      General: No focal deficit present  Mental Status: She is alert  Motor: No abnormal muscle tone  Comments:  In wheelchair   Psychiatric:         Mood and Affect: Mood normal          Behavior: Behavior normal        Elida Welch DO

## 2022-10-07 NOTE — ASSESSMENT & PLAN NOTE
BP a bit low today and pt reporting some readings at home 70's/30's, she feels poorly, will decrease Lisinopril from 5 mg 1 tab daily to 5 mg 1/2 tab (2 5 mg total) daily, recheck in 6-8 wks

## 2022-10-10 LAB — B-HEM STREP SPEC QL CULT: NEGATIVE

## 2022-10-24 ENCOUNTER — OFFICE VISIT (OUTPATIENT)
Dept: OBGYN CLINIC | Facility: CLINIC | Age: 67
End: 2022-10-24
Payer: COMMERCIAL

## 2022-10-24 ENCOUNTER — APPOINTMENT (OUTPATIENT)
Dept: RADIOLOGY | Facility: CLINIC | Age: 67
End: 2022-10-24
Payer: COMMERCIAL

## 2022-10-24 VITALS
WEIGHT: 175 LBS | BODY MASS INDEX: 28.12 KG/M2 | SYSTOLIC BLOOD PRESSURE: 122 MMHG | HEIGHT: 66 IN | DIASTOLIC BLOOD PRESSURE: 7 MMHG

## 2022-10-24 DIAGNOSIS — Z48.89 AFTERCARE FOLLOWING SURGERY: Primary | ICD-10-CM

## 2022-10-24 DIAGNOSIS — Z48.89 AFTERCARE FOLLOWING SURGERY: ICD-10-CM

## 2022-10-24 PROCEDURE — 73110 X-RAY EXAM OF WRIST: CPT

## 2022-10-24 PROCEDURE — 99213 OFFICE O/P EST LOW 20 MIN: CPT | Performed by: ORTHOPAEDIC SURGERY

## 2022-10-24 NOTE — PROGRESS NOTES
ASSESSMENT/PLAN:    Assessment:   S/P Removal of right wrist fusion plate - Right, Revision fusion of right wrist - Right, and ORIF right long finger metacarpal fracture - Right on 6/14/2022    Plan:   Continue bracing and limited use of right wrist/hand  Brace at all times, except for hygiene purposes  Continue attempts to stop smoking  Continue finger ROM    Follow Up:  3  month(s)    To Do Next Visit:  X-rays of the  right  wrist    _____________________________________________________  CHIEF COMPLAINT:  Chief Complaint   Patient presents with   • Right Wrist - Follow-up     S/P Removal of right wrist fusion plate - Right, Revision fusion of right wrist - Right, and ORIF right long finger metacarpal fracture - Right on 6/14/2022         SUBJECTIVE:  Elham Jasso is a 74 y o  female who presents for follow up regarding S/P Removal of right wrist fusion plate - Right, Revision fusion of right wrist - Right, and ORIF right long finger metacarpal fracture - Right on 6/14/2022  She had a brace given by OT, but she didn't wore it as she felt was not doing much for her, for which she transitioned to a universal removable wrist brace  She still complains of dorsal pain, but is better than previous  She has been able to cut on her smoking habits, now smoking about 3 cigs a day      PAST MEDICAL HISTORY:  Past Medical History:   Diagnosis Date   • Achalasia    • ADHD    • Anxiety    • Colon polyp    • [facility]    • Depression    • Disease of thyroid gland     hypo   • Esophageal spasm    • Fibromyalgia    • Fibromyalgia, primary    • GERD (gastroesophageal reflux disease)    • Hiatal hernia    • History of tachycardia     6/1/22 Pt reports hx of tachycardia - no current issues at this time - reports tachycardia has been tied in with anxiety   • Hyperlipidemia    • Hypertension    • Iron deficiency anemia    • Polysubstance abuse ([address] )    • Psychiatric disorder     major depressive       PAST SURGICAL HISTORY:  Past Surgical History:   Procedure Laterality Date   • AMPUTATION      RBKA   • ANKLE FUSION     • APPENDECTOMY     • BELOW KNEE LEG AMPUTATION Right    • CARPAL TUNNEL RELEASE  2021    left arm   • CATARACT EXTRACTION     •  SECTION     • COLONOSCOPY  2010   • COLONOSCOPY  2019    diverticula, 3 mm polyp and 6 mm polyp in rectum, grade III internal hemorrhoids, hyperplastic polyp, 5 year recall 2024    • EGD  2010   • FOREARM FASCIOTOMY Right    • FRACTURE SURGERY     • HYSTERECTOMY      -age 44   • KNEE ARTHROSCOPY Left    • NERVE SURGERY Right 2022    Procedure: Right wrist posterior interosseous nerve neurectomy;  Surgeon: Ivana Brooke MD;  Location: [facility] MAIN OR;  Service: Orthopedics   • ORTHOPEDIC SURGERY     • NY FUSION OF WRIST JOINT Right 2022    Procedure: Total wrist fusion of the right wrist;  Surgeon: Ivana Brooke MD;  Location:  MAIN OR;  Service: Orthopedics   • NY FUSION OF WRIST JOINT Right 2022    Procedure: Revision fusion of right wrist;  Surgeon: Ivana Boroke MD;  Location: [facility] MAIN [facility];  Service: Orthopedics   • NY OPEN Rowan Radha EA BONE Right 2022    Procedure: ORIF right long finger metacarpal fracture;  Surgeon: Ivana Brooke MD;  Location: [facility];  Service: Orthopedics   • NY REMOVAL DEEP IMPLANT Right 2022    Procedure: Removal of right wrist fusion plate;  Surgeon: Ivana Brooke MD;  Location: [facility];  Service: Orthopedics   • NY REVISE ULNAR NERVE AT ELBOW Left 2021    Procedure: Left carpal and cubital tunnel release;  Surgeon:  Yakelin Solis MD;  Location: [address] [place] [address] MAIN OR;  Service: Orthopedics   • UPPER GASTROINTESTINAL ENDOSCOPY     • WRIST TENDON TRANSFER N/A 2022    Procedure: Extensor pollicis longus tendon transposition right wrist;  Surgeon: Ivana Brooke MD;  Location:  MAIN OR;  Service: Orthopedics       FAMILY HISTORY:  Family History   Problem Relation Age of Onset   • Ulcerative colitis Mother    • Lung cancer Mother         Unknown age   • Diabetes Mother    • Alcohol abuse Father    • Diabetes Sister    • Cervical cancer Daughter 39   • No Known Problems Maternal Grandmother    • No Known Problems Maternal Grandfather    • No Known Problems Paternal Grandmother    • No Known Problems Paternal Grandfather    • Cancer Brother    • Testicular cancer Brother 21   • Colon polyps Neg Hx    • Colon cancer Neg Hx    • Anesthesia problems Neg Hx        SOCIAL HISTORY:  Social History     Tobacco Use   • Smoking status: Current Some Day Smoker     Packs/day: 0 25     Years: 25 00     Pack years: 6 25     Types: Cigarettes     Start date: 1/1/1977   • Smokeless tobacco: Never Used   • Tobacco comment: 4-5 cigs a day -   Vaping Use   • Vaping Use: Never used   Substance Use Topics   • Alcohol use: No     Comment: Denies any alcohol use   • Drug use: Not Currently     Comment: Pt denies smoking any marijuana currently at this time       MEDICATIONS:    Current Outpatient Medications:   •  albuterol (PROVENTIL HFA,VENTOLIN HFA) 90 mcg/act inhaler, Inhale 2 puffs every 4 (four) hours as needed for wheezing, Disp: , Rfl:   •  Allergy Relief 10 MG tablet, Take 1 tablet (10 mg total) by mouth 2 (two) times a day, Disp: 180 tablet, Rfl: 0  •  ascorbic acid (VITAMIN C) 500 mg tablet, TAKE ONE TABLET BY MOUTH TWICE DAILY, Disp: 56 tablet, Rfl: 1  •  atorvastatin (LIPITOR) 20 mg tablet, TAKE ONE TABLET BY MOUTH EVERY EVENING, Disp: 90 tablet, Rfl: 1  •  benzonatate (TESSALON PERLES) 100 mg capsule, Take 1 capsule (100 mg total) by mouth 3 (three) times a day as needed for cough, Disp: 30 capsule, Rfl: 0  •  Blood Glucose Monitoring Suppl (FreeStyle Lite) w/Device KIT, Use 1 kit in the morning, Disp: 1 kit, Rfl: 0  •  Blood Pressure Monitoring (Blood Pressure Cuff) MISC, Use daily as needed (dizziness), Disp: 1 each, Rfl: 0  •  Diclofenac Sodium (VOLTAREN) 1 %, Apply 4 g topically 4 (four) times a day, Disp: 300 g, Rfl: 6  •  dicyclomine (BENTYL) 20 mg tablet, TAKE 1 TABLET BY MOUTH EVERY 6 HOURS, Disp: 120 tablet, Rfl: 2  •  famotidine (PEPCID) 20 mg tablet, Take 1 tablet (20 mg total) by mouth 2 (two) times a day, Disp: 60 tablet, Rfl: 2  •  Ferrous Sulfate (Iron) 325 (65 Fe) MG TABS, Take 1 tablet by mouth 2 (two) times a day with meals, Disp: , Rfl:   •  fluticasone (FLONASE) 50 mcg/act nasal spray, 2 sprays into each nostril daily, Disp: 16 g, Rfl: 5  •  fluticasone-salmeterol (Advair) 100-50 mcg/dose inhaler, Inhale 1 puff 2 (two) times a day, Disp: , Rfl:   •  folic acid (FOLVITE) 1 mg tablet, Take 2 tablets (2 mg total) by mouth daily at bedtime, Disp: 180 tablet, Rfl: 3  •  gabapentin (NEURONTIN) 600 MG tablet, TAKE ONE TABLET BY MOUTH THREE TIMES DAILY, Disp: 90 tablet, Rfl: 2  •  Galcanezumab-gnlm (Emgality) 120 MG/ML SOSY, Inject 120 mg under the skin every 30 (thirty) days Once a month, Disp: 1 mL, Rfl: 2  •  glipiZIDE (GLUCOTROL XL) 5 mg 24 hr tablet, Take 1 tablet (5 mg total) by mouth daily, Disp: 90 tablet, Rfl: 2  •  glucose blood (FREESTYLE LITE) test strip, Test once daily, Disp: 100 each, Rfl: 0  •  glucose blood test strip, Use as instructed, Disp: 90 strip, Rfl: 0  •  hydrOXYzine pamoate (VISTARIL) 50 mg capsule, Take 1 capsule (50 mg total) by mouth 3 (three) times a day as needed for itching, Disp: 90 capsule, Rfl: 1  •  ipratropium-albuterol (DUO-NEB) 0 5-2 5 mg/3 mL nebulizer solution, Take 3 mL by nebulization every 6 (six) hours as needed , Disp: , Rfl:   •  Lactobacillus Acid-Pectin (Acidophilus/Citrus Pectin) TABS, Take 1 tablet by mouth daily, Disp: , Rfl:   •  Lancets (freestyle) lancets, Use in the morning Test once daily, Disp: 100 each, Rfl: 0  •  Lancets MISC, Check sugars once weekly, Disp: , Rfl:   •  leflunomide (ARAVA) 10 MG tablet, Take 1 tablet (10 mg total) by mouth every morning, Disp: 28 tablet, Rfl: 1  •  leucovorin (WELLCOVORIN) 10 MG tablet, Take one tablet the morning after you take your methotrexate, Disp: 12 tablet, Rfl: 3  •  levothyroxine 125 mcg tablet, TAKE 1 TABLET BY MOUTH DAILY, Disp: 30 tablet, Rfl: 2  •  lisinopril (ZESTRIL) 5 mg tablet, Take 0 5 tablets (2 5 mg total) by mouth daily, Disp: 30 tablet, Rfl: 1  •  meclizine (ANTIVERT) 25 mg tablet, Take 1 tablet (25 mg total) by mouth every 8 (eight) hours as needed for dizziness, Disp: 60 tablet, Rfl: 1  •  metFORMIN (GLUCOPHAGE) 500 mg tablet, Take 1 tablet (500 mg total) by mouth 2 (two) times a day with meals, Disp: 180 tablet, Rfl: 2  •  methocarbamol (ROBAXIN) 500 mg tablet, TAKE ONE TABLET BY MOUTH THREE TIMES DAILY, Disp: 90 tablet, Rfl: 0  •  methotrexate 2 5 MG tablet, TAKE SIX TABLETS BY MOUTH ONCE A WEEK WITH FOOD OR AFTER EATING, Disp: 72 tablet, Rfl: 1  •  Multiple Vitamin (Tab-A-Felicia) TABS, Take 1 tablet by mouth daily, Disp: , Rfl:   •  Multiple Vitamins-Minerals (multivitamin with minerals) tablet, Take 1 tablet by mouth daily, Disp: 30 tablet, Rfl: 1  •  nortriptyline (PAMELOR) 10 mg capsule, Take 1 capsule (10 mg total) by mouth daily at bedtime, Disp: 90 capsule, Rfl: 2  •  olopatadine (PATANOL) 0 1 % ophthalmic solution, Administer 1 drop to both eyes 2 (two) times a day as needed for allergies, Disp: 5 mL, Rfl: 2  •  ondansetron (ZOFRAN) 4 mg tablet, Take 1 tablet (4 mg total) by mouth every 8 (eight) hours as needed for nausea or vomiting, Disp: 90 tablet, Rfl: 2  •  oxyCODONE (Roxicodone) 5 immediate release tablet, Take 1 tablet (5 mg total) by mouth every 6 (six) hours as needed for moderate pain Max Daily Amount: 20 mg (Patient not taking: No sig reported), Disp: 10 tablet, Rfl: 0  •  pantoprazole (PROTONIX) 40 mg tablet, TAKE ONE TABLET BY MOUTH DAILY AT BEDTIME, Disp: 30 tablet, Rfl: 2  •  triamcinolone (KENALOG) 0 1 % cream, Use as directed (Patient not taking: Reported on 9/26/2022), Disp: , Rfl:   •  venlafaxine (EFFEXOR-XR) 150 mg 24 hr capsule, , Disp: , Rfl:     ALLERGIES:  Allergies   Allergen Reactions   • Levaquin [Levofloxacin] Hives   • Morphine Itching and Anaphylaxis     u  Other reaction(s): Other (See Comments)  u  Other reaction(s): Feeling agitated (finding)     • Penicillins Anaphylaxis     "almost "- gives hives, nausea    • Cephalosporins Hives     Other reaction(s): Unknown Reaction     • Codeine Hives     Other reaction(s): Unknown  Other reaction(s): Unknown Reaction  Other reaction(s): Unknown  "Nausea and makes me itch"   • Nsaids GI Bleeding     Other reaction(s): Unknown Reaction   • Cephalexin Other (See Comments)     Other reaction(s): Unknown   • Aspirin Hives, Other (See Comments) and Rash     Other reaction(s): Unknown Reaction     • Chlorhexidine Rash   • Medical Tape Rash   • Vancomycin Rash     "Kills my stomach"       REVIEW OF SYSTEMS:  Pertinent items are noted in HPI  A comprehensive review of systems was negative      LABS:  HgA1c:   Lab Results   Component Value Date    HGBA1C 6 5 (H) 2022     BMP:   Lab Results   Component Value Date    GLUCOSE 90 2015    CALCIUM 9 4 2022     2015    K 4 4 08/10/2022    CO2 24 08/10/2022    CL 97 08/10/2022    BUN 21 08/10/2022    CREATININE 0 90 08/10/2022           _____________________________________________________  PHYSICAL EXAMINATION:  Vital signs: BP (!) 122/7   Ht 5' 6" (1 676 m)   Wt 79 4 kg (175 lb)   LMP  (LMP Unknown)   BMI 28 25 kg/m²   General: well developed and well nourished, alert, oriented times 3 and appears comfortable  Psychiatric: Normal  HEENT: Trachea Midline, No torticollis  Cardiovascular: No discernable arrhythmia  Pulmonary: No wheezing or stridor  Abdomen: No rebound or guarding  Extremities: No peripheral edema  Skin: No masses, erythema, lacerations, fluctation, ulcerations  Neurovascular: Sensation Intact to the Median, Ulnar, Radial Nerve, Motor Intact to the Median, Ulnar, Radial Nerve and Pulses Intact    MUSCULOSKELETAL EXAMINATION:  RIGHT SIDE: well healed dorsal scars, TTP along the scar  Able to form a composite fist, generalized atrophy to the musculature, previously well healed dorsal forearm skin graft    _____________________________________________________  STUDIES REVIEWED:  Images were reviewed in PACS by Dr Yoni Caruso and demonstrate: Xrays right wrist 3 views show a dorsal T-plate in the LF metacarpal with more bony and callus formation, staples present in the radiocarpal joint with some bony formation in good alignment      PROCEDURES PERFORMED:  Procedures  No Procedures performed today        I interviewed, took the history and examined the patient  I discussed the case with the Resident and reviewed the Resident's note  I supervised the Resident and I agree with the Resident management plan as it was presented to me  I ws present in the clinic and examined the patient

## 2022-11-29 DIAGNOSIS — I10 PRIMARY HYPERTENSION: ICD-10-CM

## 2022-11-29 RX ORDER — LISINOPRIL 5 MG/1
TABLET ORAL
Qty: 30 TABLET | Refills: 1 | Status: SHIPPED | OUTPATIENT
Start: 2022-11-29

## 2022-11-30 ENCOUNTER — TELEPHONE (OUTPATIENT)
Dept: OBGYN CLINIC | Facility: HOSPITAL | Age: 67
End: 2022-11-30

## 2022-11-30 DIAGNOSIS — M19.90 ARTHRITIS: ICD-10-CM

## 2022-11-30 DIAGNOSIS — M13.0 POLYARTHRITIS: ICD-10-CM

## 2022-11-30 DIAGNOSIS — R53.83 MALAISE AND FATIGUE: ICD-10-CM

## 2022-11-30 DIAGNOSIS — R53.81 MALAISE AND FATIGUE: ICD-10-CM

## 2022-11-30 RX ORDER — METHOTREXATE 2.5 MG/1
TABLET ORAL
Qty: 72 TABLET | Refills: 1 | Status: SHIPPED | OUTPATIENT
Start: 2022-11-30

## 2022-11-30 NOTE — TELEPHONE ENCOUNTER
Pt contacted Call Center requested refill of their medication  Medication Name: Multiple Vitamin (Tab-A-Felicia) TABS           Dosage of Med: one tab      Frequency of Med: daily      Remaining Medication: n/a      Pharmacy and Location:     [address] [place] [address], [facility] [place] [address] [address]     Pt  Preferred Callback Phone Number: [phone]      Thank you  PLEASE ADVISE PATIENTS:    REFILL REQUESTS WILL BE PROCESSED WITHIN 24-48 HOURS

## 2022-12-01 ENCOUNTER — OFFICE VISIT (OUTPATIENT)
Dept: FAMILY MEDICINE CLINIC | Facility: HOSPITAL | Age: 67
End: 2022-12-01

## 2022-12-01 ENCOUNTER — TELEPHONE (OUTPATIENT)
Dept: OBGYN CLINIC | Facility: HOSPITAL | Age: 67
End: 2022-12-01

## 2022-12-01 VITALS — SYSTOLIC BLOOD PRESSURE: 110 MMHG | HEART RATE: 122 BPM | DIASTOLIC BLOOD PRESSURE: 72 MMHG | TEMPERATURE: 100.5 F

## 2022-12-01 DIAGNOSIS — E07.9 DISEASE OF THYROID GLAND: ICD-10-CM

## 2022-12-01 DIAGNOSIS — E11.65 UNCONTROLLED TYPE 2 DIABETES MELLITUS WITH HYPERGLYCEMIA (HCC): Primary | ICD-10-CM

## 2022-12-01 DIAGNOSIS — M19.90 ARTHRITIS: ICD-10-CM

## 2022-12-01 DIAGNOSIS — R42 DIZZINESS: ICD-10-CM

## 2022-12-01 DIAGNOSIS — R50.9 FEVER, UNSPECIFIED FEVER CAUSE: ICD-10-CM

## 2022-12-01 DIAGNOSIS — J44.9 CHRONIC OBSTRUCTIVE PULMONARY DISEASE, UNSPECIFIED COPD TYPE (HCC): ICD-10-CM

## 2022-12-01 DIAGNOSIS — S62.322G: ICD-10-CM

## 2022-12-01 DIAGNOSIS — M13.0 POLYARTHRITIS: ICD-10-CM

## 2022-12-01 LAB — SL AMB POCT HEMOGLOBIN AIC: 6.1 (ref ?–6.5)

## 2022-12-01 RX ORDER — NORTRIPTYLINE HYDROCHLORIDE 25 MG/1
25 CAPSULE ORAL
Qty: 30 CAPSULE | Refills: 2 | Status: SHIPPED | OUTPATIENT
Start: 2022-12-01

## 2022-12-01 RX ORDER — MECLIZINE HYDROCHLORIDE 25 MG/1
25 TABLET ORAL EVERY 8 HOURS PRN
Qty: 60 TABLET | Refills: 1 | Status: SHIPPED | OUTPATIENT
Start: 2022-12-01

## 2022-12-01 RX ORDER — METFORMIN HYDROCHLORIDE 500 MG/1
500 TABLET, EXTENDED RELEASE ORAL 2 TIMES DAILY WITH MEALS
Qty: 60 TABLET | Refills: 2 | Status: SHIPPED | OUTPATIENT
Start: 2022-12-01

## 2022-12-01 RX ORDER — ALBUTEROL SULFATE 90 UG/1
2 AEROSOL, METERED RESPIRATORY (INHALATION) EVERY 4 HOURS PRN
Qty: 18 G | Refills: 2 | Status: SHIPPED | OUTPATIENT
Start: 2022-12-01

## 2022-12-01 RX ORDER — FLUTICASONE PROPIONATE AND SALMETEROL 100; 50 UG/1; UG/1
1 POWDER RESPIRATORY (INHALATION) 2 TIMES DAILY
Qty: 60 BLISTER | Refills: 5 | Status: SHIPPED | OUTPATIENT
Start: 2022-12-01 | End: 2022-12-31

## 2022-12-01 RX ORDER — OXYCODONE HYDROCHLORIDE 5 MG/1
5 TABLET ORAL EVERY 8 HOURS PRN
Qty: 10 TABLET | Refills: 0 | Status: SHIPPED | OUTPATIENT
Start: 2022-12-01

## 2022-12-01 NOTE — TELEPHONE ENCOUNTER
Caller: Caregiver    Doctor: Dr Gris Lewis    Reason for call: Caregiver calling in stating patient is in a lot of pain and is wondering if there is anything that can be prescribed for her pain  She is currently taking Tylenol and it is not helping  Plate removal of right wrist on 6/14/22        Call back#: [phone]

## 2022-12-01 NOTE — ASSESSMENT & PLAN NOTE
Lab Results   Component Value Date    HGBA1C 6 1 12/01/2022   DM type 2 with h/o BKA - now controlled with A1c 6 1 -  Wishing to switch from Metformin 500 bid to Metformin  mg 1 tab PO bid as it has helped her sister with diarrhea - rx sent, recheck BW in 6 mo - BW order given, UTD on foot exam (5/22) and eye exam (5/22), on statin and ACE

## 2022-12-01 NOTE — TELEPHONE ENCOUNTER
Spoke with Caregiver and she states patient feels like a nail is being driven into her wrist   Tylenol and Voltaren gel is not helping  No redness, swelling, drainage to site  She is running a low grade temp  She is seeing PCP this afternoon  She will be assessed  And they will call if wrist needs to be accessed

## 2022-12-01 NOTE — PROGRESS NOTES
Name: Enrique Reyes      : 1955      MRN: [id number]  Encounter Provider: Oneyda Gandhi DO  Encounter Date: 2022   Encounter department: [facility]      1  Uncontrolled type 2 diabetes mellitus with hyperglycemia [facility])  Assessment & Plan:    Lab Results   Component Value Date    HGBA1C 6 1 2022   DM type 2 with h/o BKA - now controlled with A1c 6 1 -  Wishing to switch from Metformin 500 bid to Metformin  mg 1 tab PO bid as it has helped her sister with diarrhea - rx sent, recheck BW in 6 mo - BW order given, UTD on foot exam () and eye exam (), on statin and ACE    Orders:  -     metFORMIN (GLUCOPHAGE-XR) 500 mg 24 hr tablet; Take 1 tablet (500 mg total) by mouth 2 (two) times a day with meals  -     POCT hemoglobin A1c  -     Microalbumin / creatinine urine ratio  -     CBC and differential; Future; Expected date: 2023  -     Comprehensive metabolic panel; Future; Expected date: 2023  -     Lipid panel; Future; Expected date: 2023  -     Hemoglobin A1C; Future; Expected date: 2023  -     CBC and differential  -     Comprehensive metabolic panel  -     Lipid panel  -     Hemoglobin A1C    2   Closed displaced fracture of shaft of third metacarpal bone of right hand with delayed healing, subsequent encounter  Comments:  Con't chronic R wrist pain - very upset and yelling and tearful today a year out from surgery she feels worse, she called Ortho and they told her to use Tylenol, pt notes no benefit at all with the Tylenol, has tried lidocaine patch in past w/o benefit, discussed increasing Gabapentin but pt states she has had SE with higher doses, will INCREASE PAMELOR to see if helps with sleep and pain rx for 25 mg sent - re-eval in 3 mos,  discussed how narcotics ARE NOT approved for long term chronic pain use, will send a ONE TIME RX for 10 days of oxycodone, discussed f/u with Ortho and mentioned pain mgt - defer any further tx to Ortho or pain mg  Orders:  -     oxyCODONE (Roxicodone) 5 immediate release tablet; Take 1 tablet (5 mg total) by mouth every 8 (eight) hours as needed for moderate pain Max Daily Amount: 15 mg  -     CBC and differential; Future; Expected date: 06/01/2023  -     Comprehensive metabolic panel; Future; Expected date: 06/01/2023  -     CBC and differential  -     Comprehensive metabolic panel    3  Fever, unspecified fever cause  Comments:  No specific new symptoms leading to obvious source of fever, hold abx for now, call with any new/worse symptoms  Orders:  -     CBC and differential; Future; Expected date: 06/01/2023  -     Comprehensive metabolic panel; Future; Expected date: 06/01/2023  -     CBC and differential  -     Comprehensive metabolic panel    4  Dizziness  Comments:  Meclizine refilled upon request - pt requesting 25 mg as she feels the 12 5 mg is not working - rx refilled at 25 mg  Orders:  -     meclizine (ANTIVERT) 25 mg tablet; Take 1 tablet (25 mg total) by mouth every 8 (eight) hours as needed for dizziness  -     CBC and differential; Future; Expected date: 06/01/2023  -     Comprehensive metabolic panel; Future; Expected date: 06/01/2023  -     CBC and differential  -     Comprehensive metabolic panel    5  Arthritis  Comments:  joint pains not well controlled, following with Ortho and Rheum, con't meds/labs/tx as per specialists   Orders:  -     nortriptyline (PAMELOR) 25 mg capsule; Take 1 capsule (25 mg total) by mouth daily at bedtime  -     CBC and differential; Future; Expected date: 06/01/2023  -     Comprehensive metabolic panel; Future; Expected date: 06/01/2023  -     CBC and differential  -     Comprehensive metabolic panel    6  Polyarthritis  Comments:  joint pains not well controlled, following with Ortho and Rheum, con't meds/labs/tx as per specialists   Orders:  -     nortriptyline (PAMELOR) 25 mg capsule;  Take 1 capsule (25 mg total) by mouth daily at bedtime  -     CBC and differential; Future; Expected date: 06/01/2023  -     Comprehensive metabolic panel; Future; Expected date: 06/01/2023  -     CBC and differential  -     Comprehensive metabolic panel    7  Chronic obstructive pulmonary disease, unspecified COPD type ([facility] [address] )  Assessment & Plan:  Baseline chronic cough and wheezing bibasilar on exam today, has low grade fever but no other resp symptoms - urged to watch closely, hold abx/steroid for now, refilled Advair and rescue inhaler, urged to quit smoking, will follow - call with new/worse symptoms    Orders:  -     albuterol (PROVENTIL HFA,VENTOLIN HFA) 90 mcg/act inhaler; Inhale 2 puffs every 4 (four) hours as needed for wheezing  -     Fluticasone-Salmeterol (Advair Diskus) 100-50 mcg/dose inhaler; Inhale 1 puff 2 (two) times a day Rinse mouth after use  -     CBC and differential; Future; Expected date: 06/01/2023  -     Comprehensive metabolic panel; Future; Expected date: 06/01/2023  -     CBC and differential  -     Comprehensive metabolic panel    8  Disease of thyroid gland  Comments:  Still has not done thyroid labs - new order given, con't current levothyroxine for now, will follow  Orders:  -     TSH, 3rd generation with Free T4 reflex; Future; Expected date: 06/01/2023  -     TSH, 3rd generation with Free T4 reflex      Colonoscopy 12/19 - 5 yrs    Mammo 6/22    Dexa 6/22 - nml    PAP s/p hysterectomy    CT lung CA screening 8/22    DM labs 5/22 (6 5)  DM foot exam 5/22  DM eye exam 5/22    I have spent 45-50 minutes with patient and friend today in which greater than 50% of this time was spent in counseling/coordination of care regarding Diagnostic results, Prognosis, Risks and benefits of tx options, Intructions for management, Patient and family education, Importance of tx compliance, Risk factor reductions and Impressions            Subjective      HPI Pt here for follow up appt and BW results    BW was not done as ordered prior to appt  She is agreeable to A1C in office today  She cannot give urine specimen today d/t her amputation and "issues I've had from a child"  She is having a lot of diarrhea but does not believe she had it prior to starting the metformin  She is interested in switching to XR formula as it helped her sister with the diarrhea  POCT A1C today was 6 1    She con't to c/o wrist pain "feels like someone is hammering it"  She called Ortho and was told to con't Tylenol and Voltaren cream   She is on Gabapentin and has had a lidocaine patch in the patch w/o great benefit  She has had Oxycodone IR 5 mg in the past w/benefit  Pt has a cough today in the office and has a low grade fever  She had some nausea yesterday but feels a lot of her symptoms are d/t her arm/wrist pain  She notes no diarrhea/C  She notes her abd pain is stable and unchanged  She notes no blood in the stool/black stools/uirinary symptoms  She states her cough is at baseline  She notes some mild congestion/runny nose  She notes mild ST but states "that from my achalasia"  She feels SOB and is asking for inhalers to be refilled  Review of Systems   Constitutional: Positive for fever  Negative for chills and unexpected weight change  HENT: Positive for hearing loss  Negative for congestion and trouble swallowing  Eyes: Negative for pain and visual disturbance  Respiratory: Negative for cough, shortness of breath and wheezing  Cardiovascular: Negative for chest pain and palpitations  Gastrointestinal: Negative for abdominal pain, blood in stool, constipation, diarrhea, nausea and vomiting  Genitourinary: Negative for difficulty urinating and dysuria  Musculoskeletal: Positive for arthralgias  Negative for back pain and neck pain  Skin: Negative for rash and wound  Neurological: Positive for dizziness and headaches  Negative for light-headedness  Hematological: Does not bruise/bleed easily  Psychiatric/Behavioral: Negative for confusion and dysphoric mood         Current Outpatient Medications on File Prior to Visit   Medication Sig   • Allergy Relief 10 MG tablet Take 1 tablet (10 mg total) by mouth 2 (two) times a day   • ascorbic acid (VITAMIN C) 500 mg tablet TAKE ONE TABLET BY MOUTH TWICE DAILY   • atorvastatin (LIPITOR) 20 mg tablet TAKE ONE TABLET BY MOUTH EVERY EVENING   • benzonatate (TESSALON PERLES) 100 mg capsule Take 1 capsule (100 mg total) by mouth 3 (three) times a day as needed for cough   • Blood Glucose Monitoring Suppl (FreeStyle Lite) w/Device KIT Use 1 kit in the morning   • Blood Pressure Monitoring (Blood Pressure Cuff) MISC Use daily as needed (dizziness)   • Diclofenac Sodium (VOLTAREN) 1 % Apply 4 g topically 4 (four) times a day   • dicyclomine (BENTYL) 20 mg tablet TAKE 1 TABLET BY MOUTH EVERY 6 HOURS   • famotidine (PEPCID) 20 mg tablet Take 1 tablet (20 mg total) by mouth 2 (two) times a day   • Ferrous Sulfate (Iron) 325 (65 Fe) MG TABS Take 1 tablet by mouth 2 (two) times a day with meals   • fluticasone (FLONASE) 50 mcg/act nasal spray 2 sprays into each nostril daily   • folic acid (FOLVITE) 1 mg tablet Take 2 tablets (2 mg total) by mouth daily at bedtime   • gabapentin (NEURONTIN) 600 MG tablet TAKE ONE TABLET BY MOUTH THREE TIMES DAILY   • Galcanezumab-gnlm (Emgality) 120 MG/ML SOSY Inject 120 mg under the skin every 30 (thirty) days Once a month   • glipiZIDE (GLUCOTROL XL) 5 mg 24 hr tablet Take 1 tablet (5 mg total) by mouth daily   • glucose blood (FREESTYLE LITE) test strip Test once daily   • glucose blood test strip Use as instructed   • hydrOXYzine pamoate (VISTARIL) 50 mg capsule Take 1 capsule (50 mg total) by mouth 3 (three) times a day as needed for itching   • ipratropium-albuterol (DUO-NEB) 0 5-2 5 mg/3 mL nebulizer solution Take 3 mL by nebulization every 6 (six) hours as needed    • Lactobacillus Acid-Pectin (Acidophilus/Citrus Pectin) TABS Take 1 tablet by mouth daily   • Lancets (freestyle) lancets Use in the morning Test once daily   • Lancets MISC Check sugars once weekly   • leflunomide (ARAVA) 10 MG tablet Take 1 tablet (10 mg total) by mouth every morning   • leucovorin (WELLCOVORIN) 10 MG tablet Take one tablet the morning after you take your methotrexate   • levothyroxine 125 mcg tablet TAKE 1 TABLET BY MOUTH DAILY   • lisinopril (ZESTRIL) 5 mg tablet TAKE 1 TABLET BY MOUTH DAILY   • methocarbamol (ROBAXIN) 500 mg tablet Take 1 tablet (500 mg total) by mouth 3 (three) times a day as needed for muscle spasms   • methotrexate 2 5 MG tablet TAKE SIX TABLETS BY MOUTH ONCE A WEEK WITH FOOD OR AFTER EATING   • Multiple Vitamin (Tab-A-Felicia) TABS Take 1 tablet by mouth daily   • Multiple Vitamins-Minerals (multivitamin with minerals) tablet Take 1 tablet by mouth daily   • olopatadine (PATANOL) 0 1 % ophthalmic solution Administer 1 drop to both eyes 2 (two) times a day as needed for allergies   • ondansetron (ZOFRAN) 4 mg tablet Take 1 tablet (4 mg total) by mouth every 8 (eight) hours as needed for nausea or vomiting   • pantoprazole (PROTONIX) 40 mg tablet TAKE ONE TABLET BY MOUTH DAILY AT BEDTIME   • predniSONE 10 mg tablet Take by mouth daily   • triamcinolone (KENALOG) 0 1 % cream Use as directed (Patient not taking: Reported on 9/26/2022)   • venlafaxine (EFFEXOR-XR) 150 mg 24 hr capsule    • [DISCONTINUED] albuterol (PROVENTIL HFA,VENTOLIN HFA) 90 mcg/act inhaler Inhale 2 puffs every 4 (four) hours as needed for wheezing   • [DISCONTINUED] doxycycline hyclate (VIBRAMYCIN) 100 mg capsule Take 100 mg by mouth every 12 (twelve) hours   • [DISCONTINUED] fluticasone-salmeterol (Advair) 100-50 mcg/dose inhaler Inhale 1 puff 2 (two) times a day   • [DISCONTINUED] meclizine (ANTIVERT) 25 mg tablet Take 1 tablet (25 mg total) by mouth every 8 (eight) hours as needed for dizziness   • [DISCONTINUED] metFORMIN (GLUCOPHAGE) 500 mg tablet Take 1 tablet (500 mg total) by mouth 2 (two) times a day with meals   • [DISCONTINUED] nortriptyline (PAMELOR) 10 mg capsule Take 1 capsule (10 mg total) by mouth daily at bedtime   • [DISCONTINUED] oxyCODONE (Roxicodone) 5 immediate release tablet Take 1 tablet (5 mg total) by mouth every 6 (six) hours as needed for moderate pain Max Daily Amount: 20 mg       Objective     /72   Pulse (!) 122   Temp 100 5 °F (38 1 °C) (Tympanic)   LMP  (LMP Unknown)     Physical Exam  Vitals and nursing note reviewed  Constitutional:       General: She is not in acute distress  Appearance: She is well-developed and well-nourished  She is not ill-appearing  HENT:      Head: Normocephalic and atraumatic  Right Ear: Tympanic membrane and external ear normal  There is no impacted cerumen  Left Ear: Tympanic membrane and external ear normal  There is no impacted cerumen  Mouth/Throat:      Mouth: Mucous membranes are moist       Pharynx: Oropharynx is clear  No oropharyngeal exudate  Eyes:      General:         Right eye: No discharge  Left eye: No discharge  Conjunctiva/sclera: Conjunctivae normal    Neck:      Trachea: No tracheal deviation  Cardiovascular:      Rate and Rhythm: Normal rate and regular rhythm  Heart sounds: Normal heart sounds  No murmur heard  No friction rub  Pulmonary:      Effort: Pulmonary effort is normal  No respiratory distress  Breath sounds: Wheezing present  No rhonchi or rales  Musculoskeletal:         General: No edema  Cervical back: Neck supple  Comments: BKA  R wrist in brace   Lymphadenopathy:      Cervical: Cervical adenopathy present  Skin:     General: Skin is warm  Findings: No rash  Neurological:      Mental Status: She is alert  Mental status is at baseline  Motor: No abnormal muscle tone        Comments: wheelchair   Psychiatric:         Mood and Affect: Mood and affect normal       Comments: Irritable and frequently yelling Chavo Alcantara,

## 2022-12-01 NOTE — TELEPHONE ENCOUNTER
Patient can take Tylenol, she can use topical Voltaren gel as well  She should continue to use the brace as directed from her previous office visit into with lifting  Thank you

## 2022-12-01 NOTE — TELEPHONE ENCOUNTER
Caller: caregiver Yasmin Galloway    Doctor: Tammi Lam    Reason for call: returned call transferred to [facility]    Call back#: N/A

## 2022-12-01 NOTE — ASSESSMENT & PLAN NOTE
Baseline chronic cough and wheezing bibasilar on exam today, has low grade fever but no other resp symptoms - urged to watch closely, hold abx/steroid for now, refilled Advair and rescue inhaler, urged to quit smoking, will follow - call with new/worse symptoms

## 2022-12-05 ENCOUNTER — TELEPHONE (OUTPATIENT)
Dept: GASTROENTEROLOGY | Facility: CLINIC | Age: 67
End: 2022-12-05

## 2022-12-13 DIAGNOSIS — M19.90 ARTHRITIS: ICD-10-CM

## 2022-12-14 RX ORDER — LEFLUNOMIDE 10 MG/1
TABLET ORAL
Qty: 28 TABLET | Refills: 0 | Status: SHIPPED | OUTPATIENT
Start: 2022-12-14

## 2023-01-01 DIAGNOSIS — M79.7 FIBROMYALGIA: Primary | ICD-10-CM

## 2023-01-01 DIAGNOSIS — G43.109 MIGRAINE WITH AURA AND WITHOUT STATUS MIGRAINOSUS, NOT INTRACTABLE: ICD-10-CM

## 2023-01-01 DIAGNOSIS — E07.9 DISEASE OF THYROID GLAND: ICD-10-CM

## 2023-01-01 DIAGNOSIS — M19.90 ARTHRITIS: ICD-10-CM

## 2023-01-01 DIAGNOSIS — M13.0 POLYARTHRITIS: ICD-10-CM

## 2023-01-01 DIAGNOSIS — J44.9 CHRONIC OBSTRUCTIVE PULMONARY DISEASE, UNSPECIFIED COPD TYPE (HCC): ICD-10-CM

## 2023-01-01 DIAGNOSIS — R79.89 LOW TSH LEVEL: Primary | ICD-10-CM

## 2023-01-01 RX ORDER — FLUTICASONE PROPIONATE AND SALMETEROL 100; 50 UG/1; UG/1
1 POWDER RESPIRATORY (INHALATION) 2 TIMES DAILY
Qty: 60 BLISTER | Refills: 5 | Status: SHIPPED | OUTPATIENT
Start: 2023-01-01 | End: 2023-01-01

## 2023-01-01 RX ORDER — TIZANIDINE HYDROCHLORIDE 2 MG/1
2 CAPSULE, GELATIN COATED ORAL 3 TIMES DAILY
Qty: 90 CAPSULE | Refills: 0 | Status: SHIPPED | OUTPATIENT
Start: 2023-01-01 | End: 2023-01-01

## 2023-01-01 RX ORDER — LEVOTHYROXINE SODIUM 112 UG/1
112 TABLET ORAL DAILY
Qty: 30 TABLET | Refills: 1 | Status: SHIPPED | OUTPATIENT
Start: 2023-01-01 | End: 2023-01-01

## 2023-01-01 RX ORDER — DOCUSATE SODIUM 50 MG AND SENNOSIDES 8.6 MG 8.6; 5 MG/1; MG/1
1 TABLET, FILM COATED ORAL EVERY 12 HOURS
COMMUNITY
Start: 2022-01-01

## 2023-01-01 RX ORDER — NORTRIPTYLINE HYDROCHLORIDE 50 MG/1
50 CAPSULE ORAL
Qty: 30 CAPSULE | Refills: 2 | Status: SHIPPED | OUTPATIENT
Start: 2023-01-01 | End: 2023-01-01

## 2023-01-01 RX ORDER — GALCANEZUMAB 120 MG/ML
120 INJECTION, SOLUTION SUBCUTANEOUS
Qty: 1 ML | Refills: 0 | Status: SHIPPED | OUTPATIENT
Start: 2023-01-01 | End: 2023-01-01 | Stop reason: SDUPTHER

## 2023-01-06 LAB
ALBUMIN SERPL-MCNC: 4.4 G/DL (ref 3.8–4.8)
ALBUMIN/GLOB SERPL: 1.6 {RATIO} (ref 1.2–2.2)
ALP SERPL-CCNC: 124 IU/L (ref 44–121)
ALT SERPL-CCNC: 10 IU/L (ref 0–32)
AST SERPL-CCNC: 13 IU/L (ref 0–40)
BASOPHILS # BLD AUTO: 0.1 X10E3/UL (ref 0–0.2)
BASOPHILS NFR BLD AUTO: 1 %
BILIRUB SERPL-MCNC: 0.3 MG/DL (ref 0–1.2)
BUN SERPL-MCNC: 17 MG/DL (ref 8–27)
BUN/CREAT SERPL: 24 (ref 12–28)
CALCIUM SERPL-MCNC: 9.7 MG/DL (ref 8.7–10.3)
CHLORIDE SERPL-SCNC: 99 MMOL/L (ref 96–106)
CHOLEST SERPL-MCNC: 158 MG/DL (ref 100–199)
CHOLEST/HDLC SERPL: 3.4 RATIO (ref 0–4.4)
CO2 SERPL-SCNC: 26 MMOL/L (ref 20–29)
CREAT SERPL-MCNC: 0.71 MG/DL (ref 0.57–1)
EGFR: 94 ML/MIN/1.73
EOSINOPHIL # BLD AUTO: 0.5 X10E3/UL (ref 0–0.4)
EOSINOPHIL NFR BLD AUTO: 4 %
ERYTHROCYTE [DISTWIDTH] IN BLOOD BY AUTOMATED COUNT: 14 % (ref 11.7–15.4)
EST. AVERAGE GLUCOSE BLD GHB EST-MCNC: 137 MG/DL
GLOBULIN SER-MCNC: 2.8 G/DL (ref 1.5–4.5)
GLUCOSE SERPL-MCNC: 138 MG/DL (ref 70–99)
HBA1C MFR BLD: 6.4 % (ref 4.8–5.6)
HCT VFR BLD AUTO: 45.6 % (ref 34–46.6)
HDLC SERPL-MCNC: 46 MG/DL
HGB BLD-MCNC: 14.9 G/DL (ref 11.1–15.9)
IMM GRANULOCYTES # BLD: 0.1 X10E3/UL (ref 0–0.1)
IMM GRANULOCYTES NFR BLD: 1 %
LDLC SERPL CALC-MCNC: 88 MG/DL (ref 0–99)
LYMPHOCYTES # BLD AUTO: 3 X10E3/UL (ref 0.7–3.1)
LYMPHOCYTES NFR BLD AUTO: 24 %
MCH RBC QN AUTO: 28.7 PG (ref 26.6–33)
MCHC RBC AUTO-ENTMCNC: 32.7 G/DL (ref 31.5–35.7)
MCV RBC AUTO: 88 FL (ref 79–97)
MONOCYTES # BLD AUTO: 1.2 X10E3/UL (ref 0.1–0.9)
MONOCYTES NFR BLD AUTO: 10 %
NEUTROPHILS # BLD AUTO: 7.5 X10E3/UL (ref 1.4–7)
NEUTROPHILS NFR BLD AUTO: 60 %
PLATELET # BLD AUTO: 270 X10E3/UL (ref 150–450)
POTASSIUM SERPL-SCNC: 4.6 MMOL/L (ref 3.5–5.2)
PROT SERPL-MCNC: 7.2 G/DL (ref 6–8.5)
RBC # BLD AUTO: 5.19 X10E6/UL (ref 3.77–5.28)
SL AMB VLDL CHOLESTEROL CALC: 24 MG/DL (ref 5–40)
SODIUM SERPL-SCNC: 142 MMOL/L (ref 134–144)
TRIGL SERPL-MCNC: 134 MG/DL (ref 0–149)
TSH SERPL DL<=0.005 MIU/L-ACNC: 0.19 UIU/ML (ref 0.45–4.5)
WBC # BLD AUTO: 12.3 X10E3/UL (ref 3.4–10.8)

## 2023-01-08 DIAGNOSIS — M19.90 ARTHRITIS: ICD-10-CM

## 2023-01-09 RX ORDER — LEFLUNOMIDE 10 MG/1
TABLET ORAL
Qty: 28 TABLET | Refills: 0 | Status: SHIPPED | OUTPATIENT
Start: 2023-01-09

## 2023-01-18 DIAGNOSIS — M13.0 POLYARTHRITIS: ICD-10-CM

## 2023-01-18 DIAGNOSIS — M19.90 ARTHRITIS: ICD-10-CM

## 2023-01-18 RX ORDER — NORTRIPTYLINE HYDROCHLORIDE 25 MG/1
CAPSULE ORAL
Qty: 30 CAPSULE | Refills: 2 | Status: SHIPPED | OUTPATIENT
Start: 2023-01-18 | End: 2023-01-20

## 2023-01-20 ENCOUNTER — TELEPHONE (OUTPATIENT)
Dept: FAMILY MEDICINE CLINIC | Facility: HOSPITAL | Age: 68
End: 2023-01-20

## 2023-01-20 NOTE — TELEPHONE ENCOUNTER
Patient asking if her Nortriptyline can be increased  She states that she is still having trouble sleeping  Please call patient to advise

## 2023-01-30 ENCOUNTER — OFFICE VISIT (OUTPATIENT)
Dept: OBGYN CLINIC | Facility: CLINIC | Age: 68
End: 2023-01-30

## 2023-01-30 ENCOUNTER — APPOINTMENT (OUTPATIENT)
Dept: RADIOLOGY | Facility: CLINIC | Age: 68
End: 2023-01-30

## 2023-01-30 VITALS — HEIGHT: 66 IN | BODY MASS INDEX: 28.25 KG/M2

## 2023-01-30 DIAGNOSIS — Z48.89 AFTERCARE FOLLOWING SURGERY: Primary | ICD-10-CM

## 2023-01-30 DIAGNOSIS — R52 PAIN: ICD-10-CM

## 2023-01-30 DIAGNOSIS — M19.131 SLAC (SCAPHOLUNATE ADVANCED COLLAPSE) OF WRIST, RIGHT: ICD-10-CM

## 2023-01-30 DIAGNOSIS — Z48.89 AFTERCARE FOLLOWING SURGERY: ICD-10-CM

## 2023-01-30 DIAGNOSIS — M65.4 DE QUERVAIN'S TENOSYNOVITIS, LEFT: ICD-10-CM

## 2023-01-30 RX ORDER — BETAMETHASONE SODIUM PHOSPHATE AND BETAMETHASONE ACETATE 3; 3 MG/ML; MG/ML
6 INJECTION, SUSPENSION INTRA-ARTICULAR; INTRALESIONAL; INTRAMUSCULAR; SOFT TISSUE
Status: COMPLETED | OUTPATIENT
Start: 2023-01-30 | End: 2023-01-30

## 2023-01-30 RX ORDER — LIDOCAINE HYDROCHLORIDE 10 MG/ML
1 INJECTION, SOLUTION INFILTRATION; PERINEURAL
Status: COMPLETED | OUTPATIENT
Start: 2023-01-30 | End: 2023-01-30

## 2023-01-30 RX ADMIN — BETAMETHASONE SODIUM PHOSPHATE AND BETAMETHASONE ACETATE 6 MG: 3; 3 INJECTION, SUSPENSION INTRA-ARTICULAR; INTRALESIONAL; INTRAMUSCULAR; SOFT TISSUE at 11:17

## 2023-01-30 RX ADMIN — LIDOCAINE HYDROCHLORIDE 1 ML: 10 INJECTION, SOLUTION INFILTRATION; PERINEURAL at 11:17

## 2023-01-30 NOTE — PATIENT INSTRUCTIONS
Steroid Injection    Why are steroid injections used in the upper extremity? Steroid injections are commonly used to treat a variety of inflammatory conditions of the upper extremity  Examples of these include trigger fingers (stenosing tenosynovitis), De Quervain's tendonitis, carpal tunnel syndrome, arthritis, and rotator cuff tendonitis (see Figure 1)  What is in a steroid injection? Steroid injections typically contain a mixture of a synthetic cortisone and a local anesthetic such as lidocaine or bupivacaine  Cortisone is a steroid normally produced by the adrenal gland and is a powerful anti-inflammatory  There are several available synthetic preparations such as triamcinolone, betamethasone, and dexamethasone, and they are also commonly referred to by their trade names  They all have similar mechanisms although they vary in strength and duration of action (short versus long-acting)  No single preparation has been found to be superior to others so the choice of medication is left up to the individual provider  These anti-inflammatory steroids are distinctly different from the anabolic steroids that have been abused by some athletes for body-building and performance enhancement  The local anesthetic dissolves the steroid and anesthetizes the area of the injection, diminishing discomfort during the procedure  How does it work? Steroid injections work by decreasing inflammation  Once the inflammation subsides, the associated pain usually improves as well  How is the procedure performed? The area to be injected is first cleansed with an antiseptic such as an iodine preparation, alcohol, or other skin disinfectant  The injection is then given with a small needle (see Figure 2)  Typically, only a small amount of steroid and local anesthetic is injected  Afterwards, the area is covered with gauze or an adhesive bandage      When does it take effect and how long does it last?  The injection should take effect within a few days and the benefits can last for many weeks  The exact timing, however, varies from patient to patient  For some conditions, one injection can be sufficient to completely get rid of the inflammation and pain while for more severe cases, several injections may be required  Most patients respond well to injections although a small subset may not experience any relief of symptoms  How many injections can one get? There is no set rule as to how many injections a person can get  Many providers use three injections as a rule of thumb although, in some cases, more frequent injections may be helpful  Your response to the first injection is very important in determining whether to proceed with re-injection: If the first injection doesn't work or wears off quickly it may not be worthwhile repeating  Many providers limit the number of injections because repeated cortisone may cause damage to tendons and/or cartilage  What are common side effects? The most common side effect is known as a cortisone flare  This is thought to happen when the steroid crystallizes after being injected  In patients who experience a flare, a brief episode of pain lasting one or two days follows the injection  This pain can be worse than the initial discomfort for which the injection was given  Cortisone flares resolve spontaneously over a few days and can be treated with ice and immobilization  Another common side effect, especially in patients with darker skin, involves skin discoloration at the injection site  The skin becomes locally lighter in color and sometimes thinner  Although this can improve, the whitening of the skin can sometimes be permanent  One of the more serious complications from steroid injections is an infection, especially if the injection was given into a joint  Fortunately infections are rare and can usually be prevented by carefully cleaning the skin before performing the injection   Very rarely, some patients may experience allergic reactions to the steroid or local anesthetic in the injection  Concerns in diabetic patients  Patients with diabetes will often notice a transient increase in their blood glucose in the days following a steroid injection  When should you call your health care provider? If you have received a steroid injection and the area is bright red, warm to the touch, or your temperature is greater than 101° orally, you should call your provider to check for an infection  What is [place] Karma Syndrome? Patients with de Quervain syndrome have painful tendons on the thumb side of the wrist  Tendons are the ropes that the muscle uses to pull the bone  You can see them on the back of your hand when you straighten your fingers  In  de Quervain syndrome, the tunnel (the first extensor compartment; see Figure 1A-B) where the tendons run narrows due to the thickening of the soft tissues that make up the tunnel  Hand and thumb motion cause pain, especially with forceful grasping or twisting  Causes  Doctors are not sure what causes de Quervain syndrome  Patients often describe a feeling of inflammation, but studies have shown that the process is not inflammatory  People of all ages get it  When new mothers develop  de Quervain syndrome, it typically appears 4 to 6 weeks after delivery  The old theory that it was caused by wringing out cloth diapers has been replaced by concerns about holding the baby, but changes in hormones and swelling seem more probable  Treatment  Treatments that can relieve symptoms include:   A splint that stops you from moving your thumb and wrist    Tylenol or aspirin type medications (e g , ibuprofen)  Treatments that attempt to change the course of the disease include:   A cortisone-type of steroid injection into the tendon compartment    It has not been clearly established that    these injections change the course of the disease and response to the injection varies  Surgery to open the tunnel and make more room for the tendons  © 2012 [employer]  www handcare  org

## 2023-01-30 NOTE — PROGRESS NOTES
ASSESSMENT/PLAN:    Assessment:   S/P Removal of right wrist fusion plate - Right, Revision fusion of right wrist - Right, and ORIF right long finger metacarpal fracture - Right on 6/14/2022    Left deQuervain tenosynovitis     Plan:   She will proceed with her for steroid injection into her left first dorsal compartment  She was advised that she will notice weather changes into her right wrist for about 2 years  She expressed understanding  Follow Up:  2  month(s)    To Do Next Visit:       General Discussions:     Della Adan Tenosynovitis: The anatomy and physiology of de Quervain's tenosynovitis was discussed with the patient today in the office  Edema and increased contact pressure within the first dorsal extensor compartment at the radial styloid can cause pain, crepitation, and limitation of function  Treatment options include resting thumb spica splints to decrease edema, oral anti-inflammatory medications, home or formal therapy exercises, up to 2 steroid injections within the first dorsal extensor compartment, or surgical release  While majority of patients do respond to conservative treatment, up to 20% may require surgical release  Operative Discussions:       _____________________________________________________  CHIEF COMPLAINT:  Chief Complaint   Patient presents with   • Right Wrist - Follow-up     S/P Removal of right wrist fusion plate -  Revision fusion of right wrist  and ORIF right long finger metacarpal fracture -  6/14/2022         SUBJECTIVE:  Roxanne Oswald is a 74 y o  female who presents for follow up regarding S/P Removal of right wrist fusion plate - Right, Revision fusion of right wrist - Right, and ORIF right long finger metacarpal fracture - Right on 6/14/2022  Since last visit, Roxanne Oswald has tried bracing with only partial relief    Today there is Pain  Severe  Constant  Sharp to the left wrist     Radiation: Yes to the  wrist, thumb and forearm  Associated symptoms: the cold weather affects her pain levels on her right wrist   Handedness: right    PAST MEDICAL HISTORY:  Past Medical History:   Diagnosis Date   • Achalasia    • ADHD    • Anxiety    • Colon polyp    • COPD (chronic obstructive pulmonary disease) ([facility] [address] )    • Disease of thyroid gland     hypo   • Esophageal spasm    • Fibromyalgia    • GERD (gastroesophageal reflux disease)    • Hiatal hernia    • History of tachycardia     24 Pt reports hx of tachycardia - no current issues at this time - reports tachycardia has been tied in with anxiety   • Hypertension    • Iron deficiency anemia    • [facility]        PAST SURGICAL HISTORY:  Past Surgical History:   Procedure Laterality Date   • AMPUTATION      RBKA   • ANKLE FUSION     • APPENDECTOMY     • BELOW KNEE LEG AMPUTATION Right    • CARPAL TUNNEL RELEASE  2021    left arm   • CATARACT EXTRACTION     •  SECTION     • COLONOSCOPY  2010   • COLONOSCOPY  2019    diverticula, 3 mm polyp and 6 mm polyp in rectum, grade III internal hemorrhoids, hyperplastic polyp, 5 year recall 2024    • EGD  2010   • FOREARM FASCIOTOMY Right    • FRACTURE SURGERY     • HYSTERECTOMY      -age 44   • KNEE ARTHROSCOPY Left    • NERVE SURGERY Right 2022    Procedure: Right wrist posterior interosseous nerve neurectomy;  Surgeon: Usman Sousa MD;  Location: [facility] MAIN OR;  Service: Orthopedics   • ORTHOPEDIC SURGERY     • DC ARTHRODESIS WRIST COMPLETE W/O BONE GRAFT Right 2022    Procedure:  Total wrist fusion of the right wrist;  Surgeon: Usman Sousa MD;  Location: [facility] MAIN OR;  Service: Orthopedics   • DC ARTHRODESIS WRIST COMPLETE W/O BONE GRAFT Right 2022    Procedure: Revision fusion of right wrist;  Surgeon: Usman Sousa MD;  Location: [facility] MAIN OR;  Service: Orthopedics   • DC NEUROPLASTY &/TRANSPOSITION ULNAR NERVE ELBOW Left 2021    Procedure: Left carpal and cubital tunnel release;  Surgeon:  Evan Gilbert MD;  Location: [address] [place] [address] MAIN OR;  Service: Orthopedics   • IL OPEN [address] SINGLE EA BONE Right 6/14/2022    Procedure: ORIF right long finger metacarpal fracture;  Surgeon: Dereck Galicia MD;  Location:  MAIN OR;  Service: Orthopedics   • IL REMOVAL IMPLANT DEEP Right 6/14/2022    Procedure: Removal of right wrist fusion plate;  Surgeon: Dereck Galicia MD;  Location:  MAIN OR;  Service: Orthopedics   • UPPER GASTROINTESTINAL ENDOSCOPY     • WRIST TENDON TRANSFER N/A 01/20/2022    Procedure: Extensor pollicis longus tendon transposition right wrist;  Surgeon: Dereck Galicia MD;  Location:  MAIN OR;  Service: Orthopedics       FAMILY HISTORY:  Family History   Problem Relation Age of Onset   • Cancer Mother    • Ulcerative colitis Mother    • Lung cancer Mother         Unknown age   • Diabetes Mother    • Alcohol abuse Father    • Diabetes Sister    • Cancer Brother    • Testicular cancer Brother 23   • No Known Problems Maternal Grandmother    • No Known Problems Maternal Grandfather    • No Known Problems Paternal Grandmother    • No Known Problems Paternal Grandfather    • Cancer Daughter    • Cervical cancer Daughter 39   • Colon polyps Neg Hx    • Colon cancer Neg Hx    • Anesthesia problems Neg Hx        SOCIAL HISTORY:  Social History     Tobacco Use   • Smoking status: Some Days     Packs/day: 0 25     Years: 25 00     Pack years: 6 25     Types: Cigarettes     Start date: 1/1/1977   • Smokeless tobacco: Never   • Tobacco comments:     4-5 cigs a day -   Vaping Use   • Vaping Use: Never used   Substance Use Topics   • Alcohol use: No     Comment: Denies any alcohol use   • Drug use: Not Currently     Comment: Pt denies smoking any marijuana currently at this time       MEDICATIONS:    Current Outpatient Medications:   •  albuterol (PROVENTIL HFA,VENTOLIN HFA) 90 mcg/act inhaler, Inhale 2 puffs every 4 (four) hours as needed for wheezing, Disp: 18 g, Rfl: 2  •  Allergy Relief 10 MG tablet, Take 1 tablet (10 mg total) by mouth 2 (two) times a day, Disp: 180 tablet, Rfl: 0  •  ascorbic acid (VITAMIN C) 500 mg tablet, TAKE ONE TABLET BY MOUTH TWICE DAILY, Disp: 56 tablet, Rfl: 1  •  atorvastatin (LIPITOR) 20 mg tablet, TAKE ONE TABLET BY MOUTH EVERY EVENING, Disp: 90 tablet, Rfl: 1  •  benzonatate (TESSALON PERLES) 100 mg capsule, Take 1 capsule (100 mg total) by mouth 3 (three) times a day as needed for cough, Disp: 30 capsule, Rfl: 0  •  Blood Glucose Monitoring Suppl (FreeStyle Lite) w/Device KIT, Use 1 kit in the morning, Disp: 1 kit, Rfl: 0  •  Blood Pressure Monitoring (Blood Pressure Cuff) MISC, Use daily as needed (dizziness), Disp: 1 each, Rfl: 0  •  Diclofenac Sodium (VOLTAREN) 1 %, Apply 4 g topically 4 (four) times a day, Disp: 300 g, Rfl: 6  •  dicyclomine (BENTYL) 20 mg tablet, TAKE 1 TABLET BY MOUTH EVERY 6 HOURS, Disp: 120 tablet, Rfl: 2  •  famotidine (PEPCID) 20 mg tablet, Take 1 tablet (20 mg total) by mouth 2 (two) times a day, Disp: 60 tablet, Rfl: 2  •  Ferrous Sulfate (Iron) 325 (65 Fe) MG TABS, Take 1 tablet by mouth 2 (two) times a day with meals, Disp: , Rfl:   •  fluticasone (FLONASE) 50 mcg/act nasal spray, 2 sprays into each nostril daily, Disp: 16 g, Rfl: 5  •  Fluticasone-Salmeterol (Advair Diskus) 100-50 mcg/dose inhaler, Inhale 1 puff 2 (two) times a day Rinse mouth after use , Disp: 60 blister, Rfl: 5  •  folic acid (FOLVITE) 1 mg tablet, Take 2 tablets (2 mg total) by mouth daily at bedtime, Disp: 180 tablet, Rfl: 3  •  gabapentin (NEURONTIN) 600 MG tablet, TAKE ONE TABLET BY MOUTH THREE TIMES DAILY, Disp: 90 tablet, Rfl: 2  •  Galcanezumab-gnlm (Emgality) 120 MG/ML SOSY, Inject 120 mg under the skin every 30 (thirty) days Once a month, Disp: 1 mL, Rfl: 2  •  glipiZIDE (GLUCOTROL XL) 5 mg 24 hr tablet, Take 1 tablet (5 mg total) by mouth daily, Disp: 90 tablet, Rfl: 2  •  glucose blood (FREESTYLE LITE) test strip, Test once daily, Disp: 100 each, Rfl: 0  •  glucose blood test strip, Use as instructed, Disp: 90 strip, Rfl: 0  •  hydrOXYzine pamoate (VISTARIL) 50 mg capsule, Take 1 capsule (50 mg total) by mouth 3 (three) times a day as needed for itching, Disp: 90 capsule, Rfl: 1  •  ipratropium-albuterol (DUO-NEB) 0 5-2 5 mg/3 mL nebulizer solution, Take 3 mL by nebulization every 6 (six) hours as needed , Disp: , Rfl:   •  Lactobacillus Acid-Pectin (Acidophilus/Citrus Pectin) TABS, Take 1 tablet by mouth daily, Disp: , Rfl:   •  Lancets (freestyle) lancets, Use in the morning Test once daily, Disp: 100 each, Rfl: 0  •  Lancets MISC, Check sugars once weekly, Disp: , Rfl:   •  leflunomide (ARAVA) 10 MG tablet, TAKE ONE TABLET BY MOUTH EVERY MORNING, Disp: 28 tablet, Rfl: 0  •  leucovorin (WELLCOVORIN) 10 MG tablet, Take one tablet the morning after you take your methotrexate, Disp: 12 tablet, Rfl: 3  •  levothyroxine 112 mcg tablet, Take 1 tablet (112 mcg total) by mouth daily, Disp: 30 tablet, Rfl: 1  •  lisinopril (ZESTRIL) 5 mg tablet, TAKE ONE TABLET BY MOUTH DAILY, Disp: 30 tablet, Rfl: 2  •  meclizine (ANTIVERT) 25 mg tablet, Take 1 tablet (25 mg total) by mouth every 8 (eight) hours as needed for dizziness, Disp: 60 tablet, Rfl: 1  •  metFORMIN (GLUCOPHAGE-XR) 500 mg 24 hr tablet, Take 1 tablet (500 mg total) by mouth 2 (two) times a day with meals, Disp: 60 tablet, Rfl: 2  •  methocarbamol (ROBAXIN) 500 mg tablet, TAKE ONE TABLET BY MOUTH THREE TIMES DAILY AS NEEDED FOR MUSCLE SPASMS, Disp: 90 tablet, Rfl: 2  •  methotrexate 2 5 MG tablet, TAKE SIX TABLETS BY MOUTH ONCE A WEEK WITH FOOD OR AFTER EATING, Disp: 72 tablet, Rfl: 1  •  Multiple Vitamin (Tab-A-Felicia) TABS, Take 1 tablet by mouth daily, Disp: 30 tablet, Rfl: 5  •  Multiple Vitamins-Minerals (multivitamin with minerals) tablet, Take 1 tablet by mouth daily, Disp: 30 tablet, Rfl: 1  •  nortriptyline (PAMELOR) 50 mg capsule, Take 1 capsule (50 mg total) by mouth daily at bedtime, Disp: 30 capsule, Rfl: 2  •  olopatadine (PATANOL) 0 1 % ophthalmic solution, Administer 1 drop to both eyes 2 (two) times a day as needed for allergies, Disp: 5 mL, Rfl: 2  •  ondansetron (ZOFRAN) 4 mg tablet, Take 1 tablet (4 mg total) by mouth every 8 (eight) hours as needed for nausea or vomiting, Disp: 90 tablet, Rfl: 2  •  oxyCODONE (Roxicodone) 5 immediate release tablet, Take 1 tablet (5 mg total) by mouth every 8 (eight) hours as needed for moderate pain Max Daily Amount: 15 mg, Disp: 10 tablet, Rfl: 0  •  pantoprazole (PROTONIX) 40 mg tablet, TAKE ONE TABLET BY MOUTH AT BEDTIME, Disp: 30 tablet, Rfl: 2  •  predniSONE 10 mg tablet, Take by mouth daily, Disp: , Rfl:   •  Stimulant Laxative 8 6-50 MG per tablet, Take 1 tablet by mouth every 12 (twelve) hours, Disp: , Rfl:   •  triamcinolone (KENALOG) 0 1 % cream, Use as directed, Disp: , Rfl:   •  venlafaxine (EFFEXOR-XR) 150 mg 24 hr capsule, , Disp: , Rfl:     ALLERGIES:  Allergies   Allergen Reactions   • Levaquin [Levofloxacin] Hives   • Morphine Itching and Anaphylaxis     u  Other reaction(s): Other (See Comments)  u  Other reaction(s): Feeling agitated (finding)     • Penicillins Anaphylaxis     "almost "- gives hives, nausea    • Cephalosporins Hives     Other reaction(s): Unknown Reaction     • Codeine Hives     Other reaction(s): Unknown  Other reaction(s): Unknown Reaction  Other reaction(s): Unknown  "Nausea and makes me itch"   • Nsaids GI Bleeding     Other reaction(s): Unknown Reaction   • Cephalexin Other (See Comments)     Other reaction(s): Unknown   • Aspirin Hives, Other (See Comments) and Rash     Other reaction(s): Unknown Reaction     • Chlorhexidine Rash   • Medical Tape Rash   • Vancomycin Rash     "Kills my stomach"       REVIEW OF SYSTEMS:  Pertinent items are noted in HPI      LABS:  HgA1c:   Lab Results   Component Value Date    HGBA1C 6 4 (H) 2023     BMP:   Lab Results   Component Value Date    GLUCOSE 90 04/01/2015    CALCIUM 9 4 05/23/2022     04/01/2015    K 4 6 01/05/2023    CO2 26 01/05/2023    CL 99 01/05/2023    BUN 17 01/05/2023    CREATININE 0 71 01/05/2023           _____________________________________________________  PHYSICAL EXAMINATION:  Vital signs: Ht 5' 6" (1 676 m)   LMP  (LMP Unknown)   BMI 28 25 kg/m²   General: well developed and well nourished, alert, oriented times 3 and appears comfortable  Psychiatric: Normal  HEENT: Trachea Midline, No torticollis  Cardiovascular: No discernable arrhythmia  Pulmonary: No wheezing or stridor  Abdomen: No rebound or guarding  Extremities: No peripheral edema  Skin: No masses, erythema, lacerations, fluctation, ulcerations  Neurovascular: Sensation Intact to the Median, Ulnar, Radial Nerve, Motor Intact to the Median, Ulnar, Radial Nerve and Pulses Intact    MUSCULOSKELETAL EXAMINATION:  LEFT SIDE:  Murrel William:  Negative trigger thumb, Tenderness Radial Styloid and Positive Finkelstein's Test, no ttp thumb cmc or MP jt  RIGHT SIDE:  No ttp  Full composite fist formation  _____________________________________________________  STUDIES REVIEWED:  Images were reviewed in PACS by Dr Sherren Dawson and demonstrate: xray of left thumb on 1/30/2023 demonstrates arthritic change at thumb MP and [facility] jt with osteophyte formation and joint space narrowing  Xray of right wrist on 1/30/2023 demonstrates stable alignment of radiocarpal joint with routine healing of fusion site across radiocarpal joint  PROCEDURES PERFORMED:  Hand/upper extremity injection: L extensor compartment 1  Universal Protocol:  Consent: Verbal consent obtained    Risks and benefits: risks, benefits and alternatives were discussed  Consent given by: patient  Site marked: the operative site was marked  Supporting Documentation  Indications: tendon swelling   Procedure Details  Condition:de Quervain's tenosynovitis Site: L extensor compartment 1   Medications administered: 1 mL lidocaine 1 %; 6 mg betamethasone acetate-betamethasone sodium phosphate 6 (3-3) mg/mL    Patient tolerance: patient tolerated the procedure well with no immediate complications  Dressing:  Sterile dressing applied             Scribe Attestation    I,:  Nivia Staley am acting as a scribe while in the presence of the attending physician :       I,:  Teresita Sosa MD personally performed the services described in this documentation    as scribed in my presence :

## 2023-02-02 ENCOUNTER — TELEPHONE (OUTPATIENT)
Dept: PSYCHIATRY | Facility: CLINIC | Age: 68
End: 2023-02-02

## 2023-02-02 NOTE — TELEPHONE ENCOUNTER
Patient inquiring about [facility] therapy and medication management services  Writer advised the patient that there is a waitlist and added the patient to the list  Also advised patient to contact insurance company for additional resources

## 2023-02-02 NOTE — TELEPHONE ENCOUNTER
Pt and pt sister called in and pt was added to non referral wait list for med mgmt  No preference on provider and [place] location  Patient stated that as of 1/21/2023 she no longer has [place] [employer] insurance

## 2023-02-05 DIAGNOSIS — E78.2 MODERATE MIXED HYPERLIPIDEMIA NOT REQUIRING STATIN THERAPY: ICD-10-CM

## 2023-02-05 DIAGNOSIS — E11.65 UNCONTROLLED TYPE 2 DIABETES MELLITUS WITH HYPERGLYCEMIA (HCC): ICD-10-CM

## 2023-02-05 DIAGNOSIS — E07.9 DISEASE OF THYROID GLAND: ICD-10-CM

## 2023-02-05 DIAGNOSIS — M19.90 ARTHRITIS: ICD-10-CM

## 2023-02-05 RX ORDER — ATORVASTATIN CALCIUM 20 MG/1
TABLET, FILM COATED ORAL
Qty: 90 TABLET | Refills: 1 | Status: SHIPPED | OUTPATIENT
Start: 2023-02-05

## 2023-02-05 RX ORDER — METFORMIN HYDROCHLORIDE 500 MG/1
TABLET, EXTENDED RELEASE ORAL
Qty: 60 TABLET | Refills: 2 | Status: SHIPPED | OUTPATIENT
Start: 2023-02-05

## 2023-02-05 RX ORDER — LEVOTHYROXINE SODIUM 112 UG/1
TABLET ORAL
Qty: 30 TABLET | Refills: 1 | Status: SHIPPED | OUTPATIENT
Start: 2023-02-05

## 2023-02-06 RX ORDER — LEFLUNOMIDE 10 MG/1
TABLET ORAL
Qty: 28 TABLET | Refills: 0 | Status: SHIPPED | OUTPATIENT
Start: 2023-02-06

## 2023-02-24 DIAGNOSIS — J44.9 CHRONIC OBSTRUCTIVE PULMONARY DISEASE, UNSPECIFIED COPD TYPE (HCC): ICD-10-CM

## 2023-02-26 RX ORDER — ALBUTEROL SULFATE 90 UG/1
AEROSOL, METERED RESPIRATORY (INHALATION)
Qty: 8.5 G | Refills: 2 | Status: SHIPPED | OUTPATIENT
Start: 2023-02-26

## 2023-03-05 DIAGNOSIS — M19.90 ARTHRITIS: ICD-10-CM

## 2023-03-06 ENCOUNTER — RA CDI HCC (OUTPATIENT)
Dept: OTHER | Facility: HOSPITAL | Age: 68
End: 2023-03-06

## 2023-03-06 RX ORDER — LEFLUNOMIDE 10 MG/1
TABLET ORAL
Qty: 28 TABLET | Refills: 0 | Status: SHIPPED | OUTPATIENT
Start: 2023-03-06 | End: 2023-03-08

## 2023-03-06 NOTE — PROGRESS NOTES
[facility]  coding opportunities       Chart reviewed, no opportunity found:   [address]        Patients Insurance     Medicare Insurance: [employer]age

## 2023-03-08 ENCOUNTER — OFFICE VISIT (OUTPATIENT)
Dept: RHEUMATOLOGY | Facility: CLINIC | Age: 68
End: 2023-03-08

## 2023-03-08 VITALS — HEIGHT: 66 IN | SYSTOLIC BLOOD PRESSURE: 130 MMHG | DIASTOLIC BLOOD PRESSURE: 80 MMHG | BODY MASS INDEX: 28.25 KG/M2

## 2023-03-08 DIAGNOSIS — M19.90 ARTHRITIS: ICD-10-CM

## 2023-03-08 DIAGNOSIS — Z79.899 HIGH RISK MEDICATION USE: ICD-10-CM

## 2023-03-08 DIAGNOSIS — M79.10 MYALGIA: ICD-10-CM

## 2023-03-08 DIAGNOSIS — M13.0 POLYARTHRITIS: ICD-10-CM

## 2023-03-08 DIAGNOSIS — E27.8 ADRENAL NODULE (HCC): ICD-10-CM

## 2023-03-08 DIAGNOSIS — R53.83 MALAISE AND FATIGUE: ICD-10-CM

## 2023-03-08 DIAGNOSIS — M06.9 RHEUMATOID ARTHRITIS, INVOLVING UNSPECIFIED SITE, UNSPECIFIED WHETHER RHEUMATOID FACTOR PRESENT (HCC): ICD-10-CM

## 2023-03-08 DIAGNOSIS — Z51.81 MEDICATION MONITORING ENCOUNTER: Primary | ICD-10-CM

## 2023-03-08 DIAGNOSIS — R53.81 MALAISE AND FATIGUE: ICD-10-CM

## 2023-03-08 DIAGNOSIS — M54.50 CHRONIC BILATERAL LOW BACK PAIN, UNSPECIFIED WHETHER SCIATICA PRESENT: ICD-10-CM

## 2023-03-08 DIAGNOSIS — G89.29 CHRONIC BILATERAL LOW BACK PAIN, UNSPECIFIED WHETHER SCIATICA PRESENT: ICD-10-CM

## 2023-03-08 RX ORDER — PREDNISONE 1 MG/1
5 TABLET ORAL DAILY PRN
Qty: 90 TABLET | Refills: 2 | Status: SHIPPED | OUTPATIENT
Start: 2023-03-08

## 2023-03-08 RX ORDER — METHOTREXATE 2.5 MG/1
TABLET ORAL
Qty: 72 TABLET | Refills: 1 | Status: SHIPPED | OUTPATIENT
Start: 2023-03-08

## 2023-03-08 RX ORDER — LIDOCAINE 4 G/G
1 PATCH TOPICAL DAILY
Qty: 30 PATCH | Refills: 3 | Status: SHIPPED | OUTPATIENT
Start: 2023-03-08

## 2023-03-08 RX ORDER — LEUCOVORIN CALCIUM 10 MG/1
TABLET ORAL
Qty: 12 TABLET | Refills: 3 | Status: SHIPPED | OUTPATIENT
Start: 2023-03-08

## 2023-03-08 RX ORDER — GALCANEZUMAB 120 MG/ML
INJECTION, SOLUTION SUBCUTANEOUS
COMMUNITY
Start: 2023-02-15

## 2023-03-08 RX ORDER — LIDOCAINE 50 MG/G
OINTMENT TOPICAL AS NEEDED
Qty: 35.44 G | Refills: 0 | Status: SHIPPED | OUTPATIENT
Start: 2023-03-08

## 2023-03-08 RX ORDER — ACETAMINOPHEN 325 MG/1
TABLET ORAL
COMMUNITY
Start: 2023-02-24

## 2023-03-08 RX ORDER — LEFLUNOMIDE 10 MG/1
10 TABLET ORAL DAILY
Qty: 90 TABLET | Refills: 2 | Status: SHIPPED | OUTPATIENT
Start: 2023-03-08

## 2023-03-08 RX ORDER — NORTRIPTYLINE HYDROCHLORIDE 75 MG/1
75 CAPSULE ORAL
Qty: 90 CAPSULE | Refills: 2 | Status: SHIPPED | OUTPATIENT
Start: 2023-03-08

## 2023-03-08 NOTE — PROGRESS NOTES
Assessment and Plan:   RA--continue MTX weekly and folic acid daily  Continue leucovorin the day after the MTX  Continue Arava 10 mg daily  Prednisone 5mg daily PRN arthritis flares  Anti-inflammatory diet/exercise/weight control  Increase CV fitness/aerobic activity  Patient to continue to monitor symptoms  Topical NSAIDs/analgesics PRN joint pain  Continue to monitor labs including CRP, CMP, CBC every 3 months  F/u in 6 mos  or sooner if possible      Rheumatic Disease Summary  As above    Here for f/u visit  Worsening pain off of Effexor  All labs reviewed in detail with patient  CRP normal   Left knee with crepitus and pain on ROM/ambulation  No systemic symptoms  The following portions of the patient's history were reviewed and updated as appropriate: allergies, current medications, past family history, past medical history, past social history, past surgical history and problem list     Review of Systems:   Review of Systems   Constitutional: Negative for fatigue  HENT: Negative for mouth sores  Eyes: Negative for pain  Respiratory: Negative for shortness of breath  Cardiovascular: Negative for leg swelling  Musculoskeletal: Positive for arthralgias  Negative for joint swelling  Skin: Negative for rash  Neurological: Negative for weakness  Hematological: Negative for adenopathy  Psychiatric/Behavioral: Negative for sleep disturbance         Home Medications:    Current Outpatient Medications:   •  leflunomide (ARAVA) 10 MG tablet, Take 1 tablet (10 mg total) by mouth daily, Disp: 90 tablet, Rfl: 2  •  leucovorin (WELLCOVORIN) 10 MG tablet, Take one tablet the morning after you take your methotrexate, Disp: 12 tablet, Rfl: 3  •  methotrexate 2 5 MG tablet, TAKE SIX TABLETS BY MOUTH ONCE A WEEK WITH FOOD OR AFTER EATING, Disp: 72 tablet, Rfl: 1  •  nortriptyline (PAMELOR) 75 MG capsule, Take 1 capsule (75 mg total) by mouth daily at bedtime, Disp: 90 capsule, Rfl: 2  •  predniSONE 5 mg tablet, Take 1 tablet (5 mg total) by mouth daily as needed (for arthritis flares), Disp: 90 tablet, Rfl: 2  •  acetaminophen (TYLENOL) 325 mg tablet, TAKE TWO TABLETS BY MOUTH EVERY 6 HOURS AS NEEDED FOR MILD PAIN, Disp: , Rfl:   •  albuterol (PROVENTIL HFA,VENTOLIN HFA) 90 mcg/act inhaler, INHALE 1 PUFF BY MOUTH EVERY 4 HOURS AS NEEDED FOR wheezing, Disp: 8 5 g, Rfl: 2  •  Allergy Relief 10 MG tablet, Take 1 tablet (10 mg total) by mouth 2 (two) times a day, Disp: 180 tablet, Rfl: 0  •  ascorbic acid (VITAMIN C) 500 mg tablet, TAKE ONE TABLET BY MOUTH TWICE DAILY, Disp: 56 tablet, Rfl: 1  •  atorvastatin (LIPITOR) 20 mg tablet, TAKE 1 TABLET BY MOUTH EVERY EVENING, Disp: 90 tablet, Rfl: 1  •  benzonatate (TESSALON PERLES) 100 mg capsule, Take 1 capsule (100 mg total) by mouth 3 (three) times a day as needed for cough, Disp: 30 capsule, Rfl: 0  •  Blood Glucose Monitoring Suppl (FreeStyle Lite) w/Device KIT, Use 1 kit in the morning, Disp: 1 kit, Rfl: 0  •  Blood Pressure Monitoring (Blood Pressure Cuff) MISC, Use daily as needed (dizziness), Disp: 1 each, Rfl: 0  •  Diclofenac Sodium (VOLTAREN) 1 %, Apply 4 g topically 4 (four) times a day, Disp: 300 g, Rfl: 6  •  dicyclomine (BENTYL) 20 mg tablet, TAKE 1 TABLET BY MOUTH EVERY 6 HOURS, Disp: 120 tablet, Rfl: 2  •  Emgality 120 MG/ML SOAJ, INJECT ONE pen SUBCUTANEOUSLY EACH MONTH, Disp: , Rfl:   •  famotidine (PEPCID) 20 mg tablet, Take 1 tablet (20 mg total) by mouth 2 (two) times a day, Disp: 60 tablet, Rfl: 2  •  Ferrous Sulfate (Iron) 325 (65 Fe) MG TABS, Take 1 tablet by mouth 2 (two) times a day with meals, Disp: , Rfl:   •  fluticasone (FLONASE) 50 mcg/act nasal spray, 2 sprays into each nostril daily, Disp: 16 g, Rfl: 5  •  Fluticasone-Salmeterol (Advair Diskus) 100-50 mcg/dose inhaler, Inhale 1 puff 2 (two) times a day Rinse mouth after use , Disp: 60 blister, Rfl: 5  •  folic acid (FOLVITE) 1 mg tablet, Take 2 tablets (2 mg total) by mouth daily at bedtime, Disp: 180 tablet, Rfl: 3  •  gabapentin (NEURONTIN) 600 MG tablet, TAKE ONE TABLET BY MOUTH THREE TIMES DAILY, Disp: 90 tablet, Rfl: 2  •  Galcanezumab-gnlm (Emgality) 120 MG/ML SOSY, Inject 120 mg under the skin every 30 (thirty) days Once a month, Disp: 1 mL, Rfl: 2  •  glipiZIDE (GLUCOTROL XL) 5 mg 24 hr tablet, Take 1 tablet (5 mg total) by mouth daily, Disp: 90 tablet, Rfl: 2  •  glucose blood (FREESTYLE LITE) test strip, Test once daily, Disp: 100 each, Rfl: 0  •  glucose blood test strip, Use as instructed, Disp: 90 strip, Rfl: 0  •  hydrOXYzine pamoate (VISTARIL) 50 mg capsule, Take 1 capsule (50 mg total) by mouth 3 (three) times a day as needed for itching, Disp: 90 capsule, Rfl: 1  •  ipratropium-albuterol (DUO-NEB) 0 5-2 5 mg/3 mL nebulizer solution, Take 3 mL by nebulization every 6 (six) hours as needed , Disp: , Rfl:   •  Lactobacillus Acid-Pectin (Acidophilus/Citrus Pectin) TABS, Take 1 tablet by mouth daily, Disp: , Rfl:   •  Lancets (freestyle) lancets, Use in the morning Test once daily, Disp: 100 each, Rfl: 0  •  Lancets MISC, Check sugars once weekly, Disp: , Rfl:   •  levothyroxine 112 mcg tablet, TAKE ONE TABLET BY MOUTH DAILY, Disp: 30 tablet, Rfl: 1  •  lisinopril (ZESTRIL) 5 mg tablet, TAKE ONE TABLET BY MOUTH DAILY, Disp: 30 tablet, Rfl: 2  •  meclizine (ANTIVERT) 25 mg tablet, Take 1 tablet (25 mg total) by mouth every 8 (eight) hours as needed for dizziness, Disp: 60 tablet, Rfl: 1  •  metFORMIN (GLUCOPHAGE-XR) 500 mg 24 hr tablet, TAKE ONE TABLET BY MOUTH TWICE DAILY WITH MEALS, Disp: 60 tablet, Rfl: 2  •  Multiple Vitamin (Tab-A-Felicia) TABS, Take 1 tablet by mouth daily, Disp: 30 tablet, Rfl: 5  •  Multiple Vitamins-Minerals (multivitamin with minerals) tablet, Take 1 tablet by mouth daily, Disp: 30 tablet, Rfl: 1  •  olopatadine (PATANOL) 0 1 % ophthalmic solution, Administer 1 drop to both eyes 2 (two) times a day as needed for allergies, Disp: 5 mL, Rfl: 2  •  ondansetron (ZOFRAN) 4 mg tablet, Take 1 tablet (4 mg total) by mouth every 8 (eight) hours as needed for nausea or vomiting, Disp: 90 tablet, Rfl: 2  •  oxyCODONE (Roxicodone) 5 immediate release tablet, Take 1 tablet (5 mg total) by mouth every 8 (eight) hours as needed for moderate pain Max Daily Amount: 15 mg, Disp: 10 tablet, Rfl: 0  •  pantoprazole (PROTONIX) 40 mg tablet, TAKE ONE TABLET BY MOUTH AT BEDTIME, Disp: 30 tablet, Rfl: 2  •  Stimulant Laxative 8 6-50 MG per tablet, Take 1 tablet by mouth every 12 (twelve) hours, Disp: , Rfl:   •  TiZANidine (ZANAFLEX) 2 MG capsule, Take 1 capsule (2 mg total) by mouth 3 (three) times a day Do not start before February 18, 2023 , Disp: 90 capsule, Rfl: 0  •  triamcinolone (KENALOG) 0 1 % cream, Use as directed, Disp: , Rfl:   •  venlafaxine (EFFEXOR-XR) 150 mg 24 hr capsule, , Disp: , Rfl:     Objective:    Vitals:    03/08/23 0956   BP: 130/80   Height: 5' 6" (1 676 m)       Physical Exam  Musculoskeletal:      Right hand: Tenderness present  Left hand: Tenderness present  Left knee: Crepitus present  Reviewed labs and imaging  Imaging:   No results found      Labs:   Office Visit on 12/01/2022   Component Date Value Ref Range Status   • Hemoglobin A1C 12/01/2022 6 1  6 5 Final   • White Blood Cell Count 01/05/2023 12 3 (H)  3 4 - 10 8 x10E3/uL Final   • Red Blood Cell Count 01/05/2023 5 19  3 77 - 5 28 x10E6/uL Final   • Hemoglobin 01/05/2023 14 9  11 1 - 15 9 g/dL Final   • HCT 01/05/2023 45 6  34 0 - 46 6 % Final   • MCV 01/05/2023 88  79 - 97 fL Final   • MCH 01/05/2023 28 7  26 6 - 33 0 pg Final   • MCHC 01/05/2023 32 7  31 5 - 35 7 g/dL Final   • RDW 01/05/2023 14 0  11 7 - 15 4 % Final   • Platelet Count 90/93/4624 270  150 - 450 x10E3/uL Final   • Neutrophils 01/05/2023 60  Not Estab  % Final   • Lymphocytes 01/05/2023 24  Not Estab  % Final   • Monocytes 01/05/2023 10  Not Estab  % Final   • Eosinophils 01/05/2023 4  Not Estab  % Final   • Basophils PCT 01/05/2023 1  Not Estab  % Final   • Neutrophils (Absolute) 01/05/2023 7 5 (H)  1 4 - 7 0 x10E3/uL Final   • Lymphocytes (Absolute) 01/05/2023 3 0  0 7 - 3 1 x10E3/uL Final   • Monocytes (Absolute) 01/05/2023 1 2 (H)  0 1 - 0 9 x10E3/uL Final   • Eosinophils (Absolute) 01/05/2023 0 5 (H)  0 0 - 0 4 x10E3/uL Final   • Basophils ABS 01/05/2023 0 1  0 0 - 0 2 x10E3/uL Final   • Immature Granulocytes 01/05/2023 1  Not Estab  % Final   • Immature Granulocytes (Absolute) 01/05/2023 0 1  0 0 - 0 1 x10E3/uL Final   • Glucose, Random 01/05/2023 138 (H)  70 - 99 mg/dL Final   • BUN 01/05/2023 17  8 - 27 mg/dL Final   • Creatinine 01/05/2023 0 71  0 57 - 1 00 mg/dL Final   • eGFR 01/05/2023 94  >59 mL/min/1 73 Final   • SL AMB BUN/CREATININE RATIO 01/05/2023 24  12 - 28 Final   • Sodium 01/05/2023 142  134 - 144 mmol/L Final   • Potassium 01/05/2023 4 6  3 5 - 5 2 mmol/L Final   • Chloride 01/05/2023 99  96 - 106 mmol/L Final   • CO2 01/05/2023 26  20 - 29 mmol/L Final   • CALCIUM 01/05/2023 9 7  8 7 - 10 3 mg/dL Final   • Protein, Total 01/05/2023 7 2  6 0 - 8 5 g/dL Final   • Albumin 01/05/2023 4 4  3 8 - 4 8 g/dL Final   • Globulin, Total 01/05/2023 2 8  1 5 - 4 5 g/dL Final   • Albumin/Globulin Ratio 01/05/2023 1 6  1 2 - 2 2 Final   • TOTAL BILIRUBIN 01/05/2023 0 3  0 0 - 1 2 mg/dL Final   • Alk Phos Isoenzymes 01/05/2023 124 (H)  44 - 121 IU/L Final   • AST 01/05/2023 13  0 - 40 IU/L Final   • ALT 01/05/2023 10  0 - 32 IU/L Final   • TSH 01/05/2023 0 188 (L)  0 450 - 4 500 uIU/mL Final   • Cholesterol, Total 01/05/2023 158  100 - 199 mg/dL Final   • Triglycerides 01/05/2023 134  0 - 149 mg/dL Final   • HDL 01/05/2023 46  >39 mg/dL Final   • VLDL Cholesterol Calculated 01/05/2023 24  5 - 40 mg/dL Final   • LDL Calculated 01/05/2023 88  0 - 99 mg/dL Final   • T   Chol/HDL Ratio 01/05/2023 3 4  0 0 - 4 4 ratio Final    Comment:                                   T  Chol/HDL Ratio Men  Women                                1/2 Avg  Risk  3 4    3 3                                    Avg Risk  5 0    4 4                                 2X Avg  Risk  9 6    7 1                                 3X Avg  Risk 23 4   11 0     • Hemoglobin A1C 01/05/2023 6 4 (H)  4 8 - 5 6 % Final    Comment:          Prediabetes: 5 7 - 6 4           Diabetes: >6 4           Glycemic control for adults with diabetes: <7 0     • Estimated Average Glucose 01/05/2023 137  mg/dL Final   Office Visit on 10/07/2022   Component Date Value Ref Range Status   • Beta Strep Grp A Culture 10/07/2022 Negative   Final                                Reference Range: Negative   Office Visit on 07/07/2022   Component Date Value Ref Range Status   • Glucose, Random 08/10/2022 158 (H)  65 - 99 mg/dL Final   • BUN 08/10/2022 21  8 - 27 mg/dL Final   • Creatinine 08/10/2022 0 90  0 57 - 1 00 mg/dL Final   • eGFR 08/10/2022 71  >59 mL/min/1 73 Final   • SL AMB BUN/CREATININE RATIO 08/10/2022 23  12 - 28 Final   • Sodium 08/10/2022 140  134 - 144 mmol/L Final   • Potassium 08/10/2022 4 4  3 5 - 5 2 mmol/L Final   • Chloride 08/10/2022 97  96 - 106 mmol/L Final   • CO2 08/10/2022 24  20 - 29 mmol/L Final   • CALCIUM 08/10/2022 9 3  8 7 - 10 3 mg/dL Final   Admission on 06/14/2022, Discharged on 06/14/2022   Component Date Value Ref Range Status   • POC Glucose 06/14/2022 131  65 - 140 mg/dl Final   • Anaerobic Culture 06/14/2022 No growth   Final   • Fungus (Mycology) Culture 06/14/2022 No Fungus Isolated at 4 Weeks   Final   • Tissue Culture 06/14/2022 No growth   Final   • Gram Stain Result 06/14/2022 No Polys or Bacteria seen   Final   • Case Report 06/14/2022    Final                    Value:Surgical Pathology Report                         Case: [id number]                                   Authorizing Provider:  Dena Thompson MD      Collected:           06/14/2022 1450              Ordering Location:     [place] [id number] [employer]      Received:            06/17/2022 [address] [place] [address] Operating Room                                                      Pathologist:           Nory Hair                                                                 Specimen:    Bone, right wrist culture                                                                 • Final Diagnosis 06/14/2022    Final                    Value: This result contains rich text formatting which cannot be displayed here  • Additional Information 06/14/2022    Final                    Value: This result contains rich text formatting which cannot be displayed here  • Gross Description 06/14/2022    Final                    Value: This result contains rich text formatting which cannot be displayed here     • AFB Culture 06/14/2022 No AFB Isolated at 6 Weeks   Final   • AFB Stain 06/14/2022 No acid fast bacilli seen   Final   • Anaerobic Culture 06/14/2022 No growth   Final   • Fungus (Mycology) Culture 06/14/2022 No Fungus Isolated at 4 Weeks   Final   • Tissue Culture 06/14/2022 No growth   Final   • Gram Stain Result 06/14/2022 No Polys or Bacteria seen   Final   • AFB Culture 06/14/2022 No AFB Isolated at 6 Weeks   Final   • AFB Stain 06/14/2022 No acid fast bacilli seen   Final   Office Visit on 05/23/2022   Component Date Value Ref Range Status   • White Blood Cell Count 06/02/2022 8 6  3 4 - 10 8 x10E3/uL Final   • Red Blood Cell Count 06/02/2022 4 78  3 77 - 5 28 x10E6/uL Final   • Hemoglobin 06/02/2022 13 8  11 1 - 15 9 g/dL Final   • HCT 06/02/2022 42 4  34 0 - 46 6 % Final   • MCV 06/02/2022 89  79 - 97 fL Final   • MCH 06/02/2022 28 9  26 6 - 33 0 pg Final   • MCHC 06/02/2022 32 5  31 5 - 35 7 g/dL Final   • RDW 06/02/2022 14 3  11 7 - 15 4 % Final   • Platelet Count 35/40/3998 246  150 - 450 x10E3/uL Final   Appointment on 05/23/2022   Component Date Value Ref Range Status   • Sodium 05/23/2022 139  136 - 145 mmol/L Final   • Potassium 05/23/2022 4 1  3 5 - 5 3 mmol/L Final   • Chloride 05/23/2022 102  100 - 108 mmol/L Final   • CO2 05/23/2022 35 (H)  21 - 32 mmol/L Final   • ANION GAP 05/23/2022 2 (L)  4 - 13 mmol/L Final   • BUN 05/23/2022 12  5 - 25 mg/dL Final   • Creatinine 05/23/2022 0 81  0 60 - 1 30 mg/dL Final    Standardized to IDMS reference method   • Glucose, Fasting 05/23/2022 117 (H)  65 - 99 mg/dL Final    Specimen collection should occur prior to Sulfasalazine administration due to the potential for falsely depressed results  Specimen collection should occur prior to Sulfapyridine administration due to the potential for falsely elevated results  • Calcium 05/23/2022 9 4  8 3 - 10 1 mg/dL Final   • AST 05/23/2022 27  5 - 45 U/L Final    Specimen collection should occur prior to Sulfasalazine administration due to the potential for falsely depressed results  • ALT 05/23/2022 30  12 - 78 U/L Final    Specimen collection should occur prior to Sulfasalazine and/or Sulfapyridine administration due to the potential for falsely depressed results  • Alkaline Phosphatase 05/23/2022 107  46 - 116 U/L Final   • Total Protein 05/23/2022 7 7  6 4 - 8 2 g/dL Final   • Albumin 05/23/2022 3 6  3 5 - 5 0 g/dL Final   • Total Bilirubin 05/23/2022 0 35  0 20 - 1 00 mg/dL Final    Use of this assay is not recommended for patients undergoing treatment with eltrombopag due to the potential for falsely elevated results  • eGFR 05/23/2022 76  ml/min/1 73sq m Final   • Hemoglobin A1C 05/23/2022 6 5 (H)  Normal 3 8-5 6%; PreDiabetic 5 7-6 4%;  Diabetic >=6 5%; Glycemic control for adults with diabetes <7 0% % Final   • EAG 05/23/2022 140  mg/dl Final   • Hepatitis C Ab 05/23/2022 Non-reactive  Non-reactive Final   Orders Only on 05/18/2022   Component Date Value Ref Range Status   • Right Eye Diabetic Retinopathy 05/18/2022 None   Final   • Left Eye Diabetic Retinopathy 05/18/2022 None   Final   Office Visit on 05/06/2022   Component Date Value Ref Range Status   • SARS-CoV-2 05/06/2022 Negative  Negative Final   • INFLUENZA A PCR 05/06/2022 Negative  Negative Final   • INFLUENZA B PCR 05/06/2022 Negative  Negative Final

## 2023-03-08 NOTE — PATIENT INSTRUCTIONS
We are increasing your nortriptyline to 75mg at bedtime as this will help with your pain  Use the lidocaine cream and diclofenac cream on your painful joints  Please continue to have your blood work checked every 3 months

## 2023-03-10 ENCOUNTER — OFFICE VISIT (OUTPATIENT)
Dept: FAMILY MEDICINE CLINIC | Facility: HOSPITAL | Age: 68
End: 2023-03-10

## 2023-03-10 VITALS — HEART RATE: 100 BPM | DIASTOLIC BLOOD PRESSURE: 66 MMHG | SYSTOLIC BLOOD PRESSURE: 108 MMHG | TEMPERATURE: 97 F

## 2023-03-10 DIAGNOSIS — F33.1 MAJOR DEPRESSIVE DISORDER, RECURRENT, MODERATE (HCC): ICD-10-CM

## 2023-03-10 DIAGNOSIS — Z23 ENCOUNTER FOR IMMUNIZATION: ICD-10-CM

## 2023-03-10 DIAGNOSIS — Z12.31 ENCOUNTER FOR SCREENING MAMMOGRAM FOR MALIGNANT NEOPLASM OF BREAST: ICD-10-CM

## 2023-03-10 DIAGNOSIS — E11.65 UNCONTROLLED TYPE 2 DIABETES MELLITUS WITH HYPERGLYCEMIA (HCC): Primary | ICD-10-CM

## 2023-03-10 DIAGNOSIS — Z89.511 S/P BKA (BELOW KNEE AMPUTATION) UNILATERAL, RIGHT (HCC): ICD-10-CM

## 2023-03-10 DIAGNOSIS — M06.9 RHEUMATOID ARTHRITIS, INVOLVING UNSPECIFIED SITE, UNSPECIFIED WHETHER RHEUMATOID FACTOR PRESENT (HCC): ICD-10-CM

## 2023-03-10 DIAGNOSIS — G89.29 WRIST PAIN, CHRONIC, RIGHT: ICD-10-CM

## 2023-03-10 DIAGNOSIS — H57.89 EYE DRAINAGE: ICD-10-CM

## 2023-03-10 DIAGNOSIS — M25.531 WRIST PAIN, CHRONIC, RIGHT: ICD-10-CM

## 2023-03-10 DIAGNOSIS — J44.9 CHRONIC OBSTRUCTIVE PULMONARY DISEASE, UNSPECIFIED COPD TYPE (HCC): ICD-10-CM

## 2023-03-10 RX ORDER — OLOPATADINE HYDROCHLORIDE 1 MG/ML
1 SOLUTION/ DROPS OPHTHALMIC 2 TIMES DAILY PRN
Qty: 5 ML | Refills: 2 | Status: SHIPPED | OUTPATIENT
Start: 2023-03-10

## 2023-03-10 NOTE — ASSESSMENT & PLAN NOTE
Just saw Rheum, Pamelor increased further, on MTX/folate, Leucovorin and Prednisone prn, con't meds/labs and f/u as per Rheum

## 2023-03-10 NOTE — ASSESSMENT & PLAN NOTE
S/p removal of hardware, saw Dr Andrey Driscoll for follow up, on Pamelor for chronic pain, call with new/worse symptoms

## 2023-03-10 NOTE — ASSESSMENT & PLAN NOTE
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

## 2023-03-10 NOTE — PROGRESS NOTES
Name: Carlos Barragan      : 1955      MRN: [id number]  Encounter Provider: Alli Watts DO  Encounter Date: 3/10/2023   Encounter department: [facility]      1  Uncontrolled type 2 diabetes mellitus with hyperglycemia (HCC)  Assessment & Plan:    Lab Results   Component Value Date    HGBA1C 6 4 (H) 2023   DM type 2 with h/o R BKA - controlled with A1C 6 4 - con't current meds for now, encouraged to con't low sugar/carb diet and keep as active as possible, recheck BW in 6 mos - WILL ORDER AT NEXT APPT, foot exam done today, order for urine microalbumin:Cr ratio given as that was not done with last labs, on an ACE and statin, will follow       2  S/P BKA (below knee amputation) unilateral, right ([facility] [address] )  Assessment & Plan:  States she is going to get back in with office to see about prosthesis now her wrist has been fixed, call if any assistance is needed      3  Wrist pain, chronic, right  Assessment & Plan:  S/p removal of hardware, saw Dr Joy Almonte for follow up, on Pamelor for chronic pain, call with new/worse symptoms      4  Rheumatoid arthritis, involving unspecified site, unspecified whether rheumatoid factor present [facility])  Assessment & Plan:  Just saw Rheum, Pamelor increased further, on MTX/folate, Leucovorin and Prednisone prn, con't meds/labs and f/u as per Rheum      5  Chronic obstructive pulmonary disease, unspecified COPD type ([facility] )  Assessment & Plan:  Pt with mult resp infections recently and persistent abnormal exam, likely progression of COPD, again urged to quit smoking and assistance offered when she was ready, order for PFT"s given, con't current Advair for now, may need Pulm eval    Orders:  -     Complete PFT with post bronchodilator    6  Major depressive disorder, recurrent, moderate (HCC)  Assessment & Plan:  Mood still up and down, con't meds and f/u as per psych      7   Eye drainage  Comments:  Patanol refilled as per pts request  Orders:  -     olopatadine (PATANOL) 0 1 % ophthalmic solution; Administer 1 drop to both eyes 2 (two) times a day as needed for allergies    8  Encounter for screening mammogram for malignant neoplasm of breast  -     Mammo screening bilateral w 3d & cad; Future; Expected date: 07/01/2023    BMI Counseling: There is no height or weight on file to calculate BMI  The BMI is above normal  Nutrition recommendations include decreasing portion sizes, encouraging healthy choices of fruits and vegetables, consuming healthier snacks, moderation in carbohydrate intake, increasing intake of lean protein, reducing intake of saturated and trans fat and reducing intake of cholesterol  Exercise recommendations include strength training exercises  No pharmacotherapy was ordered  Rationale for BMI follow-up plan is due to patient being overweight or obese  Falls Plan of Care: not completed because patient not ambulatory, bed ridden, immobile, confined to chair, or wheelchair bound  Home safety education provided  Colonoscopy 12/19 - 5 yrs    Mammo 6/22 - order given for 2023    Dexa 6/22 - nml    PAP s/p hysterectomy    CT lung cancer screening 8/22 - will order at next appt    BW 1/23 (A1C 6 4)  DM foot exam 3/23  DM eye exam 5/22    Pt agreeable to Prevnar 20      Subjective      HPI Pt here for follow up appt    Last visit in Dec pt was requesting changing Metformin 500 mg bid to Metformin  mg 1 tab PO bid as she had heard it caused less diarrhea  The med change was made and pt notes her diarrhea has improved  She still has some days of loose stools but it seems caused by stress  She is due for DM foot exam in May - had BKA RLE in past   She notes no sores/ulcer/numbness but has some pins and needles in the L foot  Her last A1C 1/5/23 was 6 4  Last visit pt was noting worsening R wrist pain and issues with sleep   She was advised to f/u with Ortho that did her surgery and her Pamelor was increased to 25 mg 1 tab PO q day  She was given a once time 10 day supply of Oxycodone  Pt called 1/20/23 with request to increase Pamelor further and over the phone we increased it to 50 mg 1 tab PO qhs  She did see Ortho (Dr Cristhian Martinez) 1/30/23 for f/u of the R wrist pain - OV note reviewed  She subsequently had a steroid injection in her L extensor compartment that day  She notes the injection did help her L thumb  She did see Rheum (Dr Bassem Roberts) this week for f/u of her RA as well - OV note reviewed  She was told to con't MTX and folate and Leucovorin the day after the MTX  She had her Nortriptyline increased to 75 mg 1 tab PO q day  She thus far notes no SE with the medication  She was told to con't her Arava and Prednisone prn arthritis flare  She had to use the Prednisone yesterday  She was told to do labs in 3 mos and to f/u in the rheum office in 6 mos  Last visit she had a fever and resp symptoms  She is afebrile today and notes her cough is a bit worse then baseline  She notes some SOB/wheezing  She is using her Advair bid as directed  She has used her rescue inhaler the last 2 nights as her increase in pain causes a panic attack and she feels that causes her SOB  She is still smoking but states "not a lot"  Review of Systems   Constitutional: Positive for chills  Negative for fever  HENT: Negative for congestion and trouble swallowing  Eyes: Negative for pain and visual disturbance  Respiratory: Positive for cough, shortness of breath and wheezing  Cardiovascular: Positive for leg swelling  Negative for chest pain and palpitations  Gastrointestinal: Positive for diarrhea  Negative for abdominal pain, blood in stool, constipation, nausea and vomiting  Genitourinary: Negative for difficulty urinating and dysuria  Musculoskeletal: Positive for arthralgias and joint swelling  Negative for back pain  Skin: Negative for rash and wound     Neurological: Positive for numbness  Negative for dizziness, syncope and headaches  Hematological: Does not bruise/bleed easily  Psychiatric/Behavioral: Positive for dysphoric mood  The patient is nervous/anxious  Current Outpatient Medications on File Prior to Visit   Medication Sig   • acetaminophen (TYLENOL) 325 mg tablet TAKE TWO TABLETS BY MOUTH EVERY 6 HOURS AS NEEDED FOR MILD PAIN   • albuterol (PROVENTIL HFA,VENTOLIN HFA) 90 mcg/act inhaler INHALE 1 PUFF BY MOUTH EVERY 4 HOURS AS NEEDED FOR wheezing   • Allergy Relief 10 MG tablet Take 1 tablet (10 mg total) by mouth 2 (two) times a day   • ascorbic acid (VITAMIN C) 500 mg tablet TAKE ONE TABLET BY MOUTH TWICE DAILY   • atorvastatin (LIPITOR) 20 mg tablet TAKE 1 TABLET BY MOUTH EVERY EVENING   • benzonatate (TESSALON PERLES) 100 mg capsule Take 1 capsule (100 mg total) by mouth 3 (three) times a day as needed for cough   • Blood Glucose Monitoring Suppl (FreeStyle Lite) w/Device KIT Use 1 kit in the morning   • Blood Pressure Monitoring (Blood Pressure Cuff) MISC Use daily as needed (dizziness)   • Diclofenac Sodium (VOLTAREN) 1 % Apply 4 g topically 4 (four) times a day   • dicyclomine (BENTYL) 20 mg tablet TAKE 1 TABLET BY MOUTH EVERY 6 HOURS   • Emgality 120 MG/ML SOAJ INJECT ONE pen SUBCUTANEOUSLY EACH MONTH   • famotidine (PEPCID) 20 mg tablet Take 1 tablet (20 mg total) by mouth 2 (two) times a day   • Ferrous Sulfate (Iron) 325 (65 Fe) MG TABS Take 1 tablet by mouth 2 (two) times a day with meals   • fluticasone (FLONASE) 50 mcg/act nasal spray 2 sprays into each nostril daily   • Fluticasone-Salmeterol (Advair Diskus) 100-50 mcg/dose inhaler Inhale 1 puff 2 (two) times a day Rinse mouth after use     • folic acid (FOLVITE) 1 mg tablet Take 2 tablets (2 mg total) by mouth daily at bedtime   • gabapentin (NEURONTIN) 600 MG tablet TAKE ONE TABLET BY MOUTH THREE TIMES DAILY   • Galcanezumab-gnlm (Emgality) 120 MG/ML SOSY Inject 120 mg under the skin every 30 (thirty) days Once a month   • glipiZIDE (GLUCOTROL XL) 5 mg 24 hr tablet Take 1 tablet (5 mg total) by mouth daily   • glucose blood (FREESTYLE LITE) test strip Test once daily   • glucose blood test strip Use as instructed   • hydrOXYzine pamoate (VISTARIL) 50 mg capsule Take 1 capsule (50 mg total) by mouth 3 (three) times a day as needed for itching   • ipratropium-albuterol (DUO-NEB) 0 5-2 5 mg/3 mL nebulizer solution Take 3 mL by nebulization every 6 (six) hours as needed    • Lactobacillus Acid-Pectin (Acidophilus/Citrus Pectin) TABS Take 1 tablet by mouth daily   • Lancets (freestyle) lancets Use in the morning Test once daily   • Lancets MISC Check sugars once weekly   • leflunomide (ARAVA) 10 MG tablet Take 1 tablet (10 mg total) by mouth daily   • leucovorin (WELLCOVORIN) 10 MG tablet Take one tablet the morning after you take your methotrexate   • levothyroxine 112 mcg tablet TAKE ONE TABLET BY MOUTH DAILY   • lidocaine (XYLOCAINE) 5 % ointment Apply topically as needed for mild pain   • Lidocaine 4 % PTCH Apply 1 patch topically in the morning   • lisinopril (ZESTRIL) 5 mg tablet TAKE ONE TABLET BY MOUTH DAILY   • meclizine (ANTIVERT) 25 mg tablet Take 1 tablet (25 mg total) by mouth every 8 (eight) hours as needed for dizziness   • metFORMIN (GLUCOPHAGE-XR) 500 mg 24 hr tablet TAKE ONE TABLET BY MOUTH TWICE DAILY WITH MEALS   • methotrexate 2 5 MG tablet TAKE SIX TABLETS BY MOUTH ONCE A WEEK WITH FOOD OR AFTER EATING   • Multiple Vitamin (Tab-A-Felicia) TABS Take 1 tablet by mouth daily   • Multiple Vitamins-Minerals (multivitamin with minerals) tablet Take 1 tablet by mouth daily   • nortriptyline (PAMELOR) 75 MG capsule Take 1 capsule (75 mg total) by mouth daily at bedtime   • ondansetron (ZOFRAN) 4 mg tablet Take 1 tablet (4 mg total) by mouth every 8 (eight) hours as needed for nausea or vomiting   • pantoprazole (PROTONIX) 40 mg tablet TAKE ONE TABLET BY MOUTH AT BEDTIME   • predniSONE 5 mg tablet Take 1 tablet (5 mg total) by mouth daily as needed (for arthritis flares)   • Stimulant Laxative 8 6-50 MG per tablet Take 1 tablet by mouth every 12 (twelve) hours   • TiZANidine (ZANAFLEX) 2 MG capsule Take 1 capsule (2 mg total) by mouth 3 (three) times a day Do not start before February 18, 2023  • triamcinolone (KENALOG) 0 1 % cream Use as directed   • venlafaxine (EFFEXOR-XR) 150 mg 24 hr capsule    • [DISCONTINUED] olopatadine (PATANOL) 0 1 % ophthalmic solution Administer 1 drop to both eyes 2 (two) times a day as needed for allergies   • [DISCONTINUED] oxyCODONE (Roxicodone) 5 immediate release tablet Take 1 tablet (5 mg total) by mouth every 8 (eight) hours as needed for moderate pain Max Daily Amount: 15 mg       Objective     /66   Pulse 100   Temp (!) 97 °F (36 1 °C) (Tympanic)   LMP  (LMP Unknown)     Physical Exam  Vitals and nursing note reviewed  Constitutional:       General: She is not in acute distress  Appearance: She is well-developed  She is not ill-appearing  HENT:      Head: Normocephalic and atraumatic  Eyes:      General:         Right eye: No discharge  Left eye: No discharge  Conjunctiva/sclera: Conjunctivae normal    Neck:      Trachea: No tracheal deviation  Cardiovascular:      Rate and Rhythm: Normal rate and regular rhythm  Pulses: no weak pulses          Dorsalis pedis pulses are 2+ on the left side  Heart sounds: Normal heart sounds  No murmur heard  No friction rub  Pulmonary:      Effort: Pulmonary effort is normal  No respiratory distress  Breath sounds: Rhonchi present  No wheezing or rales  Abdominal:      General: There is no distension  Palpations: Abdomen is soft  Tenderness: There is no abdominal tenderness  There is no guarding or rebound  Musculoskeletal:      Cervical back: Neck supple  Left lower leg: Edema present        Comments: R BKA, mild nonpitting edema L distal LE   Feet:      Right foot: amputated     Left foot:      Skin integrity: Dry skin present  No ulcer, skin breakdown, erythema, warmth or callus  Skin:     General: Skin is warm  Coloration: Skin is not pale  Findings: No bruising or rash  Neurological:      Mental Status: She is alert  Mental status is at baseline  Motor: No abnormal muscle tone  Gait: Gait abnormal       Comments: Wheelchair bound   Psychiatric:         Behavior: Behavior normal          Thought Content: Thought content normal      Diabetic Foot Exam    Patient's shoes and socks removed  Right Foot/Ankle   Right Foot Inspection  Amputation: amputation right foot     Left Foot/Ankle  Left Foot Inspection  Skin Exam: skin normal, skin intact and dry skin  No warmth, no erythema, no maceration, normal color, no pre-ulcer, no ulcer and no callus  Toe Exam: ROM and strength within normal limits  Sensory   Vibration: intact  Monofilament testing: intact    Vascular  The left DP pulse is 2+       Assign Risk Category  No deformity present  No loss of protective sensation  No weak pulses  Risk: 3    Hannah Lu, DO

## 2023-03-10 NOTE — ASSESSMENT & PLAN NOTE
States she is going to get back in with office to see about prosthesis now her wrist has been fixed, call if any assistance is needed

## 2023-03-10 NOTE — ASSESSMENT & PLAN NOTE
Pt with mult resp infections recently and persistent abnormal exam, likely progression of COPD, again urged to quit smoking and assistance offered when she was ready, order for PFT"s given, con't current Advair for now, may need Pulm eval

## 2023-03-15 ENCOUNTER — OFFICE VISIT (OUTPATIENT)
Dept: GASTROENTEROLOGY | Facility: CLINIC | Age: 68
End: 2023-03-15

## 2023-03-15 ENCOUNTER — TELEPHONE (OUTPATIENT)
Dept: GASTROENTEROLOGY | Facility: CLINIC | Age: 68
End: 2023-03-15

## 2023-03-15 VITALS — DIASTOLIC BLOOD PRESSURE: 82 MMHG | SYSTOLIC BLOOD PRESSURE: 140 MMHG

## 2023-03-15 DIAGNOSIS — R11.14 BILIOUS VOMITING WITH NAUSEA: ICD-10-CM

## 2023-03-15 DIAGNOSIS — R10.84 GENERALIZED ABDOMINAL PAIN: Primary | ICD-10-CM

## 2023-03-15 DIAGNOSIS — K22.0 ACHALASIA: ICD-10-CM

## 2023-03-15 DIAGNOSIS — K58.0 IRRITABLE BOWEL SYNDROME WITH DIARRHEA: ICD-10-CM

## 2023-03-15 DIAGNOSIS — R13.10 DYSPHAGIA, UNSPECIFIED TYPE: ICD-10-CM

## 2023-03-15 NOTE — TELEPHONE ENCOUNTER
Scheduled date of EGD(as of today): 5/24/2023  Physician performing EGD: Dr Jennifer Mills  Location of EGD: [facility]  Instructions reviewed with patient by: Gave pt instructions packet  Clearances: n/a

## 2023-03-15 NOTE — PROGRESS NOTES
[address] [facility] - Outpatient Follow-up Note  Estefani Kelley 74 y o  female MRN: [id number]  Encounter: [id number]    ASSESSMENT AND PLAN:      1  Generalized abdominal pain  2  Irritable bowel syndrome with diarrhea    Patient with a long history of IBS with chronic intermittent abdominal pain and diarrhea  Pending CT scan  Likely has connections to her anxiety  She is planning to see a psychiatrist   25 mg of amitriptyline was started without much improvement of her symptoms       - if diarrhea continues, consider repeat colonoscopy and biopsy to rule out microscopic colitis   Consider empiric treatment with rifaximin      Patient continues that generalized abdominal pain periumbilically and has never had CT imaging  Will check CT scan of the abdomen pelvis to rule out other causes of abdominal pain    - CT abdomen pelvis w contrast; Future  - EGD; Future    3  Dysphagia, unspecified type  4  Bilious vomiting with nausea  5  Achalasia  Likely in the setting of achalasia  Will visualize esophagus with EGD for screening for esophageal cancers along with any other causes of worsening dysphagia  Was noted on manometry per patient  Recommended treatment with myotomy versus pneumatic dilatation versus POEM but patient refused  Currently deferring treatment options        Follow up appointment: For EGD    I have spent 36 minutes in preparation and review of data today and with the patient today in which greater than 50% of this time was spent in counseling/coordination of care regarding diagnostic results, prognosis, risks and benefits of tx options, intructions for management, patient and family education    ______________________________________________________________________    Chief Complaint   Patient presents with   • consult with EGD, could not do it last time     HPI:   81-year-old female past medical history of IBS-D, dysphagia secondary to achalasia, anxiety, here for follow-up    Last seen in the office in 2022  Started a TCA  Did not really help  Still continuing to have intermittent episodes of diarrhea 2-5 times a day  Unable to get CT scan as she was unable to tolerate p o  contrast   Continues to have dysphagia on a regular basis about once per week  History of achalasia in the past noted on manometry at [facility]  Recommended treatment with myotomy or pneumatic dilatation but patient refused at this time  Wanted to be conservative at this time  Continues to have generalized abdominal pain    Worsens with stress episodes        GI History:  GI procedure Hx:  EGD 2020 showed small hiatal hernia bile gastritis, esophagus normal, empirically dilated  Colonoscopy 2019  with diverticulosis, hyperplastic polyps, 5 year recall recommended/2024  GI imaging Hx:  Barium swallow in  did not show achalasia           Historical Information   Past Medical History:   Diagnosis Date   • Achalasia    • ADHD    • Anxiety    • Colon polyp    • COPD (chronic obstructive pulmonary disease) ([address] )    • Disease of thyroid gland     hypo   • Esophageal spasm    • Fibromyalgia    • GERD (gastroesophageal reflux disease)    • Hiatal hernia    • History of tachycardia     22 Pt reports hx of tachycardia - no current issues at this time - reports tachycardia has been tied in with anxiety   • Hypertension    • Iron deficiency anemia    • Polysubstance abuse ([address] )      Past Surgical History:   Procedure Laterality Date   • AMPUTATION      RBKA   • ANKLE FUSION     • APPENDECTOMY     • BELOW KNEE LEG AMPUTATION Right    • CARPAL TUNNEL RELEASE  2021    left arm   • CATARACT EXTRACTION     •  SECTION     • COLONOSCOPY  2010   • COLONOSCOPY  2019    diverticula, 3 mm polyp and 6 mm polyp in rectum, grade III internal hemorrhoids, hyperplastic polyp, 5 year recall 2024    • EGD  2010   • FOREARM FASCIOTOMY Right    • FRACTURE SURGERY     • HYSTERECTOMY      1990's-age 44   • KNEE ARTHROSCOPY Left    • NERVE SURGERY Right 01/20/2022    Procedure: Right wrist posterior interosseous nerve neurectomy;  Surgeon: Hernandez Harvey MD;  Location: [facility] MAIN OR;  Service: Orthopedics   • ORTHOPEDIC SURGERY     • KS ARTHRODESIS WRIST COMPLETE W/O BONE GRAFT Right 01/20/2022    Procedure: Total wrist fusion of the right wrist;  Surgeon: Hernandez Harvey MD;  Location: [facility] MAIN OR;  Service: Orthopedics   • KS ARTHRODESIS WRIST COMPLETE W/O BONE GRAFT Right 6/14/2022    Procedure: Revision fusion of right wrist;  Surgeon: Hernandez Harvey MD;  Location: [facility];  Service: Orthopedics   • KS NEUROPLASTY &/TRANSPOSITION ULNAR NERVE ELBOW Left 01/11/2021    Procedure: Left carpal and cubital tunnel release;  Surgeon:  Loraine Rodriguez MD;  Location: [address] [place] [address] MAIN OR;  Service: Orthopedics   • KS OPEN [address] SINGLE EA BONE Right 6/14/2022    Procedure: ORIF right long finger metacarpal fracture;  Surgeon: Hernandez Harvey MD;  Location: [facility];  Service: Orthopedics   • KS REMOVAL IMPLANT DEEP Right 6/14/2022    Procedure: Removal of right wrist fusion plate;  Surgeon: Hernandez Harvey MD;  Location: [facility] MA[facility];  Service: Orthopedics   • UPPER GASTROINTESTINAL ENDOSCOPY     • WRIST TENDON TRANSFER N/A 01/20/2022    Procedure: Extensor pollicis longus tendon transposition right wrist;  Surgeon: Hernandez Harvey MD;  Location:  MAIN OR;  Service: Orthopedics     Social History     Substance and Sexual Activity   Alcohol Use No    Comment: Denies any alcohol use     Social History     Substance and Sexual Activity   Drug Use Not Currently    Comment: Pt denies smoking any marijuana currently at this time     Social History     Tobacco Use   Smoking Status Some Days   • Packs/day: 0 25   • Years: 25 00   • Pack years: 6 25   • Types: Cigarettes   • Start date: 1/1/1977   Smokeless Tobacco Never   Tobacco Comments    4-5 cigs a day -     Family History   Problem Relation Age of Onset   • Cancer Mother    • Ulcerative colitis Mother    • Lung cancer Mother         Unknown age   • Diabetes Mother    • Alcohol abuse Father    • Diabetes Sister    • Cancer Brother    • Testicular cancer Brother 21   • No Known Problems Maternal Grandmother    • No Known Problems Maternal Grandfather    • No Known Problems Paternal Grandmother    • No Known Problems Paternal Grandfather    • Cancer Daughter    • Cervical cancer Daughter 39   • Colon polyps Neg Hx    • Colon cancer Neg Hx    • Anesthesia problems Neg Hx          Current Outpatient Medications:   •  acetaminophen (TYLENOL) 325 mg tablet  •  albuterol (PROVENTIL HFA,VENTOLIN HFA) 90 mcg/act inhaler  •  ascorbic acid (VITAMIN C) 500 mg tablet  •  atorvastatin (LIPITOR) 20 mg tablet  •  benzonatate (TESSALON PERLES) 100 mg capsule  •  Blood Glucose Monitoring Suppl (FreeStyle Lite) w/Device KIT  •  Blood Pressure Monitoring (Blood Pressure Cuff) MISC  •  Diclofenac Sodium (VOLTAREN) 1 %  •  dicyclomine (BENTYL) 20 mg tablet  •  Emgality 120 MG/ML SOAJ  •  famotidine (PEPCID) 20 mg tablet  •  Ferrous Sulfate (Iron) 325 (65 Fe) MG TABS  •  fluticasone (FLONASE) 50 mcg/act nasal spray  •  Fluticasone-Salmeterol (Advair Diskus) 100-50 mcg/dose inhaler  •  gabapentin (NEURONTIN) 600 MG tablet  •  Galcanezumab-gnlm (Emgality) 120 MG/ML SOSY  •  glipiZIDE (GLUCOTROL XL) 5 mg 24 hr tablet  •  glucose blood (FREESTYLE LITE) test strip  •  glucose blood test strip  •  hydrOXYzine pamoate (VISTARIL) 50 mg capsule  •  ipratropium-albuterol (DUO-NEB) 0 5-2 5 mg/3 mL nebulizer solution  •  Lactobacillus Acid-Pectin (Acidophilus/Citrus Pectin) TABS  •  Lancets (freestyle) lancets  •  Lancets MISC  •  leflunomide (ARAVA) 10 MG tablet  •  leucovorin (WELLCOVORIN) 10 MG tablet  •  levothyroxine 112 mcg tablet  •  lidocaine (XYLOCAINE) 5 % ointment  •  Lidocaine 4 % PTCH  •  lisinopril (ZESTRIL) 5 mg tablet  •  meclizine (ANTIVERT) 25 mg tablet  •  metFORMIN (GLUCOPHAGE-XR) 500 mg 24 hr tablet  •  methotrexate 2 5 MG tablet  •  Multiple Vitamin (Tab-A-Felicia) TABS  •  Multiple Vitamins-Minerals (multivitamin with minerals) tablet  •  nortriptyline (PAMELOR) 75 MG capsule  •  olopatadine (PATANOL) 0 1 % ophthalmic solution  •  ondansetron (ZOFRAN) 4 mg tablet  •  pantoprazole (PROTONIX) 40 mg tablet  •  predniSONE 5 mg tablet  •  Stimulant Laxative 8 6-50 MG per tablet  •  TiZANidine (ZANAFLEX) 2 MG capsule  •  venlafaxine (EFFEXOR-XR) 150 mg 24 hr capsule  •  Allergy Relief 10 MG tablet  •  folic acid (FOLVITE) 1 mg tablet  •  triamcinolone (KENALOG) 0 1 % cream  Allergies   Allergen Reactions   • Levaquin [Levofloxacin] Hives   • Morphine Itching and Anaphylaxis     u  Other reaction(s): Other (See Comments)  u  Other reaction(s): Feeling agitated (finding)     • Penicillins Anaphylaxis     "almost "- gives hives, nausea    • Cephalosporins Hives     Other reaction(s): Unknown Reaction     • Codeine Hives     Other reaction(s): Unknown  Other reaction(s): Unknown Reaction  Other reaction(s): Unknown  "Nausea and makes me itch"   • Nsaids GI Bleeding     Other reaction(s): Unknown Reaction   • Cephalexin Other (See Comments)     Other reaction(s): Unknown   • Aspirin Hives, Other (See Comments) and Rash     Other reaction(s): Unknown Reaction     • Chlorhexidine Rash   • Medical Tape Rash   • Vancomycin Rash     "Kills my stomach"     Reviewed medications and allergies and updated as indicated    PHYSICAL EXAM:    Blood pressure 140/82, not currently breastfeeding  There is no height or weight on file to calculate BMI  General Appearance: NAD, cooperative, alert  Eyes: Anicteric  GI:  Soft, non-tender, non-distended; normal bowel sounds; no masses, no organomegaly   Rectal: Deferred  Musculoskeletal: No edema    Skin:  No jaundice    Lab Results:   Lab Results   Component Value Date    WBC 12 3 (H) 2023    WBC 8 6 2022    WBC 9 7 02/18/2022    HGB 14 9 01/05/2023    HGB 13 8 06/02/2022    HGB 14 1 02/18/2022    MCV 88 01/05/2023     01/05/2023     06/02/2022     02/18/2022     Lab Results   Component Value Date     04/01/2015    K 4 6 01/05/2023    CL 99 01/05/2023    CO2 26 01/05/2023    ANIONGAP 8 04/01/2015    BUN 17 01/05/2023    CREATININE 0 71 01/05/2023    GLUCOSE 90 04/01/2015    GLUF 117 (H) 05/23/2022    CALCIUM 9 4 05/23/2022    AST 13 01/05/2023    AST 27 05/23/2022    AST 41 (H) 02/18/2022    ALT 10 01/05/2023    ALT 30 05/23/2022    ALT 49 (H) 02/18/2022    ALKPHOS 107 05/23/2022    ALKPHOS 110 09/30/2021    ALKPHOS 104 06/07/2021    PROT 5 8 (L) 04/01/2015    BILITOT 0 2 04/01/2015    BILITOT 0 3 03/31/2015    BILITOT 0 4 03/30/2015    EGFR 94 01/05/2023     Lab Results   Component Value Date    IRON 76 09/30/2021    TIBC 430 09/30/2021     No results found for: LIPASE    Radiology Results:   No results found

## 2023-03-16 ENCOUNTER — TELEPHONE (OUTPATIENT)
Dept: OBGYN CLINIC | Facility: HOSPITAL | Age: 68
End: 2023-03-16

## 2023-03-16 DIAGNOSIS — J44.9 CHRONIC OBSTRUCTIVE PULMONARY DISEASE, UNSPECIFIED COPD TYPE (HCC): Primary | ICD-10-CM

## 2023-03-16 NOTE — TELEPHONE ENCOUNTER
Caller: sister    Doctor: [facility]    Reason for call: called to confirm pt's appt    Call back#:

## 2023-03-16 NOTE — PROGRESS NOTES
Replacing existing order - some reason it is completed in her scheduling and they are not able to schedule her

## 2023-03-24 ENCOUNTER — TELEPHONE (OUTPATIENT)
Dept: OBGYN CLINIC | Facility: HOSPITAL | Age: 68
End: 2023-03-24

## 2023-03-24 NOTE — TELEPHONE ENCOUNTER
Pt contacted Call Center requested refill of their medication  Medication Name: Prednisone      Dosage of Med: 5mg      Frequency of Med: daily- **patient states she is taking 6 per day      Remaining Medication: 6      Pharmacy and Location:   [address] [place] [address] [place] [address]   Phone:  [phone]  Fax:  [phone]       Pt  Preferred Callback Phone Number: [phone]      Thank you  PLEASE ADVISE PATIENTS:    REFILL REQUESTS WILL BE PROCESSED WITHIN 24-48 HOURS

## 2023-03-27 DIAGNOSIS — M19.90 ARTHRITIS: ICD-10-CM

## 2023-03-27 DIAGNOSIS — Z51.81 MEDICATION MONITORING ENCOUNTER: ICD-10-CM

## 2023-03-27 RX ORDER — PREDNISONE 1 MG/1
5 TABLET ORAL DAILY PRN
Qty: 90 TABLET | Refills: 2 | Status: SHIPPED | OUTPATIENT
Start: 2023-03-27

## 2023-03-28 DIAGNOSIS — G43.109 MIGRAINE WITH AURA AND WITHOUT STATUS MIGRAINOSUS, NOT INTRACTABLE: Primary | ICD-10-CM

## 2023-03-31 ENCOUNTER — OFFICE VISIT (OUTPATIENT)
Dept: FAMILY MEDICINE CLINIC | Facility: HOSPITAL | Age: 68
End: 2023-03-31

## 2023-03-31 VITALS
TEMPERATURE: 98.8 F | OXYGEN SATURATION: 90 % | DIASTOLIC BLOOD PRESSURE: 68 MMHG | HEART RATE: 105 BPM | SYSTOLIC BLOOD PRESSURE: 138 MMHG

## 2023-03-31 DIAGNOSIS — E11.65 UNCONTROLLED TYPE 2 DIABETES MELLITUS WITH HYPERGLYCEMIA (HCC): ICD-10-CM

## 2023-03-31 DIAGNOSIS — J44.0 CHRONIC OBSTRUCTIVE PULMONARY DISEASE WITH ACUTE LOWER RESPIRATORY INFECTION (HCC): Primary | ICD-10-CM

## 2023-03-31 RX ORDER — AZITHROMYCIN 250 MG/1
TABLET, FILM COATED ORAL
Qty: 6 TABLET | Refills: 0 | Status: SHIPPED | OUTPATIENT
Start: 2023-03-31 | End: 2023-04-04

## 2023-03-31 RX ORDER — PREDNISONE 10 MG/1
TABLET ORAL
Qty: 16 TABLET | Refills: 0 | Status: SHIPPED | OUTPATIENT
Start: 2023-03-31

## 2023-03-31 NOTE — PROGRESS NOTES
Name: Arvind Angel      : 1955      MRN: [id number]  Encounter Provider: Zara Khan MD  Encounter Date: 3/31/2023   Encounter department: [facility]      1  Chronic obstructive pulmonary disease with acute lower respiratory infection (HCC)  -     azithromycin (ZITHROMAX) 250 mg tablet; Take 2 tablets today then 1 tablet daily x 4 days  -     predniSONE 10 mg tablet; Take 4 tablets today, then three tablets daily for 2 days, two tablets daily for 2 days, then one tablet daily for 2 days  2  Uncontrolled type 2 diabetes mellitus with hyperglycemia ([facility] [address] )  Assessment & Plan:  Cautioned to watch glucoses while on steroid  Lab Results   Component Value Date    HGBA1C 6 4 (H) 2023              Subjective      Fever to 102 degrees, congestion and cough started 5-6 days ago  COVID negative yesterday  Has been wheezy, using inhaler maybe twice a day    Fingers are all splitting over the past few days- requests that we bandage them      Review of Systems   Constitutional: Positive for diaphoresis, fatigue and fever  Negative for unexpected weight change  HENT: Positive for congestion  Negative for ear pain, sinus pressure, sore throat and trouble swallowing  Respiratory: Positive for cough and wheezing  Negative for chest tightness and shortness of breath  Cardiovascular: Negative  Gastrointestinal: Negative  All other systems reviewed and are negative        Current Outpatient Medications on File Prior to Visit   Medication Sig   • acetaminophen (TYLENOL) 325 mg tablet TAKE TWO TABLETS BY MOUTH EVERY 6 HOURS AS NEEDED FOR MILD PAIN   • albuterol (PROVENTIL HFA,VENTOLIN HFA) 90 mcg/act inhaler INHALE 1 PUFF BY MOUTH EVERY 4 HOURS AS NEEDED FOR wheezing   • ascorbic acid (VITAMIN C) 500 mg tablet TAKE ONE TABLET BY MOUTH TWICE DAILY   • atorvastatin (LIPITOR) 20 mg tablet TAKE 1 TABLET BY MOUTH EVERY EVENING   • benzonatate (TESSALON PERLES) 100 mg capsule Take 1 capsule (100 mg total) by mouth 3 (three) times a day as needed for cough   • Blood Glucose Monitoring Suppl (FreeStyle Lite) w/Device KIT Use 1 kit in the morning   • Blood Pressure Monitoring (Blood Pressure Cuff) MISC Use daily as needed (dizziness)   • Diclofenac Sodium (VOLTAREN) 1 % Apply 4 g topically 4 (four) times a day   • dicyclomine (BENTYL) 20 mg tablet TAKE 1 TABLET BY MOUTH EVERY 6 HOURS   • Emgality 120 MG/ML SOAJ INJECT ONE pen SUBCUTANEOUSLY EACH MONTH   • famotidine (PEPCID) 20 mg tablet Take 1 tablet (20 mg total) by mouth 2 (two) times a day   • Ferrous Sulfate (Iron) 325 (65 Fe) MG TABS Take 1 tablet by mouth 2 (two) times a day with meals   • fluticasone (FLONASE) 50 mcg/act nasal spray 2 sprays into each nostril daily   • gabapentin (NEURONTIN) 600 MG tablet TAKE 1 TABLET BY MOUTH THREE TIMES DAILY   • Galcanezumab-gnlm (Emgality) 120 MG/ML SOSY Inject 120 mg under the skin every 30 (thirty) days Once a month   • glipiZIDE (GLUCOTROL XL) 5 mg 24 hr tablet Take 1 tablet (5 mg total) by mouth daily   • glucose blood (FREESTYLE LITE) test strip Test once daily   • glucose blood test strip Use as instructed   • hydrOXYzine pamoate (VISTARIL) 50 mg capsule Take 1 capsule (50 mg total) by mouth 3 (three) times a day as needed for itching   • ipratropium-albuterol (DUO-NEB) 0 5-2 5 mg/3 mL nebulizer solution Take 3 mL by nebulization every 6 (six) hours as needed    • Lactobacillus Acid-Pectin (Acidophilus/Citrus Pectin) TABS Take 1 tablet by mouth daily   • Lancets (freestyle) lancets Use in the morning Test once daily   • Lancets MISC Check sugars once weekly   • leflunomide (ARAVA) 10 MG tablet Take 1 tablet (10 mg total) by mouth daily   • leucovorin (WELLCOVORIN) 10 MG tablet Take one tablet the morning after you take your methotrexate   • levothyroxine 112 mcg tablet TAKE ONE TABLET BY MOUTH DAILY   • lidocaine (XYLOCAINE) 5 % ointment Apply topically as needed for mild pain   • Lidocaine 4 % PTCH Apply 1 patch topically in the morning   • lisinopril (ZESTRIL) 5 mg tablet TAKE ONE TABLET BY MOUTH DAILY   • meclizine (ANTIVERT) 25 mg tablet Take 1 tablet (25 mg total) by mouth every 8 (eight) hours as needed for dizziness   • metFORMIN (GLUCOPHAGE-XR) 500 mg 24 hr tablet TAKE ONE TABLET BY MOUTH TWICE DAILY WITH MEALS   • methotrexate 2 5 MG tablet TAKE SIX TABLETS BY MOUTH ONCE A WEEK WITH FOOD OR AFTER EATING   • Multiple Vitamin (Tab-A-Felicia) TABS Take 1 tablet by mouth daily   • Multiple Vitamins-Minerals (multivitamin with minerals) tablet Take 1 tablet by mouth daily   • nortriptyline (PAMELOR) 75 MG capsule Take 1 capsule (75 mg total) by mouth daily at bedtime   • olopatadine (PATANOL) 0 1 % ophthalmic solution Administer 1 drop to both eyes 2 (two) times a day as needed for allergies   • ondansetron (ZOFRAN) 4 mg tablet Take 1 tablet (4 mg total) by mouth every 8 (eight) hours as needed for nausea or vomiting   • pantoprazole (PROTONIX) 40 mg tablet TAKE ONE TABLET BY MOUTH AT BEDTIME   • predniSONE 5 mg tablet Take 1 tablet (5 mg total) by mouth daily as needed (for arthritis flares)   • Stimulant Laxative 8 6-50 MG per tablet Take 1 tablet by mouth every 12 (twelve) hours   • TiZANidine (ZANAFLEX) 2 MG capsule Take 1 capsule (2 mg total) by mouth 3 (three) times a day Do not start before February 18, 2023  • venlafaxine (EFFEXOR-XR) 150 mg 24 hr capsule    • Allergy Relief 10 MG tablet Take 1 tablet (10 mg total) by mouth 2 (two) times a day (Patient not taking: Reported on 3/15/2023)   • Fluticasone-Salmeterol (Advair Diskus) 100-50 mcg/dose inhaler Inhale 1 puff 2 (two) times a day Rinse mouth after use     • folic acid (FOLVITE) 1 mg tablet Take 2 tablets (2 mg total) by mouth daily at bedtime (Patient not taking: Reported on 3/15/2023)   • triamcinolone (KENALOG) 0 1 % cream Use as directed (Patient not taking: Reported on 3/15/2023)       Objective /68   Pulse 105   Temp 98 8 °F (37 1 °C)   LMP  (LMP Unknown)   SpO2 90%     Physical Exam  Vitals reviewed  Constitutional:       Appearance: Normal appearance  HENT:      Right Ear: Tympanic membrane normal       Left Ear: Tympanic membrane normal       Nose: Nose normal       Mouth/Throat:      Mouth: Mucous membranes are moist    Cardiovascular:      Rate and Rhythm: Normal rate and regular rhythm  Pulses: Normal pulses  Heart sounds: Normal heart sounds  Pulmonary:      Effort: Pulmonary effort is normal       Breath sounds: Wheezing and rhonchi present     Skin:     Comments: Fingertips with deep dry splits, not purulent       Racquel Sharma MD

## 2023-03-31 NOTE — ASSESSMENT & PLAN NOTE
Cautioned to watch glucoses while on steroid  Lab Results   Component Value Date    HGBA1C 6 4 (H) 01/05/2023

## 2023-04-02 DIAGNOSIS — I10 PRIMARY HYPERTENSION: ICD-10-CM

## 2023-04-02 DIAGNOSIS — E07.9 DISEASE OF THYROID GLAND: ICD-10-CM

## 2023-04-02 DIAGNOSIS — M79.7 FIBROMYALGIA: ICD-10-CM

## 2023-04-02 RX ORDER — LEVOTHYROXINE SODIUM 112 UG/1
TABLET ORAL
Qty: 30 TABLET | Refills: 1 | Status: SHIPPED | OUTPATIENT
Start: 2023-04-02

## 2023-04-02 RX ORDER — LISINOPRIL 5 MG/1
TABLET ORAL
Qty: 30 TABLET | Refills: 1 | Status: SHIPPED | OUTPATIENT
Start: 2023-04-02

## 2023-04-03 DIAGNOSIS — E11.65 UNCONTROLLED TYPE 2 DIABETES MELLITUS WITH HYPERGLYCEMIA (HCC): ICD-10-CM

## 2023-04-03 RX ORDER — TIZANIDINE HYDROCHLORIDE 2 MG/1
CAPSULE, GELATIN COATED ORAL
Qty: 90 CAPSULE | Refills: 0 | Status: SHIPPED | OUTPATIENT
Start: 2023-04-03

## 2023-04-03 RX ORDER — GLIPIZIDE 5 MG/1
5 TABLET, FILM COATED, EXTENDED RELEASE ORAL DAILY
Qty: 90 TABLET | Refills: 2 | Status: SHIPPED | OUTPATIENT
Start: 2023-04-03

## 2023-04-21 ENCOUNTER — APPOINTMENT (OUTPATIENT)
Dept: LAB | Facility: HOSPITAL | Age: 68
End: 2023-04-21

## 2023-04-21 DIAGNOSIS — Z51.81 ENCOUNTER FOR THERAPEUTIC DRUG MONITORING: ICD-10-CM

## 2023-04-21 DIAGNOSIS — M19.90 SENILE ARTHRITIS: ICD-10-CM

## 2023-04-26 ENCOUNTER — CONSULT (OUTPATIENT)
Dept: NEUROLOGY | Facility: CLINIC | Age: 68
End: 2023-04-26

## 2023-04-26 ENCOUNTER — HOSPITAL ENCOUNTER (OUTPATIENT)
Dept: PULMONOLOGY | Facility: HOSPITAL | Age: 68
Discharge: HOME | End: 2023-04-26

## 2023-04-26 VITALS
SYSTOLIC BLOOD PRESSURE: 108 MMHG | HEART RATE: 78 BPM | WEIGHT: 170 LBS | BODY MASS INDEX: 27.44 KG/M2 | TEMPERATURE: 96.4 F | DIASTOLIC BLOOD PRESSURE: 62 MMHG

## 2023-04-26 DIAGNOSIS — K58.0 IRRITABLE BOWEL SYNDROME WITH DIARRHEA: ICD-10-CM

## 2023-04-26 DIAGNOSIS — G43.009 MIGRAINE WITHOUT AURA AND WITHOUT STATUS MIGRAINOSUS, NOT INTRACTABLE: Primary | ICD-10-CM

## 2023-04-26 DIAGNOSIS — R11.14 BILIOUS VOMITING WITH NAUSEA: ICD-10-CM

## 2023-04-26 DIAGNOSIS — R07.0 THROAT PAIN IN ADULT: ICD-10-CM

## 2023-04-26 DIAGNOSIS — R13.10 DYSPHAGIA, UNSPECIFIED TYPE: ICD-10-CM

## 2023-04-26 DIAGNOSIS — J44.9 CHRONIC OBSTRUCTIVE PULMONARY DISEASE, UNSPECIFIED COPD TYPE (HCC): ICD-10-CM

## 2023-04-26 RX ORDER — RIMEGEPANT SULFATE 75 MG/75MG
75 TABLET, ORALLY DISINTEGRATING ORAL AS NEEDED
Qty: 8 TABLET | Refills: 3 | Status: SHIPPED | OUTPATIENT
Start: 2023-04-26

## 2023-04-26 RX ORDER — DICYCLOMINE HCL 20 MG
20 TABLET ORAL EVERY 6 HOURS PRN
Qty: 120 TABLET | Refills: 2 | Status: SHIPPED | OUTPATIENT
Start: 2023-04-26

## 2023-04-26 RX ORDER — L. ACIDOPHILUS/PECTIN, CITRUS 25MM-100MG
1 TABLET ORAL DAILY
Qty: 30 TABLET | Refills: 6 | Status: SHIPPED | OUTPATIENT
Start: 2023-04-26

## 2023-04-26 RX ORDER — ALBUTEROL SULFATE 2.5 MG/3ML
2.5 SOLUTION RESPIRATORY (INHALATION) ONCE
Status: DISCONTINUED | OUTPATIENT
Start: 2023-04-26 | End: 2023-04-30 | Stop reason: HOSPADM

## 2023-04-26 RX ORDER — PANTOPRAZOLE SODIUM 40 MG/1
40 TABLET, DELAYED RELEASE ORAL
Qty: 30 TABLET | Refills: 2 | Status: SHIPPED | OUTPATIENT
Start: 2023-04-26

## 2023-04-26 RX ORDER — FAMOTIDINE 20 MG/1
20 TABLET, FILM COATED ORAL 2 TIMES DAILY
Qty: 60 TABLET | Refills: 2 | Status: SHIPPED | OUTPATIENT
Start: 2023-04-26

## 2023-04-26 RX ORDER — GALCANEZUMAB 120 MG/ML
120 INJECTION, SOLUTION SUBCUTANEOUS
Qty: 1 ML | Refills: 11 | Status: SHIPPED | OUTPATIENT
Start: 2023-04-26

## 2023-04-26 RX ORDER — ONDANSETRON 4 MG/1
4 TABLET, FILM COATED ORAL EVERY 8 HOURS PRN
Qty: 90 TABLET | Refills: 2 | Status: SHIPPED | OUTPATIENT
Start: 2023-04-26

## 2023-04-26 NOTE — ASSESSMENT & PLAN NOTE
Lilly Garcia is a 74year old female with multiple comorbidities with chronic migraine headaches approximately 2-4 times per month  She reports overuse of Tylenol currently on a daily basis due to her other chronic pain and arthritic conditions  She is requesting Klonopin today, she was advised that I do not prescribe Klonopin as treatment for chronic migraine  She is presently maintained on Emgality for her migraines, without any adverse effects  We discussed that I would recommend she continue on Emgality at this time- refill provided today  Rather than continuing to use Tylenol excessively, I have prescribed for her to use Nurtec 75 mg at the onset of a migraine instead  We discussed she should limit her Tylenol use to no more than 3 days per week and no more than the maximal daily dose to avoid liver damage and the risk for medication overuse/rebound  She can consider adding magnesium and riboflavin to her regimen as well, but did caution about risk of diarrhea with magnesium  Due to the increase in the frequency of her migraines, right V1-V2 sensory deficit, and several uncontrolled vascular risk factors,  I have asked her to obtain a MRI of the brain to rule out any intracranial abnormalities or stroke  Plan as outlined below  Plan:    Headache/migraine treatment:   - When you have a moderate to severe headache, you should seek rest, initiate relaxation and apply cold compresses to the head  Abortive medications (for immediate treatment of a headache): It is ok to take  acetaminophen if it helps your headaches you should limit this to no more than 3 times a week to avoid medication overuse/rebound headaches  Do not use more than the allowed daily amount <3,000 mg per day  Take Nurtec 75 mg at the onset of a migraine headache; no more than 75 mg per 24 hours      Over the counter preventive supplements for headaches/migraines   (to take every day to help prevent headaches - not to take at the "time of headache):  [x] Magnesium 500mg daily (If any diarrhea or upset stomach, decrease dose  as tolerated)  [x] Riboflavin (Vitamin B2) 400mg daily (FYI B2 may make your urine bright/neon yellow)     Prescription preventive medications for headaches/migraines   (to take every day to help prevent headaches - not to take at the time of headache):    Continue Emgality 120 mg per month        Self-Monitoring:  [x] Headache calendar  Each day bev a number from 0-10 indicating if there was a headache and how bad it was  This can be used to monitor gradual improvement and is helpful to make medication adjustments  You can do this on paper or there is an [employer] for a smart phone called \"Migraine e Diary\"  Lifestyle Recommendations:  [x] SLEEP - Maintain a regular sleep schedule: Adults need at least 7-8 hours of uninterrupted a night  Maintain good sleep hygiene:  Going to bed and waking up at consistent times, avoiding excessive daytime naps, avoiding caffeinated beverages in the evening, avoid excessive stimulation in the evening and generally using bed primarily for sleeping  One hour before bedtime would recommend turning lights down lower, decreasing your activity (may read quietly, listen to music at a low volume)  When you get into bed, should eliminate all technology (no texting, emailing, playing with your phone, iPad or tablet in bed)  [x] HYDRATION - Maintain good hydration  Drink  2L of fluid a day (4 typical small water bottles)  [x] DIET - Maintain good nutrition  In particular don't skip meals and try and eat healthy balanced meals regularly  [x] TRIGGERS - Look for other triggers and avoid them: Limit caffeine to 1-2 cups a day or less  Avoid dietary triggers that you have noticed bring on your headaches (this could include aged cheese, peanuts, MSG, aspartame and nitrates)    [x] EXERCISE - physical exercise as we all know is good for you in many ways, and not only is good for your heart, but also " is beneficial for your mental health, cognitive health and  chronic pain/headaches  I would encourage at the least 5 days of physical exercise weekly for at least 30 minutes  [x] TOBACCO CESSATION: Patient was educated regarding the impact of smoking and advised to quit  She is not interested at this time  Education and Follow-up  [x] Please call with any questions or concerns  Of course if any new concerning symptoms go to the emergency department    [x] Follow up in 6 months, sooner if needed  [x] MRI Brain wwo contrast
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
## 2023-04-26 NOTE — PATIENT INSTRUCTIONS
"Headache/migraine treatment:   - When you have a moderate to severe headache, you should seek rest, initiate relaxation and apply cold compresses to the head  Abortive medications (for immediate treatment of a headache): It is ok to take  acetaminophen if it helps your headaches you should limit this to no more than 3 times a week to avoid medication overuse/rebound headaches  Do not use more than the allowed daily amount <3,000 mg per day  Take Nurtec 75 mg at the onset of a migraine headache; no more than 75 mg per 24 hours  Over the counter preventive supplements for headaches/migraines   (to take every day to help prevent headaches - not to take at the time of headache):  [x] Magnesium 500mg daily (If any diarrhea or upset stomach, decrease dose  as tolerated)  [x] Riboflavin (Vitamin B2) 400mg daily (FYI B2 may make your urine bright/neon yellow)     Prescription preventive medications for headaches/migraines   (to take every day to help prevent headaches - not to take at the time of headache):    Continue Emgality 120 mg per month        Self-Monitoring:  [x] Headache calendar  Each day bev a number from 0-10 indicating if there was a headache and how bad it was  This can be used to monitor gradual improvement and is helpful to make medication adjustments  You can do this on paper or there is an [employer] for a smart phone called \"Migraine e Diary\"  Lifestyle Recommendations:  [x] SLEEP - Maintain a regular sleep schedule: Adults need at least 7-8 hours of uninterrupted a night  Maintain good sleep hygiene:  Going to bed and waking up at consistent times, avoiding excessive daytime naps, avoiding caffeinated beverages in the evening, avoid excessive stimulation in the evening and generally using bed primarily for sleeping  One hour before bedtime would recommend turning lights down lower, decreasing your activity (may read quietly, listen to music at a low volume)   When you get into bed, should " eliminate all technology (no texting, emailing, playing with your phone, iPad or tablet in bed)  [x] HYDRATION - Maintain good hydration  Drink  2L of fluid a day (4 typical small water bottles)  [x] DIET - Maintain good nutrition  In particular don't skip meals and try and eat healthy balanced meals regularly  [x] TRIGGERS - Look for other triggers and avoid them: Limit caffeine to 1-2 cups a day or less  Avoid dietary triggers that you have noticed bring on your headaches (this could include aged cheese, peanuts, MSG, aspartame and nitrates)  [x] EXERCISE - physical exercise as we all know is good for you in many ways, and not only is good for your heart, but also is beneficial for your mental health, cognitive health and  chronic pain/headaches  I would encourage at the least 5 days of physical exercise weekly for at least 30 minutes  [x] TOBACCO CESSATION: Patient was educated regarding the impact of smoking and advised to quit  Education and Follow-up  [x] Please call with any questions or concerns  Of course if any new concerning symptoms go to the emergency department    [x] Follow up in 6 months, sooner if needed  [x] MRI Brain wwo contrast

## 2023-04-28 ENCOUNTER — OFFICE VISIT (OUTPATIENT)
Dept: FAMILY MEDICINE CLINIC | Facility: HOSPITAL | Age: 68
End: 2023-04-28

## 2023-04-28 VITALS
DIASTOLIC BLOOD PRESSURE: 76 MMHG | WEIGHT: 170 LBS | HEART RATE: 100 BPM | BODY MASS INDEX: 27.44 KG/M2 | TEMPERATURE: 98.2 F | OXYGEN SATURATION: 92 % | SYSTOLIC BLOOD PRESSURE: 110 MMHG

## 2023-04-28 DIAGNOSIS — M19.90 ARTHRITIS: ICD-10-CM

## 2023-04-28 DIAGNOSIS — M54.50 CHRONIC BILATERAL LOW BACK PAIN, UNSPECIFIED WHETHER SCIATICA PRESENT: ICD-10-CM

## 2023-04-28 DIAGNOSIS — R05.3 CHRONIC COUGH: ICD-10-CM

## 2023-04-28 DIAGNOSIS — J42 CHRONIC BRONCHITIS, UNSPECIFIED CHRONIC BRONCHITIS TYPE (HCC): ICD-10-CM

## 2023-04-28 DIAGNOSIS — J44.1 CHRONIC OBSTRUCTIVE PULMONARY DISEASE WITH ACUTE EXACERBATION (HCC): Primary | ICD-10-CM

## 2023-04-28 DIAGNOSIS — G89.29 CHRONIC BILATERAL LOW BACK PAIN, UNSPECIFIED WHETHER SCIATICA PRESENT: ICD-10-CM

## 2023-04-28 DIAGNOSIS — E11.65 UNCONTROLLED TYPE 2 DIABETES MELLITUS WITH HYPERGLYCEMIA (HCC): ICD-10-CM

## 2023-04-28 RX ORDER — BENZONATATE 100 MG/1
100 CAPSULE ORAL 3 TIMES DAILY PRN
Qty: 30 CAPSULE | Refills: 1 | Status: SHIPPED | OUTPATIENT
Start: 2023-04-28

## 2023-04-28 RX ORDER — PREDNISONE 10 MG/1
TABLET ORAL
Qty: 39 TABLET | Refills: 0 | Status: SHIPPED | OUTPATIENT
Start: 2023-04-28

## 2023-04-28 RX ORDER — TIOTROPIUM BROMIDE INHALATION SPRAY 1.56 UG/1
2 SPRAY, METERED RESPIRATORY (INHALATION) DAILY
Qty: 12 G | Refills: 1 | Status: SHIPPED | OUTPATIENT
Start: 2023-04-28

## 2023-04-28 RX ORDER — LIDOCAINE 4 G/G
1 PATCH TOPICAL DAILY
Qty: 30 PATCH | Refills: 0 | Status: SHIPPED | OUTPATIENT
Start: 2023-04-28

## 2023-04-28 RX ORDER — DOXYCYCLINE HYCLATE 100 MG/1
100 CAPSULE ORAL EVERY 12 HOURS SCHEDULED
Qty: 14 CAPSULE | Refills: 0 | Status: SHIPPED | OUTPATIENT
Start: 2023-04-28 | End: 2023-05-05

## 2023-04-28 NOTE — ASSESSMENT & PLAN NOTE
Again SE of Prednisone reviewed with regards to her DM and SE of hyperglycemia -  Urged to check sugars more frequently and call if consistently> 250, con't current insulin regimen for now  Lab Results   Component Value Date    HGBA1C 6 4 (H) 01/05/2023

## 2023-04-28 NOTE — ASSESSMENT & PLAN NOTE
"Discussed emphysema vs chronic bronchitis as pt states \"no one is listening, I'm not short of breath, its all this phlegm and congestion\", PFT\"s ordered but not yet done, urged again to quit smoking, will rx with another round of Prednisone and Doxy, con't Advair and albuterol, added LAMA, referral to Pulm placed, to ED with new/worse symptoms  "

## 2023-04-28 NOTE — PROGRESS NOTES
"Name: Toribio Raya      : 1955      MRN: [id number]  Encounter Provider: Toma Armendariz DO  Encounter Date: 2023   Encounter department: [facility] [address]  Chronic obstructive pulmonary disease with acute exacerbation (HCC)  Assessment & Plan:  Discussed emphysema vs chronic bronchitis as pt states \"no one is listening, I'm not short of breath, its all this phlegm and congestion\", PFT\"s ordered but not yet done, urged again to quit smoking, will rx with another round of Prednisone and Doxy, con't Advair and albuterol, added LAMA, referral to Pulm placed, to ED with new/worse symptoms    Orders:  -     tiotropium (Spiriva Respimat) 1 25 MCG/ACT AERS inhaler; Inhale 2 puffs daily  -     Ambulatory referral to Pulmonology  -     doxycycline hyclate (VIBRAMYCIN) 100 mg capsule; Take 1 capsule (100 mg total) by mouth every 12 (twelve) hours for 7 days  -     predniSONE 10 mg tablet; 3 tab PO bid x 3 days then 2 tab PO bid x 3 days then 1 tab PO bid x 3 days then 1 tab PO q day x 3 days then stop    2  Chronic bronchitis, unspecified chronic bronchitis type ([facility] [address] )  Comments:  Still no PFT\"s, pt states she went and they stopped study d/t \"low oxygen level\", needs pulm eval - referral placed  Assessment & Plan:  Discussed emphysema vs chronic bronchitis as pt states \"no one is listening, I'm not short of breath, its all this phlegm and congestion\", PFT\"s ordered but not yet done, urged again to quit smoking, will rx with another round of Prednisone and Doxy, con't Advair and albuterol, added LAMA, referral to Pulm placed, to ED with new/worse symptoms    Orders:  -     Ambulatory referral to Pulmonology    3   Uncontrolled type 2 diabetes mellitus with hyperglycemia (HCC)  Assessment & Plan:  Again SE of Prednisone reviewed with regards to her DM and SE of hyperglycemia -  Urged to check sugars more frequently and call if consistently> 250, con't " "current insulin regimen for now  Lab Results   Component Value Date    HGBA1C 6 4 (H) 01/05/2023         4  Chronic cough  Comments:  Likely chronic bronchitis - awaiting PFT's, urged to quit smoking, Tessalon Perles sent, cough resulting in R back pain - lidocaine patch refilled  Orders:  -     benzonatate (TESSALON PERLES) 100 mg capsule; Take 1 capsule (100 mg total) by mouth 3 (three) times a day as needed for cough  -     Lidocaine 4 % PTCH; Apply 1 patch topically in the morning             Subjective      HPI Pt here for an acute visit    Pt noting R thoracic pain for a few days  She has been sick on and off with resp infection  She states pain is worse with deep breath and cough  She con't to cough and has SOB with exertion which is at baseline  She is using Advair 1 puff bid and rinsing after use  She has albuterol and uses it twice a day  She has Tessalon Perles but she sates she couldn't afford them  She states she has not smoked in the past 2 days  She notes some intermittent fevers and chills  She has a lot of nasal congestion and runny nose  She notes no ST/ear pain  Pt went for PFT\"s yesterday but they sent her home as \"my oxygen level was too low\"  Review of Systems   Constitutional: Positive for chills and fever  HENT: Positive for congestion and rhinorrhea  Negative for ear pain and sore throat  Eyes: Negative for discharge and redness  Respiratory: Positive for cough, shortness of breath and wheezing  Cardiovascular: Negative for chest pain and palpitations  Gastrointestinal: Negative for diarrhea, nausea and vomiting  Genitourinary: Negative for difficulty urinating and dysuria  Musculoskeletal: Positive for back pain  Skin: Negative for rash and wound  Neurological: Positive for headaches  Hematological: Does not bruise/bleed easily         Current Outpatient Medications on File Prior to Visit   Medication Sig   • acetaminophen (TYLENOL) 325 mg tablet TAKE " TWO TABLETS BY MOUTH EVERY 6 HOURS AS NEEDED FOR MILD PAIN   • albuterol (PROVENTIL HFA,VENTOLIN HFA) 90 mcg/act inhaler INHALE 1 PUFF BY MOUTH EVERY 4 HOURS AS NEEDED FOR wheezing   • Allergy Relief 10 MG tablet Take 1 tablet (10 mg total) by mouth 2 (two) times a day (Patient taking differently: Take 10 mg by mouth if needed)   • ascorbic acid (VITAMIN C) 500 mg tablet TAKE ONE TABLET BY MOUTH TWICE DAILY   • atorvastatin (LIPITOR) 20 mg tablet TAKE 1 TABLET BY MOUTH EVERY EVENING   • Blood Glucose Monitoring Suppl (FreeStyle Lite) w/Device KIT Use 1 kit in the morning   • Blood Pressure Monitoring (Blood Pressure Cuff) MISC Use daily as needed (dizziness)   • Diclofenac Sodium (VOLTAREN) 1 % Apply 4 g topically 4 (four) times a day   • dicyclomine (BENTYL) 20 mg tablet Take 1 tablet (20 mg total) by mouth every 6 (six) hours as needed (abdominal pain)   • Emgality 120 MG/ML SOAJ INJECT ONE pen SUBCUTANEOUSLY EACH MONTH   • famotidine (PEPCID) 20 mg tablet Take 1 tablet (20 mg total) by mouth 2 (two) times a day   • Ferrous Sulfate (Iron) 325 (65 Fe) MG TABS Take 1 tablet by mouth 2 (two) times a day with meals   • fluticasone (FLONASE) 50 mcg/act nasal spray 2 sprays into each nostril daily   • Fluticasone-Salmeterol (Advair Diskus) 100-50 mcg/dose inhaler Inhale 1 puff 2 (two) times a day Rinse mouth after use     • folic acid (FOLVITE) 1 mg tablet Take 2 tablets (2 mg total) by mouth daily at bedtime (Patient not taking: Reported on 3/15/2023)   • gabapentin (NEURONTIN) 600 MG tablet TAKE 1 TABLET BY MOUTH THREE TIMES DAILY   • Galcanezumab-gnlm (Emgality) 120 MG/ML SOSY Inject 120 mg under the skin every 30 (thirty) days Once a month   • glipiZIDE (GLUCOTROL XL) 5 mg 24 hr tablet Take 1 tablet (5 mg total) by mouth daily   • glucose blood (FREESTYLE LITE) test strip Test once daily   • glucose blood test strip Use as instructed   • hydrOXYzine pamoate (VISTARIL) 50 mg capsule Take 1 capsule (50 mg total) by mouth 3 (three) times a day as needed for itching   • ipratropium-albuterol (DUO-NEB) 0 5-2 5 mg/3 mL nebulizer solution Take 3 mL by nebulization every 6 (six) hours as needed    • Lactobacillus Acid-Pectin (Acidophilus/Citrus Pectin) TABS Take 1 tablet by mouth daily   • Lancets (freestyle) lancets Use in the morning Test once daily   • Lancets MISC Check sugars once weekly   • leflunomide (ARAVA) 10 MG tablet Take 1 tablet (10 mg total) by mouth daily   • leucovorin (WELLCOVORIN) 10 MG tablet Take one tablet the morning after you take your methotrexate   • levothyroxine 112 mcg tablet TAKE 1 TABLET BY MOUTH DAILY   • lidocaine (XYLOCAINE) 5 % ointment Apply topically as needed for mild pain   • lisinopril (ZESTRIL) 5 mg tablet TAKE 1 TABLET BY MOUTH DAILY   • meclizine (ANTIVERT) 25 mg tablet Take 1 tablet (25 mg total) by mouth every 8 (eight) hours as needed for dizziness   • metFORMIN (GLUCOPHAGE-XR) 500 mg 24 hr tablet TAKE ONE TABLET BY MOUTH TWICE DAILY WITH MEALS   • methotrexate 2 5 MG tablet TAKE SIX TABLETS BY MOUTH ONCE A WEEK WITH FOOD OR AFTER EATING   • Multiple Vitamin (Tab-A-Felicia) TABS Take 1 tablet by mouth daily   • Multiple Vitamins-Minerals (multivitamin with minerals) tablet Take 1 tablet by mouth daily   • nortriptyline (PAMELOR) 75 MG capsule Take 1 capsule (75 mg total) by mouth daily at bedtime   • olopatadine (PATANOL) 0 1 % ophthalmic solution Administer 1 drop to both eyes 2 (two) times a day as needed for allergies   • ondansetron (ZOFRAN) 4 mg tablet Take 1 tablet (4 mg total) by mouth every 8 (eight) hours as needed for nausea or vomiting   • pantoprazole (PROTONIX) 40 mg tablet Take 1 tablet (40 mg total) by mouth daily at bedtime   • rimegepant sulfate (Nurtec) 75 mg TBDP Take 1 tablet (75 mg total) by mouth as needed (migraine) No more than once per day   • Stimulant Laxative 8 6-50 MG per tablet Take 1 tablet by mouth every 12 (twelve) hours   • TiZANidine (ZANAFLEX) 2 MG capsule TAKE ONE CAPSULE BY MOUTH THREE TIMES DAILY  **start FEBRUARY 18, 2023**   • triamcinolone (KENALOG) 0 1 % cream Use as directed (Patient not taking: Reported on 3/15/2023)   • venlafaxine (EFFEXOR-XR) 150 mg 24 hr capsule    • [DISCONTINUED] benzonatate (TESSALON PERLES) 100 mg capsule Take 1 capsule (100 mg total) by mouth 3 (three) times a day as needed for cough   • [DISCONTINUED] Lidocaine 4 % PTCH Apply 1 patch topically in the morning   • [DISCONTINUED] predniSONE 10 mg tablet Take 4 tablets today, then three tablets daily for 2 days, two tablets daily for 2 days, then one tablet daily for 2 days  • [DISCONTINUED] predniSONE 5 mg tablet Take 1 tablet (5 mg total) by mouth daily as needed (for arthritis flares)       Objective     /76   Pulse 100   Temp 98 2 °F (36 8 °C) (Tympanic)   Wt 77 1 kg (170 lb) Comment: pt stated  LMP  (LMP Unknown)   SpO2 92%   BMI 27 44 kg/m²     Physical Exam  Vitals and nursing note reviewed  Constitutional:       General: She is not in acute distress  Appearance: She is well-developed  She is ill-appearing  She is not diaphoretic  Comments: Chronically ill appearing   HENT:      Head: Normocephalic and atraumatic  Mouth/Throat:      Mouth: Mucous membranes are moist       Pharynx: No oropharyngeal exudate  Eyes:      General:         Right eye: No discharge  Left eye: No discharge  Conjunctiva/sclera: Conjunctivae normal    Neck:      Trachea: No tracheal deviation  Cardiovascular:      Rate and Rhythm: Normal rate and regular rhythm  Heart sounds: Normal heart sounds  No murmur heard  No friction rub  Pulmonary:      Effort: Pulmonary effort is normal  No tachypnea, accessory muscle usage or respiratory distress  Breath sounds: Examination of the right-upper field reveals wheezing, rhonchi and rales  Examination of the right-middle field reveals wheezing, rhonchi and rales   Examination of the left-lower field reveals rhonchi  Wheezing, rhonchi and rales present  Abdominal:      General: There is no distension  Musculoskeletal:      Cervical back: Neck supple  Comments: R BKA   Lymphadenopathy:      Cervical: No cervical adenopathy  Skin:     General: Skin is warm  Coloration: Skin is not pale  Findings: No erythema or rash  Neurological:      Mental Status: She is alert  Motor: No abnormal muscle tone  Psychiatric:         Behavior: Behavior is agitated         Fontenellebryant Irizarry, DO

## 2023-05-10 ENCOUNTER — TELEPHONE (OUTPATIENT)
Dept: GASTROENTEROLOGY | Facility: CLINIC | Age: 68
End: 2023-05-10

## 2023-05-10 NOTE — TELEPHONE ENCOUNTER
Procedure confirmed  Endoscopy     Via: Voice mail    Instructions given: Mail     Prep Given: N/A    Call the office if there are any questions

## (undated) DEVICE — CURITY NON-ADHERENT STRIPS: Brand: CURITY

## (undated) DEVICE — 2.5MM DRILL BIT/QC/GOLD/110MM

## (undated) DEVICE — INTENDED FOR TISSUE SEPARATION, AND OTHER PROCEDURES THAT REQUIRE A SHARP SURGICAL BLADE TO PUNCTURE OR CUT.: Brand: BARD-PARKER ® SAFETYLOCK CARBON RIB-BACK BLADES

## (undated) DEVICE — SPECIMEN CONTAINER STERILE PEEL PACK

## (undated) DEVICE — DRAPE C-ARM X-RAY

## (undated) DEVICE — STERILE BETHLEHEM PLASTIC HAND: Brand: CARDINAL HEALTH

## (undated) DEVICE — BURR OVAL 4 MM C0901

## (undated) DEVICE — GAUZE SPONGES,16 PLY: Brand: CURITY

## (undated) DEVICE — MINI BLADE ROUND TIP SHARP ON ONE SIDE

## (undated) DEVICE — DRILL KIT FOR SPEED HAND AND WRIST SYSTEM: Brand: SPEED HAND AND WRIST

## (undated) DEVICE — CUFF TOURNIQUET 18 X 4 IN QUICK CONNECT DISP 1 BLADDER

## (undated) DEVICE — TUBING SUCTION 5MM X 12 FT

## (undated) DEVICE — SUT MONOCRYL 4-0 PS-2 18 IN Y496G

## (undated) DEVICE — ACE WRAP 4 IN UNSTERILE

## (undated) DEVICE — SUT ETHILON 3-0 PS-1 18 IN 1663G

## (undated) DEVICE — GLOVE INDICATOR PI UNDERGLOVE SZ 8 BLUE

## (undated) DEVICE — OCCLUSIVE GAUZE STRIP,3% BISMUTH TRIBROMOPHENATE IN PETROLATUM BLEND: Brand: XEROFORM

## (undated) DEVICE — PADDING CAST 4 IN  COTTON STRL

## (undated) DEVICE — STANDARD SURGICAL GOWN, L: Brand: CONVERTORS

## (undated) DEVICE — TIBURON HAND DRAPE: Brand: CONVERTORS

## (undated) DEVICE — SPLINT 4 X 15 IN PRECUT SYNTHETIC

## (undated) DEVICE — ACE WRAP 3 IN STERILE

## (undated) DEVICE — KNIFE CARPAL TUNNEL DISP

## (undated) DEVICE — SUT PROLENE 4-0 PS-2 18 IN 8682G

## (undated) DEVICE — DISPOSABLE EQUIPMENT COVER: Brand: SMALL TOWEL DRAPE

## (undated) DEVICE — SCD SEQUENTIAL COMPRESSION COMFORT SLEEVE MEDIUM KNEE LENGTH: Brand: KENDALL SCD

## (undated) DEVICE — GLOVE SRG BIOGEL 7.5

## (undated) DEVICE — SYRINGE 30ML LL

## (undated) DEVICE — BURR OVAL 4 MM ZB-136

## (undated) DEVICE — SPONGE PVP SCRUB WING STERILE

## (undated) DEVICE — K-WIRE 1.6MM X 9INL SMTH XMOND/XMOND
Type: IMPLANTABLE DEVICE | Site: WRIST | Status: NON-FUNCTIONAL
Removed: 2022-01-20

## (undated) DEVICE — SUT VICRYL PLUS 1 CTB-1 36 IN VCPB947H

## (undated) DEVICE — BERKELEY 1/2" (13MM) COLLECTION SET, DISPOSABLE W/HANDLE AND TAPERED FITTING TUBING, 6 FT (183 CM): Brand: BERKELEY

## (undated) DEVICE — CHLORAPREP HI-LITE 26ML ORANGE

## (undated) DEVICE — ACE WRAP 3 IN VELCRO LATEX FREE

## (undated) DEVICE — ARM SLING: Brand: DEROYAL

## (undated) DEVICE — 2.0MM DRILL BIT WITH DEPTH MARK/QC/140MM

## (undated) DEVICE — STERILE POLYISOPRENE POWDER-FREE SURGICAL GLOVES: Brand: PROTEXIS

## (undated) DEVICE — SUT VICRYL PLUS 2-0 CTB-1 27 IN VCPB259H

## (undated) DEVICE — 3M™ STERI-DRAPE™ U-DRAPE 1015: Brand: STERI-DRAPE™

## (undated) DEVICE — NEEDLE 25G X 1 1/2

## (undated) DEVICE — ABDOMINAL PAD: Brand: DERMACEA

## (undated) DEVICE — DRAPE SHEET THREE QUARTER

## (undated) DEVICE — SUT ETHIBOND 2-0 SH-1/SH-1 30 IN X763H

## (undated) DEVICE — SUT MONOCRYL 3-0 PS-2 27 IN Y427H

## (undated) DEVICE — BETHLEHEM UNIVERSAL  MIONR EXT: Brand: CARDINAL HEALTH

## (undated) DEVICE — SUT ETHILON 4-0 PS-2 18 IN 1667H

## (undated) DEVICE — NEEDLE BLUNT 18 G X 1 1/2IN

## (undated) DEVICE — 1.5MM DRILL BIT/MQC FOR THREADED HOLE/74MM

## (undated) DEVICE — DRAPE EQUIPMENT RF WAND